# Patient Record
Sex: FEMALE | Race: WHITE | Employment: FULL TIME | URBAN - METROPOLITAN AREA
[De-identification: names, ages, dates, MRNs, and addresses within clinical notes are randomized per-mention and may not be internally consistent; named-entity substitution may affect disease eponyms.]

---

## 2016-03-08 LAB
ALBUMIN SERPL-MCNC: 4.3 G/DL
ALP BLD-CCNC: 64 U/L
ALT SERPL-CCNC: 77 U/L
ANION GAP SERPL CALCULATED.3IONS-SCNC: NORMAL MMOL/L
AST SERPL-CCNC: 45 U/L
AVERAGE GLUCOSE: NORMAL
BILIRUB SERPL-MCNC: 0.4 MG/DL (ref 0.1–1.4)
BUN BLDV-MCNC: 12 MG/DL
CALCIUM SERPL-MCNC: 9.9 MG/DL
CHLORIDE BLD-SCNC: 108 MMOL/L
CHOLESTEROL, TOTAL: 297 MG/DL
CHOLESTEROL/HDL RATIO: 6.5
CO2: 21 MMOL/L
CREAT SERPL-MCNC: 0.76 MG/DL
FOLATE: >24
GFR CALCULATED: 93
GLUCOSE BLD-MCNC: 84 MG/DL
HBA1C MFR BLD: 5.7 %
HDLC SERPL-MCNC: 46 MG/DL (ref 35–70)
LDL CHOLESTEROL CALCULATED: 190 MG/DL (ref 0–160)
POTASSIUM SERPL-SCNC: 4.5 MMOL/L
SODIUM BLD-SCNC: 143 MMOL/L
TOTAL PROTEIN: 6.9
TRIGL SERPL-MCNC: 306 MG/DL
VITAMIN B-12: 940
VLDLC SERPL CALC-MCNC: ABNORMAL MG/DL

## 2018-06-07 ENCOUNTER — HOSPITAL ENCOUNTER (OUTPATIENT)
Dept: WOMENS IMAGING | Age: 48
Discharge: HOME | End: 2018-06-07
Attending: NURSE PRACTITIONER | Admitting: NURSE PRACTITIONER

## 2018-06-07 ENCOUNTER — OFFICE VISIT (OUTPATIENT)
Dept: FAMILY MEDICINE CLINIC | Age: 48
End: 2018-06-07

## 2018-06-07 VITALS
OXYGEN SATURATION: 98 % | HEIGHT: 66 IN | HEART RATE: 62 BPM | TEMPERATURE: 98.5 F | WEIGHT: 188.8 LBS | SYSTOLIC BLOOD PRESSURE: 120 MMHG | BODY MASS INDEX: 30.34 KG/M2 | DIASTOLIC BLOOD PRESSURE: 84 MMHG

## 2018-06-07 DIAGNOSIS — R82.90 ABNORMAL URINALYSIS: ICD-10-CM

## 2018-06-07 DIAGNOSIS — N63.0 LUMP OR MASS IN BREAST: ICD-10-CM

## 2018-06-07 DIAGNOSIS — N63.23 BREAST LUMP ON LEFT SIDE AT 5 O'CLOCK POSITION: ICD-10-CM

## 2018-06-07 DIAGNOSIS — Z13.220 SCREENING FOR HYPERLIPIDEMIA: ICD-10-CM

## 2018-06-07 DIAGNOSIS — N63.21 BREAST LUMP ON LEFT SIDE AT 2 O'CLOCK POSITION: ICD-10-CM

## 2018-06-07 DIAGNOSIS — N63.21 BREAST LUMP ON LEFT SIDE AT 2 O'CLOCK POSITION: Primary | ICD-10-CM

## 2018-06-07 DIAGNOSIS — R53.82 CHRONIC FATIGUE: ICD-10-CM

## 2018-06-07 LAB
BILIRUBIN, POC: NEGATIVE
BLOOD URINE, POC: NEGATIVE
CHOLESTEROL: 245 MG/DL
CLARITY, POC: CLEAR
COLOR, POC: NORMAL
GLUCOSE URINE, POC: NEGATIVE
HDLC SERPL-MCNC: 65 MG/DL
KETONES, POC: NEGATIVE
LDL CHOLESTEROL DIRECT: 172 MG/DL
LEUKOCYTE EST, POC: NORMAL
NITRITE, POC: NEGATIVE
PH, POC: 7
PROTEIN, POC: NEGATIVE
SPECIFIC GRAVITY, POC: 1.01
TRIGL SERPL-MCNC: 120 MG/DL
TSH HIGH SENSITIVITY: 1.4 UIU/ML (ref 0.27–4.2)
UROBILINOGEN, POC: NORMAL
VITAMIN B-12: 705 PG/ML (ref 211–911)

## 2018-06-07 PROCEDURE — 99213 OFFICE O/P EST LOW 20 MIN: CPT | Performed by: NURSE PRACTITIONER

## 2018-06-07 PROCEDURE — 81002 URINALYSIS NONAUTO W/O SCOPE: CPT | Performed by: NURSE PRACTITIONER

## 2018-06-07 RX ORDER — TOPIRAMATE 50 MG/1
1 TABLET, FILM COATED ORAL 2 TIMES DAILY
Refills: 4 | COMMUNITY
Start: 2018-06-04 | End: 2018-06-25 | Stop reason: DRUGHIGH

## 2018-06-07 RX ORDER — LEVOTHYROXINE SODIUM 0.03 MG/1
1 TABLET ORAL DAILY
Refills: 2 | COMMUNITY
Start: 2018-05-19 | End: 2018-06-25 | Stop reason: SDUPTHER

## 2018-06-07 ASSESSMENT — ENCOUNTER SYMPTOMS
CHEST TIGHTNESS: 1
VOMITING: 1
NAUSEA: 1
ABDOMINAL PAIN: 1
SHORTNESS OF BREATH: 1
CONSTIPATION: 1
DIARRHEA: 1
BLOOD IN STOOL: 1
COUGH: 1

## 2018-06-07 ASSESSMENT — PATIENT HEALTH QUESTIONNAIRE - PHQ9
SUM OF ALL RESPONSES TO PHQ QUESTIONS 1-9: 0
SUM OF ALL RESPONSES TO PHQ9 QUESTIONS 1 & 2: 0
1. LITTLE INTEREST OR PLEASURE IN DOING THINGS: 0
2. FEELING DOWN, DEPRESSED OR HOPELESS: 0

## 2018-06-09 LAB — URINE CULTURE, ROUTINE: NORMAL

## 2018-06-25 ENCOUNTER — OFFICE VISIT (OUTPATIENT)
Dept: FAMILY MEDICINE CLINIC | Age: 48
End: 2018-06-25

## 2018-06-25 VITALS
DIASTOLIC BLOOD PRESSURE: 70 MMHG | SYSTOLIC BLOOD PRESSURE: 118 MMHG | WEIGHT: 188.6 LBS | HEART RATE: 58 BPM | BODY MASS INDEX: 30.67 KG/M2

## 2018-06-25 DIAGNOSIS — F51.01 PRIMARY INSOMNIA: ICD-10-CM

## 2018-06-25 DIAGNOSIS — R51.9 HEADACHE DISORDER: ICD-10-CM

## 2018-06-25 DIAGNOSIS — Z90.710 HISTORY OF HYSTERECTOMY: ICD-10-CM

## 2018-06-25 DIAGNOSIS — J44.9 CHRONIC OBSTRUCTIVE PULMONARY DISEASE, UNSPECIFIED COPD TYPE (HCC): ICD-10-CM

## 2018-06-25 DIAGNOSIS — M79.7 FIBROMYALGIA: ICD-10-CM

## 2018-06-25 DIAGNOSIS — H72.92 RUPTURED EAR DRUM, LEFT: ICD-10-CM

## 2018-06-25 DIAGNOSIS — K62.5 RECTAL BLEEDING: Primary | ICD-10-CM

## 2018-06-25 DIAGNOSIS — K58.2 IRRITABLE BOWEL SYNDROME WITH BOTH CONSTIPATION AND DIARRHEA: ICD-10-CM

## 2018-06-25 DIAGNOSIS — F25.0 SCHIZOAFFECTIVE DISORDER, BIPOLAR TYPE (HCC): ICD-10-CM

## 2018-06-25 PROBLEM — F25.9 SCHIZOAFFECTIVE DISORDER (HCC): Status: ACTIVE | Noted: 2018-06-25

## 2018-06-25 PROBLEM — E78.5 HYPERLIPIDEMIA: Status: ACTIVE | Noted: 2018-06-25

## 2018-06-25 PROCEDURE — G8926 SPIRO NO PERF OR DOC: HCPCS | Performed by: FAMILY MEDICINE

## 2018-06-25 PROCEDURE — 1036F TOBACCO NON-USER: CPT | Performed by: FAMILY MEDICINE

## 2018-06-25 PROCEDURE — 3017F COLORECTAL CA SCREEN DOC REV: CPT | Performed by: FAMILY MEDICINE

## 2018-06-25 PROCEDURE — 3023F SPIROM DOC REV: CPT | Performed by: FAMILY MEDICINE

## 2018-06-25 PROCEDURE — G8417 CALC BMI ABV UP PARAM F/U: HCPCS | Performed by: FAMILY MEDICINE

## 2018-06-25 PROCEDURE — 99214 OFFICE O/P EST MOD 30 MIN: CPT | Performed by: FAMILY MEDICINE

## 2018-06-25 PROCEDURE — G8427 DOCREV CUR MEDS BY ELIG CLIN: HCPCS | Performed by: FAMILY MEDICINE

## 2018-06-25 RX ORDER — LEVOTHYROXINE SODIUM 0.03 MG/1
25 TABLET ORAL DAILY
Qty: 30 TABLET | Refills: 2 | Status: SHIPPED | OUTPATIENT
Start: 2018-06-25 | End: 2018-09-07

## 2018-06-25 RX ORDER — VIT C/B6/B5/MAGNESIUM/HERB 173 50-5-6-5MG
1 CAPSULE ORAL DAILY
Status: ON HOLD | COMMUNITY
End: 2020-02-01 | Stop reason: ALTCHOICE

## 2018-06-25 RX ORDER — TOPIRAMATE 100 MG/1
100 TABLET, FILM COATED ORAL 2 TIMES DAILY
Qty: 60 TABLET | Refills: 2 | Status: SHIPPED | OUTPATIENT
Start: 2018-06-25 | End: 2018-09-26 | Stop reason: SDUPTHER

## 2018-06-25 RX ORDER — DICYCLOMINE HYDROCHLORIDE 10 MG/1
10 CAPSULE ORAL 3 TIMES DAILY
Qty: 90 CAPSULE | Refills: 2 | Status: SHIPPED | OUTPATIENT
Start: 2018-06-25 | End: 2018-09-26 | Stop reason: SDUPTHER

## 2018-06-25 RX ORDER — ALBUTEROL SULFATE 90 UG/1
2 AEROSOL, METERED RESPIRATORY (INHALATION) EVERY 6 HOURS PRN
Qty: 1 INHALER | Refills: 1 | Status: SHIPPED | OUTPATIENT
Start: 2018-06-25 | End: 2018-09-26 | Stop reason: SDUPTHER

## 2018-06-26 ASSESSMENT — ENCOUNTER SYMPTOMS
BLOOD IN STOOL: 1
EYES NEGATIVE: 1
CONSTIPATION: 1
HEMATOCHEZIA: 1
SHORTNESS OF BREATH: 1
DIARRHEA: 1

## 2018-06-26 ASSESSMENT — COPD QUESTIONNAIRES: COPD: 1

## 2018-07-10 ENCOUNTER — OFFICE VISIT (OUTPATIENT)
Dept: FAMILY MEDICINE CLINIC | Age: 48
End: 2018-07-10

## 2018-07-10 VITALS
WEIGHT: 179.2 LBS | TEMPERATURE: 98.2 F | BODY MASS INDEX: 28.8 KG/M2 | SYSTOLIC BLOOD PRESSURE: 120 MMHG | HEART RATE: 79 BPM | HEIGHT: 66 IN | DIASTOLIC BLOOD PRESSURE: 88 MMHG | OXYGEN SATURATION: 98 %

## 2018-07-10 DIAGNOSIS — R11.2 NAUSEA AND VOMITING, INTRACTABILITY OF VOMITING NOT SPECIFIED, UNSPECIFIED VOMITING TYPE: Primary | ICD-10-CM

## 2018-07-10 DIAGNOSIS — R42 VERTIGO: ICD-10-CM

## 2018-07-10 PROCEDURE — 99213 OFFICE O/P EST LOW 20 MIN: CPT | Performed by: FAMILY MEDICINE

## 2018-07-10 PROCEDURE — G8417 CALC BMI ABV UP PARAM F/U: HCPCS | Performed by: FAMILY MEDICINE

## 2018-07-10 PROCEDURE — 1036F TOBACCO NON-USER: CPT | Performed by: FAMILY MEDICINE

## 2018-07-10 PROCEDURE — 96372 THER/PROPH/DIAG INJ SC/IM: CPT | Performed by: FAMILY MEDICINE

## 2018-07-10 PROCEDURE — G8427 DOCREV CUR MEDS BY ELIG CLIN: HCPCS | Performed by: FAMILY MEDICINE

## 2018-07-10 PROCEDURE — 3017F COLORECTAL CA SCREEN DOC REV: CPT | Performed by: FAMILY MEDICINE

## 2018-07-10 RX ORDER — PROMETHAZINE HYDROCHLORIDE 25 MG/ML
25 INJECTION, SOLUTION INTRAMUSCULAR; INTRAVENOUS ONCE
Status: COMPLETED | OUTPATIENT
Start: 2018-07-10 | End: 2018-07-10

## 2018-07-10 RX ORDER — MECLIZINE HYDROCHLORIDE 25 MG/1
25 TABLET ORAL 3 TIMES DAILY PRN
Qty: 25 TABLET | Refills: 1 | Status: SHIPPED | OUTPATIENT
Start: 2018-07-10 | End: 2018-09-26 | Stop reason: SDUPTHER

## 2018-07-10 RX ADMIN — PROMETHAZINE HYDROCHLORIDE 25 MG: 25 INJECTION, SOLUTION INTRAMUSCULAR; INTRAVENOUS at 14:04

## 2018-07-10 ASSESSMENT — ENCOUNTER SYMPTOMS: VOMITING: 1

## 2018-07-10 NOTE — PATIENT INSTRUCTIONS
Patient Education        Epley Maneuver at Home for Vertigo: Exercises  Your Care Instructions  Vertigo is a spinning or whirling sensation when you move your head. Your doctor may have moved you in different positions to help your vertigo get better faster. This is called the Epley maneuver. Your doctor also may have asked you to do these exercises at home. Do the exercises as often as your doctor recommends. If your vertigo is getting worse, your doctor may have you change the exercise or stop it. Step 1  Step 1    1. Sit on the edge of a bed or sofa. Step 2    1. Turn your head 45 degrees in the direction your doctor told you to. This should be toward the ear that causes the most vertigo for you. In this picture, the woman is turning toward her left ear. Step 3    1. Tilt yourself backward until you are lying on your back. Your head should still be at a 45-degree turn. Your head should be about midway between looking straight ahead and looking out to your side. Hold for 30 seconds. If you have vertigo, stay in this position until it stops. Step 4    1. Turn your head 90 degrees toward the ear that has the least vertigo. In this picture, the woman is turning to the right because she has vertigo on her left side. The point of your chin should be raised and over your shoulder. Hold for 30 seconds. Step 5    1. Roll onto the side with the least vertigo. You should now be looking at the floor. Hold for 30 seconds. Follow-up care is a key part of your treatment and safety. Be sure to make and go to all appointments, and call your doctor if you are having problems. It's also a good idea to know your test results and keep a list of the medicines you take. Where can you learn more? Go to https://chpejennifereb.[employer]. org and sign in to your [employer] account. Enter U989 in the [employer]z box to learn more about \"Epley Maneuver at Home for Vertigo: Exercises. \"     If you do not have an

## 2018-07-11 ENCOUNTER — TELEPHONE (OUTPATIENT)
Dept: FAMILY MEDICINE CLINIC | Age: 48
End: 2018-07-11

## 2018-07-11 DIAGNOSIS — R42 VERTIGO: Primary | ICD-10-CM

## 2018-07-11 RX ORDER — PROMETHAZINE HYDROCHLORIDE 25 MG/1
25 TABLET ORAL EVERY 6 HOURS PRN
Qty: 20 TABLET | Refills: 0 | Status: SHIPPED | OUTPATIENT
Start: 2018-07-11 | End: 2018-09-07

## 2018-07-11 ASSESSMENT — ENCOUNTER SYMPTOMS: NAUSEA: 1

## 2018-07-11 NOTE — PROGRESS NOTES
LM on VM for patient to return call
07/10/18 1322 07/10/18 1325   BP: (!) 122/90 120/88   Site: Left Arm Left Arm   Position: Sitting Sitting   Cuff Size: Medium Adult Medium Adult   Pulse: 79    Temp: 98.2 °F (36.8 °C)    TempSrc: Oral    SpO2: 98%    Weight: 179 lb 3.2 oz (81.3 kg)    Height: 5' 5.75\" (1.67 m)      Body mass index is 29.14 kg/m². Wt Readings from Last 3 Encounters:   07/10/18 179 lb 3.2 oz (81.3 kg)   06/25/18 188 lb 9.6 oz (85.5 kg)   06/07/18 188 lb 12.8 oz (85.6 kg)     BP Readings from Last 3 Encounters:   07/10/18 120/88   06/25/18 118/70   06/07/18 120/84          No results found for this visit on 07/10/18. Assessment:       Diagnosis Orders   1. Nausea and vomiting, intractability of vomiting not specified, unspecified vomiting type  promethazine (PHENERGAN) injection 25 mg    meclizine (ANTIVERT) 25 MG tablet   2. Vertigo             Plan:      Patient did not want the Epley maneuver done    Epley maneuver demonstrated--can watch u tube video for this. Repeat 10 times per day each side. May make nauseated, so do these on empty stomach. Symptoms may recur, so re-start the exercises. Meclizine may make sleepy, so take with caution.

## 2018-07-11 NOTE — TELEPHONE ENCOUNTER
Patient said she is still feeling nauseous and dizzy, about same as yesterday. Patient is out of Zofran, but that does not help. The phenergan injection helped yesterday.   Patient inquired if you can seen something in for nausea to [address]

## 2018-07-16 PROBLEM — M54.9 CHRONIC BACK PAIN: Status: ACTIVE | Noted: 2018-07-16

## 2018-07-16 PROBLEM — K76.0 FATTY LIVER: Status: ACTIVE | Noted: 2018-07-16

## 2018-07-16 PROBLEM — G89.29 CHRONIC BACK PAIN: Status: ACTIVE | Noted: 2018-07-16

## 2018-09-07 ENCOUNTER — OFFICE VISIT (OUTPATIENT)
Dept: FAMILY MEDICINE CLINIC | Age: 48
End: 2018-09-07

## 2018-09-07 VITALS
TEMPERATURE: 98 F | HEIGHT: 66 IN | RESPIRATION RATE: 15 BRPM | BODY MASS INDEX: 28.54 KG/M2 | DIASTOLIC BLOOD PRESSURE: 70 MMHG | SYSTOLIC BLOOD PRESSURE: 118 MMHG | WEIGHT: 177.6 LBS | OXYGEN SATURATION: 95 % | HEART RATE: 61 BPM

## 2018-09-07 DIAGNOSIS — R05.9 COUGH: ICD-10-CM

## 2018-09-07 DIAGNOSIS — J32.0 RIGHT MAXILLARY SINUSITIS: Primary | ICD-10-CM

## 2018-09-07 PROCEDURE — 1036F TOBACCO NON-USER: CPT | Performed by: FAMILY MEDICINE

## 2018-09-07 PROCEDURE — 99213 OFFICE O/P EST LOW 20 MIN: CPT | Performed by: FAMILY MEDICINE

## 2018-09-07 PROCEDURE — G8417 CALC BMI ABV UP PARAM F/U: HCPCS | Performed by: FAMILY MEDICINE

## 2018-09-07 PROCEDURE — G8427 DOCREV CUR MEDS BY ELIG CLIN: HCPCS | Performed by: FAMILY MEDICINE

## 2018-09-07 PROCEDURE — 3017F COLORECTAL CA SCREEN DOC REV: CPT | Performed by: FAMILY MEDICINE

## 2018-09-07 RX ORDER — LEVOTHYROXINE SODIUM 0.05 MG/1
50 TABLET ORAL DAILY
COMMUNITY
End: 2018-09-26 | Stop reason: SDUPTHER

## 2018-09-07 NOTE — PROGRESS NOTES
OFFICE VISIT      Patient ID: Wade Braun [id number]  Chief Complaint   Patient presents with    Sinusitis     sinsus pressure, congestion. Has tried flonase and allergy medication. ongoing for a few days. cough       HPI . HPI per chief complaint    Review of Systems   Constitutional: Negative for chills, diaphoresis and fever. .  ROS per HPI. ROS is otherwise negative    Patient Active Problem List   Diagnosis    Hx of cardiovascular stress test    Palpitation    H/O 24 hour EKG monitoring    Fibromyalgia    Chronic obstructive pulmonary disease (HCC)    [facility] [address]    History of hysterectomy    Hyperlipidemia    Fatty liver    Chronic back pain       Past Medical History:   Diagnosis Date    COPD (chronic obstructive pulmonary disease) ([facility] [address] 10/2013    GERD (gastroesophageal reflux disease)     H/O 24 hour EKG monitoring 08/05/14-09/03/14    30 DAY EVENT MONITOR-Normal sinus rhythm    H/O fibromyalgia     History of 24 hour EKG monitoring 6/30/14    Sinus rhythm. Isolated  premature atrial contract. and premature ventricular contraction. Sinus tachycardia present.  History of echocardiogram 7/1/14    EF 55% Mild left ventricular hypertrophy noted. Mild mitral and TR noted. The patient is bradycardic during the study.  Hx of cardiovascular stress test 6/30/14    Lexiscan: EF 73% Negative electrocardiogram suggestive for ischemia.     Hypertension     Spastic paraparesis        Past Surgical History:   Procedure Laterality Date    BACK SURGERY      CARDIAC CATHETERIZATION      CORONARY ANGIOPLASTY      HYSTERECTOMY      INGUINAL HERNIA REPAIR Right     INNER EAR SURGERY Left     TUBAL LIGATION         Family History   Problem Relation Age of Onset    Heart Disease Mother     Heart Disease Father     Diabetes Sister     Heart Disease Sister     Kidney Disease Sister     Breast Cancer Maternal Grandmother     Breast Cancer Paternal Aunt         2 aunts    Ovarian Cancer Paternal Aunt          in her 29's       Current Outpatient Prescriptions on File Prior to Visit   Medication Sig Dispense Refill    Multiple Vitamins-Minerals (MULTIPLE VITAMINS/WOMENS PO) Take by mouth      topiramate (TOPAMAX) 100 MG tablet Take 1 tablet by mouth 2 times daily 60 tablet 2    dicyclomine (BENTYL) 10 MG capsule Take 1 capsule by mouth 3 times daily 90 capsule 2    albuterol sulfate  (90 Base) MCG/ACT inhaler Inhale 2 puffs into the lungs every 6 hours as needed for Wheezing 1 Inhaler 1    metoprolol tartrate (LOPRESSOR) 25 MG tablet Take 1 tablet by mouth 2 times daily 60 tablet 2    meclizine (ANTIVERT) 25 MG tablet Take 1 tablet by mouth 3 times daily as needed for Dizziness 25 tablet 1    Turmeric 500 MG CAPS Take by mouth       No current facility-administered medications on file prior to visit. Objective:   Physical Exam   Constitutional: She appears well-developed and well-nourished. No distress. HENT:   Head: Normocephalic and atraumatic. Right Ear: Tympanic membrane and external ear normal.   Left Ear: Tympanic membrane and external ear normal.   Nose: No mucosal edema, rhinorrhea, nose lacerations, sinus tenderness or nasal deformity. Right sinus exhibits maxillary sinus tenderness. Right sinus exhibits no frontal sinus tenderness. Left sinus exhibits maxillary sinus tenderness. Left sinus exhibits no frontal sinus tenderness. Mouth/Throat: Oropharynx is clear and moist and mucous membranes are normal. No oropharyngeal exudate, posterior oropharyngeal edema or posterior oropharyngeal erythema. Neck: Neck supple. No tracheal deviation present. No thyromegaly present. Cardiovascular: Normal rate, regular rhythm, S1 normal, S2 normal and normal heart sounds. Exam reveals no gallop and no friction rub. Pulmonary/Chest: Effort normal and breath sounds normal. No respiratory distress. She has no wheezes. She has no rales.

## 2018-09-07 NOTE — PATIENT INSTRUCTIONS
all appointments, and call your doctor if you are having problems. It's also a good idea to know your test results and keep a list of the medicines you take. How can you care for yourself at home? · You can buy premixed saline solution in a squeeze bottle or other sinus rinse products at a drugstore. Read and follow the instructions on the label. · You also can make your own saline solution by adding 1 teaspoon of salt and 1 teaspoon of baking soda to 2 cups of distilled water. · If you use a homemade solution, pour a small amount into a clean bowl. Using a rubber bulb syringe, squeeze the syringe and place the tip in the salt water. Pull a small amount of the salt water into the syringe by relaxing your hand. · Sit down with your head tilted slightly back. Do not lie down. Put the tip of the bulb syringe or the squeeze bottle a little way into one of your nostrils. Gently drip or squirt a few drops into the nostril. Repeat with the other nostril. Some sneezing and gagging are normal at first.  · Gently blow your nose. · Wipe the syringe or bottle tip clean after each use. · Repeat this 2 or 3 times a day. · Use nasal washes gently if you have nosebleeds often. When should you call for help? Watch closely for changes in your health, and be sure to contact your doctor if:    · You often get nosebleeds.     · You have problems doing the nasal washes. Where can you learn more? Go to [email or web address]://[email or web address]peWriter.ly.[employer]. org and sign in to your [employer] account. Enter [phone] in the [facility] box to learn more about \"Saline Nasal Washes: Care Instructions. \"     If you do not have an account, please click on the \"Sign Up Now\" link. Current as of: May 12, 2017  Content Version: 11.7  © 6937-7705 [employer]. Care instructions adapted under license by [facility] ([facility]).  If you have questions about a medical condition or this instruction, always ask your healthcare professional. Juan C

## 2018-09-10 ENCOUNTER — TELEPHONE (OUTPATIENT)
Dept: FAMILY MEDICINE CLINIC | Age: 48
End: 2018-09-10

## 2018-09-10 DIAGNOSIS — J01.00 ACUTE MAXILLARY SINUSITIS, RECURRENCE NOT SPECIFIED: Primary | ICD-10-CM

## 2018-09-10 RX ORDER — CLINDAMYCIN HYDROCHLORIDE 300 MG/1
300 CAPSULE ORAL 3 TIMES DAILY
Qty: 30 CAPSULE | Refills: 0 | Status: SHIPPED | OUTPATIENT
Start: 2018-09-10 | End: 2018-09-20

## 2018-09-10 RX ORDER — LEVOFLOXACIN 500 MG/1
500 TABLET, FILM COATED ORAL DAILY
Qty: 10 TABLET | Refills: 0 | Status: SHIPPED | OUTPATIENT
Start: 2018-09-10 | End: 2018-09-10

## 2018-09-10 NOTE — TELEPHONE ENCOUNTER
Patient called back and said she does not have prescription coverage and the Levaquin cost $120 at [facility]. Patient would like alternative. Patient stated she can go to [facility] if there are any free antibiotic that would work for her.

## 2018-09-11 ENCOUNTER — TELEPHONE (OUTPATIENT)
Dept: FAMILY MEDICINE CLINIC | Age: 48
End: 2018-09-11

## 2018-09-26 ENCOUNTER — OFFICE VISIT (OUTPATIENT)
Dept: FAMILY MEDICINE CLINIC | Age: 48
End: 2018-09-26
Payer: MEDICARE

## 2018-09-26 VITALS
BODY MASS INDEX: 27.9 KG/M2 | HEART RATE: 88 BPM | WEIGHT: 173.6 LBS | HEIGHT: 66 IN | SYSTOLIC BLOOD PRESSURE: 142 MMHG | DIASTOLIC BLOOD PRESSURE: 90 MMHG

## 2018-09-26 DIAGNOSIS — R42 VERTIGO: ICD-10-CM

## 2018-09-26 DIAGNOSIS — R51.9 HEADACHE DISORDER: ICD-10-CM

## 2018-09-26 DIAGNOSIS — J41.0 SIMPLE CHRONIC BRONCHITIS (HCC): ICD-10-CM

## 2018-09-26 DIAGNOSIS — K58.2 IRRITABLE BOWEL SYNDROME WITH BOTH CONSTIPATION AND DIARRHEA: ICD-10-CM

## 2018-09-26 DIAGNOSIS — I10 ESSENTIAL HYPERTENSION: ICD-10-CM

## 2018-09-26 DIAGNOSIS — J44.9 CHRONIC OBSTRUCTIVE PULMONARY DISEASE, UNSPECIFIED COPD TYPE (HCC): Primary | ICD-10-CM

## 2018-09-26 DIAGNOSIS — E03.9 ACQUIRED HYPOTHYROIDISM: ICD-10-CM

## 2018-09-26 DIAGNOSIS — Z87.891 FORMER TOBACCO USE: ICD-10-CM

## 2018-09-26 DIAGNOSIS — Z23 NEED FOR INFLUENZA VACCINATION: ICD-10-CM

## 2018-09-26 PROBLEM — G89.29 CHRONIC LOW BACK PAIN: Status: ACTIVE | Noted: 2018-09-26

## 2018-09-26 PROBLEM — M54.50 CHRONIC LOW BACK PAIN: Status: ACTIVE | Noted: 2018-09-26

## 2018-09-26 PROCEDURE — G0008 ADMIN INFLUENZA VIRUS VAC: HCPCS | Performed by: FAMILY MEDICINE

## 2018-09-26 PROCEDURE — G8427 DOCREV CUR MEDS BY ELIG CLIN: HCPCS | Performed by: FAMILY MEDICINE

## 2018-09-26 PROCEDURE — 3023F SPIROM DOC REV: CPT | Performed by: FAMILY MEDICINE

## 2018-09-26 PROCEDURE — G8417 CALC BMI ABV UP PARAM F/U: HCPCS | Performed by: FAMILY MEDICINE

## 2018-09-26 PROCEDURE — 99214 OFFICE O/P EST MOD 30 MIN: CPT | Performed by: FAMILY MEDICINE

## 2018-09-26 PROCEDURE — 90686 IIV4 VACC NO PRSV 0.5 ML IM: CPT | Performed by: FAMILY MEDICINE

## 2018-09-26 PROCEDURE — 3017F COLORECTAL CA SCREEN DOC REV: CPT | Performed by: FAMILY MEDICINE

## 2018-09-26 PROCEDURE — G8926 SPIRO NO PERF OR DOC: HCPCS | Performed by: FAMILY MEDICINE

## 2018-09-26 PROCEDURE — 1036F TOBACCO NON-USER: CPT | Performed by: FAMILY MEDICINE

## 2018-09-26 RX ORDER — AMLODIPINE BESYLATE 10 MG/1
10 TABLET ORAL DAILY
Qty: 30 TABLET | Refills: 3 | Status: ON HOLD | OUTPATIENT
Start: 2018-09-26 | End: 2020-02-01

## 2018-09-26 RX ORDER — LEVOTHYROXINE SODIUM 0.05 MG/1
50 TABLET ORAL DAILY
Qty: 30 TABLET | Refills: 3 | Status: SHIPPED | OUTPATIENT
Start: 2018-09-26

## 2018-09-26 RX ORDER — MECLIZINE HYDROCHLORIDE 25 MG/1
25 TABLET ORAL 3 TIMES DAILY PRN
Qty: 25 TABLET | Refills: 1 | Status: SHIPPED | OUTPATIENT
Start: 2018-09-26 | End: 2020-03-10 | Stop reason: CLARIF

## 2018-09-26 RX ORDER — DICYCLOMINE HYDROCHLORIDE 10 MG/1
10 CAPSULE ORAL 3 TIMES DAILY
Qty: 90 CAPSULE | Refills: 3 | Status: SHIPPED | OUTPATIENT
Start: 2018-09-26

## 2018-09-26 RX ORDER — ALBUTEROL SULFATE 90 UG/1
2 AEROSOL, METERED RESPIRATORY (INHALATION) EVERY 6 HOURS PRN
Qty: 1 INHALER | Refills: 1 | Status: SHIPPED | OUTPATIENT
Start: 2018-09-26 | End: 2018-10-11 | Stop reason: CLARIF

## 2018-09-26 RX ORDER — TOPIRAMATE 100 MG/1
100 TABLET, FILM COATED ORAL 2 TIMES DAILY
Qty: 60 TABLET | Refills: 3 | Status: SHIPPED | OUTPATIENT
Start: 2018-09-26 | End: 2021-06-10 | Stop reason: CLARIF

## 2018-09-26 RX ORDER — FLUTICASONE PROPIONATE 220 UG/1
2 AEROSOL, METERED RESPIRATORY (INHALATION) 2 TIMES DAILY
Qty: 1 INHALER | Refills: 3 | Status: SHIPPED | OUTPATIENT
Start: 2018-09-26 | End: 2019-01-15 | Stop reason: ALTCHOICE

## 2018-09-26 ASSESSMENT — COPD QUESTIONNAIRES: COPD: 1

## 2018-09-26 ASSESSMENT — ENCOUNTER SYMPTOMS
DIARRHEA: 0
CHEST TIGHTNESS: 1
CONSTIPATION: 0
SHORTNESS OF BREATH: 1

## 2018-09-26 NOTE — PROGRESS NOTES
Vaccine Information Sheet, \"Influenza - Inactivated\"  given to [facility], or parent/legal guardian of  [facility] and verbalized understanding. Patient responses:    Have you ever had a reaction to a flu vaccine? No  Are you able to eat eggs without adverse effects? Yes  Do you have any current illness? No  Have you ever had [place] Syndrome? No    Flu vaccine given per order. Please see immunization tab.

## 2018-09-26 NOTE — PROGRESS NOTES
Chronic back pain    Chronic low back pain    Acquired hypothyroidism    Former tobacco use    Simple chronic bronchitis (HCC)       Past Medical History:   Diagnosis Date    COPD (chronic obstructive pulmonary disease) ([facility] [address] 10/2013    GERD (gastroesophageal reflux disease)     H/O 24 hour EKG monitoring 14-14    30 DAY EVENT MONITOR-Normal sinus rhythm    H/O fibromyalgia     History of 24 hour EKG monitoring 14    Sinus rhythm. Isolated  premature atrial contract. and premature ventricular contraction. Sinus tachycardia present.  History of echocardiogram 14    EF 55% Mild left ventricular hypertrophy noted. Mild mitral and TR noted. The patient is bradycardic during the study.  Hx of cardiovascular stress test 14    Lexiscan: EF 73% Negative electrocardiogram suggestive for ischemia.  Hypertension     Spastic paraparesis        Past Surgical History:   Procedure Laterality Date    BACK SURGERY      CARDIAC CATHETERIZATION      CORONARY ANGIOPLASTY      HYSTERECTOMY      INGUINAL HERNIA REPAIR Right     INNER EAR SURGERY Left     TUBAL LIGATION         Family History   Problem Relation Age of Onset    Heart Disease Mother     Heart Disease Father     Diabetes Sister     Heart Disease Sister     Kidney Disease Sister     Breast Cancer Maternal Grandmother     Breast Cancer Paternal Aunt         2 aunts    Ovarian Cancer Paternal Aunt          in her 29's       Current Outpatient Prescriptions on File Prior to Visit   Medication Sig Dispense Refill    Multiple Vitamins-Minerals (MULTIPLE VITAMINS/WOMENS PO) Take by mouth      Turmeric 500 MG CAPS Take by mouth       No current facility-administered medications on file prior to visit.                     Objective:   Physical Exam  Vitals:    18 1007 18 1012   BP: (!) 142/90 (!) 142/90   Pulse: 88    Weight: 173 lb 9.6 oz (78.7 kg)    Height: 5' 5.5\" (1.664 m)      Body mass index is 28.45 kg/m². Wt Readings from Last 3 Encounters:   09/26/18 173 lb 9.6 oz (78.7 kg)   09/07/18 177 lb 9.6 oz (80.6 kg)   07/10/18 179 lb 3.2 oz (81.3 kg)     BP Readings from Last 3 Encounters:   09/26/18 (!) 142/90   09/07/18 118/70   07/10/18 120/88          No results found for this visit on 09/26/18.   The 10-year ASCVD risk score (Tyson Alvarez, et al., 2013) is: 1.6%    Values used to calculate the score:      Age: 46 years      Sex: Female      Is Non- : No      Diabetic: No      Tobacco smoker: No      Systolic Blood Pressure: 859 mmHg      Is BP treated: No      HDL Cholesterol: 65 MG/DL      Total Cholesterol: 245 MG/DL  Lab Review   Abstract on 07/17/2018   Component Date Value    Cholesterol, Total 03/08/2016 297     HDL 03/08/2016 46     LDL Calculated 03/08/2016 190*    Triglycerides 03/08/2016 306     Chol/HDL Ratio 03/08/2016 6.5     Sodium 03/08/2016 143     Chloride 03/08/2016 108     Potassium 03/08/2016 4.5     BUN 03/08/2016 12     CREATININE 03/08/2016 0.76     Glucose 03/08/2016 84     AST 03/08/2016 45     ALT 03/08/2016 77     Calcium 03/08/2016 9.9     Total Protein 03/08/2016 6.9     CO2 03/08/2016 21     Alb 03/08/2016 4.3     Alkaline Phosphatase 03/08/2016 64     Total Bilirubin 03/08/2016 0.4     Gfr Calculated 03/08/2016 93     Hemoglobin A1C 03/08/2016 5.7     Vitamin B-12 03/08/2016 940     Folate 03/08/2016 >24.0    Hospital Outpatient Visit on 06/07/2018   Component Date Value    Vitamin B-12 06/07/2018 705.0     TSH, High Sensitivity 06/07/2018 1.400     Triglycerides 06/07/2018 120     Cholesterol 06/07/2018 245*    HDL 06/07/2018 65     LDL Direct 06/07/2018 172*   Office Visit on 06/07/2018   Component Date Value    Urine Culture, Routine 06/07/2018 No growth at 18-36 hours     Color, UA 06/07/2018 Pale Yellow     Clarity, UA 06/07/2018 Clear     Glucose, UA POC 06/07/2018 Negative     Bilirubin, UA 06/07/2018 Negative     Urine 05/23/2018 NEGATIVE     Specific [place], UA 05/23/2018 1.006     Blood, Urine 05/23/2018 NEGATIVE     pH, Urine 05/23/2018 7.0     Protein, UA 05/23/2018 NEGATIVE     Urobilinogen, Urine 05/23/2018 NORMAL     Nitrite Urine, Quantitat* 05/23/2018 NEGATIVE     Leukocyte Esterase, Urine 05/23/2018 MODERATE*    RBC, UA 05/23/2018 NONE SEEN     WBC, UA 05/23/2018 6*    Bacteria, UA 05/23/2018 RARE*    Squam Epithel, UA 05/23/2018 3     Trichomonas, UA 05/23/2018 NONE SEEN     Ventricular Rate 05/23/2018 59     Atrial Rate 05/23/2018 59     P-R Interval 05/23/2018 186     QRS Duration 05/23/2018 96     Q-T Interval 05/23/2018 414     QTc Calculation (Bazett) 05/23/2018 409     P Axis 05/23/2018 32     R Axis 05/23/2018 8     T Axis 05/23/2018 28     Diagnosis 05/23/2018                      Value:Sinus bradycardia  Otherwise normal ECG  No previous ECGs available  Confirmed by Chacha Lima MD, Cici Qureshi ([id number]) on 5/24/2018 10:34:55 AM             Assessment:       Diagnosis Orders   1. Chronic obstructive pulmonary disease, unspecified COPD type (HCC)  fluticasone (FLOVENT HFA) 220 MCG/ACT inhaler    albuterol sulfate  (90 Base) MCG/ACT inhaler   2. Need for influenza vaccination  INFLUENZA, QUADV, 3 YRS AND OLDER, IM, PF, PREFILL SYR OR SDV, 0.5ML (FLUZONE QUADV, PF)   3. Acquired hypothyroidism  levothyroxine (SYNTHROID) 50 MCG tablet   4. Former tobacco use     5. Simple chronic bronchitis ([address]     6. Headache disorder  topiramate (TOPAMAX) 100 MG tablet   7. Irritable bowel syndrome with both constipation and diarrhea  dicyclomine (BENTYL) 10 MG capsule   8. Vertigo  meclizine (ANTIVERT) 25 MG tablet   9. Essential hypertension  amLODIPine (NORVASC) 10 MG tablet         Plan:      See orders    Blood pressure is high today. I'm switching her from beta blocker to amlodipine. The beta blocker may have been contributing to her COPD symptoms    COPD poorly controlled.   Patient stopped

## 2018-10-09 ENCOUNTER — OFFICE VISIT (OUTPATIENT)
Dept: FAMILY MEDICINE CLINIC | Age: 48
End: 2018-10-09
Payer: MEDICARE

## 2018-10-09 VITALS
WEIGHT: 169 LBS | HEART RATE: 83 BPM | BODY MASS INDEX: 27.16 KG/M2 | HEIGHT: 66 IN | SYSTOLIC BLOOD PRESSURE: 120 MMHG | DIASTOLIC BLOOD PRESSURE: 74 MMHG

## 2018-10-09 DIAGNOSIS — M54.2 NECK PAIN ON LEFT SIDE: ICD-10-CM

## 2018-10-09 DIAGNOSIS — I10 ESSENTIAL HYPERTENSION: Primary | ICD-10-CM

## 2018-10-09 PROCEDURE — 99213 OFFICE O/P EST LOW 20 MIN: CPT | Performed by: FAMILY MEDICINE

## 2018-10-09 RX ORDER — HYDROCODONE BITARTRATE AND ACETAMINOPHEN 5; 325 MG/1; MG/1
1 TABLET ORAL EVERY 6 HOURS PRN
Qty: 20 TABLET | Refills: 0 | Status: SHIPPED | OUTPATIENT
Start: 2018-10-09 | End: 2018-10-14

## 2018-10-09 RX ORDER — IBUPROFEN 200 MG
800 TABLET ORAL EVERY 6 HOURS PRN
COMMUNITY
End: 2019-01-15

## 2018-10-09 RX ORDER — BACLOFEN 20 MG/1
20 TABLET ORAL 3 TIMES DAILY
Qty: 40 TABLET | Refills: 0 | Status: SHIPPED | OUTPATIENT
Start: 2018-10-09 | End: 2018-10-23 | Stop reason: SDUPTHER

## 2018-10-09 ASSESSMENT — ENCOUNTER SYMPTOMS: SHORTNESS OF BREATH: 0

## 2018-10-11 DIAGNOSIS — J41.0 SIMPLE CHRONIC BRONCHITIS (HCC): Primary | ICD-10-CM

## 2018-10-16 ENCOUNTER — OFFICE VISIT (OUTPATIENT)
Dept: FAMILY MEDICINE CLINIC | Age: 48
End: 2018-10-16
Payer: MEDICARE

## 2018-10-16 VITALS
BODY MASS INDEX: 27.96 KG/M2 | HEART RATE: 69 BPM | OXYGEN SATURATION: 99 % | WEIGHT: 170.6 LBS | DIASTOLIC BLOOD PRESSURE: 88 MMHG | TEMPERATURE: 98 F | SYSTOLIC BLOOD PRESSURE: 138 MMHG

## 2018-10-16 DIAGNOSIS — M54.50 ACUTE RIGHT-SIDED LOW BACK PAIN WITHOUT SCIATICA: Primary | ICD-10-CM

## 2018-10-16 DIAGNOSIS — Z98.890 HISTORY OF BACK SURGERY: ICD-10-CM

## 2018-10-16 PROCEDURE — G8417 CALC BMI ABV UP PARAM F/U: HCPCS | Performed by: FAMILY MEDICINE

## 2018-10-16 PROCEDURE — G8427 DOCREV CUR MEDS BY ELIG CLIN: HCPCS | Performed by: FAMILY MEDICINE

## 2018-10-16 PROCEDURE — 1036F TOBACCO NON-USER: CPT | Performed by: FAMILY MEDICINE

## 2018-10-16 PROCEDURE — 96372 THER/PROPH/DIAG INJ SC/IM: CPT | Performed by: FAMILY MEDICINE

## 2018-10-16 PROCEDURE — 3017F COLORECTAL CA SCREEN DOC REV: CPT | Performed by: FAMILY MEDICINE

## 2018-10-16 PROCEDURE — 99213 OFFICE O/P EST LOW 20 MIN: CPT | Performed by: FAMILY MEDICINE

## 2018-10-16 PROCEDURE — G8482 FLU IMMUNIZE ORDER/ADMIN: HCPCS | Performed by: FAMILY MEDICINE

## 2018-10-16 RX ORDER — KETOROLAC TROMETHAMINE 30 MG/ML
60 INJECTION, SOLUTION INTRAMUSCULAR; INTRAVENOUS ONCE
Status: COMPLETED | OUTPATIENT
Start: 2018-10-16 | End: 2018-10-16

## 2018-10-16 RX ORDER — GABAPENTIN 300 MG/1
300 CAPSULE ORAL NIGHTLY
Qty: 30 CAPSULE | Refills: 0 | Status: SHIPPED | OUTPATIENT
Start: 2018-10-16 | End: 2018-10-23 | Stop reason: SDUPTHER

## 2018-10-16 RX ADMIN — KETOROLAC TROMETHAMINE 60 MG: 30 INJECTION, SOLUTION INTRAMUSCULAR; INTRAVENOUS at 09:29

## 2018-10-16 ASSESSMENT — ENCOUNTER SYMPTOMS: BACK PAIN: 1

## 2018-10-16 NOTE — PROGRESS NOTES
Normocephalic and atraumatic. Neck: Neck supple. Cardiovascular: Normal rate, regular rhythm and normal heart sounds. Pulmonary/Chest: Effort normal and breath sounds normal. No respiratory distress. She has no wheezes. Musculoskeletal:        Lumbar back: She exhibits decreased range of motion, tenderness and pain. She exhibits no bony tenderness, no swelling, no edema, no deformity and no spasm. Right lower leg: She exhibits no edema. Left lower leg: She exhibits no edema. Unable to assess strength in the hamstriings/quads/legs due to intensity of the pain    Extreme amount of pain elicited in back with asking her to push her right side towards her abdomen as part of the strength evaluation. Neurological: She is alert. Unable to assess due to patient pain. Walking very stiffly. Psychiatric: She has a normal mood and affect. Vitals:    10/16/18 0854 10/16/18 0857   BP: (!) 140/90 138/88   Pulse: 69    Temp: 98 °F (36.7 °C)    TempSrc: Oral    SpO2: 99%    Weight: 170 lb 9.6 oz (77.4 kg)      Body mass index is 27.96 kg/m². Wt Readings from Last 3 Encounters:   10/16/18 170 lb 9.6 oz (77.4 kg)   10/09/18 169 lb (76.7 kg)   09/26/18 173 lb 9.6 oz (78.7 kg)     BP Readings from Last 3 Encounters:   10/16/18 138/88   10/09/18 120/74   09/26/18 (!) 142/90          No results found for this visit on 10/16/18.   The 10-year ASCVD risk score (Pamela Huerta et al., 2013) is: 2.1%    Values used to calculate the score:      Age: 46 years      Sex: Female      Is Non- : No      Diabetic: No      Tobacco smoker: No      Systolic Blood Pressure: 960 mmHg      Is BP treated: Yes      HDL Cholesterol: 65 MG/DL      Total Cholesterol: 245 MG/DL  Lab Review   Abstract on 07/17/2018   Component Date Value    Cholesterol, Total 03/08/2016 297     HDL 03/08/2016 46     LDL Calculated 03/08/2016 190*    Triglycerides 03/08/2016 306     Chol/HDL Ratio 03/08/2016 6.5     ECGs available  Confirmed by [facility] GAY ABRAMS 19 ([id number]) on 5/24/2018 10:34:55 AM             Assessment:       Diagnosis Orders   1. Acute right-sided low back pain without sciatica  XR LUMBAR SPINE (MIN 4 VIEWS)    gabapentin (NEURONTIN) 300 MG capsule    ketorolac (TORADOL) injection 60 mg   2. History of back surgery  XR LUMBAR SPINE (MIN 4 VIEWS)    XR HIP RIGHT MIN 4VW W PELVIS           Plan:      See orders    Recheck in one week.   Still awaiting

## 2018-10-17 ENCOUNTER — HOSPITAL ENCOUNTER (OUTPATIENT)
Dept: GENERAL RADIOLOGY | Age: 48
Discharge: HOME | End: 2018-10-17
Payer: MEDICARE

## 2018-10-17 ENCOUNTER — HOSPITAL ENCOUNTER (OUTPATIENT)
Age: 48
Discharge: HOME | End: 2018-10-17
Payer: MEDICARE

## 2018-10-17 DIAGNOSIS — M54.50 ACUTE RIGHT-SIDED LOW BACK PAIN WITHOUT SCIATICA: ICD-10-CM

## 2018-10-17 DIAGNOSIS — Z98.890 HISTORY OF BACK SURGERY: ICD-10-CM

## 2018-10-17 PROCEDURE — 72110 X-RAY EXAM L-2 SPINE 4/>VWS: CPT

## 2018-10-17 PROCEDURE — 73501 X-RAY EXAM HIP UNI 1 VIEW: CPT

## 2018-10-23 ENCOUNTER — OFFICE VISIT (OUTPATIENT)
Dept: FAMILY MEDICINE CLINIC | Age: 48
End: 2018-10-23
Payer: MEDICARE

## 2018-10-23 VITALS
WEIGHT: 170 LBS | SYSTOLIC BLOOD PRESSURE: 90 MMHG | BODY MASS INDEX: 27.32 KG/M2 | HEIGHT: 66 IN | HEART RATE: 74 BPM | DIASTOLIC BLOOD PRESSURE: 60 MMHG

## 2018-10-23 DIAGNOSIS — G89.29 CHRONIC RIGHT-SIDED LOW BACK PAIN WITH RIGHT-SIDED SCIATICA: Primary | ICD-10-CM

## 2018-10-23 DIAGNOSIS — M54.41 CHRONIC RIGHT-SIDED LOW BACK PAIN WITH RIGHT-SIDED SCIATICA: Primary | ICD-10-CM

## 2018-10-23 PROCEDURE — G8417 CALC BMI ABV UP PARAM F/U: HCPCS | Performed by: FAMILY MEDICINE

## 2018-10-23 PROCEDURE — 99213 OFFICE O/P EST LOW 20 MIN: CPT | Performed by: FAMILY MEDICINE

## 2018-10-23 PROCEDURE — 3017F COLORECTAL CA SCREEN DOC REV: CPT | Performed by: FAMILY MEDICINE

## 2018-10-23 PROCEDURE — 1036F TOBACCO NON-USER: CPT | Performed by: FAMILY MEDICINE

## 2018-10-23 PROCEDURE — G8427 DOCREV CUR MEDS BY ELIG CLIN: HCPCS | Performed by: FAMILY MEDICINE

## 2018-10-23 PROCEDURE — G8482 FLU IMMUNIZE ORDER/ADMIN: HCPCS | Performed by: FAMILY MEDICINE

## 2018-10-23 RX ORDER — GABAPENTIN 300 MG/1
300 CAPSULE ORAL NIGHTLY
Qty: 30 CAPSULE | Refills: 0 | Status: SHIPPED | OUTPATIENT
Start: 2018-12-16 | End: 2019-01-15

## 2018-10-23 RX ORDER — GABAPENTIN 300 MG/1
300 CAPSULE ORAL NIGHTLY
Qty: 30 CAPSULE | Refills: 0 | Status: SHIPPED | OUTPATIENT
Start: 2018-11-16 | End: 2019-01-15

## 2018-10-23 RX ORDER — BACLOFEN 20 MG/1
20 TABLET ORAL 3 TIMES DAILY
Qty: 90 TABLET | Refills: 1 | Status: ON HOLD | OUTPATIENT
Start: 2018-10-23 | End: 2020-02-01

## 2018-10-23 ASSESSMENT — ENCOUNTER SYMPTOMS: BACK PAIN: 1

## 2018-10-23 NOTE — LETTER
[address] [place] [address]  2/[birth year]      For certain conditions, multiple classes of medications may be used to help better manage your symptoms, and to improve your ability to function at home, work and in social settings. However, these medications do have risks, which will be discussed with you, including addiction and dependency. The following prescribed medications need frequent monitoring and will require you to partner and assist in your healthcare. Medication  Dose, instructions and quantity as indicated on current prescription bottle Diagnosis/Reason(s) for Taking Category     Neurontin 300 mg nightly                               Benefits and goals of Controlled Substance Medications: There are two potential goals for your treatment: (1) decreased pain and suffering (2) improved daily life functions. There are many possible treatments for your chronic condition(s), and, in addition to controlled substance medications, we will try alternatives such as physical therapy, yoga, massage, home daily exercise, meditation, relaxation techniques, injections, chiropractic manipulations, surgery, cognitive therapy, hypnosis and many medications that are not habit-forming. Use of controlled substance medications may be helpful, but they are unlikely to resolve all of your symptoms or restore all function. Risks of Controlled Substance Medications:    Opioid pain medications: These medications can lead to problems such as addiction/dependence, sedation, lightheadedness/dizziness, memory issues, falls, constipation, nausea, or vomiting. They may also impair the ability to drive or operate machinery. Additionally, these medications may lower testosterone levels, leading to loss of bone strength, stamina and sex drive.   They may cause problems with breathing, sleep apnea and reduced coughing, which are especially dangerous for patients with lung

## 2018-10-23 NOTE — PROGRESS NOTES
BACK SURGERY      CARDIAC CATHETERIZATION      CORONARY ANGIOPLASTY      HYSTERECTOMY      INGUINAL HERNIA REPAIR Right     INNER EAR SURGERY Left     TUBAL LIGATION         Family History   Problem Relation Age of Onset    Heart Disease Mother     Heart Disease Father     Diabetes Sister     Heart Disease Sister     Kidney Disease Sister     Breast Cancer Maternal Grandmother     Breast Cancer Paternal Aunt         2 aunts    Ovarian Cancer Paternal Aunt          in her 29's       Current Outpatient Prescriptions on File Prior to Visit   Medication Sig Dispense Refill    albuterol sulfate (PROAIR RESPICLICK) 988 (90 Base) MCG/ACT aerosol powder inhalation Inhale 2 puffs into the lungs every 6 hours as needed for Wheezing or Shortness of Breath 1 Inhaler 1    ibuprofen (ADVIL;MOTRIN) 200 MG tablet Take 800 mg by mouth every 6 hours as needed for Pain      baclofen (LIORESAL) 20 MG tablet Take 1 tablet by mouth 3 times daily 40 tablet 0    amLODIPine (NORVASC) 10 MG tablet Take 1 tablet by mouth daily 30 tablet 3    fluticasone (FLOVENT HFA) 220 MCG/ACT inhaler Inhale 2 puffs into the lungs 2 times daily 1 Inhaler 3    levothyroxine (SYNTHROID) 50 MCG tablet Take 1 tablet by mouth Daily 30 tablet 3    topiramate (TOPAMAX) 100 MG tablet Take 1 tablet by mouth 2 times daily 60 tablet 3    dicyclomine (BENTYL) 10 MG capsule Take 1 capsule by mouth 3 times daily 90 capsule 3    meclizine (ANTIVERT) 25 MG tablet Take 1 tablet by mouth 3 times daily as needed for Dizziness 25 tablet 1    Multiple Vitamins-Minerals (MULTIPLE VITAMINS/WOMENS PO) Take by mouth      Turmeric 500 MG CAPS Take by mouth       No current facility-administered medications on file prior to visit. Objective:   Physical Exam   Constitutional: She appears well-developed and well-nourished. HENT:   Head: Normocephalic and atraumatic. Musculoskeletal:        Lumbar back: She exhibits tenderness.  She

## 2018-11-07 DIAGNOSIS — J41.0 SIMPLE CHRONIC BRONCHITIS (HCC): ICD-10-CM

## 2018-12-17 ENCOUNTER — OFFICE VISIT (OUTPATIENT)
Dept: FAMILY MEDICINE CLINIC | Age: 48
End: 2018-12-17
Payer: MEDICARE

## 2018-12-17 VITALS
TEMPERATURE: 98.5 F | SYSTOLIC BLOOD PRESSURE: 110 MMHG | HEIGHT: 66 IN | BODY MASS INDEX: 27.38 KG/M2 | WEIGHT: 170.4 LBS | DIASTOLIC BLOOD PRESSURE: 70 MMHG | HEART RATE: 63 BPM

## 2018-12-17 DIAGNOSIS — J41.0 SIMPLE CHRONIC BRONCHITIS (HCC): Primary | ICD-10-CM

## 2018-12-17 DIAGNOSIS — J20.9 ACUTE BRONCHITIS, UNSPECIFIED ORGANISM: ICD-10-CM

## 2018-12-17 DIAGNOSIS — H72.92 PERFORATED EAR DRUM, LEFT: ICD-10-CM

## 2018-12-17 DIAGNOSIS — J44.1 COPD EXACERBATION (HCC): ICD-10-CM

## 2018-12-17 PROCEDURE — 3023F SPIROM DOC REV: CPT | Performed by: FAMILY MEDICINE

## 2018-12-17 PROCEDURE — G8482 FLU IMMUNIZE ORDER/ADMIN: HCPCS | Performed by: FAMILY MEDICINE

## 2018-12-17 PROCEDURE — G8427 DOCREV CUR MEDS BY ELIG CLIN: HCPCS | Performed by: FAMILY MEDICINE

## 2018-12-17 PROCEDURE — 3017F COLORECTAL CA SCREEN DOC REV: CPT | Performed by: FAMILY MEDICINE

## 2018-12-17 PROCEDURE — 99214 OFFICE O/P EST MOD 30 MIN: CPT | Performed by: FAMILY MEDICINE

## 2018-12-17 PROCEDURE — G8417 CALC BMI ABV UP PARAM F/U: HCPCS | Performed by: FAMILY MEDICINE

## 2018-12-17 PROCEDURE — 1036F TOBACCO NON-USER: CPT | Performed by: FAMILY MEDICINE

## 2018-12-17 PROCEDURE — G8926 SPIRO NO PERF OR DOC: HCPCS | Performed by: FAMILY MEDICINE

## 2018-12-17 RX ORDER — DOXYCYCLINE HYCLATE 100 MG
100 TABLET ORAL 2 TIMES DAILY
Qty: 14 TABLET | Refills: 0 | Status: SHIPPED | OUTPATIENT
Start: 2018-12-17 | End: 2019-01-15 | Stop reason: SDUPTHER

## 2018-12-17 RX ORDER — FLUTICASONE FUROATE AND VILANTEROL 200; 25 UG/1; UG/1
1 POWDER RESPIRATORY (INHALATION) DAILY
Qty: 1 EACH | Refills: 1 | Status: ON HOLD | OUTPATIENT
Start: 2018-12-17 | End: 2020-02-01

## 2018-12-17 ASSESSMENT — ENCOUNTER SYMPTOMS
SHORTNESS OF BREATH: 1
CHEST TIGHTNESS: 1
COUGH: 1

## 2019-01-15 ENCOUNTER — OFFICE VISIT (OUTPATIENT)
Dept: FAMILY MEDICINE CLINIC | Age: 49
End: 2019-01-15
Payer: MEDICARE

## 2019-01-15 VITALS
WEIGHT: 164.2 LBS | BODY MASS INDEX: 26.39 KG/M2 | TEMPERATURE: 98.3 F | HEART RATE: 98 BPM | HEIGHT: 66 IN | SYSTOLIC BLOOD PRESSURE: 100 MMHG | DIASTOLIC BLOOD PRESSURE: 70 MMHG

## 2019-01-15 DIAGNOSIS — H72.92 PERFORATED LEFT TYMPANIC MEMBRANE ON EXAMINATION: ICD-10-CM

## 2019-01-15 DIAGNOSIS — J20.9 ACUTE BRONCHITIS, UNSPECIFIED ORGANISM: Primary | ICD-10-CM

## 2019-01-15 PROCEDURE — 99213 OFFICE O/P EST LOW 20 MIN: CPT | Performed by: FAMILY MEDICINE

## 2019-01-15 PROCEDURE — G8427 DOCREV CUR MEDS BY ELIG CLIN: HCPCS | Performed by: FAMILY MEDICINE

## 2019-01-15 PROCEDURE — G8482 FLU IMMUNIZE ORDER/ADMIN: HCPCS | Performed by: FAMILY MEDICINE

## 2019-01-15 PROCEDURE — G8417 CALC BMI ABV UP PARAM F/U: HCPCS | Performed by: FAMILY MEDICINE

## 2019-01-15 PROCEDURE — 3017F COLORECTAL CA SCREEN DOC REV: CPT | Performed by: FAMILY MEDICINE

## 2019-01-15 PROCEDURE — 94640 AIRWAY INHALATION TREATMENT: CPT | Performed by: FAMILY MEDICINE

## 2019-01-15 PROCEDURE — 1036F TOBACCO NON-USER: CPT | Performed by: FAMILY MEDICINE

## 2019-01-15 RX ORDER — DOXYCYCLINE HYCLATE 100 MG
100 TABLET ORAL 2 TIMES DAILY
Qty: 14 TABLET | Refills: 0 | Status: SHIPPED | OUTPATIENT
Start: 2019-01-15 | End: 2019-01-22

## 2019-01-15 RX ORDER — BENZONATATE 200 MG/1
200 CAPSULE ORAL 3 TIMES DAILY PRN
Qty: 30 CAPSULE | Refills: 0 | Status: ON HOLD | OUTPATIENT
Start: 2019-01-15 | End: 2020-02-05 | Stop reason: HOSPADM

## 2019-01-15 RX ORDER — ALBUTEROL SULFATE 2.5 MG/3ML
2.5 SOLUTION RESPIRATORY (INHALATION) ONCE
Status: COMPLETED | OUTPATIENT
Start: 2019-01-15 | End: 2019-01-15

## 2019-01-15 RX ADMIN — ALBUTEROL SULFATE 2.5 MG: 2.5 SOLUTION RESPIRATORY (INHALATION) at 10:30

## 2019-01-15 RX ADMIN — ALBUTEROL SULFATE 2.5 MG: 2.5 SOLUTION RESPIRATORY (INHALATION) at 10:31

## 2019-01-15 ASSESSMENT — ENCOUNTER SYMPTOMS
CHOKING: 1
SHORTNESS OF BREATH: 1

## 2019-01-31 ENCOUNTER — HOSPITAL ENCOUNTER (EMERGENCY)
Age: 49
Discharge: HOME | End: 2019-01-31
Attending: EMERGENCY MEDICINE
Payer: MEDICARE

## 2019-01-31 ENCOUNTER — APPOINTMENT (OUTPATIENT)
Dept: GENERAL RADIOLOGY | Age: 49
End: 2019-01-31
Payer: MEDICARE

## 2019-01-31 ENCOUNTER — APPOINTMENT (OUTPATIENT)
Dept: CT IMAGING | Age: 49
End: 2019-01-31
Payer: MEDICARE

## 2019-01-31 VITALS
HEIGHT: 66 IN | TEMPERATURE: 97.9 F | WEIGHT: 160 LBS | SYSTOLIC BLOOD PRESSURE: 126 MMHG | DIASTOLIC BLOOD PRESSURE: 85 MMHG | HEART RATE: 59 BPM | OXYGEN SATURATION: 97 % | BODY MASS INDEX: 25.71 KG/M2 | RESPIRATION RATE: 12 BRPM

## 2019-01-31 DIAGNOSIS — R07.9 CHEST PAIN, UNSPECIFIED TYPE: Primary | ICD-10-CM

## 2019-01-31 LAB
ALBUMIN SERPL-MCNC: 4.6 GM/DL (ref 3.4–5)
ALP BLD-CCNC: 58 IU/L (ref 40–129)
ALT SERPL-CCNC: 14 U/L (ref 10–40)
ANION GAP SERPL CALCULATED.3IONS-SCNC: 11 MMOL/L (ref 4–16)
AST SERPL-CCNC: 19 IU/L (ref 15–37)
BASOPHILS ABSOLUTE: 0.1 K/CU MM
BASOPHILS RELATIVE PERCENT: 1.1 % (ref 0–1)
BILIRUB SERPL-MCNC: 0.3 MG/DL (ref 0–1)
BUN BLDV-MCNC: 8 MG/DL (ref 6–23)
CALCIUM SERPL-MCNC: 10.3 MG/DL (ref 8.3–10.6)
CHLORIDE BLD-SCNC: 109 MMOL/L (ref 99–110)
CO2: 23 MMOL/L (ref 21–32)
CREAT SERPL-MCNC: 0.9 MG/DL (ref 0.6–1.1)
D DIMER: 508 NG/ML(DDU)
DIFFERENTIAL TYPE: ABNORMAL
EOSINOPHILS ABSOLUTE: 0.2 K/CU MM
EOSINOPHILS RELATIVE PERCENT: 3.2 % (ref 0–3)
GFR AFRICAN AMERICAN: >60 ML/MIN/1.73M2
GFR NON-AFRICAN AMERICAN: >60 ML/MIN/1.73M2
GLUCOSE BLD-MCNC: 89 MG/DL (ref 70–99)
HCT VFR BLD CALC: 44.9 % (ref 37–47)
HEMOGLOBIN: 13.8 GM/DL (ref 12.5–16)
IMMATURE NEUTROPHIL %: 0.2 % (ref 0–0.43)
LIPASE: 24 IU/L (ref 13–60)
LYMPHOCYTES ABSOLUTE: 2.5 K/CU MM
LYMPHOCYTES RELATIVE PERCENT: 45.5 % (ref 24–44)
MCH RBC QN AUTO: 29.4 PG (ref 27–31)
MCHC RBC AUTO-ENTMCNC: 30.7 % (ref 32–36)
MCV RBC AUTO: 95.5 FL (ref 78–100)
MONOCYTES ABSOLUTE: 0.6 K/CU MM
MONOCYTES RELATIVE PERCENT: 10.1 % (ref 0–4)
NUCLEATED RBC %: 0 %
PDW BLD-RTO: 13.2 % (ref 11.7–14.9)
PLATELET # BLD: 206 K/CU MM (ref 140–440)
PMV BLD AUTO: 12.1 FL (ref 7.5–11.1)
POTASSIUM SERPL-SCNC: 3.6 MMOL/L (ref 3.5–5.1)
RBC # BLD: 4.7 M/CU MM (ref 4.2–5.4)
SEGMENTED NEUTROPHILS ABSOLUTE COUNT: 2.2 K/CU MM
SEGMENTED NEUTROPHILS RELATIVE PERCENT: 39.9 % (ref 36–66)
SODIUM BLD-SCNC: 143 MMOL/L (ref 135–145)
TOTAL IMMATURE NEUTOROPHIL: 0.01 K/CU MM
TOTAL NUCLEATED RBC: 0 K/CU MM
TOTAL PROTEIN: 7 GM/DL (ref 6.4–8.2)
TROPONIN T: <0.01 NG/ML
TROPONIN T: <0.01 NG/ML
WBC # BLD: 5.5 K/CU MM (ref 4–10.5)

## 2019-01-31 PROCEDURE — 85379 FIBRIN DEGRADATION QUANT: CPT

## 2019-01-31 PROCEDURE — 6370000000 HC RX 637 (ALT 250 FOR IP): Performed by: PHYSICIAN ASSISTANT

## 2019-01-31 PROCEDURE — 71275 CT ANGIOGRAPHY CHEST: CPT

## 2019-01-31 PROCEDURE — 2580000003 HC RX 258: Performed by: PHYSICIAN ASSISTANT

## 2019-01-31 PROCEDURE — 99285 EMERGENCY DEPT VISIT HI MDM: CPT

## 2019-01-31 PROCEDURE — 84484 ASSAY OF TROPONIN QUANT: CPT

## 2019-01-31 PROCEDURE — 93005 ELECTROCARDIOGRAM TRACING: CPT | Performed by: EMERGENCY MEDICINE

## 2019-01-31 PROCEDURE — 71046 X-RAY EXAM CHEST 2 VIEWS: CPT

## 2019-01-31 PROCEDURE — 80053 COMPREHEN METABOLIC PANEL: CPT

## 2019-01-31 PROCEDURE — 83690 ASSAY OF LIPASE: CPT

## 2019-01-31 PROCEDURE — 36415 COLL VENOUS BLD VENIPUNCTURE: CPT

## 2019-01-31 PROCEDURE — 6360000004 HC RX CONTRAST MEDICATION: Performed by: PHYSICIAN ASSISTANT

## 2019-01-31 PROCEDURE — 93010 ELECTROCARDIOGRAM REPORT: CPT | Performed by: INTERNAL MEDICINE

## 2019-01-31 PROCEDURE — 85025 COMPLETE CBC W/AUTO DIFF WBC: CPT

## 2019-01-31 RX ORDER — ONDANSETRON 4 MG/1
4 TABLET, ORALLY DISINTEGRATING ORAL ONCE
Status: COMPLETED | OUTPATIENT
Start: 2019-01-31 | End: 2019-01-31

## 2019-01-31 RX ORDER — 0.9 % SODIUM CHLORIDE 0.9 %
10 VIAL (ML) INJECTION
Status: COMPLETED | OUTPATIENT
Start: 2019-01-31 | End: 2019-01-31

## 2019-01-31 RX ADMIN — IOPAMIDOL 100 ML: 755 INJECTION, SOLUTION INTRAVENOUS at 12:23

## 2019-01-31 RX ADMIN — ONDANSETRON 4 MG: 4 TABLET, ORALLY DISINTEGRATING ORAL at 12:01

## 2019-01-31 RX ADMIN — SODIUM CHLORIDE, PRESERVATIVE FREE 10 ML: 5 INJECTION INTRAVENOUS at 12:23

## 2019-01-31 ASSESSMENT — PAIN DESCRIPTION - PAIN TYPE: TYPE: ACUTE PAIN

## 2019-01-31 ASSESSMENT — HEART SCORE: ECG: 0

## 2019-01-31 ASSESSMENT — PAIN DESCRIPTION - LOCATION: LOCATION: CHEST

## 2019-01-31 ASSESSMENT — PAIN SCALES - GENERAL: PAINLEVEL_OUTOF10: 5

## 2019-02-06 LAB
EKG ATRIAL RATE: 62 BPM
EKG DIAGNOSIS: NORMAL
EKG P AXIS: 56 DEGREES
EKG P-R INTERVAL: 188 MS
EKG Q-T INTERVAL: 402 MS
EKG QRS DURATION: 102 MS
EKG QTC CALCULATION (BAZETT): 408 MS
EKG R AXIS: 19 DEGREES
EKG T AXIS: 29 DEGREES
EKG VENTRICULAR RATE: 62 BPM

## 2019-02-24 ENCOUNTER — HOSPITAL ENCOUNTER (EMERGENCY)
Age: 49
Discharge: PSYCHIATRIC OR SUBSTANCE USE FACILITY | End: 2019-02-25
Attending: EMERGENCY MEDICINE
Payer: MEDICARE

## 2019-02-24 ENCOUNTER — APPOINTMENT (OUTPATIENT)
Dept: GENERAL RADIOLOGY | Age: 49
End: 2019-02-24
Payer: MEDICARE

## 2019-02-24 DIAGNOSIS — S52.124A CLOSED NONDISPLACED FRACTURE OF HEAD OF RIGHT RADIUS, INITIAL ENCOUNTER: Primary | ICD-10-CM

## 2019-02-24 DIAGNOSIS — F29 PSYCHOSIS, UNSPECIFIED PSYCHOSIS TYPE (HCC): ICD-10-CM

## 2019-02-24 DIAGNOSIS — Z86.59 HISTORY OF SCHIZOAFFECTIVE DISORDER: ICD-10-CM

## 2019-02-24 LAB
ACETAMINOPHEN LEVEL: <5 UG/ML (ref 15–30)
ALBUMIN SERPL-MCNC: 4.6 GM/DL (ref 3.4–5)
ALCOHOL SCREEN SERUM: <0.01 %WT/VOL
ALP BLD-CCNC: 61 IU/L (ref 40–129)
ALT SERPL-CCNC: 28 U/L (ref 10–40)
AMPHETAMINES: NEGATIVE
ANION GAP SERPL CALCULATED.3IONS-SCNC: 22 MMOL/L (ref 4–16)
AST SERPL-CCNC: 83 IU/L (ref 15–37)
BACTERIA: NEGATIVE /HPF
BARBITURATE SCREEN URINE: NEGATIVE
BASOPHILS ABSOLUTE: 0 K/CU MM
BASOPHILS RELATIVE PERCENT: 0.2 % (ref 0–1)
BENZODIAZEPINE SCREEN, URINE: NEGATIVE
BILIRUB SERPL-MCNC: 0.7 MG/DL (ref 0–1)
BILIRUBIN URINE: NEGATIVE MG/DL
BLOOD, URINE: ABNORMAL
BUN BLDV-MCNC: 27 MG/DL (ref 6–23)
CALCIUM SERPL-MCNC: 9.8 MG/DL (ref 8.3–10.6)
CANNABINOID SCREEN URINE: NEGATIVE
CHLORIDE BLD-SCNC: 98 MMOL/L (ref 99–110)
CLARITY: CLEAR
CO2: 16 MMOL/L (ref 21–32)
COCAINE METABOLITE: NEGATIVE
COLOR: YELLOW
CREAT SERPL-MCNC: 1.1 MG/DL (ref 0.6–1.1)
DIFFERENTIAL TYPE: ABNORMAL
EOSINOPHILS ABSOLUTE: 0 K/CU MM
EOSINOPHILS RELATIVE PERCENT: 0.1 % (ref 0–3)
GFR AFRICAN AMERICAN: >60 ML/MIN/1.73M2
GFR NON-AFRICAN AMERICAN: 53 ML/MIN/1.73M2
GLUCOSE BLD-MCNC: 91 MG/DL (ref 70–99)
GLUCOSE, URINE: NEGATIVE MG/DL
HCT VFR BLD CALC: 44.2 % (ref 37–47)
HEMOGLOBIN: 12.9 GM/DL (ref 12.5–16)
HYALINE CASTS: 2 /LPF
IMMATURE NEUTROPHIL %: 0.6 % (ref 0–0.43)
KETONES, URINE: ABNORMAL MG/DL
LACTATE: 1.3 MMOL/L (ref 0.4–2)
LEUKOCYTE ESTERASE, URINE: ABNORMAL
LYMPHOCYTES ABSOLUTE: 1.2 K/CU MM
LYMPHOCYTES RELATIVE PERCENT: 8.3 % (ref 24–44)
MCH RBC QN AUTO: 29.3 PG (ref 27–31)
MCHC RBC AUTO-ENTMCNC: 29.2 % (ref 32–36)
MCV RBC AUTO: 100.5 FL (ref 78–100)
MONOCYTES ABSOLUTE: 2 K/CU MM
MONOCYTES RELATIVE PERCENT: 13.9 % (ref 0–4)
MUCUS: ABNORMAL HPF
NITRITE URINE, QUANTITATIVE: NEGATIVE
NUCLEATED RBC %: 0 %
OPIATES, URINE: NEGATIVE
OXYCODONE: NEGATIVE
PDW BLD-RTO: 14.3 % (ref 11.7–14.9)
PH, URINE: 5 (ref 5–8)
PHENCYCLIDINE, URINE: NEGATIVE
PLATELET # BLD: 176 K/CU MM (ref 140–440)
PMV BLD AUTO: 11 FL (ref 7.5–11.1)
POTASSIUM SERPL-SCNC: 3.5 MMOL/L (ref 3.5–5.1)
PROTEIN UA: NEGATIVE MG/DL
RBC # BLD: 4.4 M/CU MM (ref 4.2–5.4)
RBC URINE: 5 /HPF (ref 0–6)
SALICYLATE LEVEL: <0.3 MG/DL (ref 15–30)
SEGMENTED NEUTROPHILS ABSOLUTE COUNT: 11 K/CU MM
SEGMENTED NEUTROPHILS RELATIVE PERCENT: 76.9 % (ref 36–66)
SODIUM BLD-SCNC: 136 MMOL/L (ref 135–145)
SPECIFIC GRAVITY UA: 1.01 (ref 1–1.03)
SQUAMOUS EPITHELIAL: <1 /HPF
TOTAL IMMATURE NEUTOROPHIL: 0.09 K/CU MM
TOTAL NUCLEATED RBC: 0 K/CU MM
TOTAL PROTEIN: 7.5 GM/DL (ref 6.4–8.2)
TRICHOMONAS: ABNORMAL /HPF
TSH HIGH SENSITIVITY: 0.85 UIU/ML (ref 0.27–4.2)
UROBILINOGEN, URINE: NORMAL MG/DL (ref 0.2–1)
WBC # BLD: 14.3 K/CU MM (ref 4–10.5)
WBC UA: 3 /HPF (ref 0–5)

## 2019-02-24 PROCEDURE — 84443 ASSAY THYROID STIM HORMONE: CPT

## 2019-02-24 PROCEDURE — G0480 DRUG TEST DEF 1-7 CLASSES: HCPCS

## 2019-02-24 PROCEDURE — 81001 URINALYSIS AUTO W/SCOPE: CPT

## 2019-02-24 PROCEDURE — 6370000000 HC RX 637 (ALT 250 FOR IP): Performed by: EMERGENCY MEDICINE

## 2019-02-24 PROCEDURE — 80053 COMPREHEN METABOLIC PANEL: CPT

## 2019-02-24 PROCEDURE — 36415 COLL VENOUS BLD VENIPUNCTURE: CPT

## 2019-02-24 PROCEDURE — 87040 BLOOD CULTURE FOR BACTERIA: CPT

## 2019-02-24 PROCEDURE — 99285 EMERGENCY DEPT VISIT HI MDM: CPT

## 2019-02-24 PROCEDURE — 85025 COMPLETE CBC W/AUTO DIFF WBC: CPT

## 2019-02-24 PROCEDURE — 73080 X-RAY EXAM OF ELBOW: CPT

## 2019-02-24 PROCEDURE — 4500000028 HC INTERMEDIATE PROCEDURE

## 2019-02-24 PROCEDURE — 73110 X-RAY EXAM OF WRIST: CPT

## 2019-02-24 PROCEDURE — 83605 ASSAY OF LACTIC ACID: CPT

## 2019-02-24 PROCEDURE — 80307 DRUG TEST PRSMV CHEM ANLYZR: CPT

## 2019-02-24 RX ORDER — IBUPROFEN 600 MG/1
600 TABLET ORAL ONCE
Status: COMPLETED | OUTPATIENT
Start: 2019-02-24 | End: 2019-02-24

## 2019-02-24 RX ADMIN — IBUPROFEN 600 MG: 600 TABLET ORAL at 17:08

## 2019-02-24 ASSESSMENT — PAIN SCALES - GENERAL: PAINLEVEL_OUTOF10: 10

## 2019-02-25 VITALS
BODY MASS INDEX: 24.91 KG/M2 | WEIGHT: 155 LBS | HEIGHT: 66 IN | OXYGEN SATURATION: 99 % | DIASTOLIC BLOOD PRESSURE: 89 MMHG | SYSTOLIC BLOOD PRESSURE: 161 MMHG | TEMPERATURE: 98.1 F | RESPIRATION RATE: 16 BRPM | HEART RATE: 89 BPM

## 2019-02-25 PROCEDURE — 6370000000 HC RX 637 (ALT 250 FOR IP): Performed by: EMERGENCY MEDICINE

## 2019-02-25 RX ORDER — IBUPROFEN 600 MG/1
600 TABLET ORAL ONCE
Status: COMPLETED | OUTPATIENT
Start: 2019-02-25 | End: 2019-02-25

## 2019-02-25 RX ORDER — ACETAMINOPHEN 325 MG/1
650 TABLET ORAL ONCE
Status: COMPLETED | OUTPATIENT
Start: 2019-02-25 | End: 2019-02-25

## 2019-02-25 RX ADMIN — ACETAMINOPHEN 650 MG: 325 TABLET ORAL at 09:16

## 2019-02-25 RX ADMIN — IBUPROFEN 600 MG: 600 TABLET ORAL at 09:16

## 2019-02-25 ASSESSMENT — PAIN SCALES - GENERAL: PAINLEVEL_OUTOF10: 7

## 2019-03-01 LAB
CULTURE: NORMAL
CULTURE: NORMAL
Lab: NORMAL
Lab: NORMAL
REPORT STATUS: NORMAL
REPORT STATUS: NORMAL
SPECIMEN: NORMAL
SPECIMEN: NORMAL

## 2019-03-19 ENCOUNTER — OFFICE VISIT (OUTPATIENT)
Dept: ORTHOPEDIC SURGERY | Age: 49
End: 2019-03-19
Payer: MEDICARE

## 2019-03-19 VITALS — OXYGEN SATURATION: 93 % | BODY MASS INDEX: 25.79 KG/M2 | HEART RATE: 98 BPM | HEIGHT: 65 IN | RESPIRATION RATE: 14 BRPM

## 2019-03-19 DIAGNOSIS — S52.121A CLOSED DISPLACED FRACTURE OF HEAD OF RIGHT RADIUS, INITIAL ENCOUNTER: Primary | ICD-10-CM

## 2019-03-19 PROCEDURE — 1036F TOBACCO NON-USER: CPT | Performed by: PHYSICIAN ASSISTANT

## 2019-03-19 PROCEDURE — 3017F COLORECTAL CA SCREEN DOC REV: CPT | Performed by: PHYSICIAN ASSISTANT

## 2019-03-19 PROCEDURE — G8427 DOCREV CUR MEDS BY ELIG CLIN: HCPCS | Performed by: PHYSICIAN ASSISTANT

## 2019-03-19 PROCEDURE — 99202 OFFICE O/P NEW SF 15 MIN: CPT | Performed by: PHYSICIAN ASSISTANT

## 2019-03-19 PROCEDURE — G8482 FLU IMMUNIZE ORDER/ADMIN: HCPCS | Performed by: PHYSICIAN ASSISTANT

## 2019-03-19 PROCEDURE — G8417 CALC BMI ABV UP PARAM F/U: HCPCS | Performed by: PHYSICIAN ASSISTANT

## 2019-03-19 NOTE — PATIENT INSTRUCTIONS
Discontinue sling  PT ordered   Avoid lifting over 2 pounds with right upper extremity for the next 3-4 weeks  Follow up as needed

## 2019-03-19 NOTE — PROGRESS NOTES
[facility] ALLYSON is coming in with a right radial head fracture, date of injury was on 2/24/19. She states that she fell backwards on concrete. She states that she fell right on the elbow. No prior injuries/surgeries to the right arm/elbow. She has pain when she uses the arm. She only takes ibuprofen when needed for the pain. When she does have pain it is in the forearm sometimes it is shooting and other times it just throbs.

## 2019-03-25 ENCOUNTER — HOSPITAL ENCOUNTER (OUTPATIENT)
Dept: OCCUPATIONAL THERAPY | Age: 49
Setting detail: THERAPIES SERIES
Discharge: HOME | End: 2019-03-25
Payer: MEDICARE

## 2019-03-25 PROCEDURE — 97166 OT EVAL MOD COMPLEX 45 MIN: CPT

## 2019-03-25 PROCEDURE — 97140 MANUAL THERAPY 1/> REGIONS: CPT

## 2019-03-25 PROCEDURE — 97110 THERAPEUTIC EXERCISES: CPT

## 2019-03-28 ENCOUNTER — HOSPITAL ENCOUNTER (OUTPATIENT)
Dept: OCCUPATIONAL THERAPY | Age: 49
Setting detail: THERAPIES SERIES
Discharge: HOME | End: 2019-03-28
Payer: MEDICARE

## 2019-03-28 PROCEDURE — 97022 WHIRLPOOL THERAPY: CPT

## 2019-03-28 PROCEDURE — 97110 THERAPEUTIC EXERCISES: CPT

## 2019-04-01 NOTE — PROGRESS NOTES
Review of Systems   Constitutional: Negative for chills and fever. Musculoskeletal: Positive for arthralgias and myalgias. Negative for joint swelling. Skin: Negative for rash and wound. Neurological: Negative for weakness and numbness. All other systems reviewed and are negative. HPI:Coty Stearns is a 46y.o. year old female who presented to the office today after a fall on February 24, 2019 in which she had x-rays that showed a radial head fracture. She states that she does have pain when she uses the arm and it is is in her forearm occasionally shooting pain other times it just throbs. She did have a sling but she is no longer using the sling. She rates her pain at a 4/10. No prior injuries in the right elbow. Past Medical History:   Diagnosis Date    COPD (chronic obstructive pulmonary disease) ([facility] [address] 10/2013    GERD (gastroesophageal reflux disease)     H/O 24 hour EKG monitoring 08/05/14-09/03/14    30 DAY EVENT MONITOR-Normal sinus rhythm    H/O fibromyalgia     History of 24 hour EKG monitoring 6/30/14    Sinus rhythm. Isolated  premature atrial contract. and premature ventricular contraction. Sinus tachycardia present.  History of echocardiogram 7/1/14    EF 55% Mild left ventricular hypertrophy noted. Mild mitral and TR noted. The patient is bradycardic during the study.  Hx of cardiovascular stress test 6/30/14    Lexiscan: EF 73% Negative electrocardiogram suggestive for ischemia.     Hypertension     Spastic paraparesis        Past Surgical History:   Procedure Laterality Date    BACK SURGERY      CARDIAC CATHETERIZATION      CORONARY ANGIOPLASTY      HYSTERECTOMY      INGUINAL HERNIA REPAIR Right     INNER EAR SURGERY Left     TUBAL LIGATION         Family History   Problem Relation Age of Onset    Heart Disease Mother     Elevated Lipids Mother     Hypertension Mother     Heart Disease Father     Diabetes Sister     Heart Disease Sister     Kidney forearm  Range of motion:   Extension 2 degrees   Flexion 120°   Supination 90°   Pronation 90°      Outside record review: ER records  Reviewed x-rays taken on Faber 24th 2019. Imaging studies:  4 views of the right elbow were taken and reviewed today in the office. X-rays showed previously identified right radial head fracture with intra-articular extension minimal depression and minimal step off with fracture callus formation seen indicating subacute nature of this fracture.   The official read and interpretation of these x-rays will be done by the the [facility]           Impression:  right radial head fracture      Plan:    Discontinue sling  PT ordered   Avoid lifting over 2 pounds with right upper extremity for the next 3-4 weeks  Follow up as needed

## 2019-04-02 ENCOUNTER — HOSPITAL ENCOUNTER (OUTPATIENT)
Dept: OCCUPATIONAL THERAPY | Age: 49
Setting detail: THERAPIES SERIES
Discharge: HOME | End: 2019-04-02
Payer: MEDICARE

## 2019-04-02 PROCEDURE — 97140 MANUAL THERAPY 1/> REGIONS: CPT

## 2019-04-02 PROCEDURE — 97110 THERAPEUTIC EXERCISES: CPT

## 2019-04-02 PROCEDURE — 97530 THERAPEUTIC ACTIVITIES: CPT

## 2019-04-02 NOTE — FLOWSHEET NOTE
Occupational Therapy Out Patient Daily Treatment Note     [facility]      JULIAN [facility]     [address] [facility] [address]  [facility] 23       [facility] [address], [address]       Rayna Chavez [address]     [phone] [phone] [phone]  ______________________________________________________________________  Date:  2019  Patient Name:  Israel Mansfield    :  1970  Restrictions/Precautions:   General, 2# weight limit  Diagnosis:   R radial head fx  Treatment Diagnosis:  elbow stiffness and pain  Insurance/Certification information:  [facility] medicare  Referring Physician:   Deandra Brown PA-C  Plan of care signed (Y/N):    Visit# / total visits:3  /12  Pain level: 4-5/10 With movement    Subjective:   Prior Level of Function:  Full function R elbow since injury  Patient Goals:  Return to PLOF    Treatment Flowsheet   Right Left     Fluidotherapy x    Massage elbow x    Gentle AROM/PROM elbow and FA x    Instructed in HEP x     distal strengthening with digiflex 3# pinch pin lateral and palmar 2# and 1# wrist flex and extension x                                                                                                        Interventions/Modalities used:  [x] Therapeutic Exercise   [x] Modalities: prn  [x] Therapeutic Activity    [] Ultrasound [] Elec Stimulation   [] Total Motion Release    [] Fluido [] Kinesiotaping  [] Neuromuscular Re-education   [] Ionto [x] Coldpack/hotpack   [x] Instruction in HEP    Other:    Objective Findings: L elbow flex 132 and extension 12    Communication with other providers: POC to physician    Education provided to patient: Instructed in HEP    Adverse Reactions to treatment: none    Time in: 1045  Time out:  1140  Timed treatment minutes: 45  Total treatment time:  55    Treatment/Activity Tolerance:     [x]  Patient tolerated treatment well []  Patient limited by cyn    [] Patient limited by pain []  Patient limited by other medical complications   []  Other:   Goals   Pt will decrease pain 0-1/10 with movement R elbow to increase functional activity tolerance.   Pt will increase AROM R elbow flex/ext and forearm sup/pron to WNL to increase functional mobility   Pt will follow thru and be independent with HEP including ROM ex shoulder to prevent frozen shoulder symptoms.   LTG   Pt will decrease Quick dash of 63.6          Patient Requires Follow-up:  [x]  Yes  []  No    Plan: [x]  Continue per plan of care []  Alter current plan (see comments)   [x]  Plan of care initiated []  Hold pending MD visit []  Discharge    Plan for Next Session:      Electronically signed by:  LEROY Humphrey/L, 4/2/2019, 1:42 PM

## 2019-04-05 ENCOUNTER — HOSPITAL ENCOUNTER (OUTPATIENT)
Dept: OCCUPATIONAL THERAPY | Age: 49
Setting detail: THERAPIES SERIES
Discharge: HOME | End: 2019-04-05
Payer: MEDICARE

## 2019-04-05 PROCEDURE — 97530 THERAPEUTIC ACTIVITIES: CPT

## 2019-04-05 PROCEDURE — 97140 MANUAL THERAPY 1/> REGIONS: CPT

## 2019-04-05 PROCEDURE — 97110 THERAPEUTIC EXERCISES: CPT

## 2019-04-10 ENCOUNTER — HOSPITAL ENCOUNTER (OUTPATIENT)
Dept: OCCUPATIONAL THERAPY | Age: 49
Discharge: HOME | End: 2019-04-10

## 2019-04-10 NOTE — FLOWSHEET NOTE
Physical Therapy  Cancellation/No-show Note  Patient Name:  Aurora Dillon  :  1970   Date:  4/10/2019  Cancelled visits to date: 1  No-shows to date: 0    For today's appointment patient:  [x]  Cancelled  []  Rescheduled appointment  []  No-show     Reason given by patient:  [x]  Patient ill  []  Conflicting appointment  []  No transportation    []  Conflict with work  []  No reason given  []  Other:     Comments:      Electronically signed by:  Steve Vergara, 4/10/2019, 9:31 AM

## 2019-04-22 ENCOUNTER — HOSPITAL ENCOUNTER (OUTPATIENT)
Dept: OCCUPATIONAL THERAPY | Age: 49
Setting detail: THERAPIES SERIES
Discharge: HOME | End: 2019-04-22
Payer: MEDICARE

## 2019-04-22 PROCEDURE — 97110 THERAPEUTIC EXERCISES: CPT

## 2019-04-22 PROCEDURE — 97530 THERAPEUTIC ACTIVITIES: CPT

## 2019-04-22 PROCEDURE — 97140 MANUAL THERAPY 1/> REGIONS: CPT

## 2019-04-22 NOTE — FLOWSHEET NOTE
Occupational Therapy Out Patient Daily Treatment Note     [x][place] [address]      JULIAN SALINAS [facility]     [address] [facility] [address]  [facility] 23       [facility] 218, [address], [address]       Rayna [address]avez [address]     [phone] [phone] [phone]  ______________________________________________________________________  Date:  2019  Patient Name:  Reji Champion    :  1970  Restrictions/Precautions:   General, 2# weight limit  Diagnosis:   R radial head fx  Treatment Diagnosis:  elbow stiffness and pain  Insurance/Certification information:  [facility] medicare  Referring Physician:   Kathy Huddleston PA-C  Plan of care signed (Y/N):    Visit# / total visits:   Pain level: 7/10 This morning and with exercise    Subjective: States has been really stiff and sore  Prior Level of Function:  Full function R elbow since injury  Patient Goals:  Return to PLOF    Treatment Flowsheet   Right Left    hot pack to elbow x    Massage elbow x    Gentle AROM/PROM elbow and FA x    Instructed in HEP x     distal strengthening with digiflex 3# pinch pin lateral and palmar 2# and 2# wrist flex and extension x                                                                                                        Interventions/Modalities used:  [x] Therapeutic Exercise   [x] Modalities: prn  [x] Therapeutic Activity    [] Ultrasound [] Elec Stimulation   [] Total Motion Release    [] Fluido [] Kinesiotaping  [] Neuromuscular Re-education   [] Ionto [x] Coldpack/hotpack   [x] Instruction in HEP    Other:    Objective Findings: L elbow flex 140 and extension 10    Communication with other providers: POC to physician    Education provided to patient: Instructed in HEP    Adverse Reactions to treatment: none    Time in: 1300  Time out:  1400  Timed treatment minutes: 45  Total treatment time:  45    Treatment/Activity Tolerance:     [x]  Patient

## 2019-04-24 ENCOUNTER — HOSPITAL ENCOUNTER (OUTPATIENT)
Dept: OCCUPATIONAL THERAPY | Age: 49
Setting detail: THERAPIES SERIES
Discharge: HOME | End: 2019-04-24
Payer: MEDICARE

## 2019-04-24 PROCEDURE — 97530 THERAPEUTIC ACTIVITIES: CPT

## 2019-04-24 PROCEDURE — 97110 THERAPEUTIC EXERCISES: CPT

## 2019-04-24 PROCEDURE — 97140 MANUAL THERAPY 1/> REGIONS: CPT

## 2019-04-24 NOTE — FLOWSHEET NOTE
Occupational Therapy Out Patient Daily Treatment Note     [x][place] [address]      JULIAN [facility]     [address] [facility] [address]  [place] [address]       [facility] 218, [address], [address]       Rayna Chavez [address]     [phone] [phone] [phone]  ______________________________________________________________________  Date:  2019  Patient Name:  Len Gates    :  1970  Restrictions/Precautions:   General, 2# weight limit  Diagnosis:   R radial head fx  Treatment Diagnosis:  elbow stiffness and pain  Insurance/Certification information:  [facility] medicare  Referring Physician:   Chio Billings PA-C  Plan of care signed (Y/N):    Visit# / total visits:   Pain level: 10 This morning and with exercise    Subjective: States has been really stiff and sore  Prior Level of Function:  Full function R elbow since injury  Patient Goals:  Return to PLOF    Treatment Flowsheet   Right Left    hot pack to elbow x    Massage elbow x    Gentle AROM/PROM elbow and FA x    Instructed in HEP x     distal strengthening with digiflex 3# pinch pin lateral and palmar 2# and 2# wrist flex and extension x      0wt hammer ex sup/pron x    Elbow flex and ext  x                                                                                          Interventions/Modalities used:  [x] Therapeutic Exercise   [x] Modalities: prn  [x] Therapeutic Activity    [] Ultrasound [] Elec Stimulation   [] Total Motion Release    [] Fluido [] Kinesiotaping  [] Neuromuscular Re-education   [] Ionto [x] Coldpack/hotpack   [x] Instruction in HEP    Other:    Objective Findings: L elbow flex 138 and extension 10-softer end feel    Communication with other providers: POC to physician    Education provided to patient: Instructed in HEP    Adverse Reactions to treatment: none    Time in: 1345  Time out:  1430  Timed treatment minutes: 45  Total treatment time: 45    Treatment/Activity Tolerance:     [x]  Patient tolerated treatment well []  Patient limited by fatique    []  Patient limited by pain []  Patient limited by other medical complications   []  Other:   Goals   Pt will decrease pain 0-1/10 with movement R elbow to increase functional activity tolerance.   Pt will increase AROM R elbow flex/ext and forearm sup/pron to WNL to increase functional mobility   Pt will follow thru and be independent with HEP including ROM ex shoulder to prevent frozen shoulder symptoms.   LTG   Pt will decrease Quick dash of 63.6          Patient Requires Follow-up:  [x]  Yes  []  No     Plan: [x]  Continue per plan of care []  Alter current plan (see comments)   []  Plan of care initiated []  Hold pending MD visit []  Discharge    Plan for Next Session:      Electronically signed by:  LEROY Rubi/L, 4/24/2019, 2:29 PM

## 2019-04-30 ENCOUNTER — HOSPITAL ENCOUNTER (OUTPATIENT)
Dept: OCCUPATIONAL THERAPY | Age: 49
Setting detail: THERAPIES SERIES
Discharge: HOME | End: 2019-04-30
Payer: MEDICARE

## 2019-04-30 PROCEDURE — 97140 MANUAL THERAPY 1/> REGIONS: CPT

## 2019-04-30 PROCEDURE — 97530 THERAPEUTIC ACTIVITIES: CPT

## 2019-04-30 PROCEDURE — 97110 THERAPEUTIC EXERCISES: CPT

## 2019-04-30 NOTE — FLOWSHEET NOTE
Occupational Therapy Out Patient Daily Treatment Note     [x][place] [address]      JULIAN [facility]     [address] [facility] [address]  [facility] 23       [facility] 218, [address], [address]       Rayna Chavez [address]     [phone] [phone] [phone]  ______________________________________________________________________  Date:  2019  Patient Name:  Jesusita Gorman    :  1970  Restrictions/Precautions:   General, 2# weight limit  Diagnosis:   R radial head fx  Treatment Diagnosis:  elbow stiffness and pain  Insurance/Certification information:  [facility] medicare  Referring Physician:   Aleta Sanchez PA-C  Plan of care signed (Y/N):    Visit# / total visits:   Pain level: 5-10     Subjective: States has been really stiff and sore with popping  Prior Level of Function:  Full function R elbow since injury  Patient Goals:  Return to PLOF    Treatment Flowsheet   Right Left    hot pack to elbow x    Massage elbow x    Gentle AROM/PROM elbow and FA x    Instructed in HEP x     distal strengthening with digiflex 3# pinch pin lateral and palmar 2# and 2# wrist flex and extension x      8oz wt hammer ex sup/pron x    Elbow flex and ext  x                                                                                          Interventions/Modalities used:  [x] Therapeutic Exercise   [x] Modalities: prn  [x] Therapeutic Activity    [] Ultrasound [] Elec Stimulation   [] Total Motion Release    [] Fluido [] Kinesiotaping  [] Neuromuscular Re-education   [] Ionto [x] Coldpack/hotpack   [x] Instruction in HEP    Other:    Objective Findings: L elbow flex 140 and extension 10 improved end feel    Communication with other providers: POC to physician    Education provided to patient: Instructed in HEP    Adverse Reactions to treatment: none    Time in: 1345  Time out:  1430  Timed treatment minutes: 45  Total treatment time: 45    Treatment/Activity Tolerance:     [x]  Patient tolerated treatment well []  Patient limited by fatique    []  Patient limited by pain []  Patient limited by other medical complications   []  Other:   Goals   Pt will decrease pain 0-1/10 with movement R elbow to increase functional activity tolerance.   Pt will increase AROM R elbow flex/ext and forearm sup/pron to WNL to increase functional mobility   Pt will follow thru and be independent with HEP including ROM ex shoulder to prevent frozen shoulder symptoms.   LTG   Pt will decrease Quick dash of 63.6          Patient Requires Follow-up:  [x]  Yes  []  No     Plan: [x]  Continue per plan of care []  Alter current plan (see comments)   []  Plan of care initiated []  Hold pending MD visit []  Discharge    Plan for Next Session:      Electronically signed by:  LEROY Humphrey/L, 4/30/2019, 4:01 PM

## 2019-05-03 ENCOUNTER — HOSPITAL ENCOUNTER (OUTPATIENT)
Dept: OCCUPATIONAL THERAPY | Age: 49
Discharge: HOME | End: 2019-05-03

## 2019-05-06 ENCOUNTER — HOSPITAL ENCOUNTER (OUTPATIENT)
Dept: OCCUPATIONAL THERAPY | Age: 49
Setting detail: THERAPIES SERIES
Discharge: HOME | End: 2019-05-06
Payer: MEDICARE

## 2019-05-06 PROCEDURE — 97140 MANUAL THERAPY 1/> REGIONS: CPT

## 2019-05-06 PROCEDURE — 97110 THERAPEUTIC EXERCISES: CPT

## 2019-05-06 NOTE — FLOWSHEET NOTE
Occupational Therapy Out Patient Daily Treatment Note     [x][place] [address]      JULIAN [facility]     [address] [facility] [address] [facility] 23       [facility] 218, [address], [address]       Rayna Chavez [address]     [phone] [phone] [phone]  ______________________________________________________________________  Date:  2019  Patient Name:  Laekshia Rashid    :  1970  Restrictions/Precautions:   General, 2# weight limit  Diagnosis:   R radial head fx  Treatment Diagnosis:  elbow stiffness and pain  Insurance/Certification information:  [facility] medicare  Referring Physician:   Huy Long PA-C  Plan of care signed (Y/N):    Visit# / total visits:   Pain level: 0/10 4-5/10  When moving    Subjective: States \"its getting better\".   Prior Level of Function:  Full function R elbow since injury  Patient Goals:  Return to PLOF    Treatment Flowsheet   Right Left    hot pack to elbow x    Massage elbow x    Gentle AROM/PROM elbow and FA x    Instructed in HEP x     distal strengthening with digiflex 3# pinch pin lateral and palmar 2# and 2# wrist flex and extension x      8oz wt hammer ex sup/pron x    Elbow flex and ext  x                                                                                          Interventions/Modalities used:  [x] Therapeutic Exercise   [x] Modalities: prn  [x] Therapeutic Activity    [] Ultrasound [] Elec Stimulation   [] Total Motion Release    [] Fluido [] Kinesiotaping  [] Neuromuscular Re-education   [] Ionto [x] Coldpack/hotpack   [x] Instruction in HEP    Other:    Objective Findings: L elbow flex 142 and extension 8 improved end feel    Communication with other providers: POC to physician    Education provided to patient: Instructed in HEP    Adverse Reactions to treatment: none    Time in: 1530  Time out:  1615  Timed treatment minutes: 45  Total treatment time: 45    Treatment/Activity Tolerance:     [x]  Patient tolerated treatment well []  Patient limited by fatique    []  Patient limited by pain []  Patient limited by other medical complications   []  Other:   Goals   Pt will decrease pain 0-1/10 with movement R elbow to increase functional activity tolerance.   Pt will increase AROM R elbow flex/ext and forearm sup/pron to WNL to increase functional mobility   Pt will follow thru and be independent with HEP including ROM ex shoulder to prevent frozen shoulder symptoms. LTG   Pt will decrease Quick dash of 63.6          Patient Requires Follow-up:  [x]  Yes  []  No     Plan: [x]  Continue per plan of care []  Alter current plan (see comments)   []  Plan of care initiated []  Hold pending MD visit []  Discharge    Plan for Next Session:    Continue with POC.     Electronically signed by:  Sam YIP 1994  , 5/6/2019, 3:39 PM

## 2019-05-08 ENCOUNTER — HOSPITAL ENCOUNTER (OUTPATIENT)
Dept: OCCUPATIONAL THERAPY | Age: 49
Setting detail: THERAPIES SERIES
Discharge: HOME | End: 2019-05-08
Payer: MEDICARE

## 2019-05-08 PROCEDURE — 97530 THERAPEUTIC ACTIVITIES: CPT

## 2019-05-08 PROCEDURE — 97140 MANUAL THERAPY 1/> REGIONS: CPT

## 2019-05-08 PROCEDURE — 97110 THERAPEUTIC EXERCISES: CPT

## 2019-05-13 ENCOUNTER — HOSPITAL ENCOUNTER (OUTPATIENT)
Dept: OCCUPATIONAL THERAPY | Age: 49
Setting detail: THERAPIES SERIES
Discharge: HOME | End: 2019-05-13
Payer: MEDICARE

## 2019-05-13 PROCEDURE — 97530 THERAPEUTIC ACTIVITIES: CPT

## 2019-05-13 PROCEDURE — 97140 MANUAL THERAPY 1/> REGIONS: CPT

## 2019-05-13 PROCEDURE — 97110 THERAPEUTIC EXERCISES: CPT

## 2019-05-20 ENCOUNTER — HOSPITAL ENCOUNTER (OUTPATIENT)
Dept: OCCUPATIONAL THERAPY | Age: 49
Setting detail: THERAPIES SERIES
Discharge: HOME | End: 2019-05-20
Payer: MEDICARE

## 2019-05-20 PROCEDURE — 97140 MANUAL THERAPY 1/> REGIONS: CPT

## 2019-05-20 PROCEDURE — 97530 THERAPEUTIC ACTIVITIES: CPT

## 2019-05-20 PROCEDURE — 97110 THERAPEUTIC EXERCISES: CPT

## 2019-05-20 NOTE — FLOWSHEET NOTE
40  Total treatment time:  40    Treatment/Activity Tolerance:     [x]  Patient tolerated treatment well []  Patient limited by fatique    []  Patient limited by pain []  Patient limited by other medical complications   []  Other:   Goals   Pt will decrease pain 0-1/10 with movement R elbow to increase functional activity tolerance.   Pt will increase AROM R elbow flex/ext and forearm sup/pron to WNL to increase functional mobility   Pt will follow thru and be independent with HEP including ROM ex shoulder to prevent frozen shoulder symptoms. LTG   Pt will decrease Quick dash of 63.6          Patient Requires Follow-up:  [x]  Yes  []  No     Plan: [x]  Continue per plan of care []  Alter current plan (see comments)   []  Plan of care initiated []  Hold pending MD visit []  Discharge    Plan for Next Session:    Continue with POC.     Electronically signed by:  YOSHI Mena/L THEODORA 1994    , 5/20/2019, 4:11 PM

## 2019-05-23 ENCOUNTER — HOSPITAL ENCOUNTER (OUTPATIENT)
Age: 49
Discharge: HOME | End: 2019-05-23

## 2019-05-23 LAB
ALBUMIN SERPL-MCNC: 4.3 GM/DL (ref 3.4–5)
ALP BLD-CCNC: 61 IU/L (ref 40–128)
ALT SERPL-CCNC: 9 U/L (ref 10–40)
AMPHETAMINES: NEGATIVE
ANION GAP SERPL CALCULATED.3IONS-SCNC: 16 MMOL/L (ref 4–16)
AST SERPL-CCNC: 14 IU/L (ref 15–37)
BARBITURATE SCREEN URINE: NEGATIVE
BENZODIAZEPINE SCREEN, URINE: NEGATIVE
BILIRUB SERPL-MCNC: 0.3 MG/DL (ref 0–1)
BUN BLDV-MCNC: 13 MG/DL (ref 6–23)
CALCIUM SERPL-MCNC: 10.3 MG/DL (ref 8.3–10.6)
CANNABINOID SCREEN URINE: NEGATIVE
CHLORIDE BLD-SCNC: 103 MMOL/L (ref 99–110)
CHOLESTEROL: 234 MG/DL
CO2: 25 MMOL/L (ref 21–32)
COCAINE METABOLITE: NEGATIVE
CREAT SERPL-MCNC: 0.8 MG/DL (ref 0.6–1.1)
GFR AFRICAN AMERICAN: >60 ML/MIN/1.73M2
GFR NON-AFRICAN AMERICAN: >60 ML/MIN/1.73M2
GLUCOSE BLD-MCNC: 54 MG/DL (ref 70–99)
HCT VFR BLD CALC: 42 % (ref 37–47)
HDLC SERPL-MCNC: 73 MG/DL
HEMOGLOBIN: 13.3 GM/DL (ref 12.5–16)
LDL CHOLESTEROL DIRECT: 155 MG/DL
MCH RBC QN AUTO: 29.4 PG (ref 27–31)
MCHC RBC AUTO-ENTMCNC: 31.7 % (ref 32–36)
MCV RBC AUTO: 92.9 FL (ref 78–100)
OPIATES, URINE: NEGATIVE
OXYCODONE: NORMAL
PDW BLD-RTO: 12.5 % (ref 11.7–14.9)
PHENCYCLIDINE, URINE: NEGATIVE
PLATELET # BLD: 234 K/CU MM (ref 140–440)
PMV BLD AUTO: 10.6 FL (ref 7.5–11.1)
POTASSIUM SERPL-SCNC: 4.3 MMOL/L (ref 3.5–5.1)
RBC # BLD: 4.52 M/CU MM (ref 4.2–5.4)
SODIUM BLD-SCNC: 144 MMOL/L (ref 135–145)
TOTAL PROTEIN: 6.3 GM/DL (ref 6.4–8.2)
TRIGL SERPL-MCNC: 79 MG/DL
TSH HIGH SENSITIVITY: 2.07 UIU/ML (ref 0.27–4.2)
WBC # BLD: 6.4 K/CU MM (ref 4–10.5)

## 2019-05-23 PROCEDURE — 83721 ASSAY OF BLOOD LIPOPROTEIN: CPT

## 2019-05-23 PROCEDURE — 80053 COMPREHEN METABOLIC PANEL: CPT

## 2019-05-23 PROCEDURE — 80307 DRUG TEST PRSMV CHEM ANLYZR: CPT

## 2019-05-23 PROCEDURE — 85027 COMPLETE CBC AUTOMATED: CPT

## 2019-05-23 PROCEDURE — 84443 ASSAY THYROID STIM HORMONE: CPT

## 2019-05-23 PROCEDURE — 36415 COLL VENOUS BLD VENIPUNCTURE: CPT

## 2019-05-23 PROCEDURE — 80061 LIPID PANEL: CPT

## 2019-05-31 ENCOUNTER — HOSPITAL ENCOUNTER (OUTPATIENT)
Dept: OCCUPATIONAL THERAPY | Age: 49
Setting detail: THERAPIES SERIES
Discharge: HOME | End: 2019-05-31
Payer: MEDICARE

## 2019-05-31 PROCEDURE — 97530 THERAPEUTIC ACTIVITIES: CPT

## 2019-05-31 PROCEDURE — 97110 THERAPEUTIC EXERCISES: CPT

## 2019-05-31 PROCEDURE — 97140 MANUAL THERAPY 1/> REGIONS: CPT

## 2019-05-31 NOTE — FLOWSHEET NOTE
Occupational Therapy Out Patient Daily Treatment Note     [x][facility]      JULIAN SALINAS [facility]     [address] [facility] [address]  [place] [address]       [facility] [address], [address], [address]       Rayna Chavez [address]     [phone] [phone] [phone]  ______________________________________________________________________  Date:  2019  Patient Name:  Jesusita Gorman    :  1970  Restrictions/Precautions:   General, 2# weight limit  Diagnosis:   R radial head fx  Treatment Diagnosis:  elbow stiffness and pain  Insurance/Certification information:  [facility] medicare  Referring Physician:   Aleta Sanchez PA-C  Plan of care signed (Y/N):    Visit# / total visits:   Pain level: 0/10 3-4/10  When moving    Subjective: States continue to do better.-same  Prior Level of Function:  Full function R elbow since injury  Patient Goals:  Return to PLOF    Treatment Flowsheet   Right Left    hot pack to elbow x    Massage elbow x    Gentle AROM/PROM elbow and FA x    Instructed in HEP x     distal strengthening with digiflex 5# pinch pin lateral and palmar 4# and 2# wrist flex and extension x      8oz wt hammer ex sup/pron x    Elbow flex and ext 2# x     wrist flex/ext  x                                                                                   Interventions/Modalities used:  [x] Therapeutic Exercise   [x] Modalities: prn  [x] Therapeutic Activity    [] Ultrasound [] Elec Stimulation   [] Total Motion Release    [] Fluido [] Kinesiotaping  [] Neuromuscular Re-education   [] Ionto [x] Coldpack/hotpack   [x] Instruction in HEP    Other:    Objective Findings: L elbow flex 146 and extension 2 improved end feel -pain at end of extension    Communication with other providers: POC to physician    Education provided to patient: Instructed in HEP    Adverse Reactions to treatment: none    Time in: 1515  Time out:  1600  Timed treatment minutes: 40  Total treatment time:  40    Treatment/Activity Tolerance:     [x]  Patient tolerated treatment well []  Patient limited by fatique    []  Patient limited by pain []  Patient limited by other medical complications   []  Other:   Goals   Pt will decrease pain 0-1/10 with movement R elbow to increase functional activity tolerance.   Pt will increase AROM R elbow flex/ext and forearm sup/pron to WNL to increase functional mobility   Pt will follow thru and be independent with HEP including ROM ex shoulder to prevent frozen shoulder symptoms. LTG   Pt will decrease Quick dash of 63.6          Patient Requires Follow-up:  [x]  Yes  []  No     Plan: [x]  Continue per plan of care []  Alter current plan (see comments)   []  Plan of care initiated []  Hold pending MD visit []  Discharge    Plan for Next Session:    Continue with POC.     Electronically signed by:  Luisito Fontaine    , 5/31/2019, 5:20 PM

## 2019-06-03 ENCOUNTER — HOSPITAL ENCOUNTER (OUTPATIENT)
Dept: OCCUPATIONAL THERAPY | Age: 49
Setting detail: THERAPIES SERIES
Discharge: HOME | End: 2019-06-03
Payer: MEDICARE

## 2019-06-03 PROCEDURE — 97110 THERAPEUTIC EXERCISES: CPT

## 2019-06-03 PROCEDURE — 97530 THERAPEUTIC ACTIVITIES: CPT

## 2019-06-03 PROCEDURE — 97140 MANUAL THERAPY 1/> REGIONS: CPT

## 2019-06-05 ENCOUNTER — HOSPITAL ENCOUNTER (OUTPATIENT)
Dept: OCCUPATIONAL THERAPY | Age: 49
Setting detail: THERAPIES SERIES
Discharge: HOME | End: 2019-06-05
Payer: MEDICARE

## 2019-06-05 PROCEDURE — 97110 THERAPEUTIC EXERCISES: CPT

## 2019-06-05 PROCEDURE — 97530 THERAPEUTIC ACTIVITIES: CPT

## 2019-06-05 PROCEDURE — 97140 MANUAL THERAPY 1/> REGIONS: CPT

## 2019-06-05 NOTE — FLOWSHEET NOTE
Occupational Therapy Out Patient Daily Treatment Note     [x][facility]      JULIAN SALINAS [address]z       [facility] [address], [address], [address]       Rayna [address]avez [address]     [phone] [phone] [phone]  ______________________________________________________________________  Date:  2019  Patient Name:  Radha Rojas    :  1970  Restrictions/Precautions:   General, 2# weight limit  Diagnosis:   R radial head fx  Treatment Diagnosis:  elbow stiffness and pain  Insurance/Certification information:  [facility] medicare  Referring Physician:   Blayne Ho PA-C  Plan of care signed (Y/N):    Visit# / total visits: 15/16  Pain level: 0/10 3-4/10  When moving    Subjective: States accidentally picked up water with R UE and caused some discomfort.   Prior Level of Function:  Full function R elbow since injury  Patient Goals:  Return to PLOF    Treatment Flowsheet   Right Left    hot pack to elbow x    Massage elbow x    Gentle AROM/PROM elbow and FA x    Instructed in HEP x     distal strengthening with digiflex 5# pinch pin lateral and palmar 4# and 2# wrist flex and extension x      8oz wt hammer ex sup/pron x    Elbow flex and ext 2# x     wrist flex/ext  x                                                                                   Interventions/Modalities used:  [x] Therapeutic Exercise   [x] Modalities: prn  [x] Therapeutic Activity    [] Ultrasound [] Elec Stimulation   [] Total Motion Release    [] Fluido [] Kinesiotaping  [] Neuromuscular Re-education   [] Ionto [x] Coldpack/hotpack   [x] Instruction in HEP    Other:    Objective Findings: L elbow flex 145 and extension 2 improved end feel -pain at end of extension R  29.8#    Communication with other providers: POC to physician    Education provided to patient: Instructed in HEP    Adverse Reactions to treatment: none    Time in: 1520  Time out:  1600  Timed treatment minutes: 40  Total treatment time:  40    Treatment/Activity Tolerance:     [x]  Patient tolerated treatment well []  Patient limited by fatique    []  Patient limited by pain []  Patient limited by other medical complications   []  Other:   Goals   Pt will decrease pain 0-1/10 with movement R elbow to increase functional activity tolerance.   Pt will increase AROM R elbow flex/ext and forearm sup/pron to WNL to increase functional mobility   Pt will follow thru and be independent with HEP including ROM ex shoulder to prevent frozen shoulder symptoms. LTG   Pt will decrease Quick dash of 63.6          Patient Requires Follow-up:  [x]  Yes  []  No     Plan: [x]  Continue per plan of care []  Alter current plan (see comments)   []  Plan of care initiated []  Hold pending MD visit []  Discharge    Plan for Next Session:    Continue with POC.     Electronically signed by:  YOSHI Bailey/L THEODORA 1994    , 6/5/2019, 3:31 PM

## 2019-06-10 ENCOUNTER — HOSPITAL ENCOUNTER (OUTPATIENT)
Dept: OCCUPATIONAL THERAPY | Age: 49
Setting detail: THERAPIES SERIES
Discharge: HOME | End: 2019-06-10
Payer: MEDICARE

## 2019-06-10 PROCEDURE — 97110 THERAPEUTIC EXERCISES: CPT

## 2019-06-10 PROCEDURE — 97140 MANUAL THERAPY 1/> REGIONS: CPT

## 2019-06-10 PROCEDURE — 97530 THERAPEUTIC ACTIVITIES: CPT

## 2019-06-10 NOTE — FLOWSHEET NOTE
Occupational Therapy Out Patient Daily Treatment Note     [x]Nicolaus Son Jones 3457      JULIAN [facility]     [address] [facility] [address] [facility] 23       [facility], [address], [address]       Rayna Putnam [address]     [phone] [phone] [phone]  ______________________________________________________________________  Date:  6/10/2019  Patient Name:  Cliff Thompson    :  1970  Restrictions/Precautions:   General, 2# weight limit  Diagnosis:   R radial head fx  Treatment Diagnosis:  elbow stiffness and pain  Insurance/Certification information:  [facility] medicare  Referring Physician:   Miriam Ratliff PA-C  Plan of care signed (Y/N):    Visit# / total visits:   Pain level: 0/10 3-4/10  When moving    Subjective: States accidentally picked up water with R UE and caused some discomfort.   Prior Level of Function:  Full function R elbow since injury  Patient Goals:  Return to PLOF    Treatment Flowsheet   Right Left    hot pack to elbow x    Massage elbow x    Gentle AROM/PROM elbow and FA x    Instructed in HEP x     distal strengthening with digiflex 5# pinch pin lateral and palmar 4# and 2# wrist flex and extension x      8oz wt hammer ex sup/pron x    Elbow flex and ext 2# x     wrist flex/ext  x                                                                                   Interventions/Modalities used:  [x] Therapeutic Exercise   [x] Modalities: prn  [x] Therapeutic Activity    [] Ultrasound [] Elec Stimulation   [] Total Motion Release    [] Fluido [] Kinesiotaping  [] Neuromuscular Re-education   [] Ionto [x] Coldpack/hotpack   [x] Instruction in HEP    Other:    Objective Findings: L elbow flex 150 and extension 2 improved end feel -pain at end of extension R  27.5#    Communication with other providers: POC to physician    Education provided to patient: Instructed in HEP    Adverse Reactions to treatment:

## 2019-06-12 ENCOUNTER — HOSPITAL ENCOUNTER (OUTPATIENT)
Dept: OCCUPATIONAL THERAPY | Age: 49
Setting detail: THERAPIES SERIES
Discharge: HOME | End: 2019-06-12
Payer: MEDICARE

## 2019-06-12 PROCEDURE — 97110 THERAPEUTIC EXERCISES: CPT

## 2019-06-12 PROCEDURE — 97140 MANUAL THERAPY 1/> REGIONS: CPT

## 2019-06-12 PROCEDURE — 97530 THERAPEUTIC ACTIVITIES: CPT

## 2019-06-12 NOTE — FLOWSHEET NOTE
Occupational Therapy Out Patient Daily Treatment Note     [x]Alma Son Jones 2064      JULIAN [facility]     [address] [facility] [address] [place] 23       [facility], [address], [address]       Rayna Chavez [address]     [phone] [phone] [phone]  ______________________________________________________________________  Date:  2019  Patient Name:  Aurora Dillon    :  1970  Restrictions/Precautions:   General, 2# weight limit  Diagnosis:   R radial head fx  Treatment Diagnosis:  elbow stiffness and pain  Insurance/Certification information:  [facility] medicare  Referring Physician:   Marino Zeng PA-C  Plan of care signed (Y/N):    Visit# / total visits:   Pain level: 0/10 3-4/10  When moving    Subjective: States accidentally picked up water with R UE and caused some discomfort.   Prior Level of Function:  Full function R elbow since injury  Patient Goals:  Return to PLOF    Treatment Flowsheet   Right Left    hot pack to elbow x    Massage elbow x    Gentle AROM/PROM elbow and FA x    Instructed in HEP x     distal strengthening with digiflex 5# pinch pin lateral and palmar 4# and 2# wrist flex and extension x      8oz wt hammer ex sup/pron x    Elbow flex and ext 2# x     wrist flex/ext  x                                                                                   Interventions/Modalities used:  [x] Therapeutic Exercise   [x] Modalities: prn  [x] Therapeutic Activity    [] Ultrasound [] Elec Stimulation   [] Total Motion Release    [] Fluido [] Kinesiotaping  [] Neuromuscular Re-education   [] Ionto [x] Coldpack/hotpack   [x] Instruction in HEP    Other:    Objective Findings: L elbow flex 150 and extension 2 improved end feel -pain at end of extension R  52.1#    Communication with other providers: POC to physician    Education provided to patient: Instructed in HEP    Adverse Reactions to treatment: none    Time in: 1520  Time out:  1600  Timed treatment minutes: 40  Total treatment time:  40    Treatment/Activity Tolerance:     [x]  Patient tolerated treatment well []  Patient limited by fatique    []  Patient limited by pain []  Patient limited by other medical complications   []  Other:   Goals   Pt will decrease pain 0-1/10 with movement R elbow to increase functional activity tolerance.   Pt will increase AROM R elbow flex/ext and forearm sup/pron to WNL to increase functional mobility   Pt will follow thru and be independent with HEP including ROM ex shoulder to prevent frozen shoulder symptoms. LTG   Pt will decrease Quick dash of 63.6          Patient Requires Follow-up:  [x]  Yes  []  No     Plan: [x]  Continue per plan of care []  Alter current plan (see comments)   []  Plan of care initiated []  Hold pending MD visit []  Discharge    Plan for Next Session:    Continue with POC.     Electronically signed by:  YOSHI Nash/L THEODORA 1992    , 6/12/2019, 3:40 PM

## 2019-06-17 ENCOUNTER — HOSPITAL ENCOUNTER (OUTPATIENT)
Dept: OCCUPATIONAL THERAPY | Age: 49
Setting detail: THERAPIES SERIES
Discharge: HOME | End: 2019-06-17
Payer: MEDICARE

## 2019-06-17 PROCEDURE — 97140 MANUAL THERAPY 1/> REGIONS: CPT

## 2019-06-17 PROCEDURE — 97110 THERAPEUTIC EXERCISES: CPT

## 2019-06-17 PROCEDURE — 97530 THERAPEUTIC ACTIVITIES: CPT

## 2019-06-17 NOTE — FLOWSHEET NOTE
Occupational Therapy Out Patient Daily Treatment Note     [x]Ivanhoe Son Jones 0650      JULIAN [facility]     [address] [facility] [address] [facility] 23       [facility] [address], [address], [address]       Rayna Chavez [address]     [phone] [phone] [phone]  ______________________________________________________________________  Date:  2019  Patient Name:  Cliff Thompson    :  1970  Restrictions/Precautions:   General, 2# weight limit  Diagnosis:   R radial head fx  Treatment Diagnosis:  elbow stiffness and pain  Insurance/Certification information:  [facility] medicare  Referring Physician:   Miriam Ratliff PA-C  Plan of care signed (Y/N):    Visit# / total visits:   Pain level: 0/10 3-4/10  When moving    Subjective: States accidentally picked up water with R UE and caused some discomfort.   Prior Level of Function:  Full function R elbow since injury  Patient Goals:  Return to PLOF    Treatment Flowsheet   Right Left    hot pack to elbow x    Massage elbow x    Gentle AROM/PROM elbow and FA x    Instructed in HEP x     distal strengthening with digiflex 5# pinch pin lateral and palmar 4# and 2# wrist flex and extension x      8oz wt hammer ex sup/pron x    Elbow flex and ext 2# x     wrist flex/ext  x                                                                                   Interventions/Modalities used:  [x] Therapeutic Exercise   [x] Modalities: prn  [x] Therapeutic Activity    [] Ultrasound [] Elec Stimulation   [] Total Motion Release    [] Fluido [] Kinesiotaping  [] Neuromuscular Re-education   [] Ionto [x] Coldpack/hotpack   [x] Instruction in HEP    Other:    Objective Findings: L elbow flex 150 and extension 0 improved end feel -pain at end of extension R  57.3#    Communication with other providers: POC to physician    Education provided to patient: Instructed in HEP    Adverse Reactions to treatment: none    Time in: 1515  Time out:  1555  Timed treatment minutes: 40  Total treatment time:  40    Treatment/Activity Tolerance:     [x]  Patient tolerated treatment well []  Patient limited by fatique    []  Patient limited by pain []  Patient limited by other medical complications   []  Other:   Goals   Pt will decrease pain 0-1/10 with movement R elbow to increase functional activity tolerance.   Pt will increase AROM R elbow flex/ext and forearm sup/pron to WNL to increase functional mobility   Pt will follow thru and be independent with HEP including ROM ex shoulder to prevent frozen shoulder symptoms. LTG   Pt will decrease Quick dash of 63.6          Patient Requires Follow-up:  [x]  Yes  []  No     Plan: [x]  Continue per plan of care []  Alter current plan (see comments)   []  Plan of care initiated []  Hold pending MD visit []  Discharge    Plan for Next Session:    Continue with POC.     Electronically signed by:  YOSHI Lu/L THEODORA 1992    , 6/17/2019, 3:27 PM

## 2019-06-19 ENCOUNTER — HOSPITAL ENCOUNTER (OUTPATIENT)
Dept: OCCUPATIONAL THERAPY | Age: 49
Discharge: HOME | End: 2019-06-19

## 2019-06-19 NOTE — FLOWSHEET NOTE
Physical Therapy  Cancellation/No-show Note  Patient Name:  Calvin Sandy  :  1970   Date:  2019  Cancelled visits to date: 3  No-shows to date: 1    For today's appointment patient:  [x]  Cancelled  []  Rescheduled appointment  []  No-show     Reason given by patient:  []  Patient ill  []  Conflicting appointment  []  No transportation    []  Conflict with work  [x]  No reason given  []  Other:  Left message on voicemail, did not give reason why   Comments:      Electronically signed by:  Enrique Doe,

## 2019-06-24 ENCOUNTER — HOSPITAL ENCOUNTER (OUTPATIENT)
Dept: OCCUPATIONAL THERAPY | Age: 49
Setting detail: THERAPIES SERIES
Discharge: HOME | End: 2019-06-24
Payer: MEDICARE

## 2019-06-24 PROCEDURE — 97530 THERAPEUTIC ACTIVITIES: CPT

## 2019-06-24 PROCEDURE — 97140 MANUAL THERAPY 1/> REGIONS: CPT

## 2019-06-24 PROCEDURE — 97110 THERAPEUTIC EXERCISES: CPT

## 2019-06-24 NOTE — FLOWSHEET NOTE
Occupational Therapy Out Patient Daily Treatment and Discharge Note     [x]Ar[place] Soncarlos Jones 1460      JULIAN [facility] [address] [place] [address]       [facility] [address], [address], [address]       Rayna Chavez [address]     [phone] [phone] [phone]  ______________________________________________________________________  Date:  2019  Patient Name:  Xi Lau    :  1970  Restrictions/Precautions:   General, 2# weight limit  Diagnosis:   R radial head fx  Treatment Diagnosis:  elbow stiffness and pain  Insurance/Certification information:  [facility] medicare  Referring Physician:   Jamshid Valente PA-C  Plan of care signed (Y/N):    Visit# / total visits:   Pain level: 0/10     Subjective: States \"I don't feel well today\".   Prior Level of Function:  Full function R elbow since injury  Patient Goals:  Return to PLOF    Treatment Flowsheet   Right Left    hot pack to elbow x    Massage elbow x    Gentle AROM/PROM elbow and FA x    Instructed in HEP x     distal strengthening with digiflex 5# pinch pin lateral and palmar 4# and 2# wrist flex and extension x      8oz wt hammer ex sup/pron x    Elbow flex and ext 2# x     wrist flex/ext  x                                                                                   Interventions/Modalities used:  [x] Therapeutic Exercise   [x] Modalities: prn  [x] Therapeutic Activity    [] Ultrasound [] Elec Stimulation   [] Total Motion Release    [] Fluido [] Kinesiotaping  [] Neuromuscular Re-education   [] Ionto [x] Coldpack/hotpack   [x] Instruction in HEP    Other:    Objective Findings: L elbow flex 150 and extension 0 improved end feel -pain at end of extension R  50.7#  DC Quick Dash 38.64  Communication with other providers: POC to physician    Education provided to patient: Instructed in HEP    Adverse Reactions to treatment: none    Time in: 7437  Time out: 5530  Timed treatment minutes: 39  Total treatment time:  39    Treatment/Activity Tolerance:     [x]  Patient tolerated treatment well []  Patient limited by fatique    []  Patient limited by pain []  Patient limited by other medical complications   []  Other:   Goals   Pt will decrease pain 0-1/10 with movement R elbow to increase functional activity tolerance. MET   Pt will increase AROM R elbow flex/ext and forearm sup/pron to WNL to increase functional mobility MET   Pt will follow thru and be independent with HEP including ROM ex shoulder to prevent frozen shoulder symptoms. MET  LTG   Pt will decrease Quick dash of 63.6 MET          Patient Requires Follow-up:  [x]  Yes  []  No     Plan: [x]  Continue per plan of care []  Alter current plan (see comments)   []  Plan of care initiated []  Hold pending MD visit []  Discharge    Plan for Next Session:    Continue with POC.     Electronically signed by:  YOSHI Driver/L THEODORA 1992    , 6/24/2019, 4:03 PM

## 2019-11-01 ENCOUNTER — APPOINTMENT (OUTPATIENT)
Dept: CT IMAGING | Age: 49
End: 2019-11-01
Payer: MEDICARE

## 2019-11-01 ENCOUNTER — APPOINTMENT (OUTPATIENT)
Dept: GENERAL RADIOLOGY | Age: 49
End: 2019-11-01
Payer: MEDICARE

## 2019-11-01 ENCOUNTER — HOSPITAL ENCOUNTER (EMERGENCY)
Age: 49
Discharge: HOME | End: 2019-11-02
Attending: EMERGENCY MEDICINE
Payer: MEDICARE

## 2019-11-01 DIAGNOSIS — R07.9 CHEST PAIN, UNSPECIFIED TYPE: Primary | ICD-10-CM

## 2019-11-01 LAB
ALBUMIN SERPL-MCNC: 4.5 GM/DL (ref 3.4–5)
ALP BLD-CCNC: 68 IU/L (ref 40–129)
ALT SERPL-CCNC: 13 U/L (ref 10–40)
ANION GAP SERPL CALCULATED.3IONS-SCNC: 10 MMOL/L (ref 4–16)
AST SERPL-CCNC: 22 IU/L (ref 15–37)
BASOPHILS ABSOLUTE: 0.1 K/CU MM
BASOPHILS RELATIVE PERCENT: 0.9 % (ref 0–1)
BILIRUB SERPL-MCNC: 0.1 MG/DL (ref 0–1)
BUN BLDV-MCNC: 16 MG/DL (ref 6–23)
CALCIUM SERPL-MCNC: 10.4 MG/DL (ref 8.3–10.6)
CHLORIDE BLD-SCNC: 108 MMOL/L (ref 99–110)
CO2: 22 MMOL/L (ref 21–32)
CREAT SERPL-MCNC: 1 MG/DL (ref 0.6–1.1)
D DIMER: 999 NG/ML(DDU)
DIFFERENTIAL TYPE: ABNORMAL
EOSINOPHILS ABSOLUTE: 0.4 K/CU MM
EOSINOPHILS RELATIVE PERCENT: 5.4 % (ref 0–3)
GFR AFRICAN AMERICAN: >60 ML/MIN/1.73M2
GFR NON-AFRICAN AMERICAN: 58 ML/MIN/1.73M2
GLUCOSE BLD-MCNC: 98 MG/DL (ref 70–99)
HCT VFR BLD CALC: 43.3 % (ref 37–47)
HEMOGLOBIN: 12.9 GM/DL (ref 12.5–16)
IMMATURE NEUTROPHIL %: 0.2 % (ref 0–0.43)
LIPASE: 53 IU/L (ref 13–60)
LYMPHOCYTES ABSOLUTE: 3 K/CU MM
LYMPHOCYTES RELATIVE PERCENT: 37.3 % (ref 24–44)
MCH RBC QN AUTO: 29.6 PG (ref 27–31)
MCHC RBC AUTO-ENTMCNC: 29.8 % (ref 32–36)
MCV RBC AUTO: 99.3 FL (ref 78–100)
MONOCYTES ABSOLUTE: 0.8 K/CU MM
MONOCYTES RELATIVE PERCENT: 10 % (ref 0–4)
NUCLEATED RBC %: 0 %
PDW BLD-RTO: 14.6 % (ref 11.7–14.9)
PLATELET # BLD: 214 K/CU MM (ref 140–440)
PMV BLD AUTO: 10.6 FL (ref 7.5–11.1)
POTASSIUM SERPL-SCNC: 3.7 MMOL/L (ref 3.5–5.1)
RBC # BLD: 4.36 M/CU MM (ref 4.2–5.4)
REASON FOR REJECTION: NORMAL
REJECTED TEST: NORMAL
REJECTED TEST: NORMAL
SEGMENTED NEUTROPHILS ABSOLUTE COUNT: 3.7 K/CU MM
SEGMENTED NEUTROPHILS RELATIVE PERCENT: 46.2 % (ref 36–66)
SODIUM BLD-SCNC: 140 MMOL/L (ref 135–145)
TOTAL IMMATURE NEUTOROPHIL: 0.02 K/CU MM
TOTAL NUCLEATED RBC: 0 K/CU MM
TOTAL PROTEIN: 6.8 GM/DL (ref 6.4–8.2)
TROPONIN T: <0.01 NG/ML
WBC # BLD: 8.1 K/CU MM (ref 4–10.5)

## 2019-11-01 PROCEDURE — 84484 ASSAY OF TROPONIN QUANT: CPT

## 2019-11-01 PROCEDURE — 71046 X-RAY EXAM CHEST 2 VIEWS: CPT

## 2019-11-01 PROCEDURE — 80053 COMPREHEN METABOLIC PANEL: CPT

## 2019-11-01 PROCEDURE — 99285 EMERGENCY DEPT VISIT HI MDM: CPT

## 2019-11-01 PROCEDURE — 6360000004 HC RX CONTRAST MEDICATION: Performed by: EMERGENCY MEDICINE

## 2019-11-01 PROCEDURE — 6360000002 HC RX W HCPCS: Performed by: EMERGENCY MEDICINE

## 2019-11-01 PROCEDURE — 96374 THER/PROPH/DIAG INJ IV PUSH: CPT

## 2019-11-01 PROCEDURE — 85025 COMPLETE CBC W/AUTO DIFF WBC: CPT

## 2019-11-01 PROCEDURE — 83690 ASSAY OF LIPASE: CPT

## 2019-11-01 PROCEDURE — 85379 FIBRIN DEGRADATION QUANT: CPT

## 2019-11-01 PROCEDURE — 36415 COLL VENOUS BLD VENIPUNCTURE: CPT

## 2019-11-01 PROCEDURE — 6370000000 HC RX 637 (ALT 250 FOR IP): Performed by: EMERGENCY MEDICINE

## 2019-11-01 PROCEDURE — 93005 ELECTROCARDIOGRAM TRACING: CPT | Performed by: PHYSICIAN ASSISTANT

## 2019-11-01 PROCEDURE — 71275 CT ANGIOGRAPHY CHEST: CPT

## 2019-11-01 RX ORDER — KETOROLAC TROMETHAMINE 30 MG/ML
30 INJECTION, SOLUTION INTRAMUSCULAR; INTRAVENOUS ONCE
Status: COMPLETED | OUTPATIENT
Start: 2019-11-01 | End: 2019-11-01

## 2019-11-01 RX ORDER — LIDOCAINE HYDROCHLORIDE 20 MG/ML
15 SOLUTION OROPHARYNGEAL ONCE
Status: COMPLETED | OUTPATIENT
Start: 2019-11-01 | End: 2019-11-01

## 2019-11-01 RX ORDER — MAGNESIUM HYDROXIDE/ALUMINUM HYDROXICE/SIMETHICONE 120; 1200; 1200 MG/30ML; MG/30ML; MG/30ML
30 SUSPENSION ORAL ONCE
Status: COMPLETED | OUTPATIENT
Start: 2019-11-01 | End: 2019-11-01

## 2019-11-01 RX ADMIN — KETOROLAC TROMETHAMINE 30 MG: 30 INJECTION, SOLUTION INTRAMUSCULAR; INTRAVENOUS at 21:58

## 2019-11-01 RX ADMIN — LIDOCAINE HYDROCHLORIDE 15 ML: 20 SOLUTION ORAL; TOPICAL at 22:00

## 2019-11-01 RX ADMIN — ALUMINUM HYDROXIDE, MAGNESIUM HYDROXIDE, AND SIMETHICONE 30 ML: 200; 200; 20 SUSPENSION ORAL at 22:00

## 2019-11-01 RX ADMIN — IOPAMIDOL 90 ML: 755 INJECTION, SOLUTION INTRAVENOUS at 23:14

## 2019-11-01 ASSESSMENT — PAIN DESCRIPTION - FREQUENCY: FREQUENCY: CONTINUOUS

## 2019-11-01 ASSESSMENT — PAIN SCALES - GENERAL
PAINLEVEL_OUTOF10: 7
PAINLEVEL_OUTOF10: 6

## 2019-11-01 ASSESSMENT — PAIN DESCRIPTION - PAIN TYPE: TYPE: ACUTE PAIN

## 2019-11-01 ASSESSMENT — PAIN DESCRIPTION - DESCRIPTORS: DESCRIPTORS: ACHING

## 2019-11-01 ASSESSMENT — PAIN DESCRIPTION - LOCATION: LOCATION: BACK;CHEST

## 2019-11-01 ASSESSMENT — HEART SCORE: ECG: 0

## 2019-11-02 VITALS
WEIGHT: 155 LBS | DIASTOLIC BLOOD PRESSURE: 96 MMHG | HEART RATE: 66 BPM | TEMPERATURE: 98 F | BODY MASS INDEX: 25.83 KG/M2 | RESPIRATION RATE: 18 BRPM | HEIGHT: 65 IN | OXYGEN SATURATION: 98 % | SYSTOLIC BLOOD PRESSURE: 150 MMHG

## 2019-11-02 PROCEDURE — 93010 ELECTROCARDIOGRAM REPORT: CPT | Performed by: INTERNAL MEDICINE

## 2019-11-07 LAB
EKG ATRIAL RATE: 84 BPM
EKG DIAGNOSIS: NORMAL
EKG P AXIS: 43 DEGREES
EKG P-R INTERVAL: 194 MS
EKG Q-T INTERVAL: 376 MS
EKG QRS DURATION: 96 MS
EKG QTC CALCULATION (BAZETT): 444 MS
EKG R AXIS: 4 DEGREES
EKG T AXIS: 28 DEGREES
EKG VENTRICULAR RATE: 84 BPM

## 2019-11-23 ENCOUNTER — APPOINTMENT (OUTPATIENT)
Dept: ULTRASOUND IMAGING | Age: 49
End: 2019-11-23
Payer: MEDICARE

## 2019-11-23 ENCOUNTER — HOSPITAL ENCOUNTER (EMERGENCY)
Age: 49
Discharge: HOME | End: 2019-11-24
Attending: EMERGENCY MEDICINE
Payer: MEDICARE

## 2019-11-23 ENCOUNTER — APPOINTMENT (OUTPATIENT)
Dept: GENERAL RADIOLOGY | Age: 49
End: 2019-11-23
Payer: MEDICARE

## 2019-11-23 VITALS
RESPIRATION RATE: 16 BRPM | SYSTOLIC BLOOD PRESSURE: 104 MMHG | HEIGHT: 62 IN | WEIGHT: 205 LBS | OXYGEN SATURATION: 96 % | BODY MASS INDEX: 37.73 KG/M2 | DIASTOLIC BLOOD PRESSURE: 59 MMHG | HEART RATE: 64 BPM | TEMPERATURE: 97.9 F

## 2019-11-23 DIAGNOSIS — R07.9 CHEST PAIN, UNSPECIFIED TYPE: Primary | ICD-10-CM

## 2019-11-23 LAB
ALBUMIN SERPL-MCNC: 4.7 GM/DL (ref 3.4–5)
ALP BLD-CCNC: 72 IU/L (ref 40–129)
ALT SERPL-CCNC: 18 U/L (ref 10–40)
ANION GAP SERPL CALCULATED.3IONS-SCNC: 13 MMOL/L (ref 4–16)
AST SERPL-CCNC: 19 IU/L (ref 15–37)
BACTERIA: NEGATIVE /HPF
BASOPHILS ABSOLUTE: 0.1 K/CU MM
BASOPHILS RELATIVE PERCENT: 0.8 % (ref 0–1)
BILIRUB SERPL-MCNC: 0.3 MG/DL (ref 0–1)
BILIRUBIN URINE: NEGATIVE MG/DL
BLOOD, URINE: NEGATIVE
BUN BLDV-MCNC: 20 MG/DL (ref 6–23)
CALCIUM SERPL-MCNC: 10.6 MG/DL (ref 8.3–10.6)
CHLORIDE BLD-SCNC: 103 MMOL/L (ref 99–110)
CLARITY: CLEAR
CO2: 23 MMOL/L (ref 21–32)
COLOR: YELLOW
CREAT SERPL-MCNC: 1.2 MG/DL (ref 0.6–1.1)
DIFFERENTIAL TYPE: ABNORMAL
EOSINOPHILS ABSOLUTE: 0.4 K/CU MM
EOSINOPHILS RELATIVE PERCENT: 4.1 % (ref 0–3)
GFR AFRICAN AMERICAN: 57 ML/MIN/1.73M2
GFR NON-AFRICAN AMERICAN: 47 ML/MIN/1.73M2
GLUCOSE BLD-MCNC: 94 MG/DL (ref 70–99)
GLUCOSE, URINE: NEGATIVE MG/DL
HCT VFR BLD CALC: 48.2 % (ref 37–47)
HEMOGLOBIN: 15 GM/DL (ref 12.5–16)
IMMATURE NEUTROPHIL %: 0.6 % (ref 0–0.43)
KETONES, URINE: NEGATIVE MG/DL
LEUKOCYTE ESTERASE, URINE: ABNORMAL
LIPASE: 34 IU/L (ref 13–60)
LYMPHOCYTES ABSOLUTE: 2.7 K/CU MM
LYMPHOCYTES RELATIVE PERCENT: 30.1 % (ref 24–44)
MCH RBC QN AUTO: 29.8 PG (ref 27–31)
MCHC RBC AUTO-ENTMCNC: 31.1 % (ref 32–36)
MCV RBC AUTO: 95.8 FL (ref 78–100)
MONOCYTES ABSOLUTE: 1.1 K/CU MM
MONOCYTES RELATIVE PERCENT: 12.6 % (ref 0–4)
MUCUS: ABNORMAL HPF
NITRITE URINE, QUANTITATIVE: NEGATIVE
NON SQUAM EPI CELLS: <1 /HPF
NUCLEATED RBC %: 0 %
PDW BLD-RTO: 13.9 % (ref 11.7–14.9)
PH, URINE: 5 (ref 5–8)
PLATELET # BLD: 231 K/CU MM (ref 140–440)
PMV BLD AUTO: 10.5 FL (ref 7.5–11.1)
POTASSIUM SERPL-SCNC: 4 MMOL/L (ref 3.5–5.1)
PROTEIN UA: NEGATIVE MG/DL
RBC # BLD: 5.03 M/CU MM (ref 4.2–5.4)
RBC URINE: ABNORMAL /HPF (ref 0–6)
SEGMENTED NEUTROPHILS ABSOLUTE COUNT: 4.6 K/CU MM
SEGMENTED NEUTROPHILS RELATIVE PERCENT: 51.8 % (ref 36–66)
SODIUM BLD-SCNC: 139 MMOL/L (ref 135–145)
SPECIFIC GRAVITY UA: 1.01 (ref 1–1.03)
SQUAMOUS EPITHELIAL: <1 /HPF
TOTAL IMMATURE NEUTOROPHIL: 0.05 K/CU MM
TOTAL NUCLEATED RBC: 0 K/CU MM
TOTAL PROTEIN: 7.3 GM/DL (ref 6.4–8.2)
TRICHOMONAS: ABNORMAL /HPF
TROPONIN T: <0.01 NG/ML
TROPONIN T: <0.01 NG/ML
UROBILINOGEN, URINE: NORMAL MG/DL (ref 0.2–1)
WBC # BLD: 8.8 K/CU MM (ref 4–10.5)
WBC UA: 4 /HPF (ref 0–5)

## 2019-11-23 PROCEDURE — C9113 INJ PANTOPRAZOLE SODIUM, VIA: HCPCS | Performed by: PHYSICIAN ASSISTANT

## 2019-11-23 PROCEDURE — 93005 ELECTROCARDIOGRAM TRACING: CPT | Performed by: PHYSICIAN ASSISTANT

## 2019-11-23 PROCEDURE — 84484 ASSAY OF TROPONIN QUANT: CPT

## 2019-11-23 PROCEDURE — 76705 ECHO EXAM OF ABDOMEN: CPT

## 2019-11-23 PROCEDURE — 81001 URINALYSIS AUTO W/SCOPE: CPT

## 2019-11-23 PROCEDURE — 6360000002 HC RX W HCPCS: Performed by: PHYSICIAN ASSISTANT

## 2019-11-23 PROCEDURE — 85025 COMPLETE CBC W/AUTO DIFF WBC: CPT

## 2019-11-23 PROCEDURE — 80053 COMPREHEN METABOLIC PANEL: CPT

## 2019-11-23 PROCEDURE — 99285 EMERGENCY DEPT VISIT HI MDM: CPT

## 2019-11-23 PROCEDURE — 71045 X-RAY EXAM CHEST 1 VIEW: CPT

## 2019-11-23 PROCEDURE — 96374 THER/PROPH/DIAG INJ IV PUSH: CPT

## 2019-11-23 PROCEDURE — 83690 ASSAY OF LIPASE: CPT

## 2019-11-23 PROCEDURE — 2580000003 HC RX 258: Performed by: PHYSICIAN ASSISTANT

## 2019-11-23 PROCEDURE — 96375 TX/PRO/DX INJ NEW DRUG ADDON: CPT

## 2019-11-23 PROCEDURE — 6370000000 HC RX 637 (ALT 250 FOR IP): Performed by: PHYSICIAN ASSISTANT

## 2019-11-23 RX ORDER — MAGNESIUM HYDROXIDE/ALUMINUM HYDROXICE/SIMETHICONE 120; 1200; 1200 MG/30ML; MG/30ML; MG/30ML
30 SUSPENSION ORAL ONCE
Status: COMPLETED | OUTPATIENT
Start: 2019-11-23 | End: 2019-11-23

## 2019-11-23 RX ORDER — KETOROLAC TROMETHAMINE 30 MG/ML
30 INJECTION, SOLUTION INTRAMUSCULAR; INTRAVENOUS ONCE
Status: COMPLETED | OUTPATIENT
Start: 2019-11-23 | End: 2019-11-23

## 2019-11-23 RX ORDER — PANTOPRAZOLE SODIUM 40 MG/10ML
40 INJECTION, POWDER, LYOPHILIZED, FOR SOLUTION INTRAVENOUS ONCE
Status: COMPLETED | OUTPATIENT
Start: 2019-11-23 | End: 2019-11-23

## 2019-11-23 RX ORDER — 0.9 % SODIUM CHLORIDE 0.9 %
1000 INTRAVENOUS SOLUTION INTRAVENOUS ONCE
Status: COMPLETED | OUTPATIENT
Start: 2019-11-23 | End: 2019-11-23

## 2019-11-23 RX ORDER — HYDROCODONE BITARTRATE AND ACETAMINOPHEN 5; 325 MG/1; MG/1
1 TABLET ORAL ONCE
Status: COMPLETED | OUTPATIENT
Start: 2019-11-24 | End: 2019-11-24

## 2019-11-23 RX ORDER — LIDOCAINE HYDROCHLORIDE 20 MG/ML
15 SOLUTION OROPHARYNGEAL ONCE
Status: COMPLETED | OUTPATIENT
Start: 2019-11-23 | End: 2019-11-23

## 2019-11-23 RX ADMIN — KETOROLAC TROMETHAMINE 30 MG: 30 INJECTION, SOLUTION INTRAMUSCULAR; INTRAVENOUS at 21:40

## 2019-11-23 RX ADMIN — PANTOPRAZOLE SODIUM 40 MG: 40 INJECTION, POWDER, FOR SOLUTION INTRAVENOUS at 21:40

## 2019-11-23 RX ADMIN — SODIUM CHLORIDE 1000 ML: 9 INJECTION, SOLUTION INTRAVENOUS at 21:40

## 2019-11-23 RX ADMIN — ALUMINUM HYDROXIDE, MAGNESIUM HYDROXIDE, AND SIMETHICONE 30 ML: 200; 200; 20 SUSPENSION ORAL at 21:40

## 2019-11-23 RX ADMIN — LIDOCAINE HYDROCHLORIDE 15 ML: 20 SOLUTION ORAL; TOPICAL at 21:40

## 2019-11-23 ASSESSMENT — PAIN DESCRIPTION - DESCRIPTORS: DESCRIPTORS: TIGHTNESS

## 2019-11-23 ASSESSMENT — PAIN DESCRIPTION - FREQUENCY: FREQUENCY: INTERMITTENT

## 2019-11-23 ASSESSMENT — PAIN DESCRIPTION - LOCATION: LOCATION: CHEST

## 2019-11-23 ASSESSMENT — PAIN SCALES - GENERAL: PAINLEVEL_OUTOF10: 8

## 2019-11-23 ASSESSMENT — PAIN DESCRIPTION - PAIN TYPE: TYPE: ACUTE PAIN

## 2019-11-23 ASSESSMENT — PAIN DESCRIPTION - ORIENTATION: ORIENTATION: RIGHT

## 2019-11-24 PROCEDURE — 6370000000 HC RX 637 (ALT 250 FOR IP): Performed by: PHYSICIAN ASSISTANT

## 2019-11-24 PROCEDURE — 93010 ELECTROCARDIOGRAM REPORT: CPT | Performed by: INTERNAL MEDICINE

## 2019-11-24 RX ADMIN — HYDROCODONE BITARTRATE AND ACETAMINOPHEN 1 TABLET: 5; 325 TABLET ORAL at 00:29

## 2019-11-24 ASSESSMENT — PAIN SCALES - GENERAL: PAINLEVEL_OUTOF10: 8

## 2019-11-27 LAB
EKG ATRIAL RATE: 62 BPM
EKG DIAGNOSIS: NORMAL
EKG P AXIS: 57 DEGREES
EKG P-R INTERVAL: 188 MS
EKG Q-T INTERVAL: 402 MS
EKG QRS DURATION: 102 MS
EKG QTC CALCULATION (BAZETT): 408 MS
EKG R AXIS: 49 DEGREES
EKG T AXIS: 50 DEGREES
EKG VENTRICULAR RATE: 62 BPM

## 2020-01-31 ENCOUNTER — HOSPITAL ENCOUNTER (INPATIENT)
Age: 50
LOS: 4 days | Discharge: HOME | DRG: 190 | End: 2020-02-05
Attending: EMERGENCY MEDICINE | Admitting: INTERNAL MEDICINE
Payer: COMMERCIAL

## 2020-01-31 ENCOUNTER — APPOINTMENT (OUTPATIENT)
Dept: GENERAL RADIOLOGY | Age: 50
DRG: 190 | End: 2020-01-31

## 2020-01-31 ENCOUNTER — APPOINTMENT (OUTPATIENT)
Dept: CT IMAGING | Age: 50
DRG: 190 | End: 2020-01-31

## 2020-01-31 LAB
ALBUMIN SERPL-MCNC: 3.5 GM/DL (ref 3.4–5)
ALP BLD-CCNC: 51 IU/L (ref 40–129)
ALT SERPL-CCNC: 8 U/L (ref 10–40)
ANION GAP SERPL CALCULATED.3IONS-SCNC: 14 MMOL/L (ref 4–16)
AST SERPL-CCNC: 13 IU/L (ref 15–37)
BASOPHILS ABSOLUTE: 0 K/CU MM
BASOPHILS RELATIVE PERCENT: 0.6 % (ref 0–1)
BILIRUB SERPL-MCNC: 0.1 MG/DL (ref 0–1)
BUN BLDV-MCNC: 20 MG/DL (ref 6–23)
CALCIUM SERPL-MCNC: 8.6 MG/DL (ref 8.3–10.6)
CHLORIDE BLD-SCNC: 105 MMOL/L (ref 99–110)
CO2: 19 MMOL/L (ref 21–32)
CREAT SERPL-MCNC: 1.2 MG/DL (ref 0.6–1.1)
D DIMER: 659 NG/ML(DDU)
DIFFERENTIAL TYPE: ABNORMAL
EOSINOPHILS ABSOLUTE: 0.1 K/CU MM
EOSINOPHILS RELATIVE PERCENT: 0.7 % (ref 0–3)
GFR AFRICAN AMERICAN: 57 ML/MIN/1.73M2
GFR NON-AFRICAN AMERICAN: 47 ML/MIN/1.73M2
GLUCOSE BLD-MCNC: 144 MG/DL (ref 70–99)
GONADOTROPIN, CHORIONIC (HCG) QUANT: NORMAL UIU/ML
HCT VFR BLD CALC: 37 % (ref 37–47)
HEMOGLOBIN: 11.8 GM/DL (ref 12.5–16)
IMMATURE NEUTROPHIL %: 0.3 % (ref 0–0.43)
LYMPHOCYTES ABSOLUTE: 1.6 K/CU MM
LYMPHOCYTES RELATIVE PERCENT: 23.2 % (ref 24–44)
MAGNESIUM: 1.6 MG/DL (ref 1.8–2.4)
MCH RBC QN AUTO: 29.7 PG (ref 27–31)
MCHC RBC AUTO-ENTMCNC: 31.9 % (ref 32–36)
MCV RBC AUTO: 93.2 FL (ref 78–100)
MONOCYTES ABSOLUTE: 1.4 K/CU MM
MONOCYTES RELATIVE PERCENT: 20.4 % (ref 0–4)
NUCLEATED RBC %: 0 %
PDW BLD-RTO: 13.8 % (ref 11.7–14.9)
PLATELET # BLD: 172 K/CU MM (ref 140–440)
PMV BLD AUTO: 10.4 FL (ref 7.5–11.1)
POTASSIUM SERPL-SCNC: ABNORMAL MMOL/L (ref 3.5–5.1)
RAPID INFLUENZA  B AGN: NEGATIVE
RAPID INFLUENZA A AGN: NEGATIVE
RBC # BLD: 3.97 M/CU MM (ref 4.2–5.4)
SEGMENTED NEUTROPHILS ABSOLUTE COUNT: 3.7 K/CU MM
SEGMENTED NEUTROPHILS RELATIVE PERCENT: 54.8 % (ref 36–66)
SODIUM BLD-SCNC: 138 MMOL/L (ref 135–145)
TOTAL IMMATURE NEUTOROPHIL: 0.02 K/CU MM
TOTAL NUCLEATED RBC: 0 K/CU MM
TOTAL PROTEIN: 6 GM/DL (ref 6.4–8.2)
WBC # BLD: 6.8 K/CU MM (ref 4–10.5)

## 2020-01-31 PROCEDURE — 93005 ELECTROCARDIOGRAM TRACING: CPT | Performed by: PHYSICIAN ASSISTANT

## 2020-01-31 PROCEDURE — 2580000003 HC RX 258: Performed by: PHYSICIAN ASSISTANT

## 2020-01-31 PROCEDURE — 87804 INFLUENZA ASSAY W/OPTIC: CPT

## 2020-01-31 PROCEDURE — 71045 X-RAY EXAM CHEST 1 VIEW: CPT

## 2020-01-31 PROCEDURE — 6360000004 HC RX CONTRAST MEDICATION: Performed by: EMERGENCY MEDICINE

## 2020-01-31 PROCEDURE — 94761 N-INVAS EAR/PLS OXIMETRY MLT: CPT

## 2020-01-31 PROCEDURE — 83735 ASSAY OF MAGNESIUM: CPT

## 2020-01-31 PROCEDURE — 6370000000 HC RX 637 (ALT 250 FOR IP): Performed by: PHYSICIAN ASSISTANT

## 2020-01-31 PROCEDURE — 96365 THER/PROPH/DIAG IV INF INIT: CPT

## 2020-01-31 PROCEDURE — 80053 COMPREHEN METABOLIC PANEL: CPT

## 2020-01-31 PROCEDURE — 94640 AIRWAY INHALATION TREATMENT: CPT

## 2020-01-31 PROCEDURE — 84702 CHORIONIC GONADOTROPIN TEST: CPT

## 2020-01-31 PROCEDURE — 6360000002 HC RX W HCPCS: Performed by: PHYSICIAN ASSISTANT

## 2020-01-31 PROCEDURE — 96375 TX/PRO/DX INJ NEW DRUG ADDON: CPT

## 2020-01-31 PROCEDURE — 85025 COMPLETE CBC W/AUTO DIFF WBC: CPT

## 2020-01-31 PROCEDURE — 85379 FIBRIN DEGRADATION QUANT: CPT

## 2020-01-31 PROCEDURE — 96367 TX/PROPH/DG ADDL SEQ IV INF: CPT

## 2020-01-31 PROCEDURE — 2580000003 HC RX 258: Performed by: EMERGENCY MEDICINE

## 2020-01-31 PROCEDURE — 2580000003 HC RX 258

## 2020-01-31 PROCEDURE — 96361 HYDRATE IV INFUSION ADD-ON: CPT

## 2020-01-31 PROCEDURE — 71275 CT ANGIOGRAPHY CHEST: CPT

## 2020-01-31 PROCEDURE — 87040 BLOOD CULTURE FOR BACTERIA: CPT

## 2020-01-31 PROCEDURE — 99291 CRITICAL CARE FIRST HOUR: CPT

## 2020-01-31 PROCEDURE — 96374 THER/PROPH/DIAG INJ IV PUSH: CPT

## 2020-01-31 PROCEDURE — 96368 THER/DIAG CONCURRENT INF: CPT

## 2020-01-31 RX ORDER — ACETAMINOPHEN 500 MG
1000 TABLET ORAL ONCE
Status: COMPLETED | OUTPATIENT
Start: 2020-01-31 | End: 2020-01-31

## 2020-01-31 RX ORDER — 0.9 % SODIUM CHLORIDE 0.9 %
1000 INTRAVENOUS SOLUTION INTRAVENOUS ONCE
Status: COMPLETED | OUTPATIENT
Start: 2020-01-31 | End: 2020-01-31

## 2020-01-31 RX ORDER — ONDANSETRON 4 MG/1
4 TABLET, ORALLY DISINTEGRATING ORAL ONCE
Status: COMPLETED | OUTPATIENT
Start: 2020-01-31 | End: 2020-01-31

## 2020-01-31 RX ORDER — KETOROLAC TROMETHAMINE 30 MG/ML
15 INJECTION, SOLUTION INTRAMUSCULAR; INTRAVENOUS ONCE
Status: COMPLETED | OUTPATIENT
Start: 2020-01-31 | End: 2020-01-31

## 2020-01-31 RX ORDER — POTASSIUM CHLORIDE 7.45 MG/ML
10 INJECTION INTRAVENOUS ONCE
Status: COMPLETED | OUTPATIENT
Start: 2020-01-31 | End: 2020-02-01

## 2020-01-31 RX ORDER — SODIUM CHLORIDE 9 MG/ML
INJECTION, SOLUTION INTRAVENOUS
Status: COMPLETED
Start: 2020-01-31 | End: 2020-01-31

## 2020-01-31 RX ORDER — POTASSIUM CHLORIDE 20 MEQ/1
40 TABLET, EXTENDED RELEASE ORAL ONCE
Status: COMPLETED | OUTPATIENT
Start: 2020-01-31 | End: 2020-01-31

## 2020-01-31 RX ORDER — IPRATROPIUM BROMIDE AND ALBUTEROL SULFATE 2.5; .5 MG/3ML; MG/3ML
2 SOLUTION RESPIRATORY (INHALATION) ONCE
Status: COMPLETED | OUTPATIENT
Start: 2020-01-31 | End: 2020-01-31

## 2020-01-31 RX ORDER — POTASSIUM CHLORIDE 7.45 MG/ML
20 INJECTION INTRAVENOUS ONCE
Status: DISCONTINUED | OUTPATIENT
Start: 2020-01-31 | End: 2020-01-31 | Stop reason: CLARIF

## 2020-01-31 RX ORDER — SODIUM CHLORIDE 0.9 % (FLUSH) 0.9 %
10 SYRINGE (ML) INJECTION 2 TIMES DAILY
Status: DISCONTINUED | OUTPATIENT
Start: 2020-01-31 | End: 2020-02-01

## 2020-01-31 RX ORDER — MAGNESIUM SULFATE 1 G/100ML
1 INJECTION INTRAVENOUS ONCE
Status: COMPLETED | OUTPATIENT
Start: 2020-01-31 | End: 2020-02-01

## 2020-01-31 RX ORDER — SODIUM CHLORIDE 9 MG/ML
INJECTION, SOLUTION INTRAVENOUS ONCE
Status: COMPLETED | OUTPATIENT
Start: 2020-01-31 | End: 2020-01-31

## 2020-01-31 RX ORDER — BENZONATATE 100 MG/1
100 CAPSULE ORAL ONCE
Status: COMPLETED | OUTPATIENT
Start: 2020-01-31 | End: 2020-01-31

## 2020-01-31 RX ADMIN — MAGNESIUM SULFATE HEPTAHYDRATE 1 G: 1 INJECTION, SOLUTION INTRAVENOUS at 22:59

## 2020-01-31 RX ADMIN — POTASSIUM CHLORIDE 40 MEQ: 1500 TABLET, EXTENDED RELEASE ORAL at 22:59

## 2020-01-31 RX ADMIN — KETOROLAC TROMETHAMINE 15 MG: 30 INJECTION, SOLUTION INTRAMUSCULAR at 20:02

## 2020-01-31 RX ADMIN — SODIUM CHLORIDE: 9 INJECTION, SOLUTION INTRAVENOUS at 23:45

## 2020-01-31 RX ADMIN — IPRATROPIUM BROMIDE AND ALBUTEROL SULFATE 2 AMPULE: .5; 3 SOLUTION RESPIRATORY (INHALATION) at 18:48

## 2020-01-31 RX ADMIN — CEFTRIAXONE SODIUM 1 G: 1 INJECTION, POWDER, FOR SOLUTION INTRAMUSCULAR; INTRAVENOUS at 21:20

## 2020-01-31 RX ADMIN — SODIUM CHLORIDE 1000 ML: 9 INJECTION, SOLUTION INTRAVENOUS at 20:02

## 2020-01-31 RX ADMIN — IPRATROPIUM BROMIDE AND ALBUTEROL SULFATE 2 AMPULE: .5; 3 SOLUTION RESPIRATORY (INHALATION) at 19:24

## 2020-01-31 RX ADMIN — ACETAMINOPHEN 1000 MG: 500 TABLET ORAL at 18:28

## 2020-01-31 RX ADMIN — POTASSIUM CHLORIDE 10 MEQ: 7.46 INJECTION, SOLUTION INTRAVENOUS at 23:45

## 2020-01-31 RX ADMIN — SODIUM CHLORIDE 1000 ML: 9 INJECTION, SOLUTION INTRAVENOUS at 21:20

## 2020-01-31 RX ADMIN — AZITHROMYCIN MONOHYDRATE 500 MG: 500 INJECTION, POWDER, LYOPHILIZED, FOR SOLUTION INTRAVENOUS at 21:58

## 2020-01-31 RX ADMIN — Medication 10 ML: at 22:43

## 2020-01-31 RX ADMIN — BENZONATATE 100 MG: 100 CAPSULE ORAL at 18:28

## 2020-01-31 RX ADMIN — IOPAMIDOL 90 ML: 755 INJECTION, SOLUTION INTRAVENOUS at 22:43

## 2020-01-31 RX ADMIN — ONDANSETRON 4 MG: 4 TABLET, ORALLY DISINTEGRATING ORAL at 18:28

## 2020-01-31 ASSESSMENT — PAIN DESCRIPTION - LOCATION: LOCATION: HEAD

## 2020-01-31 ASSESSMENT — PAIN DESCRIPTION - DESCRIPTORS: DESCRIPTORS: POUNDING

## 2020-01-31 ASSESSMENT — PAIN SCALES - GENERAL
PAINLEVEL_OUTOF10: 8
PAINLEVEL_OUTOF10: 7
PAINLEVEL_OUTOF10: 7
PAINLEVEL_OUTOF10: 8

## 2020-01-31 ASSESSMENT — PAIN DESCRIPTION - PAIN TYPE: TYPE: ACUTE PAIN

## 2020-01-31 NOTE — ED PROVIDER NOTES
Castillo Valentino MD   10 mL at 01/31/20 0692     Current Outpatient Medications   Medication Sig Dispense Refill    benzonatate (TESSALON) 200 MG capsule Take 1 capsule by mouth 3 times daily as needed for Cough 30 capsule 0    Fluticasone Furoate-Vilanterol (BREO ELLIPTA) 200-25 MCG/INH AEPB Inhale 1 puff into the lungs daily 1 each 1    albuterol sulfate (PROAIR RESPICLICK) 046 (90 Base) MCG/ACT aerosol powder inhalation Inhale 2 puffs into the lungs every 6 hours as needed for Wheezing or Shortness of Breath \"Proair Respiclick\" 1 Inhaler 1    baclofen (LIORESAL) 20 MG tablet Take 1 tablet by mouth 3 times daily 90 tablet 1    amLODIPine (NORVASC) 10 MG tablet Take 1 tablet by mouth daily 30 tablet 3    levothyroxine (SYNTHROID) 50 MCG tablet Take 1 tablet by mouth Daily 30 tablet 3    topiramate (TOPAMAX) 100 MG tablet Take 1 tablet by mouth 2 times daily 60 tablet 3    dicyclomine (BENTYL) 10 MG capsule Take 1 capsule by mouth 3 times daily 90 capsule 3    meclizine (ANTIVERT) 25 MG tablet Take 1 tablet by mouth 3 times daily as needed for Dizziness 25 tablet 1    Turmeric 500 MG CAPS Take 1 capsule by mouth daily       Multiple Vitamins-Minerals (MULTIPLE VITAMINS/WOMENS PO) Take 1 tablet by mouth daily        Allergies   Allergen Reactions    Latex     Codeine Itching    Pcn [Penicillins] Hives       Nursing Notes Reviewed    Physical Exam:  ED Triage Vitals   Enc Vitals Group      BP       Pulse       Resp       Temp       Temp src       SpO2       Weight       Height       Head Circumference       Peak Flow       Pain Score       Pain Loc       Pain Edu? Excl. in [address]? General :Patient is awake alert oriented person place and time no acute distress nontoxic appearing  HEENT: Pupils are equally round and reactive to light extraocular motors are intact conjunctivae clear sclerae white there is no injection no icterus. Nose without any rhinorrhea or epistaxis.    Oral mucosa is moist no exudate buccal mucosa shows no ulcerations. Uvula is midline    Neck: Neck is supple full range of motion trachea midline thyroid nonpalpable  Cardiac: Heart regular rate rhythm no murmurs rubs clicks or gallops  Lungs: Diffuse end expiratory wheezes noted. there is no use of accessory muscles no nasal flaring identified. Chest wall: There is no tenderness to palpation over the chest wall or over ribs  Abdomen: Abdomen is soft nontender nondistended. There is no firm or pulsatile masses no rebound rigidity or guarding negative Atwood's negative McBurney, no peritoneal signs  Suprapubic:  there is no tenderness to palpation over the external bladder   Musculoskeletal: 5 out of 5 strength in all 4 extremities full flexion extension abduction and adduction supination pronation of all extremities and all digits. No obvious muscle atrophy is noted. No focal muscle deficits are appreciated  Dermatology: Skin is warm and dry there is no obvious abscesses lacerations or lesions noted  Psych: Mentation is grossly normal cognition is grossly normal. Affect is appropriate  Neuro: Motor intact sensory intact cranial nerves II through XII are intact level of consciousness is normal cerebellar function is normal reflexes are grossly normal. No evidence of incontinence or loss of bowel or bladder no saddle anesthesia noted Lymphatic: There is no submandibular or cervical adenopathy appreciated.           I have reviewed and interpreted all of the currently available lab results from this visit (if applicable):  Results for orders placed or performed during the hospital encounter of 01/31/20   Rapid Flu Swab   Result Value Ref Range    Rapid Influenza A Ag NEGATIVE NEGATIVE    Rapid Influenza B Ag NEGATIVE NEGATIVE   CBC auto diff   Result Value Ref Range    WBC 6.8 4.0 - 10.5 K/CU MM    RBC 3.97 (L) 4.2 - 5.4 M/CU MM    Hemoglobin 11.8 (L) 12.5 - 16.0 GM/DL    Hematocrit 37.0 37 - 47 %    MCV 93.2 78 - 100 FL    MCH 29.7 27 - 31 PG MCHC 31.9 (L) 32.0 - 36.0 %    RDW 13.8 11.7 - 14.9 %    Platelets 401 945 - 950 K/CU MM    MPV 10.4 7.5 - 11.1 FL    Differential Type AUTOMATED DIFFERENTIAL     Segs Relative 54.8 36 - 66 %    Lymphocytes % 23.2 (L) 24 - 44 %    Monocytes % 20.4 (H) 0 - 4 %    Eosinophils % 0.7 0 - 3 %    Basophils % 0.6 0 - 1 %    Segs Absolute 3.7 K/CU MM    Lymphocytes Absolute 1.6 K/CU MM    Monocytes Absolute 1.4 K/CU MM    Eosinophils Absolute 0.1 K/CU MM    Basophils Absolute 0.0 K/CU MM    Nucleated RBC % 0.0 %    Total Nucleated RBC 0.0 K/CU MM    Total Immature Neutrophil 0.02 K/CU MM    Immature Neutrophil % 0.3 0 - 0.43 %   CMP   Result Value Ref Range    Sodium 138 135 - 145 MMOL/L    Potassium (LL) 3.5 - 5.1 MMOL/L     2.9  K CALLED TO DR Ulysses Manos AT 2115, [facility]  RESULTS READ BACK      Chloride 105 99 - 110 mMol/L    CO2 19 (L) 21 - 32 MMOL/L    BUN 20 6 - 23 MG/DL    CREATININE 1.2 (H) 0.6 - 1.1 MG/DL    Glucose 144 (H) 70 - 99 MG/DL    Calcium 8.6 8.3 - 10.6 MG/DL    Alb 3.5 3.4 - 5.0 GM/DL    Total Protein 6.0 (L) 6.4 - 8.2 GM/DL    Total Bilirubin 0.1 0.0 - 1.0 MG/DL    ALT 8 (L) 10 - 40 U/L    AST 13 (L) 15 - 37 IU/L    Alkaline Phosphatase 51 40 - 129 IU/L    GFR Non- 47 (L) >60 mL/min/1.73m2    GFR  57 (L) >60 mL/min/1.73m2    Anion Gap 14 4 - 16   HCG Serum, Quantitative   Result Value Ref Range    hCG Quant 1.7                                          UIU/ML    hCG Quant EXPECTED VALUES IN PREGNANCY UIU/ML    hCG Quant    7-50               0.2-1 WEEK UIU/ML    hCG Quant                 1-2 WEEKS UIU/ML    hCG Quant    100-5000           2-3 WEEKS UIU/ML    hCG Quant    500-10,000         3-4 WEEKS UIU/ML    hCG Quant    1000-50,000        4-5 WEEKS UIU/ML    hCG Quant    10,000-100,000     5-6 WEEKS UIU/ML    hCG Quant    15,000-200,000     6-8 WEEKS UIU/ML    hCG Quant    10,000-100,000     2-3 MONTHS UIU/ML   D-Dimer, Quantitative   Result Value Ref Range D-Dimer, Quant 659 (H) <230 NG/mL(DDU)   Magnesium   Result Value Ref Range    Magnesium 1.6 (L) 1.8 - 2.4 mg/dl   EKG 12 Lead   Result Value Ref Range    Ventricular Rate 102 BPM    Atrial Rate 102 BPM    P-R Interval 202 ms    QRS Duration 92 ms    Q-T Interval 370 ms    QTc Calculation (Bazett) 482 ms    P Axis 50 degrees    R Axis 28 degrees    T Axis 50 degrees    Diagnosis       Sinus tachycardia  Otherwise normal ECG  When compared with ECG of 23-NOV-2019 18:48,  Vent. rate has increased BY  40 BPM  QT has lengthened        Radiographs (if obtained):  [] The following radiograph was interpreted by myself in the absence of a radiologist:   [] Radiologist's Report Reviewed:  CTA PULMONARY W CONTRAST   Final Result   No evidence of pulmonary embolism or acute pulmonary abnormality. Study was   mildly motion degraded. XR CHEST PORTABLE   Final Result   No acute cardiopulmonary disease. EKG (if obtained):   Please See Note of attending physician for EKG interpretation. Chart review shows recent radiograph(s):  No results found. MDM:   Pt presents with tachycardia, hypoxia, persistent. Consistent with copd exacerbation. CBC unremarkable, cmp significant for hypokalemia being replaced in ED, CXR CTApulm both negative here. Total critical care time today provided was at least  30 minutes. This excludes seperately billable procedure. Critical care time provided for Acute respiratory process requiring  3+ breathing treatments that required close evaluation and/or intervention with concern for patient decompensation. On-call physicianWas consulted regarding patient.   After thorough discussion regarding patient's history, physical exam.  laboratory values, radiographic evidence (if applicable  theymyself as well as my attending physician agreed Given the patient's presenting concerns, medical history and clinical findings, the patient will be admitted at this time to undergo further

## 2020-01-31 NOTE — ED TRIAGE NOTES
Pt c/o generalized body aches, fever (max of 101.3), body aches x1 day. Pt states that she was exposed to influenza recently.  Pt is A&Ox4 and showing no signs of respiratory distress upon arrival.

## 2020-02-01 PROBLEM — J44.1 COPD EXACERBATION (HCC): Status: ACTIVE | Noted: 2020-02-01

## 2020-02-01 LAB
ADENOVIRUS DETECTION BY PCR: NOT DETECTED
ANION GAP SERPL CALCULATED.3IONS-SCNC: 9 MMOL/L (ref 4–16)
BASOPHILS ABSOLUTE: 0 K/CU MM
BASOPHILS RELATIVE PERCENT: 0.6 % (ref 0–1)
BORDETELLA PERTUSSIS PCR: NOT DETECTED
BUN BLDV-MCNC: 19 MG/DL (ref 6–23)
CALCIUM SERPL-MCNC: 8.1 MG/DL (ref 8.3–10.6)
CHLAMYDOPHILA PNEUMONIA PCR: NOT DETECTED
CHLORIDE BLD-SCNC: 111 MMOL/L (ref 99–110)
CO2: 22 MMOL/L (ref 21–32)
CORONAVIRUS 229E PCR: NOT DETECTED
CORONAVIRUS HKU1 PCR: NOT DETECTED
CORONAVIRUS NL63 PCR: NOT DETECTED
CORONAVIRUS OC43 PCR: NOT DETECTED
CREAT SERPL-MCNC: 1.1 MG/DL (ref 0.6–1.1)
DIFFERENTIAL TYPE: ABNORMAL
EOSINOPHILS ABSOLUTE: 0 K/CU MM
EOSINOPHILS RELATIVE PERCENT: 0 % (ref 0–3)
GFR AFRICAN AMERICAN: >60 ML/MIN/1.73M2
GFR NON-AFRICAN AMERICAN: 52 ML/MIN/1.73M2
GLUCOSE BLD-MCNC: 105 MG/DL (ref 70–99)
HCT VFR BLD CALC: 35.2 % (ref 37–47)
HEMOGLOBIN: 10.7 GM/DL (ref 12.5–16)
HUMAN METAPNEUMOVIRUS PCR: NOT DETECTED
IMMATURE NEUTROPHIL %: 0.2 % (ref 0–0.43)
INFLUENZA A BY PCR: ABNORMAL
INFLUENZA A H1 (2009) PCR: NOT DETECTED
INFLUENZA A H1 PANDEMIC PCR: NOT DETECTED
INFLUENZA A H3 PCR: NOT DETECTED
INFLUENZA B BY PCR: NOT DETECTED
LACTIC ACID, SEPSIS: 1.2 MMOL/L (ref 0.5–1.9)
LYMPHOCYTES ABSOLUTE: 1.4 K/CU MM
LYMPHOCYTES RELATIVE PERCENT: 26.3 % (ref 24–44)
MCH RBC QN AUTO: 29 PG (ref 27–31)
MCHC RBC AUTO-ENTMCNC: 30.4 % (ref 32–36)
MCV RBC AUTO: 95.4 FL (ref 78–100)
MONOCYTES ABSOLUTE: 1.1 K/CU MM
MONOCYTES RELATIVE PERCENT: 20.6 % (ref 0–4)
MYCOPLASMA PNEUMONIAE PCR: NOT DETECTED
NUCLEATED RBC %: 0 %
PARAINFLUENZA 1 PCR: NOT DETECTED
PARAINFLUENZA 2 PCR: NOT DETECTED
PARAINFLUENZA 3 PCR: NOT DETECTED
PARAINFLUENZA 4 PCR: NOT DETECTED
PDW BLD-RTO: 14.1 % (ref 11.7–14.9)
PLATELET # BLD: 166 K/CU MM (ref 140–440)
PMV BLD AUTO: 10.4 FL (ref 7.5–11.1)
POTASSIUM SERPL-SCNC: 4.7 MMOL/L (ref 3.5–5.1)
PROCALCITONIN: 0.14
RBC # BLD: 3.69 M/CU MM (ref 4.2–5.4)
RHINOVIRUS ENTEROVIRUS PCR: NOT DETECTED
RSV PCR: NOT DETECTED
SEGMENTED NEUTROPHILS ABSOLUTE COUNT: 2.8 K/CU MM
SEGMENTED NEUTROPHILS RELATIVE PERCENT: 52.3 % (ref 36–66)
SODIUM BLD-SCNC: 142 MMOL/L (ref 135–145)
TOTAL IMMATURE NEUTOROPHIL: 0.01 K/CU MM
TOTAL NUCLEATED RBC: 0 K/CU MM
WBC # BLD: 5.3 K/CU MM (ref 4–10.5)

## 2020-02-01 PROCEDURE — 93010 ELECTROCARDIOGRAM REPORT: CPT | Performed by: INTERNAL MEDICINE

## 2020-02-01 PROCEDURE — 6370000000 HC RX 637 (ALT 250 FOR IP): Performed by: NURSE PRACTITIONER

## 2020-02-01 PROCEDURE — 87633 RESP VIRUS 12-25 TARGETS: CPT

## 2020-02-01 PROCEDURE — 96376 TX/PRO/DX INJ SAME DRUG ADON: CPT

## 2020-02-01 PROCEDURE — 94761 N-INVAS EAR/PLS OXIMETRY MLT: CPT

## 2020-02-01 PROCEDURE — 2580000003 HC RX 258: Performed by: NURSE PRACTITIONER

## 2020-02-01 PROCEDURE — 87798 DETECT AGENT NOS DNA AMP: CPT

## 2020-02-01 PROCEDURE — 36415 COLL VENOUS BLD VENIPUNCTURE: CPT

## 2020-02-01 PROCEDURE — 6370000000 HC RX 637 (ALT 250 FOR IP): Performed by: INTERNAL MEDICINE

## 2020-02-01 PROCEDURE — 83605 ASSAY OF LACTIC ACID: CPT

## 2020-02-01 PROCEDURE — 85025 COMPLETE CBC W/AUTO DIFF WBC: CPT

## 2020-02-01 PROCEDURE — 84145 PROCALCITONIN (PCT): CPT

## 2020-02-01 PROCEDURE — 80048 BASIC METABOLIC PNL TOTAL CA: CPT

## 2020-02-01 PROCEDURE — 2060000000 HC ICU INTERMEDIATE R&B

## 2020-02-01 PROCEDURE — 6360000002 HC RX W HCPCS: Performed by: PHYSICIAN ASSISTANT

## 2020-02-01 PROCEDURE — 94640 AIRWAY INHALATION TREATMENT: CPT

## 2020-02-01 PROCEDURE — 96367 TX/PROPH/DG ADDL SEQ IV INF: CPT

## 2020-02-01 PROCEDURE — 6360000002 HC RX W HCPCS: Performed by: NURSE PRACTITIONER

## 2020-02-01 PROCEDURE — 87581 M.PNEUMON DNA AMP PROBE: CPT

## 2020-02-01 PROCEDURE — 87486 CHLMYD PNEUM DNA AMP PROBE: CPT

## 2020-02-01 PROCEDURE — 96372 THER/PROPH/DIAG INJ SC/IM: CPT

## 2020-02-01 RX ORDER — SODIUM CHLORIDE 0.9 % (FLUSH) 0.9 %
10 SYRINGE (ML) INJECTION PRN
Status: DISCONTINUED | OUTPATIENT
Start: 2020-02-01 | End: 2020-02-05 | Stop reason: HOSPADM

## 2020-02-01 RX ORDER — SODIUM CHLORIDE 9 MG/ML
INJECTION, SOLUTION INTRAVENOUS CONTINUOUS
Status: DISCONTINUED | OUTPATIENT
Start: 2020-02-01 | End: 2020-02-02

## 2020-02-01 RX ORDER — TOPIRAMATE 100 MG/1
100 TABLET, FILM COATED ORAL 2 TIMES DAILY
Status: DISCONTINUED | OUTPATIENT
Start: 2020-02-01 | End: 2020-02-05 | Stop reason: HOSPADM

## 2020-02-01 RX ORDER — LEVOTHYROXINE SODIUM 0.05 MG/1
50 TABLET ORAL DAILY
Status: DISCONTINUED | OUTPATIENT
Start: 2020-02-01 | End: 2020-02-05 | Stop reason: HOSPADM

## 2020-02-01 RX ORDER — OSELTAMIVIR PHOSPHATE 75 MG/1
75 CAPSULE ORAL 2 TIMES DAILY
Status: DISCONTINUED | OUTPATIENT
Start: 2020-02-01 | End: 2020-02-05 | Stop reason: HOSPADM

## 2020-02-01 RX ORDER — BENZONATATE 100 MG/1
200 CAPSULE ORAL 3 TIMES DAILY PRN
Status: DISCONTINUED | OUTPATIENT
Start: 2020-02-01 | End: 2020-02-05 | Stop reason: HOSPADM

## 2020-02-01 RX ORDER — ONDANSETRON 2 MG/ML
4 INJECTION INTRAMUSCULAR; INTRAVENOUS EVERY 6 HOURS PRN
Status: DISCONTINUED | OUTPATIENT
Start: 2020-02-01 | End: 2020-02-05 | Stop reason: HOSPADM

## 2020-02-01 RX ORDER — FLUTICASONE FUROATE AND VILANTEROL 200; 25 UG/1; UG/1
1 POWDER RESPIRATORY (INHALATION) DAILY
Status: DISCONTINUED | OUTPATIENT
Start: 2020-02-01 | End: 2020-02-01 | Stop reason: CLARIF

## 2020-02-01 RX ORDER — ALBUTEROL SULFATE 90 UG/1
2 AEROSOL, METERED RESPIRATORY (INHALATION) EVERY 6 HOURS PRN
Status: DISCONTINUED | OUTPATIENT
Start: 2020-02-01 | End: 2020-02-05 | Stop reason: HOSPADM

## 2020-02-01 RX ORDER — SODIUM CHLORIDE 0.9 % (FLUSH) 0.9 %
10 SYRINGE (ML) INJECTION EVERY 12 HOURS SCHEDULED
Status: DISCONTINUED | OUTPATIENT
Start: 2020-02-01 | End: 2020-02-05 | Stop reason: HOSPADM

## 2020-02-01 RX ORDER — GUAIFENESIN 600 MG/1
600 TABLET, EXTENDED RELEASE ORAL 2 TIMES DAILY
Status: DISCONTINUED | OUTPATIENT
Start: 2020-02-01 | End: 2020-02-05 | Stop reason: HOSPADM

## 2020-02-01 RX ORDER — PREDNISONE 20 MG/1
40 TABLET ORAL DAILY
Status: DISCONTINUED | OUTPATIENT
Start: 2020-02-01 | End: 2020-02-05 | Stop reason: HOSPADM

## 2020-02-01 RX ADMIN — GUAIFENESIN 600 MG: 600 TABLET, EXTENDED RELEASE ORAL at 20:10

## 2020-02-01 RX ADMIN — GUAIFENESIN 600 MG: 600 TABLET, EXTENDED RELEASE ORAL at 09:15

## 2020-02-01 RX ADMIN — Medication 2 PUFF: at 11:46

## 2020-02-01 RX ADMIN — Medication 2 PUFF: at 21:15

## 2020-02-01 RX ADMIN — TOPIRAMATE 100 MG: 100 TABLET, FILM COATED ORAL at 09:15

## 2020-02-01 RX ADMIN — OSELTAMIVIR PHOSPHATE 75 MG: 75 CAPSULE ORAL at 20:10

## 2020-02-01 RX ADMIN — LEVOTHYROXINE SODIUM 50 MCG: 50 TABLET ORAL at 05:13

## 2020-02-01 RX ADMIN — POTASSIUM CHLORIDE 10 MEQ: 7.46 INJECTION, SOLUTION INTRAVENOUS at 02:12

## 2020-02-01 RX ADMIN — OSELTAMIVIR PHOSPHATE 75 MG: 75 CAPSULE ORAL at 11:46

## 2020-02-01 RX ADMIN — ENOXAPARIN SODIUM 40 MG: 40 INJECTION SUBCUTANEOUS at 09:18

## 2020-02-01 RX ADMIN — SODIUM CHLORIDE, PRESERVATIVE FREE 10 ML: 5 INJECTION INTRAVENOUS at 09:22

## 2020-02-01 RX ADMIN — SODIUM CHLORIDE: 9 INJECTION, SOLUTION INTRAVENOUS at 02:51

## 2020-02-01 RX ADMIN — SODIUM CHLORIDE: 9 INJECTION, SOLUTION INTRAVENOUS at 23:50

## 2020-02-01 RX ADMIN — SODIUM CHLORIDE: 9 INJECTION, SOLUTION INTRAVENOUS at 10:16

## 2020-02-01 RX ADMIN — TOPIRAMATE 100 MG: 100 TABLET, FILM COATED ORAL at 20:11

## 2020-02-01 RX ADMIN — PREDNISONE 40 MG: 20 TABLET ORAL at 09:16

## 2020-02-01 ASSESSMENT — PAIN DESCRIPTION - PAIN TYPE
TYPE: ACUTE PAIN
TYPE: ACUTE PAIN

## 2020-02-01 ASSESSMENT — PAIN DESCRIPTION - LOCATION
LOCATION: CHEST
LOCATION: HEAD

## 2020-02-01 ASSESSMENT — PAIN SCALES - GENERAL
PAINLEVEL_OUTOF10: 0
PAINLEVEL_OUTOF10: 0
PAINLEVEL_OUTOF10: 5
PAINLEVEL_OUTOF10: 6
PAINLEVEL_OUTOF10: 0
PAINLEVEL_OUTOF10: 0

## 2020-02-01 ASSESSMENT — PAIN DESCRIPTION - ORIENTATION: ORIENTATION: INNER

## 2020-02-01 ASSESSMENT — PAIN DESCRIPTION - DESCRIPTORS: DESCRIPTORS: ACHING

## 2020-02-01 NOTE — H&P
HISTORY AND PHYSICAL  (Hospitalist, Internal Medicine)  IDENTIFYING INFORMATION   PATIENT:  Jace Lynn  MRN:  [id number]  ADMIT DATE: 1/31/2020  TIME OF EVALUATION: 2/1/2020 1:16 AM    CHIEF COMPLAINT     Fever of 101.3   Nonproductive cough  Generalized body aches    HISTORY OF PRESENT ILLNESS   Jace Lynn is a 44 y.o. female with a past medical history of COPD, hypertension, GERD, hypothyroidism, fibromyalgia, and spastic paralysis. She presents to the ER with complaints of fever, generalized body aches, and dry cough of 2 days duration. She reports exposure to flu. Patient also reports associated nausea without vomiting. At the ED, the patient was noted to be borderline hypotensive with systolic in low 33Z. She also required supplemental oxygen via nasal cannula. Baseline patient does not use oxygen. Rapid flu swab was negative. Chest x-ray without acute findings. D-dimer was elevated in 600s thus CTA chest obtained which did not show acute PE. The patient was given Rocephin and azithromycin at the ED. Blood cultures pending. At time of this assessment, patient lying in bed, having intermittent coughs spells. She reports fever states his breathing has improved, SBP in high 80s. She denies Running nose,  chest pain, hemoptysis, vomiting, diarrhea, and constipation. PMH listed below:    PAST MEDICAL, SURGICAL, FAMILY, and SOCIAL HISTORY     Past Medical History:   Diagnosis Date    COPD (chronic obstructive pulmonary disease) ([facility] [address] 10/2013    GERD (gastroesophageal reflux disease)     H/O 24 hour EKG monitoring 08/05/14-09/03/14    30 DAY EVENT MONITOR-Normal sinus rhythm    H/O fibromyalgia     History of 24 hour EKG monitoring 6/30/14    Sinus rhythm. Isolated  premature atrial contract. and premature ventricular contraction. Sinus tachycardia present.  History of echocardiogram 7/1/14    EF 55% Mild left ventricular hypertrophy noted. Mild mitral and TR noted.  The patient is bradycardic during the study.  Hx of cardiovascular stress test 14    Lexiscan: EF 73% Negative electrocardiogram suggestive for ischemia.     Hypertension     Spastic paraparesis      Past Surgical History:   Procedure Laterality Date    BACK SURGERY      CARDIAC CATHETERIZATION      HYSTERECTOMY      INGUINAL HERNIA REPAIR Right     INNER EAR SURGERY Left     TUBAL LIGATION       Family History   Problem Relation Age of Onset    Heart Disease Mother     Elevated Lipids Mother     Hypertension Mother     Heart Disease Father     Diabetes Sister     Heart Disease Sister     Kidney Disease Sister     Breast Cancer Maternal Grandmother     Breast Cancer Paternal Aunt         2 aunts    Ovarian Cancer Paternal Aunt          in her 29's     Family Hx of HTN  Family Hx as reviewed above, otherwise non-contributory  Social History     Socioeconomic History    Marital status: Legally      Spouse name: None    Number of children: None    Years of education: None    Highest education level: None   Occupational History    None   Social Needs    Financial resource strain: None    Food insecurity:     Worry: None     Inability: None    Transportation needs:     Medical: None     Non-medical: None   Tobacco Use    Smoking status: Former Smoker     Packs/day: 1.00     Years: 25.00     Pack years: 25.00     Last attempt to quit: 2011     Years since quittin.4    Smokeless tobacco: Never Used   Substance and Sexual Activity    Alcohol use: No    Drug use: No    Sexual activity: Yes     Partners: Male     Comment: seperated   Lifestyle    Physical activity:     Days per week: None     Minutes per session: None    Stress: None   Relationships    Social connections:     Talks on phone: None     Gets together: None     Attends Jain service: None     Active member of club or organization: None     Attends meetings of clubs or organizations: None     Relationship status: None    Intimate partner violence:     Fear of current or ex partner: None     Emotionally abused: None     Physically abused: None     Forced sexual activity: None   Other Topics Concern    None   Social History Narrative    None       MEDICATIONS   Medications Prior to Admission  Not in a hospital admission.     Current Medications  Current Facility-Administered Medications   Medication Dose Route Frequency Provider Last Rate Last Dose    potassium chloride 10 mEq/100 mL IVPB (Peripheral Line)  10 mEq Intravenous Once John Strickland PA-C        sodium chloride flush 0.9 % injection 10 mL  10 mL Intravenous BID Sravani Candelario MD   10 mL at 01/31/20 7346     Current Outpatient Medications   Medication Sig Dispense Refill    benzonatate (TESSALON) 200 MG capsule Take 1 capsule by mouth 3 times daily as needed for Cough 30 capsule 0    Fluticasone Furoate-Vilanterol (BREO ELLIPTA) 200-25 MCG/INH AEPB Inhale 1 puff into the lungs daily 1 each 1    albuterol sulfate (PROAIR RESPICLICK) 617 (90 Base) MCG/ACT aerosol powder inhalation Inhale 2 puffs into the lungs every 6 hours as needed for Wheezing or Shortness of Breath \"Proair Respiclick\" 1 Inhaler 1    baclofen (LIORESAL) 20 MG tablet Take 1 tablet by mouth 3 times daily 90 tablet 1    amLODIPine (NORVASC) 10 MG tablet Take 1 tablet by mouth daily 30 tablet 3    levothyroxine (SYNTHROID) 50 MCG tablet Take 1 tablet by mouth Daily 30 tablet 3    topiramate (TOPAMAX) 100 MG tablet Take 1 tablet by mouth 2 times daily 60 tablet 3    dicyclomine (BENTYL) 10 MG capsule Take 1 capsule by mouth 3 times daily 90 capsule 3    meclizine (ANTIVERT) 25 MG tablet Take 1 tablet by mouth 3 times daily as needed for Dizziness 25 tablet 1    Turmeric 500 MG CAPS Take 1 capsule by mouth daily       Multiple Vitamins-Minerals (MULTIPLE VITAMINS/WOMENS PO) Take 1 tablet by mouth daily            Allergies  Allergies   Allergen Reactions    Latex     Codeine Itching    Pcn [Penicillins] Hives       REVIEW OF SYSTEMS   Within above limitations. 14 point review of systems reviewed. Pertinent positive or negative as per HPI or otherwise negative per 14 point systems review. PHYSICAL EXAM     Wt Readings from Last 3 Encounters:   11/23/19 205 lb (93 kg)   11/01/19 155 lb (70.3 kg)   02/24/19 155 lb (70.3 kg)       Blood pressure (!) 93/58, pulse 91, temperature 98.5 °F (36.9 °C), temperature source Oral, resp. rate 20, SpO2 95 %, not currently breastfeeding. General - AAO x 3  Psych - Appropriate affect/speech. No agitation  Eyes - Eye lids intact. No scleral icterus  ENT - Lips wnl. External ear clear/dry/intact. No thyromegaly on inspection  Neuro - No gross peripheral or central neuro deficits on inspection  Heart - Sinus. RRR. S1 and S2 present. No added HS/murmurs appreciated. No elevated JVD appreciated  Lung - Adequate air entry b/l, No crackles/wheezes appreciated  GI - Soft. No guarding/rigidity. No hepatosplenomegaly/ascites. BS+   - No CVA/suprapubic tenderness or palpable bladder distension  Skin -  No rash/petechiae/ecchymosis. Warm extremities  MSK - Joints with normal ROM.  No joint swellings      LABS AND IMAGING   CBC  [unfilled]    Last 3 Hemoglobin  Lab Results   Component Value Date    HGB 11.8 01/31/2020    HGB 15.0 11/23/2019    HGB 12.9 11/01/2019     Last 3 WBC/ANC  Lab Results   Component Value Date    WBC 6.8 01/31/2020    WBC 8.8 11/23/2019    WBC 8.1 11/01/2019     No components found for: GRNLOCTYABS  Last 3 Platelets  No results found for: PLATELET  Chemistry  [unfilled]  [unfilled]  No results found for: LDH  Coagulation Studies  Lab Results   Component Value Date    INR 0.99 05/23/2018     Liver Function Studies  Lab Results   Component Value Date    ALT 8 01/31/2020    AST 13 01/31/2020    ALKPHOS 51 01/31/2020       Recent Imaging    EXAMINATION:   ONE XRAY VIEW OF THE CHEST       1/31/2020 5:57 pm       COMPARISON:

## 2020-02-01 NOTE — ED NOTES
Bed: ED-29  Expected date:   Expected time:   Means of arrival:   Comments:  Moving room [address], RN  01/31/20 2649

## 2020-02-01 NOTE — FLOWSHEET NOTE
Dr. Euceda Reveal notified of result positive for influenza A @ this time. Orders placed for droplet isolation.      Elizabeth Berg RN 11:31 AM

## 2020-02-02 LAB
ANION GAP SERPL CALCULATED.3IONS-SCNC: 10 MMOL/L (ref 4–16)
BUN BLDV-MCNC: 16 MG/DL (ref 6–23)
CALCIUM SERPL-MCNC: 8.5 MG/DL (ref 8.3–10.6)
CHLORIDE BLD-SCNC: 113 MMOL/L (ref 99–110)
CO2: 19 MMOL/L (ref 21–32)
CREAT SERPL-MCNC: 0.8 MG/DL (ref 0.6–1.1)
GFR AFRICAN AMERICAN: >60 ML/MIN/1.73M2
GFR NON-AFRICAN AMERICAN: >60 ML/MIN/1.73M2
GLUCOSE BLD-MCNC: 100 MG/DL (ref 70–99)
MAGNESIUM: 2.1 MG/DL (ref 1.8–2.4)
POTASSIUM SERPL-SCNC: 4.1 MMOL/L (ref 3.5–5.1)
SODIUM BLD-SCNC: 142 MMOL/L (ref 135–145)

## 2020-02-02 PROCEDURE — 94640 AIRWAY INHALATION TREATMENT: CPT

## 2020-02-02 PROCEDURE — 2580000003 HC RX 258: Performed by: NURSE PRACTITIONER

## 2020-02-02 PROCEDURE — 6370000000 HC RX 637 (ALT 250 FOR IP): Performed by: INTERNAL MEDICINE

## 2020-02-02 PROCEDURE — 6360000002 HC RX W HCPCS: Performed by: NURSE PRACTITIONER

## 2020-02-02 PROCEDURE — 2700000000 HC OXYGEN THERAPY PER DAY

## 2020-02-02 PROCEDURE — 36415 COLL VENOUS BLD VENIPUNCTURE: CPT

## 2020-02-02 PROCEDURE — 83735 ASSAY OF MAGNESIUM: CPT

## 2020-02-02 PROCEDURE — 1200000000 HC SEMI PRIVATE

## 2020-02-02 PROCEDURE — 6370000000 HC RX 637 (ALT 250 FOR IP): Performed by: NURSE PRACTITIONER

## 2020-02-02 PROCEDURE — 96372 THER/PROPH/DIAG INJ SC/IM: CPT

## 2020-02-02 PROCEDURE — 80048 BASIC METABOLIC PNL TOTAL CA: CPT

## 2020-02-02 PROCEDURE — 96366 THER/PROPH/DIAG IV INF ADDON: CPT

## 2020-02-02 PROCEDURE — 94761 N-INVAS EAR/PLS OXIMETRY MLT: CPT

## 2020-02-02 RX ORDER — QUETIAPINE FUMARATE 200 MG/1
200 TABLET, FILM COATED ORAL NIGHTLY
Status: DISCONTINUED | OUTPATIENT
Start: 2020-02-02 | End: 2020-02-05 | Stop reason: HOSPADM

## 2020-02-02 RX ORDER — TRAZODONE HYDROCHLORIDE 50 MG/1
50 TABLET ORAL NIGHTLY
Status: DISCONTINUED | OUTPATIENT
Start: 2020-02-02 | End: 2020-02-05 | Stop reason: HOSPADM

## 2020-02-02 RX ADMIN — Medication 2 PUFF: at 07:14

## 2020-02-02 RX ADMIN — GUAIFENESIN 600 MG: 600 TABLET, EXTENDED RELEASE ORAL at 08:10

## 2020-02-02 RX ADMIN — ENOXAPARIN SODIUM 40 MG: 40 INJECTION SUBCUTANEOUS at 08:09

## 2020-02-02 RX ADMIN — GUAIFENESIN 600 MG: 600 TABLET, EXTENDED RELEASE ORAL at 22:00

## 2020-02-02 RX ADMIN — TOPIRAMATE 100 MG: 100 TABLET, FILM COATED ORAL at 22:00

## 2020-02-02 RX ADMIN — LEVOTHYROXINE SODIUM 50 MCG: 50 TABLET ORAL at 05:18

## 2020-02-02 RX ADMIN — SODIUM CHLORIDE, PRESERVATIVE FREE 10 ML: 5 INJECTION INTRAVENOUS at 08:10

## 2020-02-02 RX ADMIN — OSELTAMIVIR PHOSPHATE 75 MG: 75 CAPSULE ORAL at 08:10

## 2020-02-02 RX ADMIN — SODIUM CHLORIDE, PRESERVATIVE FREE 10 ML: 5 INJECTION INTRAVENOUS at 22:01

## 2020-02-02 RX ADMIN — OSELTAMIVIR PHOSPHATE 75 MG: 75 CAPSULE ORAL at 22:00

## 2020-02-02 RX ADMIN — AZITHROMYCIN MONOHYDRATE 500 MG: 500 INJECTION, POWDER, LYOPHILIZED, FOR SOLUTION INTRAVENOUS at 22:08

## 2020-02-02 RX ADMIN — ALBUTEROL SULFATE 2 PUFF: 90 AEROSOL, METERED RESPIRATORY (INHALATION) at 15:42

## 2020-02-02 RX ADMIN — TRAZODONE HYDROCHLORIDE 50 MG: 50 TABLET ORAL at 22:00

## 2020-02-02 RX ADMIN — TOPIRAMATE 100 MG: 100 TABLET, FILM COATED ORAL at 08:10

## 2020-02-02 RX ADMIN — PREDNISONE 40 MG: 20 TABLET ORAL at 08:10

## 2020-02-02 RX ADMIN — QUETIAPINE FUMARATE 200 MG: 200 TABLET ORAL at 22:00

## 2020-02-02 ASSESSMENT — PAIN SCALES - GENERAL: PAINLEVEL_OUTOF10: 0

## 2020-02-02 NOTE — PROGRESS NOTES
Pt taken by this nurse in wheelchair to 1116. Pt ambulated to bathroom upon arrival to new room. Pt has all belongings with her in new room including her chargers, 2 personal bags, clothing, shoes, and winter coat.  Droplet isolation posted outside room

## 2020-02-02 NOTE — PROGRESS NOTES
Hospitalist Progress Note      Name:  Nick Jones /Age/Sex: 1970  (44 y.o. female)   MRN & CSN:  [id number] & [id number] Admission Date/Time: 2020  5:37 PM   Location:  -A PCP: Mis García PA-C         Hospital Day: 3    Assessment and Plan:   Nick Jones is a 44 y.o.  female  who presents with SOB    1) Influenza PNA  -RDP positive for Flu A  -CXR: No infiltrate  -Started on tamiflu for 5 days  -Droplet precaution in place    2) COPD Exacerbation  -On breathing treatment, steroid  -Covered with Azithromycin for bacterial superinfection     3) Mild Acute kidney injury; likely prerenal  -Crea of 1.2--0.8   -Rehydrated with IVF NS; will dc IVF      4) GERD  -Protonix     5) Hypothyroid  -Synthroid resumed        -DVT Prophylaxis: lovenox    Diet DIET GENERAL;   DVT Prophylaxis [] Lovenox, []  Heparin, [] SCDs, [] Ambulation   GI Prophylaxis [] PPI,  [] H2 Blocker,  [] Carafate,  [] Diet/Tube Feeds   Code Status Full Code   Disposition Home   MDM      History of Present Illness:     Patient was seen and examined  Denied worsening SOB  No dizziness, palpitations  Still having bouts of cough  No further spikes in temp    Ten point ROS reviewed negative, unless as noted above    Objective: Intake/Output Summary (Last 24 hours) at 2020 1205  Last data filed at 2020 0515  Gross per 24 hour   Intake 2706 ml   Output --   Net 2706 ml      Vitals:   Vitals:    20 0803   BP: 119/80   Pulse: 72   Resp: 17   Temp: 98.3 °F (36.8 °C)   SpO2: 98%     Physical Exam:   GEN Awake female, sitting upright in bed in no apparent distress. Appears given age. EYES Pupils are equally round. No scleral erythema, discharge, or conjunctivitis. HENT Mucous membranes are moist. Oral pharynx without exudates, no evidence of thrush. NECK Supple, no apparent thyromegaly or masses. RESP Clear to auscultation, no wheezes, rales or rhonchi.   Symmetric chest movement while on 1L on O2.  CARDIO/VASC S1/S2 auscultated. Regular rate without appreciable murmurs, rubs, or gallops. No JVD or carotid bruits. Peripheral pulses equal bilaterally and palpable. No peripheral edema. GI Abdomen is soft without significant tenderness, masses, or guarding. Bowel sounds are normoactive. Rectal exam deferred.  No costovertebral angle tenderness. Normal appearing external genitalia. Lin catheter is not present. HEME/LYMPH No palpable cervical lymphadenopathy and no hepatosplenomegaly. No petechiae or ecchymoses. MSK No gross joint deformities. SKIN Normal coloration, warm, dry. NEURO Cranial nerves appear grossly intact, normal speech, no lateralizing weakness. PSYCH Awake, alert, oriented x 4. Affect appropriate.     Medications:   Medications:    levothyroxine  50 mcg Oral Daily    topiramate  100 mg Oral BID    sodium chloride flush  10 mL Intravenous 2 times per day    enoxaparin  40 mg Subcutaneous Daily    predniSONE  40 mg Oral Daily    guaiFENesin  600 mg Oral BID    azithromycin  500 mg Intravenous Q24H    mometasone-formoterol  2 puff Inhalation BID    oseltamivir  75 mg Oral BID      Infusions:   PRN Meds: albuterol sulfate HFA, 2 puff, Q6H PRN  benzonatate, 200 mg, TID PRN  sodium chloride flush, 10 mL, PRN  magnesium hydroxide, 30 mL, Daily PRN  ondansetron, 4 mg, Q6H PRN          Electronically signed by Yamini Eller MD on 2/2/2020 at 12:05 PM

## 2020-02-02 NOTE — PLAN OF CARE
Problem: Falls - Risk of:  Goal: Will remain free from falls  Description  Will remain free from falls  2/1/2020 1944 by Kevin Merida RN  Outcome: Ongoing  2/1/2020 1943 by Kevin Merida RN  Outcome: Ongoing  2/1/2020 0906 by David Cartwright RN  Outcome: Ongoing  Goal: Absence of physical injury  Description  Absence of physical injury  2/1/2020 1944 by Kevin Merida RN  Outcome: Ongoing  2/1/2020 1943 by Kevin Merida RN  Outcome: Ongoing  2/1/2020 0906 by David Cartwright RN  Outcome: Ongoing     Problem: Discharge Planning:  Goal: Discharged to appropriate level of care  Description  Discharged to appropriate level of care  2/1/2020 1944 by Kevin Merida RN  Outcome: Ongoing  2/1/2020 1943 by Kevin Merida RN  Outcome: Ongoing  2/1/2020 0906 by David Cartwright RN  Outcome: Ongoing     Problem:  Activity Intolerance:  Goal: Ability to tolerate increased activity will improve  Description  Ability to tolerate increased activity will improve  2/1/2020 1944 by Kevin Merida RN  Outcome: Ongoing  2/1/2020 1943 by Kevin Merida RN  Outcome: Ongoing  2/1/2020 0906 by David Cartwright RN  Outcome: Ongoing     Problem: Airway Clearance - Ineffective:  Goal: Ability to maintain a clear airway will improve  Description  Ability to maintain a clear airway will improve  2/1/2020 1944 by Kevin Merida RN  Outcome: Ongoing  2/1/2020 1943 by Kevin Merida RN  Outcome: Ongoing  2/1/2020 0906 by David Cartwright RN  Outcome: Ongoing     Problem: Breathing Pattern - Ineffective:  Goal: Ability to achieve and maintain a regular respiratory rate will improve  Description  Ability to achieve and maintain a regular respiratory rate will improve  2/1/2020 1944 by Kevin Merida RN  Outcome: Ongoing  2/1/2020 1943 by Kevin Merida RN  Outcome: Ongoing  2/1/2020 0906 by David Cartwright RN  Outcome: Ongoing     Problem: Gas Exchange - Impaired:  Goal: Levels of oxygenation will improve  Description  Levels of oxygenation will

## 2020-02-03 LAB
ANION GAP SERPL CALCULATED.3IONS-SCNC: 10 MMOL/L (ref 4–16)
BUN BLDV-MCNC: 14 MG/DL (ref 6–23)
CALCIUM SERPL-MCNC: 8.8 MG/DL (ref 8.3–10.6)
CHLORIDE BLD-SCNC: 117 MMOL/L (ref 99–110)
CO2: 18 MMOL/L (ref 21–32)
CREAT SERPL-MCNC: 0.7 MG/DL (ref 0.6–1.1)
GFR AFRICAN AMERICAN: >60 ML/MIN/1.73M2
GFR NON-AFRICAN AMERICAN: >60 ML/MIN/1.73M2
GLUCOSE BLD-MCNC: 100 MG/DL (ref 70–99)
MAGNESIUM: 2.2 MG/DL (ref 1.8–2.4)
POTASSIUM SERPL-SCNC: 3.7 MMOL/L (ref 3.5–5.1)
SODIUM BLD-SCNC: 145 MMOL/L (ref 135–145)

## 2020-02-03 PROCEDURE — 6370000000 HC RX 637 (ALT 250 FOR IP): Performed by: INTERNAL MEDICINE

## 2020-02-03 PROCEDURE — 94640 AIRWAY INHALATION TREATMENT: CPT

## 2020-02-03 PROCEDURE — 96372 THER/PROPH/DIAG INJ SC/IM: CPT

## 2020-02-03 PROCEDURE — 6360000002 HC RX W HCPCS: Performed by: NURSE PRACTITIONER

## 2020-02-03 PROCEDURE — 1200000000 HC SEMI PRIVATE

## 2020-02-03 PROCEDURE — 2580000003 HC RX 258: Performed by: NURSE PRACTITIONER

## 2020-02-03 PROCEDURE — 96366 THER/PROPH/DIAG IV INF ADDON: CPT

## 2020-02-03 PROCEDURE — 80048 BASIC METABOLIC PNL TOTAL CA: CPT

## 2020-02-03 PROCEDURE — 6370000000 HC RX 637 (ALT 250 FOR IP): Performed by: NURSE PRACTITIONER

## 2020-02-03 PROCEDURE — 83735 ASSAY OF MAGNESIUM: CPT

## 2020-02-03 PROCEDURE — 94761 N-INVAS EAR/PLS OXIMETRY MLT: CPT

## 2020-02-03 RX ADMIN — OSELTAMIVIR PHOSPHATE 75 MG: 75 CAPSULE ORAL at 21:18

## 2020-02-03 RX ADMIN — GUAIFENESIN 600 MG: 600 TABLET, EXTENDED RELEASE ORAL at 21:17

## 2020-02-03 RX ADMIN — Medication 2 PUFF: at 07:49

## 2020-02-03 RX ADMIN — ENOXAPARIN SODIUM 40 MG: 40 INJECTION SUBCUTANEOUS at 08:40

## 2020-02-03 RX ADMIN — QUETIAPINE FUMARATE 200 MG: 200 TABLET ORAL at 21:18

## 2020-02-03 RX ADMIN — OSELTAMIVIR PHOSPHATE 75 MG: 75 CAPSULE ORAL at 08:40

## 2020-02-03 RX ADMIN — LEVOTHYROXINE SODIUM 50 MCG: 50 TABLET ORAL at 08:40

## 2020-02-03 RX ADMIN — PREDNISONE 40 MG: 20 TABLET ORAL at 08:40

## 2020-02-03 RX ADMIN — GUAIFENESIN 600 MG: 600 TABLET, EXTENDED RELEASE ORAL at 08:40

## 2020-02-03 RX ADMIN — TOPIRAMATE 100 MG: 100 TABLET, FILM COATED ORAL at 21:18

## 2020-02-03 RX ADMIN — TRAZODONE HYDROCHLORIDE 50 MG: 50 TABLET ORAL at 21:17

## 2020-02-03 RX ADMIN — TOPIRAMATE 100 MG: 100 TABLET, FILM COATED ORAL at 08:40

## 2020-02-03 RX ADMIN — SODIUM CHLORIDE, PRESERVATIVE FREE 10 ML: 5 INJECTION INTRAVENOUS at 14:35

## 2020-02-03 RX ADMIN — SODIUM CHLORIDE, PRESERVATIVE FREE 10 ML: 5 INJECTION INTRAVENOUS at 21:11

## 2020-02-03 RX ADMIN — AZITHROMYCIN MONOHYDRATE 500 MG: 500 INJECTION, POWDER, LYOPHILIZED, FOR SOLUTION INTRAVENOUS at 21:10

## 2020-02-03 ASSESSMENT — PAIN SCALES - GENERAL: PAINLEVEL_OUTOF10: 0

## 2020-02-03 NOTE — CARE COORDINATION
Intern reviewed patient's chart and met with patient bedside to initiate discharge planning. Pt had a visitor and pt gave permission to continue with her present. Intern introduced self and explained role. Pt and visitor were having a Bible study and both said, \"It is amazing that we're both sitting here living the lives that we are. You should've seen us before\". Pt has good supports from friends, like her visitor, and lives with a friend in a duplex. Pt is independent with ADLs, has access to transportation and can afford Rx/Med. Pt has no DMEs in the home. Pt has a new insurance card through [employer]. Intern made copies of pt's new card and informed Registration of the change. Pt expressed interest in receiving additional resources. Intern gave a pt a Where to Turn sheet. Pt's plan is to return home with her friend, no needs at this time. CM will be available if needs arise.      Sherra Meckel, KIANAW Intern

## 2020-02-04 LAB
ANION GAP SERPL CALCULATED.3IONS-SCNC: 11 MMOL/L (ref 4–16)
BUN BLDV-MCNC: 14 MG/DL (ref 6–23)
CALCIUM SERPL-MCNC: 8.8 MG/DL (ref 8.3–10.6)
CHLORIDE BLD-SCNC: 116 MMOL/L (ref 99–110)
CO2: 17 MMOL/L (ref 21–32)
CREAT SERPL-MCNC: 0.7 MG/DL (ref 0.6–1.1)
GFR AFRICAN AMERICAN: >60 ML/MIN/1.73M2
GFR NON-AFRICAN AMERICAN: >60 ML/MIN/1.73M2
GLUCOSE BLD-MCNC: 97 MG/DL (ref 70–99)
MAGNESIUM: 2.1 MG/DL (ref 1.8–2.4)
POTASSIUM SERPL-SCNC: 3.6 MMOL/L (ref 3.5–5.1)
SODIUM BLD-SCNC: 144 MMOL/L (ref 135–145)

## 2020-02-04 PROCEDURE — 2580000003 HC RX 258: Performed by: NURSE PRACTITIONER

## 2020-02-04 PROCEDURE — 83735 ASSAY OF MAGNESIUM: CPT

## 2020-02-04 PROCEDURE — 94640 AIRWAY INHALATION TREATMENT: CPT

## 2020-02-04 PROCEDURE — 96366 THER/PROPH/DIAG IV INF ADDON: CPT

## 2020-02-04 PROCEDURE — 6370000000 HC RX 637 (ALT 250 FOR IP): Performed by: INTERNAL MEDICINE

## 2020-02-04 PROCEDURE — 96372 THER/PROPH/DIAG INJ SC/IM: CPT

## 2020-02-04 PROCEDURE — 36415 COLL VENOUS BLD VENIPUNCTURE: CPT

## 2020-02-04 PROCEDURE — 6370000000 HC RX 637 (ALT 250 FOR IP): Performed by: NURSE PRACTITIONER

## 2020-02-04 PROCEDURE — 80048 BASIC METABOLIC PNL TOTAL CA: CPT

## 2020-02-04 PROCEDURE — 94761 N-INVAS EAR/PLS OXIMETRY MLT: CPT

## 2020-02-04 PROCEDURE — 6360000002 HC RX W HCPCS: Performed by: NURSE PRACTITIONER

## 2020-02-04 PROCEDURE — 1200000000 HC SEMI PRIVATE

## 2020-02-04 RX ORDER — ACETAMINOPHEN 325 MG/1
650 TABLET ORAL EVERY 4 HOURS PRN
Status: DISCONTINUED | OUTPATIENT
Start: 2020-02-04 | End: 2020-02-05 | Stop reason: HOSPADM

## 2020-02-04 RX ADMIN — TOPIRAMATE 100 MG: 100 TABLET, FILM COATED ORAL at 08:54

## 2020-02-04 RX ADMIN — Medication 2 PUFF: at 07:52

## 2020-02-04 RX ADMIN — ACETAMINOPHEN 650 MG: 325 TABLET ORAL at 05:58

## 2020-02-04 RX ADMIN — TRAZODONE HYDROCHLORIDE 50 MG: 50 TABLET ORAL at 21:20

## 2020-02-04 RX ADMIN — QUETIAPINE FUMARATE 200 MG: 200 TABLET ORAL at 21:20

## 2020-02-04 RX ADMIN — TOPIRAMATE 100 MG: 100 TABLET, FILM COATED ORAL at 21:20

## 2020-02-04 RX ADMIN — GUAIFENESIN 600 MG: 600 TABLET, EXTENDED RELEASE ORAL at 21:20

## 2020-02-04 RX ADMIN — GUAIFENESIN 600 MG: 600 TABLET, EXTENDED RELEASE ORAL at 08:54

## 2020-02-04 RX ADMIN — SODIUM CHLORIDE, PRESERVATIVE FREE 10 ML: 5 INJECTION INTRAVENOUS at 08:55

## 2020-02-04 RX ADMIN — LEVOTHYROXINE SODIUM 50 MCG: 50 TABLET ORAL at 05:15

## 2020-02-04 RX ADMIN — AZITHROMYCIN MONOHYDRATE 500 MG: 500 INJECTION, POWDER, LYOPHILIZED, FOR SOLUTION INTRAVENOUS at 21:20

## 2020-02-04 RX ADMIN — PREDNISONE 40 MG: 20 TABLET ORAL at 08:54

## 2020-02-04 RX ADMIN — OSELTAMIVIR PHOSPHATE 75 MG: 75 CAPSULE ORAL at 08:54

## 2020-02-04 RX ADMIN — Medication 2 PUFF: at 20:27

## 2020-02-04 RX ADMIN — ENOXAPARIN SODIUM 40 MG: 40 INJECTION SUBCUTANEOUS at 08:54

## 2020-02-04 RX ADMIN — OSELTAMIVIR PHOSPHATE 75 MG: 75 CAPSULE ORAL at 21:20

## 2020-02-04 ASSESSMENT — PAIN DESCRIPTION - PROGRESSION: CLINICAL_PROGRESSION: GRADUALLY WORSENING

## 2020-02-04 ASSESSMENT — PAIN DESCRIPTION - DESCRIPTORS: DESCRIPTORS: HEADACHE

## 2020-02-04 ASSESSMENT — PAIN DESCRIPTION - ORIENTATION: ORIENTATION: ANTERIOR

## 2020-02-04 ASSESSMENT — PAIN DESCRIPTION - PAIN TYPE: TYPE: ACUTE PAIN

## 2020-02-04 ASSESSMENT — PAIN DESCRIPTION - ONSET: ONSET: PROGRESSIVE

## 2020-02-04 ASSESSMENT — PAIN DESCRIPTION - LOCATION: LOCATION: HEAD

## 2020-02-04 ASSESSMENT — PAIN SCALES - GENERAL
PAINLEVEL_OUTOF10: 6
PAINLEVEL_OUTOF10: 7

## 2020-02-04 ASSESSMENT — PAIN - FUNCTIONAL ASSESSMENT: PAIN_FUNCTIONAL_ASSESSMENT: ACTIVITIES ARE NOT PREVENTED

## 2020-02-04 ASSESSMENT — PAIN DESCRIPTION - FREQUENCY: FREQUENCY: INTERMITTENT

## 2020-02-04 NOTE — CARE COORDINATION
Spoke with Alyx Ball, she has a friend that is also in the room. Alyx Ball gave me permission to talk with her friend in the room. Alyx Ball is discharged, we went over her respiratory medications and questions answered. Her Ticket [address] changed midnight 2-1-20. She was seeing Homar Hardin as her PCP but he does not accept her new insurance. She will start seeing Ilsa Olga now. She still has to make her new patient appointment. She respectfully declined letting me make a hospital follow up appointment for her, voicing she will make it to work around her friends schedule. Alyx Ball was educated on the importance of going to a hospital follow up, she voiced understanding. Alyx Ball was given a paper handout on the Walk in [facility] in case she was not able to get in to see Ilsa Espinoza in the next 5-7 days. Went over when to use, how to use and what order to use a IMDI. Shake first, how to synchronize the inhalation with each puff, holding breath, waiting time between puffs, rinse mouth and how to make sure the canister is not empty. Reviewed the benefits of deep breathing and coughing throughout the day. Explained using Pursed lip breathing and the Tri Pod Position when short of breath. Gave a COPD stop light. Reviewed what to look for in the beginning of a flare-up and when to call the doctor. Reviewed COPD, gave her a blue booklet and my business card in case she came up with any questions. Camelia Monique 02/05/20     Her friend/roomate was sick on thursday with the flu. Alyx Ball feels she got this form her roommate. Went to talk to Alyx Ball, she was sleeping.   Will follow  Camelia Monique 02/04/20     COPD CHECKLIST    Was the COPD Order set used      Yes  Pneumonia/COPD Stoplight Education   Yes  Blood Cultures drawn before 1st antibiotic given   Yes  Antibiotic given within 24 hours    Yes  O2 Saturation on admission     93% RA    Consults:             Smoking Cessation      N/A Quit 8-5-11  Pulmonary       No  Med Assist Consult      No  Home Health Care Consult     No  Palliative Care Consult     No  Respiratory Consult      Yes  (including bedside instructions on nebulizers, inhalers, and assessment of oxygen and equipment needs at home)    Discharge:             Pneumococcal Vaccine given before discharge  N/A  Flu Vaccine given before discharge    No refused  Inhalers       Yes  Spacer        No  Steroids       Yes  Follow-up appointment scheduled within 5-7 days  No - refused  Cardio/Pulmonary Wellness program consult  No  Home Oxygen       No  Nebulizer, bi-pap, c-pap at home    No  Home air quality assessment ([facility] [place] [place]) No    Where do you live     Primary Physician:               [x] Home     [x] Dr. Kimberly CALIX               [] Independent Living              [][facility]               [] Assisted Living    [] [address]               [] [address]       [] Homeless/Homeless Shelter              Pulmonary Physician:    Consulted:  [] Crystal Olson     [] Yes   [] Man     [x] No  [] [facility]  [] Tomas Zhou  [] Other:     Pharmacy:      Meds to Beds:  [x] [facility]                               [] Yes   [] [address]       [x] No   [] [facility]   [] [facility]      [] [facility]    [] [facility]   [] [facility]   [] [place]   [] Other:    Home Care:       SNF:  [] Yes, Name:                  [] Yes, Name:  [x] No                     [x] No     Do you have            DME Company:  [] Home O2      [] CM DME    [] BIPAP/CPAP          [] [facility]  [] Nebulizer      [] [facility]  [x] None of the above     [] Other    Home COPD Medications:  Inhalers: Albuterol, Breo     Nebulizers:    N/A      Other:  N/A    Do you have all your home medications? Can you pay for medication?   [x] Yes         [x] Yes   []  No [] No     What time do you prefer your appointments:  Do you have transportation:    [x] AM   Late morning

## 2020-02-05 VITALS
BODY MASS INDEX: 25.34 KG/M2 | HEART RATE: 84 BPM | DIASTOLIC BLOOD PRESSURE: 79 MMHG | TEMPERATURE: 96.6 F | HEIGHT: 65 IN | RESPIRATION RATE: 16 BRPM | OXYGEN SATURATION: 98 % | WEIGHT: 152.12 LBS | SYSTOLIC BLOOD PRESSURE: 120 MMHG

## 2020-02-05 LAB
CULTURE: NORMAL
CULTURE: NORMAL
Lab: NORMAL
Lab: NORMAL
SPECIMEN: NORMAL
SPECIMEN: NORMAL

## 2020-02-05 PROCEDURE — 96372 THER/PROPH/DIAG INJ SC/IM: CPT

## 2020-02-05 PROCEDURE — 6370000000 HC RX 637 (ALT 250 FOR IP): Performed by: INTERNAL MEDICINE

## 2020-02-05 PROCEDURE — 6370000000 HC RX 637 (ALT 250 FOR IP): Performed by: NURSE PRACTITIONER

## 2020-02-05 PROCEDURE — 6360000002 HC RX W HCPCS: Performed by: NURSE PRACTITIONER

## 2020-02-05 PROCEDURE — 2580000003 HC RX 258: Performed by: NURSE PRACTITIONER

## 2020-02-05 RX ORDER — GUAIFENESIN 100 MG/5ML
10 SYRUP ORAL EVERY 4 HOURS PRN
Qty: 1 BOTTLE | Refills: 0 | Status: SHIPPED | OUTPATIENT
Start: 2020-02-05 | End: 2021-06-10 | Stop reason: CLARIF

## 2020-02-05 RX ORDER — BENZONATATE 200 MG/1
200 CAPSULE ORAL 3 TIMES DAILY PRN
Qty: 30 CAPSULE | Refills: 0 | Status: SHIPPED | OUTPATIENT
Start: 2020-02-05 | End: 2020-02-15

## 2020-02-05 RX ORDER — BUDESONIDE AND FORMOTEROL FUMARATE DIHYDRATE 160; 4.5 UG/1; UG/1
2 AEROSOL RESPIRATORY (INHALATION) 2 TIMES DAILY
Qty: 3 INHALER | Refills: 1 | Status: SHIPPED | OUTPATIENT
Start: 2020-02-05 | End: 2020-09-10 | Stop reason: SDUPTHER

## 2020-02-05 RX ORDER — METHYLPREDNISOLONE 4 MG/1
TABLET ORAL
Qty: 1 KIT | Refills: 0 | Status: SHIPPED | OUTPATIENT
Start: 2020-02-05 | End: 2020-02-11

## 2020-02-05 RX ORDER — AZITHROMYCIN 250 MG/1
250 TABLET, FILM COATED ORAL DAILY
Qty: 2 TABLET | Refills: 0 | Status: SHIPPED | OUTPATIENT
Start: 2020-02-05 | End: 2020-02-07

## 2020-02-05 RX ORDER — ALBUTEROL SULFATE 90 UG/1
2 AEROSOL, METERED RESPIRATORY (INHALATION) EVERY 6 HOURS PRN
Qty: 1 INHALER | Refills: 3 | Status: SHIPPED | OUTPATIENT
Start: 2020-02-05 | End: 2020-09-10 | Stop reason: SDUPTHER

## 2020-02-05 RX ORDER — OSELTAMIVIR PHOSPHATE 75 MG/1
75 CAPSULE ORAL 2 TIMES DAILY
Qty: 1 CAPSULE | Refills: 0 | Status: SHIPPED | OUTPATIENT
Start: 2020-02-05 | End: 2020-02-06

## 2020-02-05 RX ADMIN — OSELTAMIVIR PHOSPHATE 75 MG: 75 CAPSULE ORAL at 08:15

## 2020-02-05 RX ADMIN — SODIUM CHLORIDE, PRESERVATIVE FREE 10 ML: 5 INJECTION INTRAVENOUS at 08:16

## 2020-02-05 RX ADMIN — ENOXAPARIN SODIUM 40 MG: 40 INJECTION SUBCUTANEOUS at 08:15

## 2020-02-05 RX ADMIN — PREDNISONE 40 MG: 20 TABLET ORAL at 08:16

## 2020-02-05 RX ADMIN — TOPIRAMATE 100 MG: 100 TABLET, FILM COATED ORAL at 08:16

## 2020-02-05 RX ADMIN — GUAIFENESIN 600 MG: 600 TABLET, EXTENDED RELEASE ORAL at 08:15

## 2020-02-05 RX ADMIN — LEVOTHYROXINE SODIUM 50 MCG: 50 TABLET ORAL at 05:57

## 2020-02-05 NOTE — DISCHARGE SUMMARY
Discharge Summary    Name:  Rohith Lynn /Age/Sex: 1970  (44 y.o. female)   MRN & CSN:  [id number] & [id number] Admission Date/Time: 2020  5:37 PM   Attending:  Elise Azul MD Discharging Physician: Rogers Marcelo MD     Hospital Course:   Kimberlyn Mejia a 44 y.o.  female  who presents with SOB    Patient was seen and examined  Reported significant improvement in her symptoms  Denied any fever, chills, N/V/D  No worsening cough or SOB     Assessment and Plan  1) Influenza PNA  -RDP positive for Flu A  -CXR: No infiltrate  -Started on tamiflu for 5 days; complete today  -Follow up with PCP as OP     2) COPD Exacerbation  -On breathing treatment, steroid  -Covered with Azithromycin for bacterial superinfection  -Dc on Albuterol and ICS+LABA  -Referred to Pulmo to establish care     3) Mild Acute kidney injury; likely prerenal  -Crea of 1.2--0.7  -Rehydrated with IVF NS  -Encouraged oral intake       4) GERD  -Protonix     5) Hypothyroid  -Synthroid resumed        -DVT Prophylaxis: lovenox      The patient expressed appropriate understanding of and agreement with the discharge recommendations, medications, and plan.      Consults this admission:  Leonardo Becerril HOSPITALIST    Discharge Instruction:   Follow up appointments: Pulmo in 2 weeks  Primary care physician:  within 2 weeks    Diet:  regular diet   Activity: activity as tolerated  Disposition: Discharged to:   [x]Home, []C, []SNF, []Acute Rehab, []Hospice   Condition on discharge: Stable    Discharge Medications:      [place] Medication Instructions JOON:[id number]    Printed on:20 1008   Medication Information                      albuterol sulfate  (90 Base) MCG/ACT inhaler  Inhale 2 puffs into the lungs every 6 hours as needed for Wheezing or Shortness of Breath             azithromycin (ZITHROMAX) 250 MG tablet  Take 1 tablet by mouth daily for 2 days             benzonatate (TESSALON) 200 MG genitalia. Lin catheter is not present. HEME/LYMPH No palpable cervical lymphadenopathy and no hepatosplenomegaly. No petechiae or ecchymoses. MSK No gross joint deformities. SKIN Normal coloration, warm, dry. NEURO Cranial nerves appear grossly intact, normal speech, no lateralizing weakness. PSYCH Awake, alert, oriented x 4. Affect appropriate.     BMP/CBC  Recent Labs     02/03/20  0426 02/04/20  0649    144   K 3.7 3.6   * 116*   CO2 18* 17*   BUN 14 14   CREATININE 0.7 0.7       IMAGING:  As above    Discharge Time of 35 minutes    Electronically signed by Shiv Kurtz MD on 2/5/2020 at 10:09 AM

## 2020-02-06 LAB
EKG ATRIAL RATE: 102 BPM
EKG DIAGNOSIS: NORMAL
EKG P AXIS: 50 DEGREES
EKG P-R INTERVAL: 202 MS
EKG Q-T INTERVAL: 370 MS
EKG QRS DURATION: 92 MS
EKG QTC CALCULATION (BAZETT): 482 MS
EKG R AXIS: 28 DEGREES
EKG T AXIS: 50 DEGREES
EKG VENTRICULAR RATE: 102 BPM

## 2020-03-18 ENCOUNTER — HOSPITAL ENCOUNTER (OUTPATIENT)
Age: 50
Discharge: HOME | End: 2020-03-18
Payer: COMMERCIAL

## 2020-03-18 LAB
BASOPHILS ABSOLUTE: 0 K/CU MM
BASOPHILS RELATIVE PERCENT: 0.7 % (ref 0–1)
DIFFERENTIAL TYPE: ABNORMAL
EOSINOPHILS ABSOLUTE: 0.2 K/CU MM
EOSINOPHILS RELATIVE PERCENT: 3.9 % (ref 0–3)
HCT VFR BLD CALC: 39.7 % (ref 37–47)
HEMOGLOBIN: 12.2 GM/DL (ref 12.5–16)
IMMATURE NEUTROPHIL %: 0.3 % (ref 0–0.43)
LYMPHOCYTES ABSOLUTE: 2 K/CU MM
LYMPHOCYTES RELATIVE PERCENT: 34.2 % (ref 24–44)
MCH RBC QN AUTO: 29.1 PG (ref 27–31)
MCHC RBC AUTO-ENTMCNC: 30.7 % (ref 32–36)
MCV RBC AUTO: 94.7 FL (ref 78–100)
MONOCYTES ABSOLUTE: 0.5 K/CU MM
MONOCYTES RELATIVE PERCENT: 8.7 % (ref 0–4)
NUCLEATED RBC %: 0 %
PDW BLD-RTO: 13.6 % (ref 11.7–14.9)
PLATELET # BLD: 205 K/CU MM (ref 140–440)
PMV BLD AUTO: 11.2 FL (ref 7.5–11.1)
RBC # BLD: 4.19 M/CU MM (ref 4.2–5.4)
SEGMENTED NEUTROPHILS ABSOLUTE COUNT: 3.1 K/CU MM
SEGMENTED NEUTROPHILS RELATIVE PERCENT: 52.2 % (ref 36–66)
TOTAL IMMATURE NEUTOROPHIL: 0.02 K/CU MM
TOTAL NUCLEATED RBC: 0 K/CU MM
WBC # BLD: 6 K/CU MM (ref 4–10.5)

## 2020-03-18 PROCEDURE — 82785 ASSAY OF IGE: CPT

## 2020-03-18 PROCEDURE — 36415 COLL VENOUS BLD VENIPUNCTURE: CPT

## 2020-03-18 PROCEDURE — 85025 COMPLETE CBC W/AUTO DIFF WBC: CPT

## 2020-03-20 LAB
IMMUNOGLOBULIN E: 11 KU/L
IMMUNOGLOBULIN E: NORMAL KU/L

## 2020-06-02 ENCOUNTER — HOSPITAL ENCOUNTER (OUTPATIENT)
Dept: WOMENS IMAGING | Age: 50
Discharge: HOME | End: 2020-06-02
Payer: COMMERCIAL

## 2020-06-02 ENCOUNTER — HOSPITAL ENCOUNTER (OUTPATIENT)
Dept: ULTRASOUND IMAGING | Age: 50
Discharge: HOME | End: 2020-06-02
Payer: COMMERCIAL

## 2020-06-02 PROCEDURE — G0279 TOMOSYNTHESIS, MAMMO: HCPCS

## 2020-06-02 PROCEDURE — 76642 ULTRASOUND BREAST LIMITED: CPT

## 2021-09-16 ENCOUNTER — TELEPHONE (OUTPATIENT)
Dept: INFUSION THERAPY | Age: 51
End: 2021-09-16

## 2021-09-17 ENCOUNTER — HOSPITAL ENCOUNTER (OUTPATIENT)
Dept: INFUSION THERAPY | Age: 51
Setting detail: INFUSION SERIES
Discharge: HOME | DRG: 177 | End: 2021-09-17
Payer: COMMERCIAL

## 2021-09-17 VITALS
HEART RATE: 96 BPM | TEMPERATURE: 97.8 F | DIASTOLIC BLOOD PRESSURE: 65 MMHG | RESPIRATION RATE: 20 BRPM | SYSTOLIC BLOOD PRESSURE: 101 MMHG | OXYGEN SATURATION: 95 %

## 2021-09-17 PROCEDURE — 2500000003 HC RX 250 WO HCPCS: Performed by: FAMILY MEDICINE

## 2021-09-17 PROCEDURE — M0243 CASIRIVI AND IMDEVI INFUSION: HCPCS

## 2021-09-17 PROCEDURE — 2580000003 HC RX 258: Performed by: FAMILY MEDICINE

## 2021-09-17 RX ORDER — SODIUM CHLORIDE 9 MG/ML
25 INJECTION, SOLUTION INTRAVENOUS PRN
Status: DISCONTINUED | OUTPATIENT
Start: 2021-09-17 | End: 2021-09-18 | Stop reason: HOSPADM

## 2021-09-17 RX ORDER — SODIUM CHLORIDE 0.9 % (FLUSH) 0.9 %
5-40 SYRINGE (ML) INJECTION EVERY 12 HOURS SCHEDULED
Status: DISCONTINUED | OUTPATIENT
Start: 2021-09-17 | End: 2021-09-18 | Stop reason: HOSPADM

## 2021-09-17 RX ORDER — SODIUM CHLORIDE 0.9 % (FLUSH) 0.9 %
5-40 SYRINGE (ML) INJECTION PRN
Status: DISCONTINUED | OUTPATIENT
Start: 2021-09-17 | End: 2021-09-18 | Stop reason: HOSPADM

## 2021-09-17 RX ADMIN — SODIUM CHLORIDE, PRESERVATIVE FREE 10 ML: 5 INJECTION INTRAVENOUS at 10:55

## 2021-09-17 RX ADMIN — CASIRIVIMAB AND IMDEVIMAB: 600; 600 INJECTION, SOLUTION, CONCENTRATE INTRAVENOUS at 10:56

## 2021-09-17 RX ADMIN — SODIUM CHLORIDE, PRESERVATIVE FREE 10 ML: 5 INJECTION INTRAVENOUS at 11:27

## 2021-09-17 NOTE — PROGRESS NOTES
Pt taken to Covid room. Oriented to room and chair controls. Needs met at present. Pt agreeable for plan of care.

## 2021-09-18 ENCOUNTER — APPOINTMENT (OUTPATIENT)
Dept: GENERAL RADIOLOGY | Age: 51
DRG: 177 | End: 2021-09-18
Payer: COMMERCIAL

## 2021-09-18 ENCOUNTER — HOSPITAL ENCOUNTER (INPATIENT)
Age: 51
LOS: 11 days | Discharge: HOME | DRG: 177 | End: 2021-09-29
Attending: EMERGENCY MEDICINE | Admitting: INTERNAL MEDICINE
Payer: COMMERCIAL

## 2021-09-18 DIAGNOSIS — U07.1 PNEUMONIA DUE TO COVID-19 VIRUS: ICD-10-CM

## 2021-09-18 DIAGNOSIS — U07.1 COVID-19: Primary | ICD-10-CM

## 2021-09-18 DIAGNOSIS — J12.82 PNEUMONIA DUE TO COVID-19 VIRUS: ICD-10-CM

## 2021-09-18 DIAGNOSIS — R09.02 HYPOXIA: ICD-10-CM

## 2021-09-18 LAB
ALBUMIN SERPL-MCNC: 3.8 GM/DL (ref 3.4–5)
ALP BLD-CCNC: 45 IU/L (ref 40–129)
ALT SERPL-CCNC: 11 U/L (ref 10–40)
ANION GAP SERPL CALCULATED.3IONS-SCNC: 17 MMOL/L (ref 4–16)
AST SERPL-CCNC: 30 IU/L (ref 15–37)
BASE EXCESS MIXED: 0.5 (ref 0–2.3)
BASOPHILS ABSOLUTE: 0 K/CU MM
BASOPHILS RELATIVE PERCENT: 0.3 % (ref 0–1)
BILIRUB SERPL-MCNC: 0.4 MG/DL (ref 0–1)
BUN BLDV-MCNC: 20 MG/DL (ref 6–23)
CALCIUM SERPL-MCNC: 9.5 MG/DL (ref 8.3–10.6)
CHLORIDE BLD-SCNC: 102 MMOL/L (ref 99–110)
CO2: 20 MMOL/L (ref 21–32)
COMMENT: ABNORMAL
CREAT SERPL-MCNC: 0.5 MG/DL (ref 0.6–1.1)
DIFFERENTIAL TYPE: ABNORMAL
EOSINOPHILS ABSOLUTE: 0 K/CU MM
EOSINOPHILS RELATIVE PERCENT: 0.5 % (ref 0–3)
GFR AFRICAN AMERICAN: >60 ML/MIN/1.73M2
GFR NON-AFRICAN AMERICAN: >60 ML/MIN/1.73M2
GLUCOSE BLD-MCNC: 86 MG/DL (ref 70–99)
HCO3 VENOUS: 25.4 MMOL/L (ref 19–25)
HCT VFR BLD CALC: 46 % (ref 37–47)
HEMOGLOBIN: 14.4 GM/DL (ref 12.5–16)
IMMATURE NEUTROPHIL %: 0.3 % (ref 0–0.43)
LACTATE: 1 MMOL/L (ref 0.4–2)
LYMPHOCYTES ABSOLUTE: 1.4 K/CU MM
LYMPHOCYTES RELATIVE PERCENT: 37.5 % (ref 24–44)
MCH RBC QN AUTO: 28.5 PG (ref 27–31)
MCHC RBC AUTO-ENTMCNC: 31.3 % (ref 32–36)
MCV RBC AUTO: 90.9 FL (ref 78–100)
MONOCYTES ABSOLUTE: 0.5 K/CU MM
MONOCYTES RELATIVE PERCENT: 12.3 % (ref 0–4)
NUCLEATED RBC %: 0 %
O2 SAT, VEN: 89.7 % (ref 50–70)
PCO2, VEN: 41 MMHG (ref 38–52)
PDW BLD-RTO: 14.1 % (ref 11.7–14.9)
PH VENOUS: 7.4 (ref 7.32–7.42)
PLATELET # BLD: 144 K/CU MM (ref 140–440)
PMV BLD AUTO: 10.9 FL (ref 7.5–11.1)
PO2, VEN: 59 MMHG (ref 28–48)
POTASSIUM SERPL-SCNC: 3.8 MMOL/L (ref 3.5–5.1)
PRO-BNP: 24.1 PG/ML
RBC # BLD: 5.06 M/CU MM (ref 4.2–5.4)
SARS-COV-2, NAAT: DETECTED
SEGMENTED NEUTROPHILS ABSOLUTE COUNT: 1.9 K/CU MM
SEGMENTED NEUTROPHILS RELATIVE PERCENT: 49.1 % (ref 36–66)
SODIUM BLD-SCNC: 139 MMOL/L (ref 135–145)
SOURCE: ABNORMAL
TOTAL IMMATURE NEUTOROPHIL: 0.01 K/CU MM
TOTAL NUCLEATED RBC: 0 K/CU MM
TOTAL PROTEIN: 6.7 GM/DL (ref 6.4–8.2)
TROPONIN T: <0.01 NG/ML
WBC # BLD: 3.8 K/CU MM (ref 4–10.5)

## 2021-09-18 PROCEDURE — 93005 ELECTROCARDIOGRAM TRACING: CPT | Performed by: EMERGENCY MEDICINE

## 2021-09-18 PROCEDURE — 94640 AIRWAY INHALATION TREATMENT: CPT

## 2021-09-18 PROCEDURE — 71045 X-RAY EXAM CHEST 1 VIEW: CPT

## 2021-09-18 PROCEDURE — 2580000003 HC RX 258: Performed by: EMERGENCY MEDICINE

## 2021-09-18 PROCEDURE — 1200000000 HC SEMI PRIVATE

## 2021-09-18 PROCEDURE — 87040 BLOOD CULTURE FOR BACTERIA: CPT

## 2021-09-18 PROCEDURE — 85025 COMPLETE CBC W/AUTO DIFF WBC: CPT

## 2021-09-18 PROCEDURE — 2580000003 HC RX 258: Performed by: NURSE PRACTITIONER

## 2021-09-18 PROCEDURE — 80053 COMPREHEN METABOLIC PANEL: CPT

## 2021-09-18 PROCEDURE — 6370000000 HC RX 637 (ALT 250 FOR IP): Performed by: NURSE PRACTITIONER

## 2021-09-18 PROCEDURE — 6360000002 HC RX W HCPCS: Performed by: EMERGENCY MEDICINE

## 2021-09-18 PROCEDURE — 99285 EMERGENCY DEPT VISIT HI MDM: CPT

## 2021-09-18 PROCEDURE — 87635 SARS-COV-2 COVID-19 AMP PRB: CPT

## 2021-09-18 PROCEDURE — 83605 ASSAY OF LACTIC ACID: CPT

## 2021-09-18 PROCEDURE — 82805 BLOOD GASES W/O2 SATURATION: CPT

## 2021-09-18 PROCEDURE — 84484 ASSAY OF TROPONIN QUANT: CPT

## 2021-09-18 PROCEDURE — 96375 TX/PRO/DX INJ NEW DRUG ADDON: CPT

## 2021-09-18 PROCEDURE — 96365 THER/PROPH/DIAG IV INF INIT: CPT

## 2021-09-18 PROCEDURE — 83880 ASSAY OF NATRIURETIC PEPTIDE: CPT

## 2021-09-18 PROCEDURE — 6360000002 HC RX W HCPCS: Performed by: NURSE PRACTITIONER

## 2021-09-18 RX ORDER — KETOROLAC TROMETHAMINE 30 MG/ML
30 INJECTION, SOLUTION INTRAMUSCULAR; INTRAVENOUS ONCE
Status: COMPLETED | OUTPATIENT
Start: 2021-09-18 | End: 2021-09-18

## 2021-09-18 RX ORDER — ALBUTEROL SULFATE 90 UG/1
2 AEROSOL, METERED RESPIRATORY (INHALATION) EVERY 4 HOURS PRN
Status: DISCONTINUED | OUTPATIENT
Start: 2021-09-18 | End: 2021-09-29 | Stop reason: HOSPADM

## 2021-09-18 RX ORDER — MONTELUKAST SODIUM 10 MG/1
10 TABLET ORAL DAILY
Status: DISCONTINUED | OUTPATIENT
Start: 2021-09-18 | End: 2021-09-29 | Stop reason: HOSPADM

## 2021-09-18 RX ORDER — ACETAMINOPHEN 650 MG/1
650 SUPPOSITORY RECTAL EVERY 6 HOURS PRN
Status: DISCONTINUED | OUTPATIENT
Start: 2021-09-18 | End: 2021-09-21

## 2021-09-18 RX ORDER — DEXAMETHASONE SODIUM PHOSPHATE 4 MG/ML
6 INJECTION, SOLUTION INTRA-ARTICULAR; INTRALESIONAL; INTRAMUSCULAR; INTRAVENOUS; SOFT TISSUE EVERY 24 HOURS
Status: DISCONTINUED | OUTPATIENT
Start: 2021-09-19 | End: 2021-09-21

## 2021-09-18 RX ORDER — SODIUM CHLORIDE 0.9 % (FLUSH) 0.9 %
5-40 SYRINGE (ML) INJECTION EVERY 12 HOURS SCHEDULED
Status: DISCONTINUED | OUTPATIENT
Start: 2021-09-18 | End: 2021-09-29 | Stop reason: HOSPADM

## 2021-09-18 RX ORDER — BUDESONIDE AND FORMOTEROL FUMARATE DIHYDRATE 160; 4.5 UG/1; UG/1
2 AEROSOL RESPIRATORY (INHALATION) 2 TIMES DAILY
Status: DISCONTINUED | OUTPATIENT
Start: 2021-09-18 | End: 2021-09-22

## 2021-09-18 RX ORDER — SODIUM CHLORIDE 9 MG/ML
25 INJECTION, SOLUTION INTRAVENOUS PRN
Status: DISCONTINUED | OUTPATIENT
Start: 2021-09-18 | End: 2021-09-29 | Stop reason: HOSPADM

## 2021-09-18 RX ORDER — SODIUM CHLORIDE 0.9 % (FLUSH) 0.9 %
5-40 SYRINGE (ML) INJECTION PRN
Status: DISCONTINUED | OUTPATIENT
Start: 2021-09-18 | End: 2021-09-29 | Stop reason: HOSPADM

## 2021-09-18 RX ORDER — LEVOTHYROXINE SODIUM 0.05 MG/1
50 TABLET ORAL DAILY
Status: DISCONTINUED | OUTPATIENT
Start: 2021-09-19 | End: 2021-09-29 | Stop reason: HOSPADM

## 2021-09-18 RX ORDER — POLYETHYLENE GLYCOL 3350 17 G/17G
17 POWDER, FOR SOLUTION ORAL DAILY PRN
Status: DISCONTINUED | OUTPATIENT
Start: 2021-09-18 | End: 2021-09-29 | Stop reason: HOSPADM

## 2021-09-18 RX ORDER — ONDANSETRON 2 MG/ML
4 INJECTION INTRAMUSCULAR; INTRAVENOUS ONCE
Status: COMPLETED | OUTPATIENT
Start: 2021-09-18 | End: 2021-09-18

## 2021-09-18 RX ORDER — ONDANSETRON 4 MG/1
4 TABLET, ORALLY DISINTEGRATING ORAL EVERY 8 HOURS PRN
Status: DISCONTINUED | OUTPATIENT
Start: 2021-09-18 | End: 2021-09-29 | Stop reason: HOSPADM

## 2021-09-18 RX ORDER — GUAIFENESIN/DEXTROMETHORPHAN 100-10MG/5
5 SYRUP ORAL EVERY 4 HOURS PRN
Status: DISCONTINUED | OUTPATIENT
Start: 2021-09-18 | End: 2021-09-29 | Stop reason: HOSPADM

## 2021-09-18 RX ORDER — DEXAMETHASONE SODIUM PHOSPHATE 10 MG/ML
10 INJECTION, SOLUTION INTRAMUSCULAR; INTRAVENOUS ONCE
Status: COMPLETED | OUTPATIENT
Start: 2021-09-18 | End: 2021-09-18

## 2021-09-18 RX ORDER — ACETAMINOPHEN 325 MG/1
650 TABLET ORAL EVERY 6 HOURS PRN
Status: DISCONTINUED | OUTPATIENT
Start: 2021-09-18 | End: 2021-09-29 | Stop reason: HOSPADM

## 2021-09-18 RX ORDER — ONDANSETRON 2 MG/ML
4 INJECTION INTRAMUSCULAR; INTRAVENOUS EVERY 6 HOURS PRN
Status: DISCONTINUED | OUTPATIENT
Start: 2021-09-18 | End: 2021-09-29 | Stop reason: HOSPADM

## 2021-09-18 RX ORDER — DICYCLOMINE HYDROCHLORIDE 10 MG/1
10 CAPSULE ORAL 3 TIMES DAILY
Status: DISCONTINUED | OUTPATIENT
Start: 2021-09-18 | End: 2021-09-29 | Stop reason: HOSPADM

## 2021-09-18 RX ADMIN — CEFTRIAXONE 1000 MG: 1 INJECTION, POWDER, FOR SOLUTION INTRAMUSCULAR; INTRAVENOUS at 17:42

## 2021-09-18 RX ADMIN — ENOXAPARIN SODIUM 30 MG: 30 INJECTION SUBCUTANEOUS at 23:22

## 2021-09-18 RX ADMIN — MONTELUKAST SODIUM 10 MG: 10 TABLET, FILM COATED ORAL at 23:18

## 2021-09-18 RX ADMIN — SODIUM CHLORIDE 25 ML: 9 INJECTION, SOLUTION INTRAVENOUS at 20:31

## 2021-09-18 RX ADMIN — IPRATROPIUM BROMIDE 2 PUFF: 17 AEROSOL, METERED RESPIRATORY (INHALATION) at 19:54

## 2021-09-18 RX ADMIN — BISACODYL 5 MG: 5 TABLET, COATED ORAL at 23:15

## 2021-09-18 RX ADMIN — DEXAMETHASONE SODIUM PHOSPHATE 10 MG: 10 INJECTION, SOLUTION INTRAMUSCULAR; INTRAVENOUS at 17:11

## 2021-09-18 RX ADMIN — AZITHROMYCIN MONOHYDRATE 500 MG: 500 INJECTION, POWDER, LYOPHILIZED, FOR SOLUTION INTRAVENOUS at 19:10

## 2021-09-18 RX ADMIN — KETOROLAC TROMETHAMINE 30 MG: 30 INJECTION, SOLUTION INTRAMUSCULAR; INTRAVENOUS at 17:11

## 2021-09-18 RX ADMIN — DICYCLOMINE HYDROCHLORIDE 10 MG: 10 CAPSULE ORAL at 23:15

## 2021-09-18 RX ADMIN — SODIUM CHLORIDE, PRESERVATIVE FREE 10 ML: 5 INJECTION INTRAVENOUS at 22:36

## 2021-09-18 RX ADMIN — BUDESONIDE AND FORMOTEROL FUMARATE DIHYDRATE 2 PUFF: 160; 4.5 AEROSOL RESPIRATORY (INHALATION) at 19:53

## 2021-09-18 RX ADMIN — ONDANSETRON 4 MG: 2 INJECTION INTRAMUSCULAR; INTRAVENOUS at 17:11

## 2021-09-18 ASSESSMENT — PAIN SCALES - GENERAL
PAINLEVEL_OUTOF10: 9
PAINLEVEL_OUTOF10: 9

## 2021-09-18 ASSESSMENT — ENCOUNTER SYMPTOMS
DIARRHEA: 0
NAUSEA: 0
WHEEZING: 0
EYE REDNESS: 0
SHORTNESS OF BREATH: 1
VOMITING: 0
COUGH: 1
CHEST TIGHTNESS: 0

## 2021-09-18 ASSESSMENT — PAIN DESCRIPTION - LOCATION
LOCATION: HEAD
LOCATION: HEAD

## 2021-09-18 ASSESSMENT — PAIN DESCRIPTION - PAIN TYPE
TYPE: ACUTE PAIN
TYPE: ACUTE PAIN

## 2021-09-18 NOTE — H&P
HISTORY AND PHYSICAL             Date: 9/18/2021        Patient Name: Augustine SILVA [facility]     YOB: 1970      Age:  46 y.o. Chief Complaint     Chief Complaint   Patient presents with    Cough    Shortness of Breath     covid positive           History Obtained From   patient, electronic medical record    History of Present Illness   46year old female who tested positive for COVID 8 days ago, report symptoms begain about 10 days ago. Received Regeneron IV yesterday, reports feeling worse last 2 days . Endorses TMAX 102 at home , chills, SOB. Denies chest pain or pressure, N/V/D, hematuria or hematochezia or numbness or tingling. Additional 10 point ROS negative unless otherwise noted below       Past Medical History     Past Medical History:   Diagnosis Date    Acquired hypothyroidism 9/26/2018    Chronic back pain 7/16/2018    Chronic obstructive pulmonary disease ([address] 6/25/2018    COPD (chronic obstructive pulmonary disease) ([address] 10/2013    Essential hypertension 10/9/2018    Fibromyalgia 6/25/2018    Former tobacco use 9/26/2018    GERD (gastroesophageal reflux disease)     H/O 24 hour EKG monitoring 08/05/14-09/03/14    30 DAY EVENT MONITOR-Normal sinus rhythm    H/O fibromyalgia     History of 24 hour EKG monitoring 6/30/14    Sinus rhythm. Isolated  premature atrial contract. and premature ventricular contraction. Sinus tachycardia present.  History of echocardiogram 7/1/14    EF 55% Mild left ventricular hypertrophy noted. Mild mitral and TR noted. The patient is bradycardic during the study.  History of hysterectomy 6/25/2018    Hx of cardiovascular stress test 6/30/14    Lexiscan: EF 73% Negative electrocardiogram suggestive for ischemia.     Hyperlipidemia 6/25/2018    Hypertension     Schizoaffective disorder ([address] 6/25/2018    Simple chronic bronchitis ([address] 9/26/2018    Spastic paraparesis         Past Surgical History     Past Surgical History:   Procedure Laterality Date    BACK SURGERY      CARDIAC CATHETERIZATION      HYSTERECTOMY      INGUINAL HERNIA REPAIR Right     INNER EAR SURGERY Left     TUBAL LIGATION          Medications Prior to Admission     Prior to Admission medications    Medication Sig Start Date End Date Taking?  Authorizing Provider   albuterol sulfate  (90 Base) MCG/ACT inhaler INHALE 2 PUFFS INTO THE LUNGS EVERY 6 HOURS AS NEEDED FOR WHEEZING OR SHORTNESS OF BREATH 21   Felix Deleon MD   montelukast (SINGULAIR) 10 MG tablet TAKE 1 TABLET BY MOUTH EVERY DAY 21   Felix Deleon MD   SYMBICORT 160-4.5 MCG/ACT AERO INHALE 2 PUFFS INTO THE LUNGS 2 TIMES DAILY 21   Felix Deleon MD   baclofen (LIORESAL) 10 MG tablet Take 20 mg by mouth every 8 hours    Historical Provider, MD   LISINOPRIL PO Take by mouth    Historical Provider, MD   levothyroxine (SYNTHROID) 50 MCG tablet Take 1 tablet by mouth Daily 18   Hamida Velez MD   dicyclomine (BENTYL) 10 MG capsule Take 1 capsule by mouth 3 times daily 18   Hamida Velez MD        Allergies   Latex, Codeine, and Pcn [penicillins]    Social History     Social History     Tobacco History     Smoking Status  Former Smoker Quit date  2011 Smoking Frequency  1 pack/day for 25 years (25 pk yrs)    Smokeless Tobacco Use  Never Used          Alcohol History     Alcohol Use Status  No          Drug Use     Drug Use Status  No          Sexual Activity     Sexually Active  Yes Partners  Male Comment  seperated                Family History     Family History   Problem Relation Age of Onset    Heart Disease Mother     Elevated Lipids Mother     Hypertension Mother     Heart Disease Father     Diabetes Sister     Heart Disease Sister     Kidney Disease Sister     Breast Cancer Maternal Grandmother     Breast Cancer Paternal Aunt         2 aunts    Ovarian Cancer Paternal Aunt          in her 29's       Review of Systems   Review of Systems   Constitutional: Positive for activity change, chills, fatigue and fever. Negative for appetite change and diaphoresis. HENT: Positive for sneezing. Negative for congestion and postnasal drip. Eyes: Negative for redness and visual disturbance. Respiratory: Positive for cough and shortness of breath. Negative for chest tightness and wheezing. Cardiovascular: Negative for chest pain and palpitations. Gastrointestinal: Negative for diarrhea, nausea and vomiting. Genitourinary: Negative for difficulty urinating and dysuria. Musculoskeletal: Positive for myalgias. Negative for joint swelling and neck pain. Neurological: Negative for dizziness and speech difficulty. Psychiatric/Behavioral: Negative for agitation and confusion. Physical Exam   /88   Pulse 80   Temp 98.4 °F (36.9 °C) (Oral)   Resp 22   Ht 5' 5\" (1.651 m)   Wt 205 lb (93 kg)   LMP  (LMP Unknown)   SpO2 91%   BMI 34.11 kg/m²     Physical Exam  Vitals and nursing note reviewed. Constitutional:       General: She is not in acute distress. Appearance: Normal appearance. HENT:      Head: Normocephalic. Cardiovascular:      Rate and Rhythm: Normal rate and regular rhythm. Pulses: Normal pulses. Pulmonary:      Effort: Pulmonary effort is normal. No accessory muscle usage or respiratory distress. Breath sounds: Examination of the right-upper field reveals wheezing. Examination of the right-middle field reveals rhonchi. Examination of the right-lower field reveals rhonchi. Examination of the left-lower field reveals rhonchi. Wheezing and rhonchi present. No rales. Abdominal:      General: Abdomen is flat. Bowel sounds are normal. There is no distension. Musculoskeletal:         General: Normal range of motion. Skin:     General: Skin is warm and dry. Capillary Refill: Capillary refill takes 2 to 3 seconds. Neurological:      General: No focal deficit present.       Mental Status: She is alert and oriented to person, place, and time. Labs      Recent Results (from the past 24 hour(s))   Blood Gas, Venous    Collection Time: 09/18/21  4:15 PM   Result Value Ref Range    pH, Bib 7.40 7.32 - 7.42    pCO2, Bib 41 38 - 52 mmHG    pO2, Bib 59 (H) 28 - 48 mmHG    Base Exc, Mixed . 5 0 - 2.3    HCO3, Venous 25.4 (H) 19 - 25 MMOL/L    O2 Sat, Bib 89.7 (H) 50 - 70 %    Comment VBG    EKG 12 Lead    Collection Time: 09/18/21  4:37 PM   Result Value Ref Range    Ventricular Rate 79 BPM    Atrial Rate 79 BPM    P-R Interval 182 ms    QRS Duration 106 ms    Q-T Interval 372 ms    QTc Calculation (Bazett) 426 ms    P Axis 38 degrees    R Axis 3 degrees    T Axis 25 degrees    Diagnosis       Normal sinus rhythm  Normal ECG  When compared with ECG of 31-JAN-2020 22:07,  ST no longer depressed in Anterior leads  QT has shortened     CBC with Auto Diff    Collection Time: 09/18/21  4:49 PM   Result Value Ref Range    WBC 3.8 (L) 4.0 - 10.5 K/CU MM    RBC 5.06 4.2 - 5.4 M/CU MM    Hemoglobin 14.4 12.5 - 16.0 GM/DL    Hematocrit 46.0 37 - 47 %    MCV 90.9 78 - 100 FL    MCH 28.5 27 - 31 PG    MCHC 31.3 (L) 32.0 - 36.0 %    RDW 14.1 11.7 - 14.9 %    Platelets 790 594 - 420 K/CU MM    MPV 10.9 7.5 - 11.1 FL    Differential Type AUTOMATED DIFFERENTIAL     Segs Relative 49.1 36 - 66 %    Lymphocytes % 37.5 24 - 44 %    Monocytes % 12.3 (H) 0 - 4 %    Eosinophils % 0.5 0 - 3 %    Basophils % 0.3 0 - 1 %    Segs Absolute 1.9 K/CU MM    Lymphocytes Absolute 1.4 K/CU MM    Monocytes Absolute 0.5 K/CU MM    Eosinophils Absolute 0.0 K/CU MM    Basophils Absolute 0.0 K/CU MM    Nucleated RBC % 0.0 %    Total Nucleated RBC 0.0 K/CU MM    Total Immature Neutrophil 0.01 K/CU MM    Immature Neutrophil % 0.3 0 - 0.43 %   CMP    Collection Time: 09/18/21  4:49 PM   Result Value Ref Range    Sodium 139 135 - 145 MMOL/L    Potassium 3.8 3.5 - 5.1 MMOL/L    Chloride 102 99 - 110 mMol/L    CO2 20 (L) 21 - 32 MMOL/L    BUN 20 6 - 23 MG/DL    CREATININE 0.5 (L) 0.6 - 1.1 MG/DL    Glucose 86 70 - 99 MG/DL    Calcium 9.5 8.3 - 10.6 MG/DL    Albumin 3.8 3.4 - 5.0 GM/DL    Total Protein 6.7 6.4 - 8.2 GM/DL    Total Bilirubin 0.4 0.0 - 1.0 MG/DL    ALT 11 10 - 40 U/L    AST 30 15 - 37 IU/L    Alkaline Phosphatase 45 40 - 129 IU/L    GFR Non-African American >60 >60 mL/min/1.73m2    GFR African American >60 >60 mL/min/1.73m2    Anion Gap 17 (H) 4 - 16   Lactic acid, plasma    Collection Time: 09/18/21  4:49 PM   Result Value Ref Range    Lactate 1.0 0.4 - 2.0 mMOL/L   Troponin    Collection Time: 09/18/21  4:49 PM   Result Value Ref Range    Troponin T <0.010 <0.01 NG/ML   Brain Natriuretic Peptide    Collection Time: 09/18/21  4:49 PM   Result Value Ref Range    Pro-BNP 24.10 <300 PG/ML   COVID-19, Rapid    Collection Time: 09/18/21  5:00 PM    Specimen: Nasopharyngeal   Result Value Ref Range    Source THROAT     SARS-CoV-2, NAAT DETECTED (A) NOT DETECTED        Imaging/Diagnostics Last 24 Hours   XR CHEST PORTABLE    Result Date: 9/18/2021  EXAMINATION: ONE XRAY VIEW OF THE CHEST 9/18/2021 4:55 pm COMPARISON: Chest 01/31/2020 HISTORY: ORDERING SYSTEM PROVIDED HISTORY: Short of breath, COVID + FINDINGS: The cardiomediastinal and hilar silhouettes appear unremarkable. Scattered pulmonary opacities bilateral mid and lower lungs, greater on the right than left. No pleural effusion evident. No pneumothorax is seen. No acute osseous abnormality is identified. Nonspecific pulmonary infiltrates can be seen with atypical/viral pneumonia.        Assessment      Hospital Problems         Last Modified POA    COVID-19 9/18/2021 Yes          Plan   #COVID 23   #COVID 19 pneumonia  #Acute respiratory failure with hypoxia  #COPD exacerbation  #History of tobacco abuse    - Admit to inpatient , COVID 19 unit    -Chest x-ray with nonspecific pulmonary infiltrate consistent with atypical/viral pneumonia   -Blood cultures x2 check uric urine for Legionella antigen   -Start Decadron, continue to azithromycin and Rocephin as initiated in the ED   -Wean oxygen as tolerated   -Albuterol as needed for wheezing   Daily CBC, CMP, CRP, INR, D-dimer, procalcitonin q48hr   Telemetry and continuous pulse ox   Droplet plus precaution   -Not a candidate for Remdesevir d/t onset >7 days       Continue home medications for chornic medical conditions unless otherwise noted above including but not limited to hypothyroidim, chronic back pain, fibromyalgia, HTN, and GERD,         Diet ADULT DIET; Regular     DVT Prophylaxis [x] Lovenox, []  Heparin, [] SCDs, [] Ambulation   GI Prophylaxis [] PPI,  [] H2 Blocker,  [] Carafate,  [x] Diet/Tube Feeds   Code Status Full Code   Disposition Patient requires continued admission due to Covid 19 PNA      MDM [] Low, [] Moderate,[x]  High  Patient's risk as above      Patient discussed with and evaluated by Dr Tylor Baez  who agrees with the above diagnosis, treatment and disposition. All testing  and results reviewed with patient . All questions answered.  Patient and family voiced understanding              Consultations Ordered:  IP CONSULT TO HOSPITALIST    Electronically signed by MONTSE Holland CNP on 9/18/21 at 6:57 PM EDT

## 2021-09-18 NOTE — ED NOTES
Pulse ox on arrival 80% on room air.  Pt placed onto 6 liters nasal cannula with pulse ox slowly increasing to 89-90%     Ellen Connell RN  09/18/21 0922

## 2021-09-18 NOTE — ED NOTES
Bed: ED-25  Expected date:   Expected time:   Means of arrival:   Comments:  Sukhdeep Lange RN  09/18/21 [id number]

## 2021-09-18 NOTE — ED PROVIDER NOTES
Disease Sister     Breast Cancer Maternal Grandmother     Breast Cancer Paternal Aunt         2 aunts    Ovarian Cancer Paternal Aunt          in her 29's     Social History     Socioeconomic History    Marital status:      Spouse name: Not on file    Number of children: Not on file    Years of education: Not on file    Highest education level: Not on file   Occupational History    Not on file   Tobacco Use    Smoking status: Former Smoker     Packs/day: 1.00     Years: 25.00     Pack years: 25.00     Quit date: 2011     Years since quitting: 10.1    Smokeless tobacco: Never Used   Vaping Use    Vaping Use: Never used   Substance and Sexual Activity    Alcohol use: No    Drug use: No    Sexual activity: Yes     Partners: Male     Comment: seperated   Other Topics Concern    Not on file   Social History Narrative    Not on file     Social Determinants of Health     Financial Resource Strain:     Difficulty of Paying Living Expenses:    Food Insecurity:     Worried About Running Out of Food in the Last Year:     [address] in the Last Year:    Transportation Needs:     Lack of Transportation (Medical):      Lack of Transportation (Non-Medical):    Physical Activity:     Days of Exercise per Week:     Minutes of Exercise per Session:    Stress:     Feeling of Stress :    Social Connections:     Frequency of Communication with Friends and Family:     Frequency of Social Gatherings with Friends and Family:     Attends Alevism Services:     Active Member of Clubs or Organizations:     Attends Club or Organization Meetings:     Marital Status:    Intimate Partner Violence:     Fear of Current or Ex-Partner:     Emotionally Abused:     Physically Abused:     Sexually Abused:      Current Facility-Administered Medications   Medication Dose Route Frequency Provider Last Rate Last Admin    azithromycin (ZITHROMAX) 500 mg in dextrose 5 % 250 mL IVPB  500 mg IntraVENous Q24H Da Merino MD        And    cefTRIAXone (ROCEPHIN) 1000 mg IVPB in 50 mL D5W minibag  1,000 mg IntraVENous Q24H Da Merino  mL/hr at 09/18/21 1742 1,000 mg at 09/18/21 1742     Current Outpatient Medications   Medication Sig Dispense Refill    albuterol sulfate  (90 Base) MCG/ACT inhaler INHALE 2 PUFFS INTO THE LUNGS EVERY 6 HOURS AS NEEDED FOR WHEEZING OR SHORTNESS OF BREATH 8.5 each 1    montelukast (SINGULAIR) 10 MG tablet TAKE 1 TABLET BY MOUTH EVERY DAY 90 tablet 1    SYMBICORT 160-4.5 MCG/ACT AERO INHALE 2 PUFFS INTO THE LUNGS 2 TIMES DAILY 30.6 Inhaler 1    baclofen (LIORESAL) 10 MG tablet Take 20 mg by mouth every 8 hours      LISINOPRIL PO Take by mouth      levothyroxine (SYNTHROID) 50 MCG tablet Take 1 tablet by mouth Daily 30 tablet 3    dicyclomine (BENTYL) 10 MG capsule Take 1 capsule by mouth 3 times daily 90 capsule 3     Allergies   Allergen Reactions    Latex     Codeine Itching    Pcn [Penicillins] Hives     Nursing Notes Reviewed    ROS:  At least 10 systems reviewed and otherwise negative except as in the [place]. Physical Exam:  ED Triage Vitals [09/18/21 1544]   Enc Vitals Group      BP       Pulse       Resp       Temp       Temp src       SpO2       Weight 205 lb (93 kg)      Height 5' 5\" (1.651 m)      Head Circumference       Peak Flow       Pain Score       Pain Loc       Pain Edu? Excl. in [address]? My pulse oximetry interpretation is which is within the normal range    GENERAL APPEARANCE: Awake and alert. Cooperative. No acute distress. HEAD: Normocephalic. Atraumatic. EYES: EOM's grossly intact. Sclera anicteric. ENT: Mucous membranes are moist. Tolerates saliva. No trismus. NECK: Supple. No meningismus. Trachea midline. HEART: RRR. Radial pulses 2+. LUNGS: Respirations unlabored. CTAB  ABDOMEN: Soft. Non-tender. No guarding or rebound. EXTREMITIES: No acute deformities. No swelling to legs. SKIN: Warm and dry.   NEUROLOGICAL: No gross facial drooping. Moves all 4 extremities spontaneously. Patient is awake, alert, oriented x4. PSYCHIATRIC: Normal mood.     I have reviewed and interpreted all of the currently available lab results from this visit (if applicable):  Results for orders placed or performed during the hospital encounter of 09/18/21   COVID-19, Rapid    Specimen: Nasopharyngeal   Result Value Ref Range    Source THROAT     SARS-CoV-2, NAAT DETECTED (A) NOT DETECTED   CBC with Auto Diff   Result Value Ref Range    WBC 3.8 (L) 4.0 - 10.5 K/CU MM    RBC 5.06 4.2 - 5.4 M/CU MM    Hemoglobin 14.4 12.5 - 16.0 GM/DL    Hematocrit 46.0 37 - 47 %    MCV 90.9 78 - 100 FL    MCH 28.5 27 - 31 PG    MCHC 31.3 (L) 32.0 - 36.0 %    RDW 14.1 11.7 - 14.9 %    Platelets 959 167 - 851 K/CU MM    MPV 10.9 7.5 - 11.1 FL    Differential Type AUTOMATED DIFFERENTIAL     Segs Relative 49.1 36 - 66 %    Lymphocytes % 37.5 24 - 44 %    Monocytes % 12.3 (H) 0 - 4 %    Eosinophils % 0.5 0 - 3 %    Basophils % 0.3 0 - 1 %    Segs Absolute 1.9 K/CU MM    Lymphocytes Absolute 1.4 K/CU MM    Monocytes Absolute 0.5 K/CU MM    Eosinophils Absolute 0.0 K/CU MM    Basophils Absolute 0.0 K/CU MM    Nucleated RBC % 0.0 %    Total Nucleated RBC 0.0 K/CU MM    Total Immature Neutrophil 0.01 K/CU MM    Immature Neutrophil % 0.3 0 - 0.43 %   CMP   Result Value Ref Range    Sodium 139 135 - 145 MMOL/L    Potassium 3.8 3.5 - 5.1 MMOL/L    Chloride 102 99 - 110 mMol/L    CO2 20 (L) 21 - 32 MMOL/L    BUN 20 6 - 23 MG/DL    CREATININE 0.5 (L) 0.6 - 1.1 MG/DL    Glucose 86 70 - 99 MG/DL    Calcium 9.5 8.3 - 10.6 MG/DL    Albumin 3.8 3.4 - 5.0 GM/DL    Total Protein 6.7 6.4 - 8.2 GM/DL    Total Bilirubin 0.4 0.0 - 1.0 MG/DL    ALT 11 10 - 40 U/L    AST 30 15 - 37 IU/L    Alkaline Phosphatase 45 40 - 129 IU/L    GFR Non-African American >60 >60 mL/min/1.73m2    GFR African American >60 >60 mL/min/1.73m2    Anion Gap 17 (H) 4 - 16   Lactic acid, plasma   Result Value Ref Range Lactate 1.0 0.4 - 2.0 mMOL/L   Blood Gas, Venous   Result Value Ref Range    pH, Bib 7.40 7.32 - 7.42    pCO2, Bib 41 38 - 52 mmHG    pO2, Bib 59 (H) 28 - 48 mmHG    Base Exc, Mixed . 5 0 - 2.3    HCO3, Venous 25.4 (H) 19 - 25 MMOL/L    O2 Sat, Bib 89.7 (H) 50 - 70 %    Comment VBG    Troponin   Result Value Ref Range    Troponin T <0.010 <0.01 NG/ML   Brain Natriuretic Peptide   Result Value Ref Range    Pro-BNP 24.10 <300 PG/ML   EKG 12 Lead   Result Value Ref Range    Ventricular Rate 79 BPM    Atrial Rate 79 BPM    P-R Interval 182 ms    QRS Duration 106 ms    Q-T Interval 372 ms    QTc Calculation (Bazett) 426 ms    P Axis 38 degrees    R Axis 3 degrees    T Axis 25 degrees    Diagnosis       Normal sinus rhythm  Normal ECG  When compared with ECG of 31-JAN-2020 22:07,  ST no longer depressed in Anterior leads  QT has shortened          Radiographs:  [] Radiologist's Wet Read Report Reviewed:      XR CHEST PORTABLE (Final result)  Result time 09/18/21 17:01:35  Final result by Mary Barker MD (09/18/21 17:01:35)                Impression:    Nonspecific pulmonary infiltrates can be seen with atypical/viral pneumonia. Narrative:    EXAMINATION:   ONE XRAY VIEW OF THE CHEST     9/18/2021 4:55 pm     COMPARISON:   Chest 01/31/2020     HISTORY:   ORDERING SYSTEM PROVIDED HISTORY: Short of breath, COVID +     FINDINGS:   The cardiomediastinal and hilar silhouettes appear unremarkable. Scattered   pulmonary opacities bilateral mid and lower lungs, greater on the right than   left. No pleural effusion evident. No pneumothorax is seen. No acute osseous   abnormality is identified.                    [] Discussed with Radiologist:     [] The following radiograph was interpreted by myself in the absence of a radiologist:     EKG: (All EKG's are interpreted by myself in the absence of a cardiologist)  The Ekg interpreted by me shows  normal sinus rhythm with a rate of 79  Axis is   Normal  QTc is normal  Intervals and Durations are unremarkable. ST Segments: no acute change. Small Q waves in 3 and aVF   no significant change from prior EKG dated 1- did not have above q waves         MDM:  Vitals normotensive. Afebrile. Heart rate in the 80s. Sats 91% on oxygen. . Toradol IV, zofran IV, decadron IV given. Shows PCO2 41. Venous pH of 7.40. Chest x-ray shows nonspecific pulmonary infiltrates can be seen with atypical/viral pneumonia. Started on Rocephin and azithromycin. CBC shows a white count of 3.8. Hemoglobin of 14.4. Electrolytes shows a mildly decreased CO2 of 20. Mild elevation in anion gap of 17. Normal lactic acid of 1.0. Troponin normal.  BNP normal. covid detectable. Luis Armando results with patient. Will admit to hospital.  She has been weaned down to 2 L of oxygen but still requiring oxygen to maintain her saturations above 90%. She does voiced understanding and agree to admit    CRITICAL CARE NOTE:  There was a high probability of clinically significant life-threatening deterioration of the patient's condition requiring my urgent intervention due to dyspnea, hypoxia. Oxygen, IV steroids, chart review, reeval, admit was performed to address this. Total critical care time is at least  35 minutes. This includes vital sign monitoring, pulse oximetry monitoring, telemetry monitoring, clinical response to the IV medications, reviewing the nursing notes, consultation time, dictation/documentation time, and interpretation of the lab work. This time excludes time spent performing procedures and separately billable procedures and family discussion time. Clinical Impression:  1. COVID-19    2. Hypoxia    3. Pneumonia due to COVID-19 virus        Disposition Vitals:  [unfilled], [unfilled], [unfilled], [unfilled]    Disposition referral (if applicable):  No follow-up provider specified.     Disposition medications (if applicable):  New Prescriptions    No medications on file         (Please note that portions of this note may have been completed with a voice recognition program. Efforts were made to edit the dictations but occasionally words are mis-transcribed.)    MD Claudia Lovett MD  09/18/21 1800

## 2021-09-18 NOTE — ED NOTES
Patient states she has COPD and her oxygen saturation is normally 93-94% on r/a and has c pap     Robbi Jimenez RN  09/18/21 [id number]

## 2021-09-19 LAB
BANDED NEUTROPHILS ABSOLUTE COUNT: 0.31 K/CU MM
BANDED NEUTROPHILS RELATIVE PERCENT: 11 % (ref 5–11)
D DIMER: 552 NG/ML(DDU)
DIFFERENTIAL TYPE: ABNORMAL
FERRITIN: 492 NG/ML (ref 15–150)
FIBRINOGEN LEVEL: 489 MG/DL (ref 196.9–442.1)
HCT VFR BLD CALC: 44.7 % (ref 37–47)
HEMOGLOBIN: 14 GM/DL (ref 12.5–16)
HIGH SENSITIVE C-REACTIVE PROTEIN: 79.6 MG/L
LACTATE: 1.9 MMOL/L (ref 0.4–2)
LEGIONELLA URINARY AG: NEGATIVE
LYMPHOCYTES ABSOLUTE: 0.5 K/CU MM
LYMPHOCYTES RELATIVE PERCENT: 17 % (ref 24–44)
MCH RBC QN AUTO: 28.7 PG (ref 27–31)
MCHC RBC AUTO-ENTMCNC: 31.3 % (ref 32–36)
MCV RBC AUTO: 91.8 FL (ref 78–100)
MONOCYTES ABSOLUTE: 0 K/CU MM
MONOCYTES RELATIVE PERCENT: 1 % (ref 0–4)
PDW BLD-RTO: 13.6 % (ref 11.7–14.9)
PLATELET # BLD: 148 K/CU MM (ref 140–440)
PLT MORPHOLOGY: ABNORMAL
PMV BLD AUTO: 11.1 FL (ref 7.5–11.1)
PROCALCITONIN: 0.1
RBC # BLD: 4.87 M/CU MM (ref 4.2–5.4)
SEGMENTED NEUTROPHILS ABSOLUTE COUNT: 2 K/CU MM
SEGMENTED NEUTROPHILS RELATIVE PERCENT: 71 % (ref 36–66)
STREP PNEUMONIAE ANTIGEN: NORMAL
WBC # BLD: 2.8 K/CU MM (ref 4–10.5)
WBC # BLD: ABNORMAL 10*3/UL

## 2021-09-19 PROCEDURE — 86141 C-REACTIVE PROTEIN HS: CPT

## 2021-09-19 PROCEDURE — 6370000000 HC RX 637 (ALT 250 FOR IP): Performed by: FAMILY MEDICINE

## 2021-09-19 PROCEDURE — 6370000000 HC RX 637 (ALT 250 FOR IP): Performed by: NURSE PRACTITIONER

## 2021-09-19 PROCEDURE — 94761 N-INVAS EAR/PLS OXIMETRY MLT: CPT

## 2021-09-19 PROCEDURE — 85027 COMPLETE CBC AUTOMATED: CPT

## 2021-09-19 PROCEDURE — 6360000002 HC RX W HCPCS: Performed by: FAMILY MEDICINE

## 2021-09-19 PROCEDURE — 84145 PROCALCITONIN (PCT): CPT

## 2021-09-19 PROCEDURE — 83605 ASSAY OF LACTIC ACID: CPT

## 2021-09-19 PROCEDURE — 87449 NOS EACH ORGANISM AG IA: CPT

## 2021-09-19 PROCEDURE — 85384 FIBRINOGEN ACTIVITY: CPT

## 2021-09-19 PROCEDURE — 85007 BL SMEAR W/DIFF WBC COUNT: CPT

## 2021-09-19 PROCEDURE — 82728 ASSAY OF FERRITIN: CPT

## 2021-09-19 PROCEDURE — 6360000002 HC RX W HCPCS: Performed by: NURSE PRACTITIONER

## 2021-09-19 PROCEDURE — 2700000000 HC OXYGEN THERAPY PER DAY

## 2021-09-19 PROCEDURE — 36415 COLL VENOUS BLD VENIPUNCTURE: CPT

## 2021-09-19 PROCEDURE — 87899 AGENT NOS ASSAY W/OPTIC: CPT

## 2021-09-19 PROCEDURE — 85379 FIBRIN DEGRADATION QUANT: CPT

## 2021-09-19 PROCEDURE — 1200000000 HC SEMI PRIVATE

## 2021-09-19 PROCEDURE — 94640 AIRWAY INHALATION TREATMENT: CPT

## 2021-09-19 PROCEDURE — 2580000003 HC RX 258: Performed by: NURSE PRACTITIONER

## 2021-09-19 RX ORDER — BENZONATATE 100 MG/1
200 CAPSULE ORAL 3 TIMES DAILY PRN
Status: DISCONTINUED | OUTPATIENT
Start: 2021-09-19 | End: 2021-09-29 | Stop reason: HOSPADM

## 2021-09-19 RX ADMIN — IPRATROPIUM BROMIDE 2 PUFF: 17 AEROSOL, METERED RESPIRATORY (INHALATION) at 08:18

## 2021-09-19 RX ADMIN — ALBUTEROL SULFATE 2 PUFF: 90 AEROSOL, METERED RESPIRATORY (INHALATION) at 08:17

## 2021-09-19 RX ADMIN — MONTELUKAST SODIUM 10 MG: 10 TABLET, FILM COATED ORAL at 12:58

## 2021-09-19 RX ADMIN — ACETAMINOPHEN 650 MG: 325 TABLET ORAL at 18:24

## 2021-09-19 RX ADMIN — IPRATROPIUM BROMIDE 2 PUFF: 17 AEROSOL, METERED RESPIRATORY (INHALATION) at 16:16

## 2021-09-19 RX ADMIN — ONDANSETRON 4 MG: 2 INJECTION INTRAMUSCULAR; INTRAVENOUS at 12:58

## 2021-09-19 RX ADMIN — ALBUTEROL SULFATE 2 PUFF: 90 AEROSOL, METERED RESPIRATORY (INHALATION) at 16:16

## 2021-09-19 RX ADMIN — SODIUM CHLORIDE, PRESERVATIVE FREE 10 ML: 5 INJECTION INTRAVENOUS at 22:36

## 2021-09-19 RX ADMIN — DICYCLOMINE HYDROCHLORIDE 10 MG: 10 CAPSULE ORAL at 18:16

## 2021-09-19 RX ADMIN — DICYCLOMINE HYDROCHLORIDE 10 MG: 10 CAPSULE ORAL at 12:58

## 2021-09-19 RX ADMIN — ENOXAPARIN SODIUM 30 MG: 30 INJECTION SUBCUTANEOUS at 12:58

## 2021-09-19 RX ADMIN — DEXAMETHASONE SODIUM PHOSPHATE 6 MG: 4 INJECTION, SOLUTION INTRAMUSCULAR; INTRAVENOUS at 18:16

## 2021-09-19 RX ADMIN — GUAIFENESIN AND DEXTROMETHORPHAN 5 ML: 100; 10 SYRUP ORAL at 07:55

## 2021-09-19 RX ADMIN — IPRATROPIUM BROMIDE 2 PUFF: 17 AEROSOL, METERED RESPIRATORY (INHALATION) at 19:42

## 2021-09-19 RX ADMIN — ALBUTEROL SULFATE 2 PUFF: 90 AEROSOL, METERED RESPIRATORY (INHALATION) at 19:43

## 2021-09-19 RX ADMIN — LEVOTHYROXINE SODIUM 50 MCG: 0.05 TABLET ORAL at 06:35

## 2021-09-19 RX ADMIN — BENZONATATE 200 MG: 100 CAPSULE ORAL at 18:16

## 2021-09-19 RX ADMIN — BISACODYL 5 MG: 5 TABLET, COATED ORAL at 12:58

## 2021-09-19 RX ADMIN — BUDESONIDE AND FORMOTEROL FUMARATE DIHYDRATE 2 PUFF: 160; 4.5 AEROSOL RESPIRATORY (INHALATION) at 19:43

## 2021-09-19 RX ADMIN — BUDESONIDE AND FORMOTEROL FUMARATE DIHYDRATE 2 PUFF: 160; 4.5 AEROSOL RESPIRATORY (INHALATION) at 08:18

## 2021-09-19 RX ADMIN — DICYCLOMINE HYDROCHLORIDE 10 MG: 10 CAPSULE ORAL at 20:26

## 2021-09-19 RX ADMIN — ALBUTEROL SULFATE 2 PUFF: 90 AEROSOL, METERED RESPIRATORY (INHALATION) at 12:53

## 2021-09-19 RX ADMIN — SODIUM CHLORIDE, PRESERVATIVE FREE 10 ML: 5 INJECTION INTRAVENOUS at 12:59

## 2021-09-19 RX ADMIN — ENOXAPARIN SODIUM 40 MG: 40 INJECTION SUBCUTANEOUS at 22:35

## 2021-09-19 RX ADMIN — IPRATROPIUM BROMIDE 2 PUFF: 17 AEROSOL, METERED RESPIRATORY (INHALATION) at 12:52

## 2021-09-19 ASSESSMENT — PAIN DESCRIPTION - PROGRESSION
CLINICAL_PROGRESSION: NOT CHANGED

## 2021-09-19 ASSESSMENT — PAIN SCALES - GENERAL
PAINLEVEL_OUTOF10: 6
PAINLEVEL_OUTOF10: 0
PAINLEVEL_OUTOF10: 0
PAINLEVEL_OUTOF10: 8

## 2021-09-19 ASSESSMENT — PAIN DESCRIPTION - ORIENTATION: ORIENTATION: POSTERIOR

## 2021-09-19 ASSESSMENT — PAIN DESCRIPTION - FREQUENCY: FREQUENCY: CONTINUOUS

## 2021-09-19 ASSESSMENT — PAIN DESCRIPTION - ONSET: ONSET: ON-GOING

## 2021-09-19 ASSESSMENT — PAIN DESCRIPTION - PAIN TYPE: TYPE: ACUTE PAIN

## 2021-09-19 ASSESSMENT — PAIN DESCRIPTION - LOCATION: LOCATION: HEAD;NECK

## 2021-09-19 ASSESSMENT — PAIN - FUNCTIONAL ASSESSMENT: PAIN_FUNCTIONAL_ASSESSMENT: ACTIVITIES ARE NOT PREVENTED

## 2021-09-19 ASSESSMENT — PAIN DESCRIPTION - DESCRIPTORS: DESCRIPTORS: ACHING;SHARP

## 2021-09-19 NOTE — PROGRESS NOTES
Hospitalist Progress Note      Name:  Augustine Ackerman /Age/Sex: 1970  (46 y.o. female)   MRN & CSN:  [id number] & [id number] Admission Date/Time: 2021  3:41 PM   Location:  [address] [place] [address] PCP: Carmen Varghese, [facility] Day: 2    Assessment and Plan:   Augustine Ackerman is a 46 y.o.  female  who presents with <principal problem not specified>    1. PNEUMONIA DUE TO COVID-19  -Given Regeneron, also on dexamethasone. Procal is low, DC antibiotics    2. ACUTE RESPIRATORY FAILURE WITH HYPOXIA  -continue oxygen and wean as tolerated    3. COPD  -Does not appear to be in exacerbation, see plan above    4. ESSENTIAL HYPERTENSION  -continue medications and monitor    5. TOBACCO DEPENDENCE      Diet ADULT DIET; Regular   DVT Prophylaxis [x] Lovenox, []  Heparin, [] SCDs, [] Ambulation   GI Prophylaxis [] PPI,  [] H2 Blocker,  [] Carafate,  [] Diet/Tube Feeds   Code Status Full Code   Disposition Patient requires continued admission due to covid   MDM [] Low, [] Moderate,[]  High  Patient's risk as above due to covid     History of Present Illness:     Chief Complaint: <principal problem not specified>  Augustine Ackerman is a 46 y.o.  female  who presents with covid, hypoxia    Chart reviewed. Patient is currently comfortable in no distress. Her appetite is good no GI complaints       14 point ROS reviewed negative, unless as noted above    Objective: Intake/Output Summary (Last 24 hours) at 2021 1336  Last data filed at 2021 1314  Gross per 24 hour   Intake 250 ml   Output 300 ml   Net -50 ml      Vitals:   Vitals:    21 1255   BP:    Pulse:    Resp:    Temp:    SpO2: (!) 85%     Physical Exam:   GEN Awake female, sitting upright in bed in no apparent distress. Appears given age. EYES Pupils are equally round. No scleral erythema, discharge, or conjunctivitis. HENT Mucous membranes are moist. Oral pharynx without exudates, no evidence of thrush.   NECK Supple, no apparent thyromegaly or masses. RESP Clear to auscultation, no wheezes, rales or rhonchi. Symmetric chest movement while on room air. CARDIO/VASC S1/S2 auscultated. Regular rate without appreciable murmurs, rubs, or gallops. No JVD or carotid bruits. Peripheral pulses equal bilaterally and palpable. No peripheral edema. GI Abdomen is soft without significant tenderness, masses, or guarding. Bowel sounds are normoactive. Rectal exam deferred. HEME/LYMPH No palpable cervical lymphadenopathy and no hepatosplenomegaly. No petechiae or ecchymoses. MSK No gross joint deformities. SKIN Normal coloration, warm, dry. NEURO Cranial nerves appear grossly intact, normal speech, no lateralizing weakness. PSYCH Awake, alert, oriented x 4. Affect appropriate.     Medications:   Medications:    azithromycin  500 mg IntraVENous Q24H    And    cefTRIAXone (ROCEPHIN) IV  1,000 mg IntraVENous Q24H    sodium chloride flush  5-40 mL IntraVENous 2 times per day    enoxaparin  30 mg SubCUTAneous BID    ipratropium  2 puff Inhalation 4x daily    bisacodyl  5 mg Oral Daily    dexamethasone  6 mg IntraVENous Q24H    levothyroxine  50 mcg Oral Daily    dicyclomine  10 mg Oral TID    montelukast  10 mg Oral Daily    budesonide-formoterol  2 puff Inhalation BID      Infusions:    sodium chloride Stopped (09/18/21 2152)     PRN Meds: sodium chloride flush, 5-40 mL, PRN  sodium chloride, 25 mL, PRN  ondansetron, 4 mg, Q8H PRN   Or  ondansetron, 4 mg, Q6H PRN  polyethylene glycol, 17 g, Daily PRN  acetaminophen, 650 mg, Q6H PRN   Or  acetaminophen, 650 mg, Q6H PRN  guaiFENesin-dextromethorphan, 5 mL, Q4H PRN  albuterol sulfate HFA, 2 puff, Q4H PRN

## 2021-09-20 LAB
D DIMER: 292 NG/ML(DDU)
EKG ATRIAL RATE: 79 BPM
EKG DIAGNOSIS: NORMAL
EKG P AXIS: 38 DEGREES
EKG P-R INTERVAL: 182 MS
EKG Q-T INTERVAL: 372 MS
EKG QRS DURATION: 106 MS
EKG QTC CALCULATION (BAZETT): 426 MS
EKG R AXIS: 3 DEGREES
EKG T AXIS: 25 DEGREES
EKG VENTRICULAR RATE: 79 BPM
HIGH SENSITIVE C-REACTIVE PROTEIN: 17.8 MG/L

## 2021-09-20 PROCEDURE — 6370000000 HC RX 637 (ALT 250 FOR IP): Performed by: FAMILY MEDICINE

## 2021-09-20 PROCEDURE — 2700000000 HC OXYGEN THERAPY PER DAY

## 2021-09-20 PROCEDURE — 6360000002 HC RX W HCPCS: Performed by: NURSE PRACTITIONER

## 2021-09-20 PROCEDURE — 94761 N-INVAS EAR/PLS OXIMETRY MLT: CPT

## 2021-09-20 PROCEDURE — 86141 C-REACTIVE PROTEIN HS: CPT

## 2021-09-20 PROCEDURE — 2580000003 HC RX 258: Performed by: NURSE PRACTITIONER

## 2021-09-20 PROCEDURE — 85379 FIBRIN DEGRADATION QUANT: CPT

## 2021-09-20 PROCEDURE — 1200000000 HC SEMI PRIVATE

## 2021-09-20 PROCEDURE — 6360000002 HC RX W HCPCS: Performed by: FAMILY MEDICINE

## 2021-09-20 PROCEDURE — 94640 AIRWAY INHALATION TREATMENT: CPT

## 2021-09-20 PROCEDURE — 36415 COLL VENOUS BLD VENIPUNCTURE: CPT

## 2021-09-20 PROCEDURE — 93010 ELECTROCARDIOGRAM REPORT: CPT | Performed by: INTERNAL MEDICINE

## 2021-09-20 PROCEDURE — 6370000000 HC RX 637 (ALT 250 FOR IP): Performed by: NURSE PRACTITIONER

## 2021-09-20 RX ADMIN — IPRATROPIUM BROMIDE 2 PUFF: 17 AEROSOL, METERED RESPIRATORY (INHALATION) at 19:41

## 2021-09-20 RX ADMIN — LEVOTHYROXINE SODIUM 50 MCG: 0.05 TABLET ORAL at 06:17

## 2021-09-20 RX ADMIN — DICYCLOMINE HYDROCHLORIDE 10 MG: 10 CAPSULE ORAL at 13:47

## 2021-09-20 RX ADMIN — DICYCLOMINE HYDROCHLORIDE 10 MG: 10 CAPSULE ORAL at 21:22

## 2021-09-20 RX ADMIN — BENZONATATE 200 MG: 100 CAPSULE ORAL at 21:22

## 2021-09-20 RX ADMIN — SALINE NASAL SPRAY 1 SPRAY: 1.5 SOLUTION NASAL at 16:29

## 2021-09-20 RX ADMIN — BUDESONIDE AND FORMOTEROL FUMARATE DIHYDRATE 2 PUFF: 160; 4.5 AEROSOL RESPIRATORY (INHALATION) at 19:41

## 2021-09-20 RX ADMIN — DEXAMETHASONE SODIUM PHOSPHATE 6 MG: 4 INJECTION, SOLUTION INTRAMUSCULAR; INTRAVENOUS at 16:29

## 2021-09-20 RX ADMIN — BISACODYL 5 MG: 5 TABLET, COATED ORAL at 07:53

## 2021-09-20 RX ADMIN — IPRATROPIUM BROMIDE 2 PUFF: 17 AEROSOL, METERED RESPIRATORY (INHALATION) at 07:58

## 2021-09-20 RX ADMIN — SODIUM CHLORIDE, PRESERVATIVE FREE 10 ML: 5 INJECTION INTRAVENOUS at 07:54

## 2021-09-20 RX ADMIN — ENOXAPARIN SODIUM 40 MG: 40 INJECTION SUBCUTANEOUS at 07:53

## 2021-09-20 RX ADMIN — IPRATROPIUM BROMIDE 2 PUFF: 17 AEROSOL, METERED RESPIRATORY (INHALATION) at 16:09

## 2021-09-20 RX ADMIN — ENOXAPARIN SODIUM 40 MG: 40 INJECTION SUBCUTANEOUS at 21:21

## 2021-09-20 RX ADMIN — ALBUTEROL SULFATE 2 PUFF: 90 AEROSOL, METERED RESPIRATORY (INHALATION) at 16:09

## 2021-09-20 RX ADMIN — DICYCLOMINE HYDROCHLORIDE 10 MG: 10 CAPSULE ORAL at 07:53

## 2021-09-20 RX ADMIN — IPRATROPIUM BROMIDE 2 PUFF: 17 AEROSOL, METERED RESPIRATORY (INHALATION) at 12:36

## 2021-09-20 RX ADMIN — ACETAMINOPHEN 650 MG: 325 TABLET ORAL at 21:22

## 2021-09-20 RX ADMIN — MONTELUKAST SODIUM 10 MG: 10 TABLET, FILM COATED ORAL at 07:53

## 2021-09-20 RX ADMIN — ALBUTEROL SULFATE 2 PUFF: 90 AEROSOL, METERED RESPIRATORY (INHALATION) at 07:58

## 2021-09-20 RX ADMIN — BUDESONIDE AND FORMOTEROL FUMARATE DIHYDRATE 2 PUFF: 160; 4.5 AEROSOL RESPIRATORY (INHALATION) at 07:58

## 2021-09-20 RX ADMIN — SODIUM CHLORIDE, PRESERVATIVE FREE 10 ML: 5 INJECTION INTRAVENOUS at 21:21

## 2021-09-20 ASSESSMENT — PAIN DESCRIPTION - PAIN TYPE: TYPE: ACUTE PAIN

## 2021-09-20 ASSESSMENT — PAIN SCALES - GENERAL
PAINLEVEL_OUTOF10: 0
PAINLEVEL_OUTOF10: 5
PAINLEVEL_OUTOF10: 0
PAINLEVEL_OUTOF10: 5

## 2021-09-20 ASSESSMENT — PAIN DESCRIPTION - LOCATION: LOCATION: HEAD

## 2021-09-20 NOTE — PROGRESS NOTES
Paged Dr. Fidelina Stevens. Pt is requesting nasal saline spray for dry nares; waiting for response.

## 2021-09-20 NOTE — CARE COORDINATION
Covid + 9/18. pt is currently on O2 high flow at 15 lm. Per review pt is from home . Pt has a PCP. Pt has insurance and is able to obtain her prescriptions. Will follow.  [facility]

## 2021-09-20 NOTE — PROGRESS NOTES
Hospitalist Progress Note      Name:  Augustine Ackerman /Age/Sex: 1970  (46 y.o. female)   MRN & CSN:  [id number] & [id number] Admission Date/Time: 2021  3:41 PM   Location:  [facility] PCP: Sage Mendez, [facility] Day: 3    Assessment and Plan:   Augustine Ackerman is a 46 y.o.  female  who presents with <principal problem not specified>    1. PNEUMONIA DUE TO COVID-19  -Given Regeneron, also on dexamethasone. CRP dropped from 79-17. She is clinically improving. Continue current treatment plan     2. ACUTE RESPIRATORY FAILURE WITH HYPOXIA  -continue oxygen and wean as tolerated     3. COPD  -Does not appear to be in exacerbation, see plan above     4. ESSENTIAL HYPERTENSION  -continue medications and monitor     5. TOBACCO DEPENDENCE  -n oissues not on NRT    Diet ADULT DIET; Regular   DVT Prophylaxis [x] Lovenox, []  Heparin, [] SCDs, [] Ambulation   GI Prophylaxis [] PPI,  [] H2 Blocker,  [] Carafate,  [] Diet/Tube Feeds   Code Status Full Code   Disposition Patient requires continued admission due to covid   MDM [] Low, [] Moderate,[]  High  Patient's risk as above due to covid     History of Present Illness:     Chief Complaint: <principal problem not specified>  Augustine Ackerman is a 46 y.o.  female  who presents with covid  No issues overnight. She is feeling some improvement. Appetite ok. No other specific complaints       14 point ROS reviewed negative, unless as noted above    Objective: Intake/Output Summary (Last 24 hours) at 2021 1623  Last data filed at 2021 2143  Gross per 24 hour   Intake 53.07 ml   Output --   Net 53.07 ml      Vitals:   Vitals:    21 1613   BP:    Pulse:    Resp: 26   Temp:    SpO2: 92%     Physical Exam:   GEN Awake female, sitting upright in bed in no apparent distress. Appears given age. EYES Pupils are equally round. No scleral erythema, discharge, or conjunctivitis.   HENT Mucous membranes are moist. Oral pharynx without exudates, no evidence of thrush. NECK Supple, no apparent thyromegaly or masses. RESP Clear to auscultation with decreased BS, no wheezes, rales or rhonchi. Symmetric chest movement while on room air. CARDIO/VASC S1/S2 auscultated. Regular rate without appreciable murmurs, rubs, or gallops. No JVD or carotid bruits. Peripheral pulses equal bilaterally and palpable. No peripheral edema. GI Abdomen is soft without significant tenderness, masses, or guarding. Bowel sounds are normoactive. Rectal exam deferred. HEME/LYMPH No palpable cervical lymphadenopathy and no hepatosplenomegaly. No petechiae or ecchymoses. MSK No gross joint deformities. SKIN Normal coloration, warm, dry. NEURO Cranial nerves appear grossly intact, normal speech, no lateralizing weakness. PSYCH Awake, alert, oriented x 4. Affect appropriate.     Medications:   Medications:    enoxaparin  40 mg SubCUTAneous BID    sodium chloride flush  5-40 mL IntraVENous 2 times per day    ipratropium  2 puff Inhalation 4x daily    bisacodyl  5 mg Oral Daily    dexamethasone  6 mg IntraVENous Q24H    levothyroxine  50 mcg Oral Daily    dicyclomine  10 mg Oral TID    montelukast  10 mg Oral Daily    budesonide-formoterol  2 puff Inhalation BID      Infusions:    sodium chloride Stopped (09/18/21 2152)     PRN Meds: sodium chloride, 1 spray, PRN  benzonatate, 200 mg, TID PRN  sodium chloride flush, 5-40 mL, PRN  sodium chloride, 25 mL, PRN  ondansetron, 4 mg, Q8H PRN   Or  ondansetron, 4 mg, Q6H PRN  polyethylene glycol, 17 g, Daily PRN  acetaminophen, 650 mg, Q6H PRN   Or  acetaminophen, 650 mg, Q6H PRN  guaiFENesin-dextromethorphan, 5 mL, Q4H PRN  albuterol sulfate HFA, 2 puff, Q4H PRN

## 2021-09-20 NOTE — PLAN OF CARE
minimized  9/20/2021 0748 by Amanda Kiser RN  Outcome: Ongoing  9/20/2021 0000 by Kavin Solomon RN  Outcome: Ongoing  9/19/2021 2359 by Kavin Solomon RN  Outcome: Ongoing  9/19/2021 2121 by Fatemeh Hillman. Dimitry Martinez RN  Outcome: Ongoing     Problem: Isolation Precautions - Risk of Spread of Infection  Goal: Prevent transmission of infection  9/20/2021 0748 by Amanda Kiser, RN  Outcome: Ongoing  9/20/2021 0000 by Kavin Solomon RN  Outcome: Ongoing  9/19/2021 2359 by Kavin Solomon, RN  Outcome: Ongoing  9/19/2021 2121 by Fatemeh Hillman. Dimitry Martinez RN  Outcome: Ongoing     Problem: Nutrition Deficits  Goal: Optimize nutritional status  9/20/2021 0748 by Amanda Kiser, RN  Outcome: Ongoing  9/20/2021 0000 by Kavin Solomon RN  Outcome: Ongoing  9/19/2021 2359 by Kavin Solomon, RN  Outcome: Ongoing  9/19/2021 2121 by Fatemeh Hillman. Dimitry Martinez RN  Outcome: Ongoing     Problem: Risk for Fluid Volume Deficit  Goal: Maintain normal heart rhythm  9/20/2021 0748 by Amanda Kiser, RN  Outcome: Ongoing  9/20/2021 0000 by Kavin Solomon, RN  Outcome: Ongoing  9/19/2021 2359 by Kavin Solomon, RN  Outcome: Ongoing  9/19/2021 2121 by Fatemeh Hillman. Dimitry Martinez RN  Outcome: Ongoing  Goal: Maintain absence of muscle cramping  9/20/2021 0748 by Amanda Kiser, RN  Outcome: Ongoing  9/20/2021 0000 by Kavin Solomon, RN  Outcome: Ongoing  9/19/2021 2359 by Kavin Solomon, RN  Outcome: Ongoing  9/19/2021 2121 by Fatemeh Hillman. Dimitry Martinez RN  Outcome: Ongoing  Goal: Maintain normal serum potassium, sodium, calcium, phosphorus, and pH  9/20/2021 0748 by Amanda Kiser, RN  Outcome: Ongoing  9/20/2021 0000 by Kavin Solomon RN  Outcome: Ongoing  9/19/2021 2359 by Kavin Solomon, RN  Outcome: Ongoing  9/19/2021 2121 by Fatemeh Hillman.  Dimitry Martinez RN  Outcome: Ongoing     Problem: Loneliness or Risk for Loneliness  Goal: Demonstrate positive use of time alone when socialization is not possible  9/20/2021 0748 by Amanda Kiser RN  Outcome: Ongoing  9/20/2021 0000 by Sandip Bray RN  Outcome: Ongoing  9/19/2021 2359 by Sandip Bray RN  Outcome: Ongoing  9/19/2021 2121 by Mary Fong. Junito Padilla RN  Outcome: Ongoing     Problem: Fatigue  Goal: Verbalize increase energy and improved vitality  9/20/2021 0748 by Mlevi Perry RN  Outcome: Ongoing  9/20/2021 0000 by Sandip Bray RN  Outcome: Ongoing  9/19/2021 2359 by Sandip Bray RN  Outcome: Ongoing  9/19/2021 2121 by Mary Fong. Junito Padilla RN  Outcome: Ongoing     Problem: Patient Education: Go to Patient Education Activity  Goal: Patient/Family Education  9/20/2021 6310 by Melvi Perry RN  Outcome: Ongoing  9/20/2021 0000 by Sandip Bray RN  Outcome: Ongoing  9/19/2021 2359 by Sandip Bray RN  Outcome: Ongoing  9/19/2021 2121 by Mary Fong.  Junito Padilla RN  Outcome: Ongoing     Problem: Falls - Risk of:  Goal: Will remain free from falls  Description: Will remain free from falls  9/20/2021 0748 by Melvi Perry RN  Outcome: Ongoing  9/20/2021 0000 by Sandip Bray RN  Outcome: Ongoing  9/19/2021 2359 by Sandip Bray RN  Outcome: Ongoing  Goal: Absence of physical injury  Description: Absence of physical injury  9/20/2021 0748 by Melvi Perry RN  Outcome: Ongoing  9/20/2021 0000 by Sandip Bray RN  Outcome: Ongoing  9/19/2021 2359 by Sandip rBay RN  Outcome: Ongoing

## 2021-09-21 ENCOUNTER — APPOINTMENT (OUTPATIENT)
Dept: GENERAL RADIOLOGY | Age: 51
DRG: 177 | End: 2021-09-21
Payer: COMMERCIAL

## 2021-09-21 PROCEDURE — 94761 N-INVAS EAR/PLS OXIMETRY MLT: CPT

## 2021-09-21 PROCEDURE — 2700000000 HC OXYGEN THERAPY PER DAY

## 2021-09-21 PROCEDURE — 2580000003 HC RX 258: Performed by: NURSE PRACTITIONER

## 2021-09-21 PROCEDURE — 6360000002 HC RX W HCPCS: Performed by: HOSPITALIST

## 2021-09-21 PROCEDURE — 6370000000 HC RX 637 (ALT 250 FOR IP): Performed by: HOSPITALIST

## 2021-09-21 PROCEDURE — 1200000000 HC SEMI PRIVATE

## 2021-09-21 PROCEDURE — 6360000002 HC RX W HCPCS: Performed by: FAMILY MEDICINE

## 2021-09-21 PROCEDURE — 6370000000 HC RX 637 (ALT 250 FOR IP): Performed by: NURSE PRACTITIONER

## 2021-09-21 PROCEDURE — 71045 X-RAY EXAM CHEST 1 VIEW: CPT

## 2021-09-21 PROCEDURE — 94640 AIRWAY INHALATION TREATMENT: CPT

## 2021-09-21 RX ORDER — DEXAMETHASONE SODIUM PHOSPHATE 4 MG/ML
10 INJECTION, SOLUTION INTRA-ARTICULAR; INTRALESIONAL; INTRAMUSCULAR; INTRAVENOUS; SOFT TISSUE EVERY 24 HOURS
Status: COMPLETED | OUTPATIENT
Start: 2021-09-21 | End: 2021-09-28

## 2021-09-21 RX ORDER — ASCORBIC ACID 500 MG
500 TABLET ORAL 3 TIMES DAILY
Status: DISCONTINUED | OUTPATIENT
Start: 2021-09-21 | End: 2021-09-29 | Stop reason: HOSPADM

## 2021-09-21 RX ORDER — ZINC SULFATE 50(220)MG
50 CAPSULE ORAL DAILY
Status: DISCONTINUED | OUTPATIENT
Start: 2021-09-21 | End: 2021-09-29 | Stop reason: HOSPADM

## 2021-09-21 RX ORDER — FUROSEMIDE 10 MG/ML
40 INJECTION INTRAMUSCULAR; INTRAVENOUS ONCE
Status: COMPLETED | OUTPATIENT
Start: 2021-09-21 | End: 2021-09-21

## 2021-09-21 RX ORDER — LANOLIN ALCOHOL/MO/W.PET/CERES
100 CREAM (GRAM) TOPICAL DAILY
Status: DISCONTINUED | OUTPATIENT
Start: 2021-09-21 | End: 2021-09-29 | Stop reason: HOSPADM

## 2021-09-21 RX ADMIN — DICYCLOMINE HYDROCHLORIDE 10 MG: 10 CAPSULE ORAL at 21:29

## 2021-09-21 RX ADMIN — FUROSEMIDE 40 MG: 10 INJECTION, SOLUTION INTRAMUSCULAR; INTRAVENOUS at 10:19

## 2021-09-21 RX ADMIN — IPRATROPIUM BROMIDE 2 PUFF: 17 AEROSOL, METERED RESPIRATORY (INHALATION) at 20:43

## 2021-09-21 RX ADMIN — BUDESONIDE AND FORMOTEROL FUMARATE DIHYDRATE 2 PUFF: 160; 4.5 AEROSOL RESPIRATORY (INHALATION) at 08:01

## 2021-09-21 RX ADMIN — ACETAMINOPHEN 650 MG: 325 TABLET ORAL at 21:33

## 2021-09-21 RX ADMIN — DEXAMETHASONE SODIUM PHOSPHATE 10 MG: 4 INJECTION, SOLUTION INTRAMUSCULAR; INTRAVENOUS at 16:55

## 2021-09-21 RX ADMIN — BISACODYL 5 MG: 5 TABLET, COATED ORAL at 08:15

## 2021-09-21 RX ADMIN — BUDESONIDE AND FORMOTEROL FUMARATE DIHYDRATE 2 PUFF: 160; 4.5 AEROSOL RESPIRATORY (INHALATION) at 20:43

## 2021-09-21 RX ADMIN — LEVOTHYROXINE SODIUM 50 MCG: 0.05 TABLET ORAL at 06:41

## 2021-09-21 RX ADMIN — ACETAMINOPHEN 650 MG: 325 TABLET ORAL at 08:19

## 2021-09-21 RX ADMIN — DICYCLOMINE HYDROCHLORIDE 10 MG: 10 CAPSULE ORAL at 08:15

## 2021-09-21 RX ADMIN — ALBUTEROL SULFATE 2 PUFF: 90 AEROSOL, METERED RESPIRATORY (INHALATION) at 08:01

## 2021-09-21 RX ADMIN — DICYCLOMINE HYDROCHLORIDE 10 MG: 10 CAPSULE ORAL at 13:26

## 2021-09-21 RX ADMIN — ZINC SULFATE 220 MG (50 MG) CAPSULE 50 MG: CAPSULE at 10:15

## 2021-09-21 RX ADMIN — SODIUM CHLORIDE, PRESERVATIVE FREE 10 ML: 5 INJECTION INTRAVENOUS at 08:15

## 2021-09-21 RX ADMIN — MONTELUKAST SODIUM 10 MG: 10 TABLET, FILM COATED ORAL at 08:15

## 2021-09-21 RX ADMIN — Medication 100 MG: at 10:14

## 2021-09-21 RX ADMIN — ENOXAPARIN SODIUM 40 MG: 40 INJECTION SUBCUTANEOUS at 08:15

## 2021-09-21 RX ADMIN — SODIUM CHLORIDE, PRESERVATIVE FREE 10 ML: 5 INJECTION INTRAVENOUS at 21:29

## 2021-09-21 RX ADMIN — OXYCODONE HYDROCHLORIDE AND ACETAMINOPHEN 500 MG: 500 TABLET ORAL at 10:14

## 2021-09-21 RX ADMIN — OXYCODONE HYDROCHLORIDE AND ACETAMINOPHEN 500 MG: 500 TABLET ORAL at 13:27

## 2021-09-21 RX ADMIN — OXYCODONE HYDROCHLORIDE AND ACETAMINOPHEN 500 MG: 500 TABLET ORAL at 21:29

## 2021-09-21 RX ADMIN — IPRATROPIUM BROMIDE 2 PUFF: 17 AEROSOL, METERED RESPIRATORY (INHALATION) at 08:02

## 2021-09-21 RX ADMIN — ENOXAPARIN SODIUM 40 MG: 40 INJECTION SUBCUTANEOUS at 21:29

## 2021-09-21 ASSESSMENT — PAIN SCALES - GENERAL
PAINLEVEL_OUTOF10: 2
PAINLEVEL_OUTOF10: 3
PAINLEVEL_OUTOF10: 3
PAINLEVEL_OUTOF10: 0

## 2021-09-21 ASSESSMENT — PAIN DESCRIPTION - PAIN TYPE: TYPE: ACUTE PAIN

## 2021-09-21 NOTE — PROGRESS NOTES
Hospital Medicine Progress Note     Date:  9/21/2021    PCP: Anay Pacheco (Tel: [phone])    Date of Admission: 9/18/2021    Chief complaint:   Chief Complaint   Patient presents with    Cough    Shortness of Breath     covid positive       Assessment:  Active Problems:    COVID-19  Resolved Problems:    * No resolved hospital problems. *      Plan:  1. Acute respiratory failure with hypoxia  Secondary to COVID-19 pneumonia  On high flow oxygen, wean as tolerated  Patient received Regeneron  On IV Decadron  Start on IV Lasix  Multivitamin cocktail  DVT prophylaxis     2. Pneumonia due to COVID-19  Management as above     3. COPD  Continue breathing treatment     4. ESSENTIAL HYPERTENSION  Continue current management     5. TOBACCO DEPENDENCE      Diet: ADULT DIET; Regular    Code status: Full Code   ----------  Subjective  Patient seen and examined at bedside, laying in bed, not in acute distress, no new complaints today. Continues to require high flow oxygen. Objective  Physical exam:  Vitals: /86   Pulse (!) 47   Temp 98 °F (36.7 °C) (Oral)   Resp 20   Ht 5' 5\" (1.651 m)   Wt 199 lb 3.2 oz (90.4 kg)   LMP  (LMP Unknown)   SpO2 96%   BMI 33.15 kg/m²   Gen/overall appearance: Not in acute distress. Alert. Head: Normocephalic, atraumatic  Eyes: EOMI, good acuity  ENT: Oral mucosa moist  Neck: No JVD, thyromegaly  CVS: Nml S1S2, no MRG, RRR  Pulm: Bilateral crackles, tachypnea  Gastrointestinal: Soft, NT/ND, +BS  Musculoskeletal: No edema. Warm  Neuro: No focal deficit. Moves extremity spontaneously. Psychiatry: Appropriate affect. Not agitated. Skin: Warm, dry with normal turgor. No rash  Capillary refill: Brisk,< 3 seconds   Peripheral Pulses: +2 palpable, equal bilaterally     24HR INTAKE/OUTPUT:  No intake or output data in the 24 hours ending 09/21/21 1522  No intake/output data recorded. No intake/output data recorded.   Meds:    dexamethasone  10 mg IntraVENous Q24H

## 2021-09-21 NOTE — CARE COORDINATION
CM reviewed notes pt remains on High flow 14 lmin. CM team will follow asO2 requirements lessen.  [address]

## 2021-09-22 ENCOUNTER — APPOINTMENT (OUTPATIENT)
Dept: GENERAL RADIOLOGY | Age: 51
DRG: 177 | End: 2021-09-22
Payer: COMMERCIAL

## 2021-09-22 LAB
BANDED NEUTROPHILS ABSOLUTE COUNT: 0.23 K/CU MM
BANDED NEUTROPHILS RELATIVE PERCENT: 2 % (ref 5–11)
D DIMER: 355 NG/ML(DDU)
DIFFERENTIAL TYPE: ABNORMAL
HCT VFR BLD CALC: 48.4 % (ref 37–47)
HEMOGLOBIN: 15.3 GM/DL (ref 12.5–16)
HIGH SENSITIVE C-REACTIVE PROTEIN: 5.2 MG/L
LYMPHOCYTES ABSOLUTE: 1.8 K/CU MM
LYMPHOCYTES RELATIVE PERCENT: 16 % (ref 24–44)
MCH RBC QN AUTO: 29.1 PG (ref 27–31)
MCHC RBC AUTO-ENTMCNC: 31.6 % (ref 32–36)
MCV RBC AUTO: 92 FL (ref 78–100)
MONOCYTES ABSOLUTE: 0.9 K/CU MM
MONOCYTES RELATIVE PERCENT: 8 % (ref 0–4)
PDW BLD-RTO: 13.4 % (ref 11.7–14.9)
PLATELET # BLD: 340 K/CU MM (ref 140–440)
PMV BLD AUTO: 10.8 FL (ref 7.5–11.1)
RBC # BLD: 5.26 M/CU MM (ref 4.2–5.4)
RBC # BLD: ABNORMAL 10*6/UL
SEGMENTED NEUTROPHILS ABSOLUTE COUNT: 8.6 K/CU MM
SEGMENTED NEUTROPHILS RELATIVE PERCENT: 74 % (ref 36–66)
WBC # BLD: 11.5 K/CU MM (ref 4–10.5)

## 2021-09-22 PROCEDURE — 94640 AIRWAY INHALATION TREATMENT: CPT

## 2021-09-22 PROCEDURE — 85027 COMPLETE CBC AUTOMATED: CPT

## 2021-09-22 PROCEDURE — 36415 COLL VENOUS BLD VENIPUNCTURE: CPT

## 2021-09-22 PROCEDURE — 1200000000 HC SEMI PRIVATE

## 2021-09-22 PROCEDURE — 6370000000 HC RX 637 (ALT 250 FOR IP): Performed by: HOSPITALIST

## 2021-09-22 PROCEDURE — 87205 SMEAR GRAM STAIN: CPT

## 2021-09-22 PROCEDURE — 6360000002 HC RX W HCPCS: Performed by: HOSPITALIST

## 2021-09-22 PROCEDURE — 87070 CULTURE OTHR SPECIMN AEROBIC: CPT

## 2021-09-22 PROCEDURE — 71045 X-RAY EXAM CHEST 1 VIEW: CPT

## 2021-09-22 PROCEDURE — 80053 COMPREHEN METABOLIC PANEL: CPT

## 2021-09-22 PROCEDURE — 6360000002 HC RX W HCPCS: Performed by: FAMILY MEDICINE

## 2021-09-22 PROCEDURE — 2580000003 HC RX 258: Performed by: NURSE PRACTITIONER

## 2021-09-22 PROCEDURE — 85379 FIBRIN DEGRADATION QUANT: CPT

## 2021-09-22 PROCEDURE — 2700000000 HC OXYGEN THERAPY PER DAY

## 2021-09-22 PROCEDURE — 85007 BL SMEAR W/DIFF WBC COUNT: CPT

## 2021-09-22 PROCEDURE — 86141 C-REACTIVE PROTEIN HS: CPT

## 2021-09-22 PROCEDURE — 6370000000 HC RX 637 (ALT 250 FOR IP): Performed by: NURSE PRACTITIONER

## 2021-09-22 PROCEDURE — 94761 N-INVAS EAR/PLS OXIMETRY MLT: CPT

## 2021-09-22 PROCEDURE — 6370000000 HC RX 637 (ALT 250 FOR IP): Performed by: FAMILY MEDICINE

## 2021-09-22 RX ORDER — ALBUTEROL SULFATE 90 UG/1
2 AEROSOL, METERED RESPIRATORY (INHALATION) 4 TIMES DAILY
Status: DISCONTINUED | OUTPATIENT
Start: 2021-09-22 | End: 2021-09-28

## 2021-09-22 RX ORDER — IPRATROPIUM BROMIDE AND ALBUTEROL SULFATE 2.5; .5 MG/3ML; MG/3ML
1 SOLUTION RESPIRATORY (INHALATION)
Status: DISCONTINUED | OUTPATIENT
Start: 2021-09-22 | End: 2021-09-22

## 2021-09-22 RX ADMIN — DEXAMETHASONE SODIUM PHOSPHATE 10 MG: 4 INJECTION, SOLUTION INTRAMUSCULAR; INTRAVENOUS at 16:40

## 2021-09-22 RX ADMIN — DICYCLOMINE HYDROCHLORIDE 10 MG: 10 CAPSULE ORAL at 13:43

## 2021-09-22 RX ADMIN — DICYCLOMINE HYDROCHLORIDE 10 MG: 10 CAPSULE ORAL at 08:34

## 2021-09-22 RX ADMIN — SODIUM CHLORIDE, PRESERVATIVE FREE 10 ML: 5 INJECTION INTRAVENOUS at 08:35

## 2021-09-22 RX ADMIN — ENOXAPARIN SODIUM 40 MG: 40 INJECTION SUBCUTANEOUS at 20:56

## 2021-09-22 RX ADMIN — ACETAMINOPHEN 650 MG: 325 TABLET ORAL at 08:38

## 2021-09-22 RX ADMIN — MONTELUKAST SODIUM 10 MG: 10 TABLET, FILM COATED ORAL at 08:34

## 2021-09-22 RX ADMIN — OXYCODONE HYDROCHLORIDE AND ACETAMINOPHEN 500 MG: 500 TABLET ORAL at 08:34

## 2021-09-22 RX ADMIN — IPRATROPIUM BROMIDE 2 PUFF: 17 AEROSOL, METERED RESPIRATORY (INHALATION) at 20:38

## 2021-09-22 RX ADMIN — ACETAMINOPHEN 650 MG: 325 TABLET ORAL at 20:55

## 2021-09-22 RX ADMIN — GUAIFENESIN AND DEXTROMETHORPHAN 5 ML: 100; 10 SYRUP ORAL at 04:00

## 2021-09-22 RX ADMIN — BISACODYL 5 MG: 5 TABLET, COATED ORAL at 08:34

## 2021-09-22 RX ADMIN — ALBUTEROL SULFATE 2 PUFF: 90 AEROSOL, METERED RESPIRATORY (INHALATION) at 15:25

## 2021-09-22 RX ADMIN — ZINC SULFATE 220 MG (50 MG) CAPSULE 50 MG: CAPSULE at 08:34

## 2021-09-22 RX ADMIN — IPRATROPIUM BROMIDE 2 PUFF: 17 AEROSOL, METERED RESPIRATORY (INHALATION) at 11:48

## 2021-09-22 RX ADMIN — IPRATROPIUM BROMIDE 2 PUFF: 17 AEROSOL, METERED RESPIRATORY (INHALATION) at 15:26

## 2021-09-22 RX ADMIN — LEVOTHYROXINE SODIUM 50 MCG: 0.05 TABLET ORAL at 06:50

## 2021-09-22 RX ADMIN — ALBUTEROL SULFATE 2 PUFF: 90 AEROSOL, METERED RESPIRATORY (INHALATION) at 20:39

## 2021-09-22 RX ADMIN — Medication 100 MG: at 08:34

## 2021-09-22 RX ADMIN — OXYCODONE HYDROCHLORIDE AND ACETAMINOPHEN 500 MG: 500 TABLET ORAL at 20:56

## 2021-09-22 RX ADMIN — ALBUTEROL SULFATE 2 PUFF: 90 AEROSOL, METERED RESPIRATORY (INHALATION) at 11:47

## 2021-09-22 RX ADMIN — ALBUTEROL SULFATE 2 PUFF: 90 AEROSOL, METERED RESPIRATORY (INHALATION) at 08:08

## 2021-09-22 RX ADMIN — BENZONATATE 200 MG: 100 CAPSULE ORAL at 20:56

## 2021-09-22 RX ADMIN — SODIUM CHLORIDE, PRESERVATIVE FREE 10 ML: 5 INJECTION INTRAVENOUS at 20:56

## 2021-09-22 RX ADMIN — DICYCLOMINE HYDROCHLORIDE 10 MG: 10 CAPSULE ORAL at 20:56

## 2021-09-22 RX ADMIN — BUDESONIDE AND FORMOTEROL FUMARATE DIHYDRATE 2 PUFF: 160; 4.5 AEROSOL RESPIRATORY (INHALATION) at 08:09

## 2021-09-22 RX ADMIN — ENOXAPARIN SODIUM 40 MG: 40 INJECTION SUBCUTANEOUS at 08:34

## 2021-09-22 RX ADMIN — IPRATROPIUM BROMIDE 2 PUFF: 17 AEROSOL, METERED RESPIRATORY (INHALATION) at 08:09

## 2021-09-22 RX ADMIN — OXYCODONE HYDROCHLORIDE AND ACETAMINOPHEN 500 MG: 500 TABLET ORAL at 13:43

## 2021-09-22 ASSESSMENT — PAIN DESCRIPTION - LOCATION
LOCATION: HEAD
LOCATION: HEAD

## 2021-09-22 ASSESSMENT — PAIN SCALES - GENERAL
PAINLEVEL_OUTOF10: 3
PAINLEVEL_OUTOF10: 0
PAINLEVEL_OUTOF10: 7
PAINLEVEL_OUTOF10: 3

## 2021-09-22 ASSESSMENT — PAIN DESCRIPTION - PAIN TYPE: TYPE: ACUTE PAIN

## 2021-09-22 NOTE — PLAN OF CARE
Problem: Airway Clearance - Ineffective  Goal: Achieve or maintain patent airway  9/22/2021 1044 by Amalia Lane LPN  Outcome: Ongoing  9/22/2021 0350 by Marisel Strong RN  Outcome: Ongoing     Problem: Gas Exchange - Impaired  Goal: Absence of hypoxia  9/22/2021 1044 by Amalia Lane LPN  Outcome: Ongoing  9/22/2021 0350 by Marisel Strong RN  Outcome: Ongoing  Goal: Promote optimal lung function  9/22/2021 1044 by Amalia Lane LPN  Outcome: Ongoing  9/22/2021 0350 by Marisel Strong RN  Outcome: Ongoing     Problem: Breathing Pattern - Ineffective  Goal: Ability to achieve and maintain a regular respiratory rate  9/22/2021 1044 by Amalia Lane LPN  Outcome: Ongoing  9/22/2021 0350 by Marisel Strong RN  Outcome: Ongoing     Problem:  Body Temperature -  Risk of, Imbalanced  Goal: Ability to maintain a body temperature within defined limits  9/22/2021 1044 by Amalia Lane LPN  Outcome: Ongoing  9/22/2021 0350 by Marisel Strong RN  Outcome: Ongoing  Goal: Will regain or maintain usual level of consciousness  9/22/2021 1044 by Amalia Lane LPN  Outcome: Ongoing  9/22/2021 0350 by Marisel Strong RN  Outcome: Ongoing  Goal: Complications related to the disease process, condition or treatment will be avoided or minimized  9/22/2021 1044 by Amalia Lane LPN  Outcome: Ongoing  9/22/2021 0350 by Marisel Strong RN  Outcome: Ongoing     Problem: Isolation Precautions - Risk of Spread of Infection  Goal: Prevent transmission of infection  9/22/2021 1044 by Amalia Lane LPN  Outcome: Ongoing  9/22/2021 0350 by Marisel Strong RN  Outcome: Ongoing     Problem: Nutrition Deficits  Goal: Optimize nutritional status  9/22/2021 1044 by Amalia Lane LPN  Outcome: Ongoing  9/22/2021 0350 by Marisel Strong RN  Outcome: Ongoing     Problem: Risk for Fluid Volume Deficit  Goal: Maintain normal heart rhythm  9/22/2021 1044 by Amalia Lane LPN  Outcome: Ongoing  9/22/2021 0350 by Rashida Sanchez RN  Outcome: Ongoing  Goal: Maintain absence of muscle cramping  9/22/2021 1044 by Shannan Abreu LPN  Outcome: Ongoing  9/22/2021 0350 by Rashida Sanchez RN  Outcome: Ongoing  Goal: Maintain normal serum potassium, sodium, calcium, phosphorus, and pH  9/22/2021 1044 by Shannan Abreu LPN  Outcome: Ongoing  9/22/2021 0350 by Rashida Sanchez RN  Outcome: Ongoing     Problem: Loneliness or Risk for Loneliness  Goal: Demonstrate positive use of time alone when socialization is not possible  9/22/2021 1044 by Shannan Abreu LPN  Outcome: Ongoing  9/22/2021 0350 by Rashida Sanchez RN  Outcome: Ongoing     Problem: Fatigue  Goal: Verbalize increase energy and improved vitality  9/22/2021 1044 by Shannan Abreu LPN  Outcome: Ongoing  9/22/2021 0350 by Rashida Sanchez RN  Outcome: Ongoing     Problem: Patient Education: Go to Patient Education Activity  Goal: Patient/Family Education  9/22/2021 1044 by Shannan Abreu LPN  Outcome: Ongoing  9/22/2021 0350 by Rashida Sanchez RN  Outcome: Ongoing     Problem: Falls - Risk of:  Goal: Will remain free from falls  Description: Will remain free from falls  9/22/2021 1044 by Shannan Abreu LPN  Outcome: Ongoing  9/22/2021 0350 by Rashida Sanchez RN  Outcome: Ongoing  Goal: Absence of physical injury  Description: Absence of physical injury  9/22/2021 1044 by Shannan Abreu LPN  Outcome: Ongoing  9/22/2021 0350 by Rashida Sanchez RN  Outcome: Ongoing     Problem: Pain:  Goal: Pain level will decrease  Description: Pain level will decrease  9/22/2021 1044 by Shannan Abreu LPN  Outcome: Ongoing  9/22/2021 0350 by Rashida Sanchez RN  Outcome: Ongoing  Goal: Control of acute pain  Description: Control of acute pain  9/22/2021 1044 by Shannan Abreu LPN  Outcome: Ongoing  9/22/2021 0350 by Rashida Sanchez RN  Outcome: Ongoing  Goal: Control of chronic pain  Description: Control of chronic pain  9/22/2021 1044 by Shannan Abreu LPN  Outcome: Ongoing  9/22/2021 0350 by Get Choudhury RN  Outcome: Ongoing

## 2021-09-22 NOTE — PROGRESS NOTES
Hospital Medicine Progress Note     Date:  9/22/2021    PCP: Naomie Govea (Tel: [phone])    Date of Admission: 9/18/2021    Chief complaint:   Chief Complaint   Patient presents with    Cough    Shortness of Breath     covid positive       Assessment:  Active Problems:    COVID-19  Resolved Problems:    * No resolved hospital problems. *      Plan:  1. Acute respiratory failure with hypoxia  Secondary to COVID-19 pneumonia  On high flow oxygen, wean as tolerated  Patient received Regeneron  Continue on IV Decadron  Start on IV Lasix  Multivitamin cocktail  DVT prophylaxis     2. Pneumonia due to COVID-19  Management as above     3. COPD  Continue breathing treatment     4.  Essential hypertension  Continue current management     5.  Tobacco abuse      Diet: ADULT DIET; Regular    Code status: Full Code   ----------  Subjective  Patient seen and examined at bedside, laying in bed, in mild respiratory distress. Continues to require high flow oxygen. Objective  Physical exam:  Vitals: /80   Pulse 72   Temp 98 °F (36.7 °C) (Oral)   Resp 21   Ht 5' 5\" (1.651 m)   Wt 198 lb 1.6 oz (89.9 kg)   LMP  (LMP Unknown)   SpO2 (!) 87%   BMI 32.97 kg/m²   Gen/overall appearance: Not in acute distress. Alert. Head: Normocephalic, atraumatic  Eyes: EOMI, good acuity  ENT: Oral mucosa moist  Neck: No JVD, thyromegaly  CVS: Nml S1S2, no MRG, RRR  Pulm: Bilateral crackles, tachypnea  Gastrointestinal: Soft, NT/ND, +BS  Musculoskeletal: No edema. Warm  Neuro: No focal deficit. Moves extremity spontaneously. Psychiatry: Appropriate affect. Not agitated. Skin: Warm, dry with normal turgor.  No rash  Capillary refill: Brisk,< 3 seconds   Peripheral Pulses: +2 palpable, equal bilaterally     24HR INTAKE/OUTPUT:      Intake/Output Summary (Last 24 hours) at 9/22/2021 1204  Last data filed at 9/22/2021 0835  Gross per 24 hour   Intake 20 ml   Output --   Net 20 ml     I/O last 3 completed shifts: In: 10 [I.V.:10]  Out: -   I/O this shift:  In: 10 [I.V.:10]  Out: -   Meds:    albuterol sulfate HFA  2 puff Inhalation 4x daily    ipratropium  2 puff Inhalation 4x daily    dexamethasone  10 mg IntraVENous Q24H    ascorbic acid  500 mg Oral TID    thiamine  100 mg Oral Daily    zinc sulfate  50 mg Oral Daily    enoxaparin  40 mg SubCUTAneous BID    sodium chloride flush  5-40 mL IntraVENous 2 times per day    bisacodyl  5 mg Oral Daily    levothyroxine  50 mcg Oral Daily    dicyclomine  10 mg Oral TID    montelukast  10 mg Oral Daily     Infusions:    sodium chloride Stopped (09/18/21 2152)     PRN Meds: sodium chloride, benzonatate, sodium chloride flush, sodium chloride, ondansetron **OR** ondansetron, polyethylene glycol, acetaminophen **OR** [DISCONTINUED] acetaminophen, guaiFENesin-dextromethorphan, albuterol sulfate HFA    Labs/imaging:  CBC:   Recent Labs     09/22/21  1111   WBC 11.5*   HGB 15.3     BMP:    No results for input(s): NA, K, CL, CO2, BUN, CREATININE, GLUCOSE in the last 72 hours. Hepatic:   No results for input(s): AST, ALT, ALB, BILITOT, ALKPHOS in the last 72 hours.     Julie Hodgkins, MD  -------------------------------  Rounding hospitalist

## 2021-09-22 NOTE — CONSULTS
[address] [place] [address], [address] [place] [address]                                  CONSULTATION    PATIENT NAME: Rose Forrester                     :        1970  MED REC NO:   [id number]                          ROOM:       [id number]  ACCOUNT NO:   [de-identified]                           ADMIT DATE: 2021  PROVIDER:     Jaycee Ormond, MD    DATE OF CONSULTATION:  2021    HISTORY OF PRESENT ILLNESS:  The patient is a 64-year-old lady with  multiple medical problems including COPD, chronic bronchitis,  obstructive sleep apnea, on CPAP, who was admitted through the emergency  room with complaints of increasing shortness of breath, fever, chills,  and a temperature of 102 degrees Fahrenheit. She denied any hemoptysis. She denied any nausea or vomiting. She denied any chest pains. She  tested positive for COVID-19 around 09/10/2021. She had a chest x-ray,  which showed patchy ground-glass opacities consistent with COVID-19. She was subsequently admitted to the hospital.  The patient did receive  Regeneron antibody treatment a day prior to admission. She is not  vaccinated. PAST MEDICAL HISTORY:  Significant for COPD; obstructive sleep apnea, on  CPAP; hyperlipidemia; hypertension. PAST SURGICAL HISTORY:  Remarkable for hysterectomy, back surgery, inner  ear surgery, tubal ligation, inguinal hernia repair, cardiac  catheterization. FAMILY HISTORY:  Reveals that her mother had hypertension, heart  disease, hypercholesterolemia. Father had heart disease. SOCIAL HISTORY:  Reveals that she quit smoking in , but used to  smoke a pack per day for 25 years. Prior to that, no history of alcohol  or drug abuse. MEDICATIONS:  Reviewed. ALLERGIES:  She is allergic to LATEX, CODEINE, and PENICILLIN.     REVIEW OF SYSTEMS:  A 10-to 14-point review of systems were reviewed and  are negative except for what is mentioned in the history of present  illness. PHYSICAL EXAMINATION:  GENERAL:  The patient is alert, oriented x3 in no acute respiratory  distress. VITAL SIGNS:  Her blood pressure is 130/74 mmHg, pulse of 70 per minute,  respiratory rate of 25 per minute, and saturation is 95% on high-flow  nasal cannula. HEENT:  Exam is essentially unremarkable. There is no JVD, no  lymphadenopathy. NECK:  Supple. LUNGS:  Exam revealed diminished breath sounds and basilar crackles. HEART:  Exam showed normal S1, S2. There was no S3, S4 noted. ABDOMEN:  Exam is benign. There is no evidence of any organomegaly. The bowel sounds are present. NEUROLOGIC:  Exam reveals that she is awake and responsive, answering  questions appropriately, moving her extremities. Her chest x-ray showed subtle patchy ground-glass opacities consistent  with COVID-19. Her CBC showed a white count of 2.8, hemoglobin 14.0,  hematocrit of 44.7. Her electrolytes showed a sodium 139, potassium  3.8, chloride 102, carbon dioxide 20, BUN 20, creatinine 0.5. A proBNP  was 24.10. Her CRP on 09/20 was 17.8. IMPRESSION:  1. Acute respiratory failure secondary to bilateral pneumonia secondary  to COVID-19.  2.  Bilateral pneumonia secondary to COVID-19.  3.  COPD.  4.  Obstructive sleep apnea on CPAP. PLAN:  1. Continue present bronchodilator regimen. 2.  The patient is not a candidate for remdesivir. 3.  Continue steroids. 4.  We will start auto-CPAP. 5.  Sputum for culture and sensitivity. 6.  Check procalcitonin level. 7.  Check D-dimer. 8.  Check CRP. 9.  As per orders.         Paola Mansfield MD    D: 09/22/2021 11:27:38       T: 09/22/2021 11:30:24     [email or web address]  Job#: [id number]     Doc#: [id number]    CC:

## 2021-09-23 ENCOUNTER — APPOINTMENT (OUTPATIENT)
Dept: GENERAL RADIOLOGY | Age: 51
DRG: 177 | End: 2021-09-23
Payer: COMMERCIAL

## 2021-09-23 LAB
ALBUMIN SERPL-MCNC: 4.1 GM/DL (ref 3.4–5)
ALP BLD-CCNC: 43 IU/L (ref 40–129)
ALT SERPL-CCNC: 23 U/L (ref 10–40)
ANION GAP SERPL CALCULATED.3IONS-SCNC: 14 MMOL/L (ref 4–16)
AST SERPL-CCNC: 16 IU/L (ref 15–37)
BILIRUB SERPL-MCNC: 0.2 MG/DL (ref 0–1)
BUN BLDV-MCNC: 25 MG/DL (ref 6–23)
CALCIUM SERPL-MCNC: 10.6 MG/DL (ref 8.3–10.6)
CHLORIDE BLD-SCNC: 104 MMOL/L (ref 99–110)
CO2: 24 MMOL/L (ref 21–32)
CREAT SERPL-MCNC: 0.5 MG/DL (ref 0.6–1.1)
CULTURE: NORMAL
CULTURE: NORMAL
D DIMER: 276 NG/ML(DDU)
DIFFERENTIAL TYPE: ABNORMAL
GFR AFRICAN AMERICAN: >60 ML/MIN/1.73M2
GFR NON-AFRICAN AMERICAN: >60 ML/MIN/1.73M2
GLUCOSE BLD-MCNC: 116 MG/DL (ref 70–99)
HCT VFR BLD CALC: 42.6 % (ref 37–47)
HEMOGLOBIN: 13.5 GM/DL (ref 12.5–16)
LYMPHOCYTES ABSOLUTE: 1.6 K/CU MM
LYMPHOCYTES RELATIVE PERCENT: 14 % (ref 24–44)
Lab: NORMAL
Lab: NORMAL
MAGNESIUM: 2.3 MG/DL (ref 1.8–2.4)
MCH RBC QN AUTO: 28.5 PG (ref 27–31)
MCHC RBC AUTO-ENTMCNC: 31.7 % (ref 32–36)
MCV RBC AUTO: 90.1 FL (ref 78–100)
MICROCYTES: ABNORMAL
MONOCYTES ABSOLUTE: 1 K/CU MM
MONOCYTES RELATIVE PERCENT: 9 % (ref 0–4)
PDW BLD-RTO: 13.2 % (ref 11.7–14.9)
PHOSPHORUS: 2.6 MG/DL (ref 2.5–4.9)
PLATELET # BLD: 230 K/CU MM (ref 140–440)
PMV BLD AUTO: 10.5 FL (ref 7.5–11.1)
POTASSIUM SERPL-SCNC: 3.9 MMOL/L (ref 3.5–5.1)
PRO-BNP: 60.12 PG/ML
PROCALCITONIN: 0.05
RBC # BLD: 4.73 M/CU MM (ref 4.2–5.4)
SEGMENTED NEUTROPHILS ABSOLUTE COUNT: 8.7 K/CU MM
SEGMENTED NEUTROPHILS RELATIVE PERCENT: 77 % (ref 36–66)
SODIUM BLD-SCNC: 142 MMOL/L (ref 135–145)
SPECIMEN: NORMAL
SPECIMEN: NORMAL
TOTAL PROTEIN: 6.3 GM/DL (ref 6.4–8.2)
WBC # BLD: 11.3 K/CU MM (ref 4–10.5)

## 2021-09-23 PROCEDURE — 71045 X-RAY EXAM CHEST 1 VIEW: CPT

## 2021-09-23 PROCEDURE — 85007 BL SMEAR W/DIFF WBC COUNT: CPT

## 2021-09-23 PROCEDURE — 2580000003 HC RX 258: Performed by: NURSE PRACTITIONER

## 2021-09-23 PROCEDURE — 6360000002 HC RX W HCPCS: Performed by: FAMILY MEDICINE

## 2021-09-23 PROCEDURE — 6370000000 HC RX 637 (ALT 250 FOR IP): Performed by: HOSPITALIST

## 2021-09-23 PROCEDURE — 36415 COLL VENOUS BLD VENIPUNCTURE: CPT

## 2021-09-23 PROCEDURE — 85379 FIBRIN DEGRADATION QUANT: CPT

## 2021-09-23 PROCEDURE — 83735 ASSAY OF MAGNESIUM: CPT

## 2021-09-23 PROCEDURE — 80053 COMPREHEN METABOLIC PANEL: CPT

## 2021-09-23 PROCEDURE — 83880 ASSAY OF NATRIURETIC PEPTIDE: CPT

## 2021-09-23 PROCEDURE — 6370000000 HC RX 637 (ALT 250 FOR IP): Performed by: NURSE PRACTITIONER

## 2021-09-23 PROCEDURE — 84100 ASSAY OF PHOSPHORUS: CPT

## 2021-09-23 PROCEDURE — 2700000000 HC OXYGEN THERAPY PER DAY

## 2021-09-23 PROCEDURE — 84145 PROCALCITONIN (PCT): CPT

## 2021-09-23 PROCEDURE — 94761 N-INVAS EAR/PLS OXIMETRY MLT: CPT

## 2021-09-23 PROCEDURE — 6360000002 HC RX W HCPCS: Performed by: HOSPITALIST

## 2021-09-23 PROCEDURE — 94150 VITAL CAPACITY TEST: CPT

## 2021-09-23 PROCEDURE — 1200000000 HC SEMI PRIVATE

## 2021-09-23 PROCEDURE — 85027 COMPLETE CBC AUTOMATED: CPT

## 2021-09-23 PROCEDURE — 94640 AIRWAY INHALATION TREATMENT: CPT

## 2021-09-23 RX ADMIN — LEVOTHYROXINE SODIUM 50 MCG: 0.05 TABLET ORAL at 06:08

## 2021-09-23 RX ADMIN — ENOXAPARIN SODIUM 40 MG: 40 INJECTION SUBCUTANEOUS at 08:59

## 2021-09-23 RX ADMIN — SODIUM CHLORIDE, PRESERVATIVE FREE 10 ML: 5 INJECTION INTRAVENOUS at 09:00

## 2021-09-23 RX ADMIN — Medication 100 MG: at 08:59

## 2021-09-23 RX ADMIN — IPRATROPIUM BROMIDE 2 PUFF: 17 AEROSOL, METERED RESPIRATORY (INHALATION) at 15:23

## 2021-09-23 RX ADMIN — IPRATROPIUM BROMIDE 2 PUFF: 17 AEROSOL, METERED RESPIRATORY (INHALATION) at 11:16

## 2021-09-23 RX ADMIN — IPRATROPIUM BROMIDE 2 PUFF: 17 AEROSOL, METERED RESPIRATORY (INHALATION) at 07:16

## 2021-09-23 RX ADMIN — DEXAMETHASONE SODIUM PHOSPHATE 10 MG: 4 INJECTION, SOLUTION INTRAMUSCULAR; INTRAVENOUS at 17:54

## 2021-09-23 RX ADMIN — ALBUTEROL SULFATE 2 PUFF: 90 AEROSOL, METERED RESPIRATORY (INHALATION) at 15:24

## 2021-09-23 RX ADMIN — SODIUM CHLORIDE, PRESERVATIVE FREE 10 ML: 5 INJECTION INTRAVENOUS at 20:52

## 2021-09-23 RX ADMIN — BISACODYL 5 MG: 5 TABLET, COATED ORAL at 08:59

## 2021-09-23 RX ADMIN — OXYCODONE HYDROCHLORIDE AND ACETAMINOPHEN 500 MG: 500 TABLET ORAL at 20:51

## 2021-09-23 RX ADMIN — IPRATROPIUM BROMIDE 2 PUFF: 17 AEROSOL, METERED RESPIRATORY (INHALATION) at 19:31

## 2021-09-23 RX ADMIN — ALBUTEROL SULFATE 2 PUFF: 90 AEROSOL, METERED RESPIRATORY (INHALATION) at 19:31

## 2021-09-23 RX ADMIN — ALBUTEROL SULFATE 2 PUFF: 90 AEROSOL, METERED RESPIRATORY (INHALATION) at 07:16

## 2021-09-23 RX ADMIN — OXYCODONE HYDROCHLORIDE AND ACETAMINOPHEN 500 MG: 500 TABLET ORAL at 13:31

## 2021-09-23 RX ADMIN — DICYCLOMINE HYDROCHLORIDE 10 MG: 10 CAPSULE ORAL at 08:59

## 2021-09-23 RX ADMIN — ACETAMINOPHEN 650 MG: 325 TABLET ORAL at 20:54

## 2021-09-23 RX ADMIN — ALBUTEROL SULFATE 2 PUFF: 90 AEROSOL, METERED RESPIRATORY (INHALATION) at 11:14

## 2021-09-23 RX ADMIN — ZINC SULFATE 220 MG (50 MG) CAPSULE 50 MG: CAPSULE at 13:31

## 2021-09-23 RX ADMIN — OXYCODONE HYDROCHLORIDE AND ACETAMINOPHEN 500 MG: 500 TABLET ORAL at 08:59

## 2021-09-23 RX ADMIN — DICYCLOMINE HYDROCHLORIDE 10 MG: 10 CAPSULE ORAL at 13:31

## 2021-09-23 RX ADMIN — ENOXAPARIN SODIUM 40 MG: 40 INJECTION SUBCUTANEOUS at 20:51

## 2021-09-23 RX ADMIN — DICYCLOMINE HYDROCHLORIDE 10 MG: 10 CAPSULE ORAL at 20:51

## 2021-09-23 RX ADMIN — MONTELUKAST SODIUM 10 MG: 10 TABLET, FILM COATED ORAL at 08:59

## 2021-09-23 RX ADMIN — ACETAMINOPHEN 650 MG: 325 TABLET ORAL at 15:11

## 2021-09-23 ASSESSMENT — PAIN DESCRIPTION - ONSET: ONSET: ON-GOING

## 2021-09-23 ASSESSMENT — PAIN SCALES - GENERAL
PAINLEVEL_OUTOF10: 7
PAINLEVEL_OUTOF10: 3
PAINLEVEL_OUTOF10: 0

## 2021-09-23 ASSESSMENT — PAIN DESCRIPTION - PAIN TYPE: TYPE: ACUTE PAIN

## 2021-09-23 ASSESSMENT — PAIN DESCRIPTION - PROGRESSION: CLINICAL_PROGRESSION: NOT CHANGED

## 2021-09-23 ASSESSMENT — PAIN DESCRIPTION - DESCRIPTORS: DESCRIPTORS: ACHING

## 2021-09-23 ASSESSMENT — PAIN DESCRIPTION - ORIENTATION: ORIENTATION: POSTERIOR

## 2021-09-23 ASSESSMENT — PAIN - FUNCTIONAL ASSESSMENT: PAIN_FUNCTIONAL_ASSESSMENT: ACTIVITIES ARE NOT PREVENTED

## 2021-09-23 ASSESSMENT — PAIN DESCRIPTION - FREQUENCY: FREQUENCY: CONTINUOUS

## 2021-09-23 ASSESSMENT — PAIN DESCRIPTION - LOCATION: LOCATION: HEAD

## 2021-09-23 NOTE — PLAN OF CARE
Problem: Airway Clearance - Ineffective  Goal: Achieve or maintain patent airway  9/22/2021 2105 by Yaron Birch RN  Outcome: Ongoing  9/22/2021 1044 by Kate Conroy LPN  Outcome: Ongoing     Problem: Gas Exchange - Impaired  Goal: Absence of hypoxia  9/22/2021 2105 by Yaron Birch RN  Outcome: Ongoing  9/22/2021 1044 by Kate Conroy LPN  Outcome: Ongoing  Goal: Promote optimal lung function  9/22/2021 2105 by Yaron Birch RN  Outcome: Ongoing  9/22/2021 1044 by Kate Conroy LPN  Outcome: Ongoing     Problem: Breathing Pattern - Ineffective  Goal: Ability to achieve and maintain a regular respiratory rate  9/22/2021 2105 by Yaron Birch RN  Outcome: Ongoing  9/22/2021 1044 by Kate Conroy LPN  Outcome: Ongoing     Problem:  Body Temperature -  Risk of, Imbalanced  Goal: Ability to maintain a body temperature within defined limits  9/22/2021 2105 by Yaron Birch RN  Outcome: Ongoing  9/22/2021 1044 by Kate Cnoroy LPN  Outcome: Ongoing  Goal: Will regain or maintain usual level of consciousness  9/22/2021 2105 by Yaron Birch RN  Outcome: Ongoing  9/22/2021 1044 by Kate Conroy LPN  Outcome: Ongoing  Goal: Complications related to the disease process, condition or treatment will be avoided or minimized  9/22/2021 2105 by Yaron Birch RN  Outcome: Ongoing  9/22/2021 1044 by Kate Conroy LPN  Outcome: Ongoing     Problem: Isolation Precautions - Risk of Spread of Infection  Goal: Prevent transmission of infection  9/22/2021 2105 by Yaron Birch RN  Outcome: Ongoing  9/22/2021 1044 by Kate Conroy LPN  Outcome: Ongoing     Problem: Nutrition Deficits  Goal: Optimize nutritional status  9/22/2021 2105 by Yaron Birch RN  Outcome: Ongoing  9/22/2021 1044 by Kate Conroy LPN  Outcome: Ongoing     Problem: Risk for Fluid Volume Deficit  Goal: Maintain normal heart rhythm  9/22/2021 2105 by Yarno Birch RN  Outcome: Ongoing  9/22/2021 1044 by Kate Conroy LPN  Outcome: Ongoing  Goal: Maintain absence of muscle cramping  9/22/2021 2105 by Tino Ozuna RN  Outcome: Ongoing  9/22/2021 1044 by Sulma Giron LPN  Outcome: Ongoing  Goal: Maintain normal serum potassium, sodium, calcium, phosphorus, and pH  9/22/2021 2105 by Tino Ozuna RN  Outcome: Ongoing  9/22/2021 1044 by Sulma Giron LPN  Outcome: Ongoing     Problem: Loneliness or Risk for Loneliness  Goal: Demonstrate positive use of time alone when socialization is not possible  9/22/2021 2105 by Tino Ozuna RN  Outcome: Ongoing  9/22/2021 1044 by Sulma Giron LPN  Outcome: Ongoing     Problem: Fatigue  Goal: Verbalize increase energy and improved vitality  9/22/2021 2105 by Tino Ozuna RN  Outcome: Ongoing  9/22/2021 1044 by Sulma Giron LPN  Outcome: Ongoing     Problem: Patient Education: Go to Patient Education Activity  Goal: Patient/Family Education  9/22/2021 2105 by Tino Ozuna RN  Outcome: Ongoing  9/22/2021 1044 by Sulma Giron LPN  Outcome: Ongoing     Problem: Falls - Risk of:  Goal: Will remain free from falls  Description: Will remain free from falls  9/22/2021 2105 by Tino Ozuna RN  Outcome: Ongoing  9/22/2021 1044 by Sulma Giron LPN  Outcome: Ongoing  Goal: Absence of physical injury  Description: Absence of physical injury  9/22/2021 2105 by Tino Ozuna RN  Outcome: Ongoing  9/22/2021 1044 by Sulma Giron LPN  Outcome: Ongoing     Problem: Pain:  Goal: Pain level will decrease  Description: Pain level will decrease  9/22/2021 2105 by Tino Ozuna RN  Outcome: Ongoing  9/22/2021 1044 by Sulma Giron LPN  Outcome: Ongoing  Goal: Control of acute pain  Description: Control of acute pain  9/22/2021 2105 by Tino Ozuna RN  Outcome: Ongoing  9/22/2021 1044 by Sulma Giron LPN  Outcome: Ongoing  Goal: Control of chronic pain  Description: Control of chronic pain  9/22/2021 2105 by Tino Ozuna RN  Outcome: Ongoing  9/22/2021 1044 by [place] Kristi Rojas LPN  Outcome: Ongoing     Problem: Airway Clearance - Ineffective  Goal: Achieve or maintain patent airway  9/22/2021 2105 by Clinton Guerrero RN  Outcome: Ongoing  9/22/2021 1044 by Priscilla Alba LPN  Outcome: Ongoing     Problem: Gas Exchange - Impaired  Goal: Absence of hypoxia  9/22/2021 2105 by Clinton Guerrero RN  Outcome: Ongoing  9/22/2021 1044 by Priscilla Alba LPN  Outcome: Ongoing  Goal: Promote optimal lung function  9/22/2021 2105 by Clinton Guerrero RN  Outcome: Ongoing  9/22/2021 1044 by Priscilla Alba LPN  Outcome: Ongoing     Problem: Breathing Pattern - Ineffective  Goal: Ability to achieve and maintain a regular respiratory rate  9/22/2021 2105 by Clinton Guerrero RN  Outcome: Ongoing  9/22/2021 1044 by Priscilla Alba LPN  Outcome: Ongoing     Problem:  Body Temperature -  Risk of, Imbalanced  Goal: Ability to maintain a body temperature within defined limits  9/22/2021 2105 by Clinton Guerrero RN  Outcome: Ongoing  9/22/2021 1044 by Priscilla Alba LPN  Outcome: Ongoing  Goal: Will regain or maintain usual level of consciousness  9/22/2021 2105 by Clinton Guerrero RN  Outcome: Ongoing  9/22/2021 1044 by Priscilla Alba LPN  Outcome: Ongoing  Goal: Complications related to the disease process, condition or treatment will be avoided or minimized  9/22/2021 2105 by Clinton Guerrero RN  Outcome: Ongoing  9/22/2021 1044 by Priscilla Alba LPN  Outcome: Ongoing     Problem: Isolation Precautions - Risk of Spread of Infection  Goal: Prevent transmission of infection  9/22/2021 2105 by Clinton Guerrero RN  Outcome: Ongoing  9/22/2021 1044 by Priscilla Alba LPN  Outcome: Ongoing     Problem: Nutrition Deficits  Goal: Optimize nutritional status  9/22/2021 2105 by Clinton Guerrero RN  Outcome: Ongoing  9/22/2021 1044 by Priscilla Alba LPN  Outcome: Ongoing     Problem: Risk for Fluid Volume Deficit  Goal: Maintain normal heart rhythm  9/22/2021 2105 by Donivan Blotter, RN  Outcome: Ongoing  9/22/2021 1044 by Colin Dunne LPN  Outcome: Ongoing  Goal: Maintain absence of muscle cramping  9/22/2021 2105 by Bernita Snellen, RN  Outcome: Ongoing  9/22/2021 1044 by Colin Dunne LPN  Outcome: Ongoing  Goal: Maintain normal serum potassium, sodium, calcium, phosphorus, and pH  9/22/2021 2105 by Bernita Snellen, RN  Outcome: Ongoing  9/22/2021 1044 by Colin Dunne LPN  Outcome: Ongoing     Problem: Loneliness or Risk for Loneliness  Goal: Demonstrate positive use of time alone when socialization is not possible  9/22/2021 2105 by Bernita Snellen, RN  Outcome: Ongoing  9/22/2021 1044 by Colin Dunne LPN  Outcome: Ongoing     Problem: Fatigue  Goal: Verbalize increase energy and improved vitality  9/22/2021 2105 by Bernita Snellen, RN  Outcome: Ongoing  9/22/2021 1044 by Colin Dunne LPN  Outcome: Ongoing     Problem: Patient Education: Go to Patient Education Activity  Goal: Patient/Family Education  9/22/2021 2105 by Bernita Snellen, RN  Outcome: Ongoing  9/22/2021 1044 by Colin Dunne LPN  Outcome: Ongoing     Problem: Falls - Risk of:  Goal: Will remain free from falls  Description: Will remain free from falls  9/22/2021 2105 by Bernita Snellen, RN  Outcome: Ongoing  9/22/2021 1044 by Colin Dunne LPN  Outcome: Ongoing  Goal: Absence of physical injury  Description: Absence of physical injury  9/22/2021 2105 by Bernita Snellen, RN  Outcome: Ongoing  9/22/2021 1044 by Colin Dunne LPN  Outcome: Ongoing     Problem: Pain:  Goal: Pain level will decrease  Description: Pain level will decrease  9/22/2021 2105 by Bernita Snellen, RN  Outcome: Ongoing  9/22/2021 1044 by Colin Dunne LPN  Outcome: Ongoing  Goal: Control of acute pain  Description: Control of acute pain  9/22/2021 2105 by Bernita Snellen, RN  Outcome: Ongoing  9/22/2021 1044 by Colin Dunne LPN  Outcome: Ongoing  Goal: Control of chronic pain  Description: Control of chronic pain  9/22/2021 2105 by Bernita Snellen, RN  Outcome: Ongoing  9/22/2021 1044 by Fredy Snow LPN  Outcome: Ongoing

## 2021-09-23 NOTE — PROGRESS NOTES
Hospital Medicine Progress Note     Date:  9/23/2021    PCP: Beena Ho (Tel: [phone])    Date of Admission: 9/18/2021    Chief complaint:   Chief Complaint   Patient presents with    Cough    Shortness of Breath     covid positive       Assessment:  Active Problems:    COVID-19  Resolved Problems:    * No resolved hospital problems. *      Plan:  1. Acute respiratory failure with hypoxia  Secondary to COVID-19 pneumonia  On high flow oxygen, wean as tolerated  Patient received Regeneron  Continue on IV Decadron  on IV Lasix  Multivitamin cocktail  DVT prophylaxis  Start incentive spirometer     2. Pneumonia due to COVID-19  Management as above     3. COPD  Continue breathing treatment     4.  Essential hypertension  Continue current management     5.  Tobacco abuse      Diet: ADULT DIET; Regular    Code status: Full Code   ----------  Subjective  Patient seen and examined at bedside, laying in bed, in mild respiratory distress. Continues to require high flow oxygen. Objective  Physical exam:  Vitals: /70   Pulse 63   Temp 98.1 °F (36.7 °C) (Oral)   Resp 16   Ht 5' 5\" (1.651 m)   Wt 198 lb 1.6 oz (89.9 kg)   LMP  (LMP Unknown)   SpO2 92%   BMI 32.97 kg/m²   Gen/overall appearance: Not in acute distress. Alert. Head: Normocephalic, atraumatic  Eyes: EOMI, good acuity  ENT: Oral mucosa moist  Neck: No JVD, thyromegaly  CVS: Nml S1S2, no MRG, RRR  Pulm: Bilateral crackles, tachypnea  Gastrointestinal: Soft, NT/ND, +BS  Musculoskeletal: No edema. Warm  Neuro: No focal deficit. Moves extremity spontaneously. Psychiatry: Appropriate affect. Not agitated. Skin: Warm, dry with normal turgor.  No rash  Capillary refill: Brisk,< 3 seconds   Peripheral Pulses: +2 palpable, equal bilaterally     24HR INTAKE/OUTPUT:      Intake/Output Summary (Last 24 hours) at 9/23/2021 1058  Last data filed at 9/23/2021 0625  Gross per 24 hour   Intake 500 ml   Output 700 ml   Net -200 ml     I/O

## 2021-09-23 NOTE — PROGRESS NOTES
Intake/Output Summary (Last 24 hours) at 9/23/2021 1705  Last data filed at 9/23/2021 1676  Gross per 24 hour   Intake 500 ml   Output 700 ml   Net -200 ml       CONSTITUTIONAL:  awake, alert, cooperative, no apparent distress, and appears stated age  LUNGS:  decreased breath sounds, basilar crackles. CARDIOVASCULAR:  normal S1 and S2 and negative JVD  ABD:Abdomen soft, non-tender. BS normal. No masses,  No organomegaly  NEURO:Alert and oriented x3. Gait normal. Reflexes and motor strength normal and symmetric. Cranial nerves 2-12 and sensation grossly intact. DATA:    CBC:  Recent Labs     09/22/21  1111 09/23/21  1241   WBC 11.5* 11.3*   RBC 5.26 4.73   HGB 15.3 13.5   HCT 48.4* 42.6    230   MCV 92.0 90.1   MCH 29.1 28.5   MCHC 31.6* 31.7*   RDW 13.4 13.2   SEGSPCT 74.0* 77.0*   BANDSPCT 2*  --       BMP:  Recent Labs     09/23/21  1241      K 3.9      CO2 24   BUN 25*   CREATININE 0.5*   CALCIUM 10.6   GLUCOSE 116*      ABG:  No results for input(s): PH, PO2ART, BSB7LTZ, HCO3, BEART, O2SAT in the last 72 hours. Lab Results   Component Value Date    PROBNP 60.12 09/23/2021    PROBNP 24.10 09/18/2021    PROBNP 7.88 06/29/2014     No results found for: [address] [facility]    Radiology Review:  Pertinent images / reports were reviewed as a part of this visit. Assessment:     Patient Active Problem List   Diagnosis    Hx of cardiovascular stress test    Palpitation    H/O 24 hour EKG monitoring    Fibromyalgia    Chronic obstructive pulmonary disease (HCC)    Schizoaffective disorder ([address]    History of hysterectomy    Hyperlipidemia    Fatty liver    Chronic back pain    Chronic low back pain    Acquired hypothyroidism    Former tobacco use    Simple chronic bronchitis ([address]    Essential hypertension    Perforated left tympanic membrane on examination    COPD exacerbation ([address]    COVID-19       Plan:   1. Overall the patient is unchanged  2. Inc. activity. 3. Wean FiO2.   4. Advised to wear Cpap.   [address], [place]  9/23/2021  5:05 PM

## 2021-09-23 NOTE — PROGRESS NOTES
I brought the V60 in to th patient's room and was going to place her on CPAP, but she says she is allergic to silicon; it makes her face swell up.

## 2021-09-24 ENCOUNTER — APPOINTMENT (OUTPATIENT)
Dept: GENERAL RADIOLOGY | Age: 51
DRG: 177 | End: 2021-09-24
Payer: COMMERCIAL

## 2021-09-24 LAB
ALBUMIN SERPL-MCNC: 3.6 GM/DL (ref 3.4–5)
ALP BLD-CCNC: 49 IU/L (ref 40–128)
ALT SERPL-CCNC: 21 U/L (ref 10–40)
ANION GAP SERPL CALCULATED.3IONS-SCNC: 11 MMOL/L (ref 4–16)
AST SERPL-CCNC: 14 IU/L (ref 15–37)
BANDED NEUTROPHILS ABSOLUTE COUNT: 0.58 K/CU MM
BANDED NEUTROPHILS RELATIVE PERCENT: 4 % (ref 5–11)
BILIRUB SERPL-MCNC: 0.2 MG/DL (ref 0–1)
BUN BLDV-MCNC: 24 MG/DL (ref 6–23)
CALCIUM SERPL-MCNC: 10.1 MG/DL (ref 8.3–10.6)
CHLORIDE BLD-SCNC: 106 MMOL/L (ref 99–110)
CO2: 24 MMOL/L (ref 21–32)
CREAT SERPL-MCNC: 0.6 MG/DL (ref 0.6–1.1)
DIFFERENTIAL TYPE: ABNORMAL
GFR AFRICAN AMERICAN: >60 ML/MIN/1.73M2
GFR NON-AFRICAN AMERICAN: >60 ML/MIN/1.73M2
GLUCOSE BLD-MCNC: 127 MG/DL (ref 70–99)
HCT VFR BLD CALC: 41.8 % (ref 37–47)
HEMOGLOBIN: 13.1 GM/DL (ref 12.5–16)
LYMPHOCYTES ABSOLUTE: 1 K/CU MM
LYMPHOCYTES RELATIVE PERCENT: 7 % (ref 24–44)
MCH RBC QN AUTO: 28.5 PG (ref 27–31)
MCHC RBC AUTO-ENTMCNC: 31.3 % (ref 32–36)
MCV RBC AUTO: 90.9 FL (ref 78–100)
METAMYELOCYTES ABSOLUTE COUNT: 0.29 K/CU MM
METAMYELOCYTES PERCENT: 2 %
MONOCYTES ABSOLUTE: 1.3 K/CU MM
MONOCYTES RELATIVE PERCENT: 9 % (ref 0–4)
PDW BLD-RTO: 13.2 % (ref 11.7–14.9)
PLATELET # BLD: 241 K/CU MM (ref 140–440)
PLT MORPHOLOGY: ABNORMAL
PMV BLD AUTO: 10.9 FL (ref 7.5–11.1)
POTASSIUM SERPL-SCNC: 4.1 MMOL/L (ref 3.5–5.1)
RBC # BLD: 4.6 M/CU MM (ref 4.2–5.4)
SEGMENTED NEUTROPHILS ABSOLUTE COUNT: 11.3 K/CU MM
SEGMENTED NEUTROPHILS RELATIVE PERCENT: 78 % (ref 36–66)
SODIUM BLD-SCNC: 141 MMOL/L (ref 135–145)
TOTAL PROTEIN: 5.9 GM/DL (ref 6.4–8.2)
WBC # BLD: 14.5 K/CU MM (ref 4–10.5)
WBC # BLD: ABNORMAL 10*3/UL

## 2021-09-24 PROCEDURE — 36415 COLL VENOUS BLD VENIPUNCTURE: CPT

## 2021-09-24 PROCEDURE — 80053 COMPREHEN METABOLIC PANEL: CPT

## 2021-09-24 PROCEDURE — 2580000003 HC RX 258: Performed by: NURSE PRACTITIONER

## 2021-09-24 PROCEDURE — 85027 COMPLETE CBC AUTOMATED: CPT

## 2021-09-24 PROCEDURE — 94761 N-INVAS EAR/PLS OXIMETRY MLT: CPT

## 2021-09-24 PROCEDURE — 71045 X-RAY EXAM CHEST 1 VIEW: CPT

## 2021-09-24 PROCEDURE — 6370000000 HC RX 637 (ALT 250 FOR IP): Performed by: FAMILY MEDICINE

## 2021-09-24 PROCEDURE — 1200000000 HC SEMI PRIVATE

## 2021-09-24 PROCEDURE — 2700000000 HC OXYGEN THERAPY PER DAY

## 2021-09-24 PROCEDURE — 85007 BL SMEAR W/DIFF WBC COUNT: CPT

## 2021-09-24 PROCEDURE — 6360000002 HC RX W HCPCS: Performed by: FAMILY MEDICINE

## 2021-09-24 PROCEDURE — 6370000000 HC RX 637 (ALT 250 FOR IP): Performed by: NURSE PRACTITIONER

## 2021-09-24 PROCEDURE — 94640 AIRWAY INHALATION TREATMENT: CPT

## 2021-09-24 PROCEDURE — 6360000002 HC RX W HCPCS: Performed by: HOSPITALIST

## 2021-09-24 PROCEDURE — 6370000000 HC RX 637 (ALT 250 FOR IP): Performed by: HOSPITALIST

## 2021-09-24 RX ADMIN — ENOXAPARIN SODIUM 40 MG: 40 INJECTION SUBCUTANEOUS at 10:26

## 2021-09-24 RX ADMIN — OXYCODONE HYDROCHLORIDE AND ACETAMINOPHEN 500 MG: 500 TABLET ORAL at 20:47

## 2021-09-24 RX ADMIN — DEXAMETHASONE SODIUM PHOSPHATE 10 MG: 4 INJECTION, SOLUTION INTRAMUSCULAR; INTRAVENOUS at 18:30

## 2021-09-24 RX ADMIN — DICYCLOMINE HYDROCHLORIDE 10 MG: 10 CAPSULE ORAL at 20:47

## 2021-09-24 RX ADMIN — IPRATROPIUM BROMIDE 2 PUFF: 17 AEROSOL, METERED RESPIRATORY (INHALATION) at 15:25

## 2021-09-24 RX ADMIN — POLYETHYLENE GLYCOL (3350) 17 G: 17 POWDER, FOR SOLUTION ORAL at 20:51

## 2021-09-24 RX ADMIN — Medication 100 MG: at 10:25

## 2021-09-24 RX ADMIN — SODIUM CHLORIDE, PRESERVATIVE FREE 10 ML: 5 INJECTION INTRAVENOUS at 20:48

## 2021-09-24 RX ADMIN — BISACODYL 5 MG: 5 TABLET, COATED ORAL at 10:25

## 2021-09-24 RX ADMIN — OXYCODONE HYDROCHLORIDE AND ACETAMINOPHEN 500 MG: 500 TABLET ORAL at 10:26

## 2021-09-24 RX ADMIN — DICYCLOMINE HYDROCHLORIDE 10 MG: 10 CAPSULE ORAL at 15:13

## 2021-09-24 RX ADMIN — ALBUTEROL SULFATE 2 PUFF: 90 AEROSOL, METERED RESPIRATORY (INHALATION) at 08:19

## 2021-09-24 RX ADMIN — ALBUTEROL SULFATE 2 PUFF: 90 AEROSOL, METERED RESPIRATORY (INHALATION) at 15:24

## 2021-09-24 RX ADMIN — ALBUTEROL SULFATE 2 PUFF: 90 AEROSOL, METERED RESPIRATORY (INHALATION) at 20:41

## 2021-09-24 RX ADMIN — ACETAMINOPHEN 650 MG: 325 TABLET ORAL at 20:47

## 2021-09-24 RX ADMIN — ZINC SULFATE 220 MG (50 MG) CAPSULE 50 MG: CAPSULE at 10:25

## 2021-09-24 RX ADMIN — DICYCLOMINE HYDROCHLORIDE 10 MG: 10 CAPSULE ORAL at 10:25

## 2021-09-24 RX ADMIN — OXYCODONE HYDROCHLORIDE AND ACETAMINOPHEN 500 MG: 500 TABLET ORAL at 15:13

## 2021-09-24 RX ADMIN — IPRATROPIUM BROMIDE 2 PUFF: 17 AEROSOL, METERED RESPIRATORY (INHALATION) at 20:41

## 2021-09-24 RX ADMIN — IPRATROPIUM BROMIDE 2 PUFF: 17 AEROSOL, METERED RESPIRATORY (INHALATION) at 08:19

## 2021-09-24 RX ADMIN — BENZONATATE 200 MG: 100 CAPSULE ORAL at 20:47

## 2021-09-24 RX ADMIN — ACETAMINOPHEN 650 MG: 325 TABLET ORAL at 10:28

## 2021-09-24 RX ADMIN — ENOXAPARIN SODIUM 40 MG: 40 INJECTION SUBCUTANEOUS at 20:47

## 2021-09-24 RX ADMIN — MONTELUKAST SODIUM 10 MG: 10 TABLET, FILM COATED ORAL at 10:25

## 2021-09-24 RX ADMIN — LEVOTHYROXINE SODIUM 50 MCG: 0.05 TABLET ORAL at 10:25

## 2021-09-24 RX ADMIN — SODIUM CHLORIDE, PRESERVATIVE FREE 10 ML: 5 INJECTION INTRAVENOUS at 10:28

## 2021-09-24 ASSESSMENT — PAIN SCALES - GENERAL
PAINLEVEL_OUTOF10: 5
PAINLEVEL_OUTOF10: 0
PAINLEVEL_OUTOF10: 7
PAINLEVEL_OUTOF10: 7
PAINLEVEL_OUTOF10: 2

## 2021-09-24 ASSESSMENT — PAIN DESCRIPTION - PAIN TYPE
TYPE: ACUTE PAIN
TYPE: ACUTE PAIN

## 2021-09-24 ASSESSMENT — PAIN DESCRIPTION - LOCATION: LOCATION: HEAD

## 2021-09-24 NOTE — PLAN OF CARE
Nutrition Problem #1: Predicted inadequate energy intake  Intervention: Food and/or Nutrient Delivery: Continue Current Diet, Start Oral Nutrition Supplement, Vitamin Supplement  Nutritional Goals: Pt will consume at least half of her meals and supplements

## 2021-09-24 NOTE — PROGRESS NOTES
Inhalation 4x daily    ipratropium  2 puff Inhalation 4x daily    dexamethasone  10 mg IntraVENous Q24H    ascorbic acid  500 mg Oral TID    thiamine  100 mg Oral Daily    zinc sulfate  50 mg Oral Daily    enoxaparin  40 mg SubCUTAneous BID    sodium chloride flush  5-40 mL IntraVENous 2 times per day    bisacodyl  5 mg Oral Daily    levothyroxine  50 mcg Oral Daily    dicyclomine  10 mg Oral TID    montelukast  10 mg Oral Daily     Infusions:    sodium chloride Stopped (09/18/21 2152)     PRN Meds: sodium chloride, benzonatate, sodium chloride flush, sodium chloride, ondansetron **OR** ondansetron, polyethylene glycol, acetaminophen **OR** [DISCONTINUED] acetaminophen, guaiFENesin-dextromethorphan, albuterol sulfate HFA    Labs/imaging:  CBC:   Recent Labs     09/22/21  1111 09/23/21  1241   WBC 11.5* 11.3*   HGB 15.3 13.5    230     BMP:    Recent Labs     09/23/21  1241      K 3.9      CO2 24   BUN 25*   CREATININE 0.5*   GLUCOSE 116*     Hepatic:   Recent Labs     09/23/21  1241   AST 16   ALT 23   BILITOT 0.2   ALKPHOS 37       Luz Raines MD  -------------------------------  Rounding hospitalist

## 2021-09-24 NOTE — PLAN OF CARE
Problem: Airway Clearance - Ineffective  Goal: Achieve or maintain patent airway  9/24/2021 1933 by Deo Montaño RN  Outcome: Met This Shift  9/24/2021 1211 by Reji Brown. Gracie Baumann RN  Outcome: Ongoing     Problem: Gas Exchange - Impaired  Goal: Absence of hypoxia  9/24/2021 1933 by Deo Montaño RN  Outcome: Met This Shift  9/24/2021 1211 by Reji Brown. Gracie Baumann RN  Outcome: Ongoing  Goal: Promote optimal lung function  9/24/2021 1933 by Deo Montaño RN  Outcome: Met This Shift  9/24/2021 1211 by Reji Brown. Gracie Baumann RN  Outcome: Ongoing     Problem: Breathing Pattern - Ineffective  Goal: Ability to achieve and maintain a regular respiratory rate  9/24/2021 1933 by Deo Montaño RN  Outcome: Met This Shift  9/24/2021 1211 by Reji Brown. Gracie Baumann RN  Outcome: Ongoing     Problem: Body Temperature -  Risk of, Imbalanced  Goal: Ability to maintain a body temperature within defined limits  9/24/2021 1933 by Deo Montaño RN  Outcome: Met This Shift  9/24/2021 1211 by Reji Brown. Gracie Baumann RN  Outcome: Ongoing  Goal: Will regain or maintain usual level of consciousness  9/24/2021 1933 by Deo Montaño RN  Outcome: Met This Shift  9/24/2021 1211 by Reji Brown. Gracie Baumann RN  Outcome: Ongoing  Goal: Complications related to the disease process, condition or treatment will be avoided or minimized  9/24/2021 1933 by Deo Montaño RN  Outcome: Met This Shift  9/24/2021 1211 by Reji Brown. Gracie Baumann RN  Outcome: Ongoing     Problem: Isolation Precautions - Risk of Spread of Infection  Goal: Prevent transmission of infection  9/24/2021 1933 by Deo Montaño RN  Outcome: Met This Shift  9/24/2021 1211 by Reji Brown. Gracie Baumann RN  Outcome: Ongoing     Problem: Nutrition Deficits  Goal: Optimize nutritional status  9/24/2021 1933 by Deo Montaño RN  Outcome: Met This Shift  9/24/2021 1211 by Reji Brown.  Gracie Baumann RN  Outcome: Ongoing     Problem: Risk for Fluid Volume Deficit  Goal: Maintain normal heart rhythm  9/24/2021 1933 by Hellen Nelson RN  Outcome: Met This Shift  9/24/2021 1211 by Salbador Dickey RN  Outcome: Ongoing  Goal: Maintain absence of muscle cramping  9/24/2021 1933 by Hellen Nelson RN  Outcome: Met This Shift  9/24/2021 1211 by Salbador Dickey RN  Outcome: Ongoing  Goal: Maintain normal serum potassium, sodium, calcium, phosphorus, and pH  9/24/2021 1933 by Hellen Nelson RN  Outcome: Met This Shift  9/24/2021 1211 by Salbador Dickey RN  Outcome: Ongoing     Problem: Loneliness or Risk for Loneliness  Goal: Demonstrate positive use of time alone when socialization is not possible  9/24/2021 1933 by Hellen Nelson RN  Outcome: Met This Shift  9/24/2021 1211 by Salbador Dickey RN  Outcome: Ongoing     Problem: Fatigue  Goal: Verbalize increase energy and improved vitality  9/24/2021 1933 by Hellen Nelson RN  Outcome: Met This Shift  9/24/2021 1211 by Salbador Dickey RN  Outcome: Ongoing     Problem: Patient Education: Go to Patient Education Activity  Goal: Patient/Family Education  9/24/2021 1933 by Hellen Nelson RN  Outcome: Met This Shift  9/24/2021 1211 by Salbador Dickey RN  Outcome: Ongoing     Problem: Falls - Risk of:  Goal: Will remain free from falls  Description: Will remain free from falls  9/24/2021 1933 by Hellen Nelson RN  Outcome: Met This Shift  9/24/2021 1211 by Salbador Dickey RN  Outcome: Ongoing  Goal: Absence of physical injury  Description: Absence of physical injury  9/24/2021 1933 by Hellen Nelson RN  Outcome: Met This Shift  9/24/2021 1211 by Salbador Dickey RN  Outcome: Ongoing     Problem: Pain:  Goal: Pain level will decrease  Description: Pain level will decrease  9/24/2021 1933 by Hellen Nelson RN  Outcome: Met This Shift  9/24/2021 1211 by Salbador Lindsay.  Jose Luis Dickey, SCARLET  Outcome: Ongoing  Goal: Control of acute pain  Description: Control of acute pain  9/24/2021 1933 by Hellen Nelson, RN  Outcome: Met This Shift  9/24/2021 1211 by Roselyn Guo. Barry Scanlon RN  Outcome: Ongoing  Goal: Control of chronic pain  Description: Control of chronic pain  9/24/2021 1933 by Carrol Solo RN  Outcome: Met This Shift  9/24/2021 1211 by Roselyn Guo. Barry Scanlon RN  Outcome: Ongoing     Problem: Nutrition  Goal: Optimal nutrition therapy  9/24/2021 1933 by Carrol Solo RN  Outcome: Met This Shift  9/24/2021 1211 by Roselyn Guo.  Barry Scanlon RN  Outcome: Ongoing

## 2021-09-24 NOTE — PROGRESS NOTES
intake or output data in the 24 hours ending 09/24/21 1254    CONSTITUTIONAL:  awake, alert, cooperative, no apparent distress, and appears stated age  LUNGS:  decreased breath sounds, basilar crackles. CARDIOVASCULAR:  normal S1 and S2 and negative JVD  ABD:Abdomen soft, non-tender. BS normal. No masses,  No organomegaly  NEURO:Alert and oriented x3. Gait normal. Reflexes and motor strength normal and symmetric. Cranial nerves 2-12 and sensation grossly intact. DATA:    CBC:  Recent Labs     09/22/21  1111 09/23/21  1241   WBC 11.5* 11.3*   RBC 5.26 4.73   HGB 15.3 13.5   HCT 48.4* 42.6    230   MCV 92.0 90.1   MCH 29.1 28.5   MCHC 31.6* 31.7*   RDW 13.4 13.2   SEGSPCT 74.0* 77.0*   BANDSPCT 2*  --       BMP:  Recent Labs     09/23/21  1241      K 3.9      CO2 24   BUN 25*   CREATININE 0.5*   CALCIUM 10.6   GLUCOSE 116*      ABG:  No results for input(s): PH, PO2ART, UUL1HIZ, HCO3, BEART, O2SAT in the last 72 hours. Lab Results   Component Value Date    PROBNP 60.12 09/23/2021    PROBNP 24.10 09/18/2021    PROBNP 7.88 06/29/2014     No results found for: [facility]    Radiology Review:  Pertinent images / reports were reviewed as a part of this visit. Assessment:     Patient Active Problem List   Diagnosis    Hx of cardiovascular stress test    Palpitation    H/O 24 hour EKG monitoring    Fibromyalgia    Chronic obstructive pulmonary disease (HCC)    Schizoaffective disorder ([address]    History of hysterectomy    Hyperlipidemia    Fatty liver    Chronic back pain    Chronic low back pain    Acquired hypothyroidism    Former tobacco use    Simple chronic bronchitis ([address]    Essential hypertension    Perforated left tympanic membrane on examination    COPD exacerbation ([address]    COVID-19       Plan:   1. Overall the patient is unchanged  2. Inc. activity. 3. Wean FiO2.   Farhad Palencia MD  9/24/2021  12:54 PM

## 2021-09-24 NOTE — PROGRESS NOTES
Comprehensive Nutrition Assessment    Type and Reason for Visit:  Initial (los 6 d)    Nutrition Recommendations/Plan:   · Continue current diet  · Begin daily low andre high protein oral nutrition supplement to optimize po intake with current illness    Nutrition Assessment:  Pt was admitted with acute respiratory failure with hypoxia 2/2 COVID-19 pneumonia. H/O GERD, COPD. Continues to require high flow oxygen. Lasix, multivitamin cocktail noted. Good appetite noted upon admission. Currently feeding self with fair po intake. No recent wt loss noted/reported. Will continue to follow as moderate nutrition risk. Malnutrition Assessment:  Malnutrition Status:  No malnutrition    Context:  Acute Illness       Estimated Daily Nutrient Needs:  Energy (kcal):  6574-4651 (Catron-St Jeor); Weight Used for Energy Requirements:  Current     Protein (g):  57-68 (1-1.2 g/kg IBW); Weight Used for Protein Requirements:  Ideal        Fluid (ml/day):  7231-4698; Method Used for Fluid Requirements:  1 ml/kcal      Wounds:  None       Current Nutrition Therapies:    ADULT DIET; Regular    Anthropometric Measures:  · Height: 5' 5\" (165.1 cm)  · Current Body Weight: 198 lb 1.6 oz (89.9 kg)   · Admission Body Weight: 199 lb 3.2 oz (90.4 kg)    · Usual Body Weight: 200 lb (90.7 kg)     · Ideal Body Weight: 125 lbs; % Ideal Body Weight 158.5 %   · BMI: 33  · BMI Categories: Obese Class 1 (BMI 30.0-34. 9)       Nutrition Diagnosis:   · Predicted inadequate energy intake related to impaired respiratory function as evidenced by intake 26-50%    Nutrition Interventions:   Food and/or Nutrient Delivery:  Continue Current Diet, Start Oral Nutrition Supplement, Vitamin Supplement  Nutrition Education/Counseling:  No recommendation at this time   Coordination of Nutrition Care:  Continue to monitor while inpatient    Goals:  Pt will consume at least half of her meals and supplements       Nutrition Monitoring and Evaluation: Behavioral-Environmental Outcomes:  None Identified   Food/Nutrient Intake Outcomes:  Food and Nutrient Intake, Supplement Intake  Physical Signs/Symptoms Outcomes:  Biochemical Data, Fluid Status or Edema, Weight     Discharge Planning:    No discharge needs at this time     Electronically signed by Yessica Conn RD, LD on 9/24/21 at 10:27 AM EDT    Contact: [phone]

## 2021-09-25 ENCOUNTER — APPOINTMENT (OUTPATIENT)
Dept: GENERAL RADIOLOGY | Age: 51
DRG: 177 | End: 2021-09-25
Payer: COMMERCIAL

## 2021-09-25 LAB
ALBUMIN SERPL-MCNC: 3.8 GM/DL (ref 3.4–5)
ALP BLD-CCNC: 43 IU/L (ref 40–129)
ALT SERPL-CCNC: 20 U/L (ref 10–40)
ANION GAP SERPL CALCULATED.3IONS-SCNC: 13 MMOL/L (ref 4–16)
AST SERPL-CCNC: 11 IU/L (ref 15–37)
BILIRUB SERPL-MCNC: 0.2 MG/DL (ref 0–1)
BUN BLDV-MCNC: 19 MG/DL (ref 6–23)
CALCIUM SERPL-MCNC: 9.6 MG/DL (ref 8.3–10.6)
CHLORIDE BLD-SCNC: 102 MMOL/L (ref 99–110)
CO2: 23 MMOL/L (ref 21–32)
CREAT SERPL-MCNC: 0.5 MG/DL (ref 0.6–1.1)
DIFFERENTIAL TYPE: ABNORMAL
GFR AFRICAN AMERICAN: >60 ML/MIN/1.73M2
GFR NON-AFRICAN AMERICAN: >60 ML/MIN/1.73M2
GLUCOSE BLD-MCNC: 212 MG/DL (ref 70–99)
HCT VFR BLD CALC: 41.6 % (ref 37–47)
HEMOGLOBIN: 13 GM/DL (ref 12.5–16)
LYMPHOCYTES ABSOLUTE: 1.5 K/CU MM
LYMPHOCYTES RELATIVE PERCENT: 13 % (ref 24–44)
MCH RBC QN AUTO: 28.3 PG (ref 27–31)
MCHC RBC AUTO-ENTMCNC: 31.3 % (ref 32–36)
MCV RBC AUTO: 90.6 FL (ref 78–100)
METAMYELOCYTES ABSOLUTE COUNT: 0.12 K/CU MM
METAMYELOCYTES PERCENT: 1 %
MONOCYTES ABSOLUTE: 0.6 K/CU MM
MONOCYTES RELATIVE PERCENT: 5 % (ref 0–4)
PDW BLD-RTO: 13.2 % (ref 11.7–14.9)
PLATELET # BLD: 247 K/CU MM (ref 140–440)
PLT MORPHOLOGY: ADEQUATE
PMV BLD AUTO: 10.5 FL (ref 7.5–11.1)
POTASSIUM SERPL-SCNC: 4.1 MMOL/L (ref 3.5–5.1)
RBC # BLD: 4.59 M/CU MM (ref 4.2–5.4)
SEGMENTED NEUTROPHILS ABSOLUTE COUNT: 9.5 K/CU MM
SEGMENTED NEUTROPHILS RELATIVE PERCENT: 81 % (ref 36–66)
SODIUM BLD-SCNC: 138 MMOL/L (ref 135–145)
TOTAL PROTEIN: 5.5 GM/DL (ref 6.4–8.2)
WBC # BLD: 11.7 K/CU MM (ref 4–10.5)
WBC # BLD: ABNORMAL 10*3/UL

## 2021-09-25 PROCEDURE — 36415 COLL VENOUS BLD VENIPUNCTURE: CPT

## 2021-09-25 PROCEDURE — 94761 N-INVAS EAR/PLS OXIMETRY MLT: CPT

## 2021-09-25 PROCEDURE — 85007 BL SMEAR W/DIFF WBC COUNT: CPT

## 2021-09-25 PROCEDURE — 6370000000 HC RX 637 (ALT 250 FOR IP): Performed by: FAMILY MEDICINE

## 2021-09-25 PROCEDURE — 6370000000 HC RX 637 (ALT 250 FOR IP): Performed by: HOSPITALIST

## 2021-09-25 PROCEDURE — 85027 COMPLETE CBC AUTOMATED: CPT

## 2021-09-25 PROCEDURE — 6360000002 HC RX W HCPCS: Performed by: FAMILY MEDICINE

## 2021-09-25 PROCEDURE — 1200000000 HC SEMI PRIVATE

## 2021-09-25 PROCEDURE — 6370000000 HC RX 637 (ALT 250 FOR IP): Performed by: NURSE PRACTITIONER

## 2021-09-25 PROCEDURE — 94150 VITAL CAPACITY TEST: CPT

## 2021-09-25 PROCEDURE — 2700000000 HC OXYGEN THERAPY PER DAY

## 2021-09-25 PROCEDURE — 80053 COMPREHEN METABOLIC PANEL: CPT

## 2021-09-25 PROCEDURE — 94640 AIRWAY INHALATION TREATMENT: CPT

## 2021-09-25 PROCEDURE — 2580000003 HC RX 258: Performed by: NURSE PRACTITIONER

## 2021-09-25 PROCEDURE — 6360000002 HC RX W HCPCS: Performed by: HOSPITALIST

## 2021-09-25 PROCEDURE — 71045 X-RAY EXAM CHEST 1 VIEW: CPT

## 2021-09-25 PROCEDURE — 94664 DEMO&/EVAL PT USE INHALER: CPT

## 2021-09-25 RX ORDER — BUDESONIDE AND FORMOTEROL FUMARATE DIHYDRATE 160; 4.5 UG/1; UG/1
2 AEROSOL RESPIRATORY (INHALATION) 2 TIMES DAILY
Status: DISCONTINUED | OUTPATIENT
Start: 2021-09-25 | End: 2021-09-29 | Stop reason: HOSPADM

## 2021-09-25 RX ADMIN — ZINC SULFATE 220 MG (50 MG) CAPSULE 50 MG: CAPSULE at 10:17

## 2021-09-25 RX ADMIN — Medication 100 MG: at 10:17

## 2021-09-25 RX ADMIN — ALBUTEROL SULFATE 2 PUFF: 90 AEROSOL, METERED RESPIRATORY (INHALATION) at 22:01

## 2021-09-25 RX ADMIN — ALBUTEROL SULFATE 2 PUFF: 90 AEROSOL, METERED RESPIRATORY (INHALATION) at 07:36

## 2021-09-25 RX ADMIN — ACETAMINOPHEN 650 MG: 325 TABLET ORAL at 05:30

## 2021-09-25 RX ADMIN — ALBUTEROL SULFATE 2 PUFF: 90 AEROSOL, METERED RESPIRATORY (INHALATION) at 11:39

## 2021-09-25 RX ADMIN — ONDANSETRON 4 MG: 4 TABLET, ORALLY DISINTEGRATING ORAL at 05:30

## 2021-09-25 RX ADMIN — SODIUM CHLORIDE, PRESERVATIVE FREE 10 ML: 5 INJECTION INTRAVENOUS at 21:45

## 2021-09-25 RX ADMIN — OXYCODONE HYDROCHLORIDE AND ACETAMINOPHEN 500 MG: 500 TABLET ORAL at 10:17

## 2021-09-25 RX ADMIN — ACETAMINOPHEN 650 MG: 325 TABLET ORAL at 21:44

## 2021-09-25 RX ADMIN — IPRATROPIUM BROMIDE 2 PUFF: 17 AEROSOL, METERED RESPIRATORY (INHALATION) at 11:40

## 2021-09-25 RX ADMIN — OXYCODONE HYDROCHLORIDE AND ACETAMINOPHEN 500 MG: 500 TABLET ORAL at 15:50

## 2021-09-25 RX ADMIN — DICYCLOMINE HYDROCHLORIDE 10 MG: 10 CAPSULE ORAL at 10:18

## 2021-09-25 RX ADMIN — IPRATROPIUM BROMIDE 2 PUFF: 17 AEROSOL, METERED RESPIRATORY (INHALATION) at 15:14

## 2021-09-25 RX ADMIN — ENOXAPARIN SODIUM 40 MG: 40 INJECTION SUBCUTANEOUS at 10:18

## 2021-09-25 RX ADMIN — DEXAMETHASONE SODIUM PHOSPHATE 10 MG: 4 INJECTION, SOLUTION INTRAMUSCULAR; INTRAVENOUS at 17:37

## 2021-09-25 RX ADMIN — ALBUTEROL SULFATE 2 PUFF: 90 AEROSOL, METERED RESPIRATORY (INHALATION) at 15:13

## 2021-09-25 RX ADMIN — OXYCODONE HYDROCHLORIDE AND ACETAMINOPHEN 500 MG: 500 TABLET ORAL at 21:44

## 2021-09-25 RX ADMIN — DICYCLOMINE HYDROCHLORIDE 10 MG: 10 CAPSULE ORAL at 21:44

## 2021-09-25 RX ADMIN — POLYETHYLENE GLYCOL (3350) 17 G: 17 POWDER, FOR SOLUTION ORAL at 21:51

## 2021-09-25 RX ADMIN — ENOXAPARIN SODIUM 40 MG: 40 INJECTION SUBCUTANEOUS at 21:44

## 2021-09-25 RX ADMIN — LEVOTHYROXINE SODIUM 50 MCG: 0.05 TABLET ORAL at 05:30

## 2021-09-25 RX ADMIN — SODIUM CHLORIDE, PRESERVATIVE FREE 10 ML: 5 INJECTION INTRAVENOUS at 10:17

## 2021-09-25 RX ADMIN — DICYCLOMINE HYDROCHLORIDE 10 MG: 10 CAPSULE ORAL at 15:50

## 2021-09-25 RX ADMIN — IPRATROPIUM BROMIDE 2 PUFF: 17 AEROSOL, METERED RESPIRATORY (INHALATION) at 22:02

## 2021-09-25 RX ADMIN — POLYETHYLENE GLYCOL (3350) 17 G: 17 POWDER, FOR SOLUTION ORAL at 10:17

## 2021-09-25 RX ADMIN — MONTELUKAST SODIUM 10 MG: 10 TABLET, FILM COATED ORAL at 10:18

## 2021-09-25 RX ADMIN — BENZONATATE 200 MG: 100 CAPSULE ORAL at 21:44

## 2021-09-25 RX ADMIN — BISACODYL 5 MG: 5 TABLET, COATED ORAL at 10:18

## 2021-09-25 RX ADMIN — IPRATROPIUM BROMIDE 2 PUFF: 17 AEROSOL, METERED RESPIRATORY (INHALATION) at 07:37

## 2021-09-25 ASSESSMENT — PAIN SCALES - GENERAL
PAINLEVEL_OUTOF10: 4
PAINLEVEL_OUTOF10: 8
PAINLEVEL_OUTOF10: 8
PAINLEVEL_OUTOF10: 7
PAINLEVEL_OUTOF10: 8

## 2021-09-25 ASSESSMENT — PAIN DESCRIPTION - PROGRESSION: CLINICAL_PROGRESSION: NOT CHANGED

## 2021-09-25 ASSESSMENT — PAIN DESCRIPTION - DESCRIPTORS: DESCRIPTORS: HEADACHE

## 2021-09-25 ASSESSMENT — PAIN DESCRIPTION - LOCATION
LOCATION: HEAD
LOCATION: HEAD

## 2021-09-25 ASSESSMENT — PAIN DESCRIPTION - PAIN TYPE: TYPE: ACUTE PAIN

## 2021-09-25 ASSESSMENT — PAIN DESCRIPTION - FREQUENCY: FREQUENCY: CONTINUOUS

## 2021-09-25 ASSESSMENT — PAIN DESCRIPTION - ONSET: ONSET: ON-GOING

## 2021-09-25 NOTE — PROGRESS NOTES
Hospital Medicine Progress Note     Date:  9/25/2021    PCP: Elias Jonas (Tel: [phone])    Date of Admission: 9/18/2021    Chief complaint:   Chief Complaint   Patient presents with    Cough    Shortness of Breath     covid positive       Assessment:  Active Problems:    COVID-19  Resolved Problems:    * No resolved hospital problems. *      Plan:  1. Acute respiratory failure with hypoxia  Secondary to COVID-19 pneumonia  On high flow oxygen, wean as tolerated  Patient received Regeneron  Continue on IV Decadron  on IV Lasix  Multivitamin cocktail  DVT prophylaxis  Start incentive spirometer  --Remains on HFNC   --Trend CRP, evaluate eligibility for mono-clonal abs per [facility] protocol     2. Pneumonia due to COVID-19  Management as above     3. COPD  Continue breathing treatment     4.  Essential hypertension  Continue current management     5.  Tobacco abuse      Diet: ADULT DIET; Regular  Adult Oral Nutrition Supplement; Low Calorie/High Protein Oral Supplement    Code status: Full Code   ----------  Subjective  No acute distress, remains on HFNC    Objective  Physical exam:  Vitals: /86   Pulse 91   Temp 98.2 °F (36.8 °C) (Oral)   Resp 22   Ht 5' 5\" (1.651 m)   Wt 198 lb 1.6 oz (89.9 kg)   LMP  (LMP Unknown)   SpO2 97%   BMI 32.97 kg/m²   Gen/overall appearance: Not in acute distress. Alert. Head: Normocephalic, atraumatic  Eyes: EOMI, good acuity  ENT: Oral mucosa moist  Neck: No JVD, thyromegaly  CVS: Nml S1S2, no MRG, RRR  Pulm: Bilateral crackles, tachypnea  Gastrointestinal: Soft, NT/ND, +BS  Musculoskeletal: No edema. Warm  Neuro: No focal deficit. Moves extremity spontaneously. Psychiatry: Appropriate affect. Not agitated. Skin: Warm, dry with normal turgor.  No rash  Capillary refill: Brisk,< 3 seconds   Peripheral Pulses: +2 palpable, equal bilaterally     24HR INTAKE/OUTPUT:      Intake/Output Summary (Last 24 hours) at 9/25/2021 6390  Last data filed at 9/25/2021 0644  Gross per 24 hour   Intake --   Output 2700 ml   Net -2700 ml     I/O last 3 completed shifts:  In: -   Out: 2700 [Urine:2700]  No intake/output data recorded.   Meds:    budesonide-formoterol  2 puff Inhalation BID    albuterol sulfate HFA  2 puff Inhalation 4x daily    ipratropium  2 puff Inhalation 4x daily    dexamethasone  10 mg IntraVENous Q24H    ascorbic acid  500 mg Oral TID    thiamine  100 mg Oral Daily    zinc sulfate  50 mg Oral Daily    enoxaparin  40 mg SubCUTAneous BID    sodium chloride flush  5-40 mL IntraVENous 2 times per day    bisacodyl  5 mg Oral Daily    levothyroxine  50 mcg Oral Daily    dicyclomine  10 mg Oral TID    montelukast  10 mg Oral Daily     Infusions:    sodium chloride Stopped (09/18/21 2152)     PRN Meds: sodium chloride, benzonatate, sodium chloride flush, sodium chloride, ondansetron **OR** ondansetron, polyethylene glycol, acetaminophen **OR** [DISCONTINUED] acetaminophen, guaiFENesin-dextromethorphan, albuterol sulfate HFA    Labs/imaging:  CBC:   Recent Labs     09/23/21  1241 09/24/21  1226 09/25/21  1041   WBC 11.3* 14.5* 11.7*   HGB 13.5 13.1 13.0    241 247     BMP:    Recent Labs     09/23/21  1241 09/24/21  1226 09/25/21  1041    141 138   K 3.9 4.1 4.1    106 102   CO2 24 24 23   BUN 25* 24* 19   CREATININE 0.5* 0.6 0.5*   GLUCOSE 116* 127* 212*     Hepatic:   Recent Labs     09/23/21  1241 09/24/21  1226 09/25/21  1041   AST 16 14* 11*   ALT 23 21 20   BILITOT 0.2 0.2 0.2   ALKPHOS 43 52 43       Gunnar Childress MD  -------------------------------  Rounding hospitalist

## 2021-09-25 NOTE — PROGRESS NOTES
Pulmonary and Critical Care  Progress Note    Subjective: The patient is unchanged. On HFNC. Shortness of breath is unchanged. Chest pain none  Addressing respiratory complaints Patient is negative for  hemoptysis and cyanosis  CONSTITUTIONAL:  negative for fevers and chills      Past Medical History:     has a past medical history of Acquired hypothyroidism, Chronic back pain, Chronic obstructive pulmonary disease ([address] COPD (chronic obstructive pulmonary disease) ([address] COVID-19, Essential hypertension, Fibromyalgia, Former tobacco use, GERD (gastroesophageal reflux disease), H/O 24 hour EKG monitoring, H/O fibromyalgia, History of 24 hour EKG monitoring, History of echocardiogram, History of hysterectomy, Hx of cardiovascular stress test, Hyperlipidemia, Hypertension, Schizoaffective disorder ([address] Simple chronic bronchitis [address] [address] and Spastic paraparesis. has a past surgical history that includes Hysterectomy; back surgery; Inner ear surgery (Left); Tubal ligation; Inguinal hernia repair (Right); and Cardiac catheterization. reports that she quit smoking about 10 years ago. She has a 25.00 pack-year smoking history. She has never used smokeless tobacco. She reports that she does not drink alcohol and does not use drugs. Family history:  family history includes Breast Cancer in her maternal grandmother and paternal aunt; Diabetes in her sister; Elevated Lipids in her mother; Heart Disease in her father, mother, and sister; Hypertension in her mother; Kidney Disease in her sister; Ovarian Cancer in her paternal aunt.     Allergies   Allergen Reactions    Latex     Codeine Itching    Pcn [Penicillins] Hives     Social History:    Reviewed; no changes    Objective:   PHYSICAL EXAM:        VITALS:  /66   Pulse 60   Temp 98.1 °F (36.7 °C) (Oral)   Resp 22   Ht 5' 5\" (1.651 m)   Wt 198 lb 1.6 oz (89.9 kg)   LMP  (LMP Unknown)   SpO2 90%   BMI 32.97 kg/m²     24HR INTAKE/OUTPUT: Intake/Output Summary (Last 24 hours) at 9/25/2021 1206  Last data filed at 9/25/2021 0644  Gross per 24 hour   Intake --   Output 2700 ml   Net -2700 ml       CONSTITUTIONAL:  awake, alert, cooperative, no apparent distress, and appears stated age  LUNGS:  decreased breath sounds, basilar crackles. CARDIOVASCULAR:  normal S1 and S2 and negative JVD  ABD:Abdomen soft, non-tender. BS normal. No masses,  No organomegaly  NEURO:Alert and oriented x3. Gait normal. Reflexes and motor strength normal and symmetric. Cranial nerves 2-12 and sensation grossly intact. DATA:    CBC:  Recent Labs     09/23/21  1241 09/24/21  1226 09/25/21  1041   WBC 11.3* 14.5* 11.7*   RBC 4.73 4.60 4.59   HGB 13.5 13.1 13.0   HCT 42.6 41.8 41.6    241 247   MCV 90.1 90.9 90.6   MCH 28.5 28.5 28.3   MCHC 31.7* 31.3* 31.3*   RDW 13.2 13.2 13.2   SEGSPCT 77.0* 78.0*  --    BANDSPCT  --  4*  --       BMP:  Recent Labs     09/23/21  1241 09/24/21  1226 09/25/21  1041    141 138   K 3.9 4.1 4.1    106 102   CO2 24 24 23   BUN 25* 24* 19   CREATININE 0.5* 0.6 0.5*   CALCIUM 10.6 10.1 9.6   GLUCOSE 116* 127* 212*      ABG:  No results for input(s): PH, PO2ART, AFT5TQA, HCO3, BEART, O2SAT in the last 72 hours. Lab Results   Component Value Date    PROBNP 60.12 09/23/2021    PROBNP 24.10 09/18/2021    PROBNP 7.88 06/29/2014     No results found for: [facility]    Radiology Review:  Pertinent images / reports were reviewed as a part of this visit.     Assessment:     Patient Active Problem List   Diagnosis    Hx of cardiovascular stress test    Palpitation    H/O 24 hour EKG monitoring    Fibromyalgia    Chronic obstructive pulmonary disease (HCC)    [facility]oa[facility]ive disorder [address] [address]    History of hysterectomy    Hyperlipidemia    Fatty liver    Chronic back pain    Chronic low back pain    Acquired hypothyroidism    Former tobacco use    Simple chronic bronchitis ([address]    Essential hypertension    Perforated left tympanic membrane on examination    COPD exacerbation ([address]    COVID-19       Plan:   1. Overall the patient is unchanged. 2. Inc. activity. 3. Wean FiO2.   Ronnie Elaine MD  9/25/2021  12:06 PM

## 2021-09-26 LAB
ANION GAP SERPL CALCULATED.3IONS-SCNC: 12 MMOL/L (ref 4–16)
BUN BLDV-MCNC: 19 MG/DL (ref 6–23)
C-REACTIVE PROTEIN, HIGH SENSITIVITY: 1.1 MG/L
CALCIUM SERPL-MCNC: 9.7 MG/DL (ref 8.3–10.6)
CHLORIDE BLD-SCNC: 104 MMOL/L (ref 99–110)
CO2: 24 MMOL/L (ref 21–32)
CREAT SERPL-MCNC: 0.6 MG/DL (ref 0.6–1.1)
GFR AFRICAN AMERICAN: >60 ML/MIN/1.73M2
GFR NON-AFRICAN AMERICAN: >60 ML/MIN/1.73M2
GLUCOSE BLD-MCNC: 123 MG/DL (ref 70–99)
POTASSIUM SERPL-SCNC: 5.1 MMOL/L (ref 3.5–5.1)
SODIUM BLD-SCNC: 140 MMOL/L (ref 135–145)

## 2021-09-26 PROCEDURE — 80048 BASIC METABOLIC PNL TOTAL CA: CPT

## 2021-09-26 PROCEDURE — 2580000003 HC RX 258: Performed by: NURSE PRACTITIONER

## 2021-09-26 PROCEDURE — 2700000000 HC OXYGEN THERAPY PER DAY

## 2021-09-26 PROCEDURE — 94640 AIRWAY INHALATION TREATMENT: CPT

## 2021-09-26 PROCEDURE — 6360000002 HC RX W HCPCS: Performed by: FAMILY MEDICINE

## 2021-09-26 PROCEDURE — 6360000002 HC RX W HCPCS: Performed by: HOSPITALIST

## 2021-09-26 PROCEDURE — 1200000000 HC SEMI PRIVATE

## 2021-09-26 PROCEDURE — 6370000000 HC RX 637 (ALT 250 FOR IP): Performed by: NURSE PRACTITIONER

## 2021-09-26 PROCEDURE — 94761 N-INVAS EAR/PLS OXIMETRY MLT: CPT

## 2021-09-26 PROCEDURE — 6370000000 HC RX 637 (ALT 250 FOR IP): Performed by: HOSPITALIST

## 2021-09-26 PROCEDURE — 6370000000 HC RX 637 (ALT 250 FOR IP): Performed by: FAMILY MEDICINE

## 2021-09-26 PROCEDURE — 6360000002 HC RX W HCPCS: Performed by: NURSE PRACTITIONER

## 2021-09-26 PROCEDURE — 86140 C-REACTIVE PROTEIN: CPT

## 2021-09-26 PROCEDURE — 36415 COLL VENOUS BLD VENIPUNCTURE: CPT

## 2021-09-26 RX ORDER — SUMATRIPTAN 6 MG/.5ML
6 INJECTION, SOLUTION SUBCUTANEOUS ONCE
Status: COMPLETED | OUTPATIENT
Start: 2021-09-26 | End: 2021-09-26

## 2021-09-26 RX ORDER — METOCLOPRAMIDE HYDROCHLORIDE 5 MG/ML
10 INJECTION INTRAMUSCULAR; INTRAVENOUS ONCE
Status: COMPLETED | OUTPATIENT
Start: 2021-09-26 | End: 2021-09-26

## 2021-09-26 RX ORDER — DIPHENHYDRAMINE HYDROCHLORIDE 50 MG/ML
25 INJECTION INTRAMUSCULAR; INTRAVENOUS ONCE
Status: COMPLETED | OUTPATIENT
Start: 2021-09-26 | End: 2021-09-26

## 2021-09-26 RX ORDER — KETOROLAC TROMETHAMINE 30 MG/ML
30 INJECTION, SOLUTION INTRAMUSCULAR; INTRAVENOUS ONCE
Status: COMPLETED | OUTPATIENT
Start: 2021-09-26 | End: 2021-09-26

## 2021-09-26 RX ADMIN — ACETAMINOPHEN 650 MG: 325 TABLET ORAL at 09:01

## 2021-09-26 RX ADMIN — SUMATRIPTAN SUCCINATE 6 MG: 6 INJECTION SUBCUTANEOUS at 21:16

## 2021-09-26 RX ADMIN — MONTELUKAST SODIUM 10 MG: 10 TABLET, FILM COATED ORAL at 12:18

## 2021-09-26 RX ADMIN — IPRATROPIUM BROMIDE 2 PUFF: 17 AEROSOL, METERED RESPIRATORY (INHALATION) at 15:39

## 2021-09-26 RX ADMIN — SODIUM CHLORIDE, PRESERVATIVE FREE 10 ML: 5 INJECTION INTRAVENOUS at 21:03

## 2021-09-26 RX ADMIN — DIPHENHYDRAMINE HYDROCHLORIDE 25 MG: 50 INJECTION INTRAMUSCULAR; INTRAVENOUS at 15:50

## 2021-09-26 RX ADMIN — ENOXAPARIN SODIUM 40 MG: 40 INJECTION SUBCUTANEOUS at 12:19

## 2021-09-26 RX ADMIN — ALBUTEROL SULFATE 2 PUFF: 90 AEROSOL, METERED RESPIRATORY (INHALATION) at 15:39

## 2021-09-26 RX ADMIN — IPRATROPIUM BROMIDE 2 PUFF: 17 AEROSOL, METERED RESPIRATORY (INHALATION) at 11:52

## 2021-09-26 RX ADMIN — DICYCLOMINE HYDROCHLORIDE 10 MG: 10 CAPSULE ORAL at 21:02

## 2021-09-26 RX ADMIN — DICYCLOMINE HYDROCHLORIDE 10 MG: 10 CAPSULE ORAL at 12:18

## 2021-09-26 RX ADMIN — ONDANSETRON 4 MG: 2 INJECTION INTRAMUSCULAR; INTRAVENOUS at 09:01

## 2021-09-26 RX ADMIN — BENZONATATE 200 MG: 100 CAPSULE ORAL at 21:02

## 2021-09-26 RX ADMIN — BISACODYL 5 MG: 5 TABLET, COATED ORAL at 12:18

## 2021-09-26 RX ADMIN — DEXAMETHASONE SODIUM PHOSPHATE 10 MG: 4 INJECTION, SOLUTION INTRAMUSCULAR; INTRAVENOUS at 15:50

## 2021-09-26 RX ADMIN — ACETAMINOPHEN 650 MG: 325 TABLET ORAL at 21:02

## 2021-09-26 RX ADMIN — OXYCODONE HYDROCHLORIDE AND ACETAMINOPHEN 500 MG: 500 TABLET ORAL at 21:02

## 2021-09-26 RX ADMIN — Medication 100 MG: at 12:18

## 2021-09-26 RX ADMIN — METOCLOPRAMIDE HYDROCHLORIDE 10 MG: 5 INJECTION INTRAMUSCULAR; INTRAVENOUS at 15:51

## 2021-09-26 RX ADMIN — SUMATRIPTAN SUCCINATE 6 MG: 6 INJECTION, SOLUTION SUBCUTANEOUS at 12:18

## 2021-09-26 RX ADMIN — ONDANSETRON 4 MG: 2 INJECTION INTRAMUSCULAR; INTRAVENOUS at 15:50

## 2021-09-26 RX ADMIN — LEVOTHYROXINE SODIUM 50 MCG: 0.05 TABLET ORAL at 06:08

## 2021-09-26 RX ADMIN — SODIUM CHLORIDE, PRESERVATIVE FREE 10 ML: 5 INJECTION INTRAVENOUS at 12:19

## 2021-09-26 RX ADMIN — KETOROLAC TROMETHAMINE 30 MG: 30 INJECTION, SOLUTION INTRAMUSCULAR; INTRAVENOUS at 15:50

## 2021-09-26 RX ADMIN — ENOXAPARIN SODIUM 40 MG: 40 INJECTION SUBCUTANEOUS at 21:02

## 2021-09-26 RX ADMIN — ZINC SULFATE 220 MG (50 MG) CAPSULE 50 MG: CAPSULE at 12:18

## 2021-09-26 RX ADMIN — OXYCODONE HYDROCHLORIDE AND ACETAMINOPHEN 500 MG: 500 TABLET ORAL at 12:18

## 2021-09-26 RX ADMIN — ALBUTEROL SULFATE 2 PUFF: 90 AEROSOL, METERED RESPIRATORY (INHALATION) at 11:52

## 2021-09-26 ASSESSMENT — PAIN DESCRIPTION - PAIN TYPE
TYPE: ACUTE PAIN

## 2021-09-26 ASSESSMENT — PAIN DESCRIPTION - PROGRESSION
CLINICAL_PROGRESSION: NOT CHANGED
CLINICAL_PROGRESSION: NOT CHANGED

## 2021-09-26 ASSESSMENT — PAIN DESCRIPTION - DESCRIPTORS
DESCRIPTORS: HEADACHE
DESCRIPTORS: HEADACHE

## 2021-09-26 ASSESSMENT — PAIN DESCRIPTION - FREQUENCY
FREQUENCY: CONTINUOUS
FREQUENCY: CONTINUOUS

## 2021-09-26 ASSESSMENT — PAIN DESCRIPTION - LOCATION
LOCATION: GENERALIZED
LOCATION: HEAD
LOCATION: HEAD

## 2021-09-26 ASSESSMENT — PAIN SCALES - GENERAL
PAINLEVEL_OUTOF10: 10
PAINLEVEL_OUTOF10: 2
PAINLEVEL_OUTOF10: 4
PAINLEVEL_OUTOF10: 10

## 2021-09-26 ASSESSMENT — PAIN DESCRIPTION - ONSET
ONSET: ON-GOING
ONSET: ON-GOING

## 2021-09-26 NOTE — PROGRESS NOTES
Hospital Medicine Progress Note     Date:  9/26/2021    PCP: Dina Saravia (Tel: [phone])    Date of Admission: 9/18/2021    Chief complaint:   Chief Complaint   Patient presents with    Cough    Shortness of Breath     covid positive       Assessment:  Active Problems:    COVID-19  Resolved Problems:    * No resolved hospital problems. *      Plan:  1. Acute respiratory failure with hypoxia  Secondary to COVID-19 pneumonia  On high flow oxygen, wean as tolerated  Patient received Regeneron  Continue on IV Decadron  on IV Lasix  Multivitamin cocktail  DVT prophylaxis  Start incentive spirometer  --O2 requirements trending down today    Migraine Headache   -Reports has them intermittently at home, sumatriptan usually provides relief  -Sumatriptan x1, repeat dose if not effective  -HA cocktail      2. Pneumonia due to COVID-19  Management as above     3. COPD  Continue breathing treatment     4.  Essential hypertension  Continue current management     5.  Tobacco abuse      Diet: ADULT DIET; Regular  Adult Oral Nutrition Supplement; Low Calorie/High Protein Oral Supplement    Code status: Full Code   ----------  Subjective  Reporting severe migraine headache today, reports taking sumatriptan at home     Objective  Physical exam:  Vitals: /61   Pulse 64   Temp 98.2 °F (36.8 °C) (Oral)   Resp 17   Ht 5' 5\" (1.651 m)   Wt 198 lb 1.6 oz (89.9 kg)   LMP  (LMP Unknown)   SpO2 93%   BMI 32.97 kg/m²   Gen/overall appearance: Not in acute distress. Alert. Head: Normocephalic, atraumatic  Eyes: EOMI, good acuity  ENT: Oral mucosa moist  Neck: No JVD, thyromegaly  CVS: Nml S1S2, no MRG, RRR  Pulm: Bilateral crackles, tachypnea  Gastrointestinal: Soft, NT/ND, +BS  Musculoskeletal: No edema. Warm  Neuro: No focal deficit. Moves extremity spontaneously. Psychiatry: Appropriate affect. Not agitated. Skin: Warm, dry with normal turgor.  No rash  Capillary refill: Brisk,< 3 seconds   Peripheral Pulses: +2 palpable, equal bilaterally     24HR INTAKE/OUTPUT:    No intake or output data in the 24 hours ending 09/26/21 1831  No intake/output data recorded. No intake/output data recorded.   Meds:    SUMAtriptan  6 mg SubCUTAneous Once    budesonide-formoterol  2 puff Inhalation BID    albuterol sulfate HFA  2 puff Inhalation 4x daily    ipratropium  2 puff Inhalation 4x daily    dexamethasone  10 mg IntraVENous Q24H    ascorbic acid  500 mg Oral TID    thiamine  100 mg Oral Daily    zinc sulfate  50 mg Oral Daily    enoxaparin  40 mg SubCUTAneous BID    sodium chloride flush  5-40 mL IntraVENous 2 times per day    bisacodyl  5 mg Oral Daily    levothyroxine  50 mcg Oral Daily    dicyclomine  10 mg Oral TID    montelukast  10 mg Oral Daily     Infusions:    sodium chloride Stopped (09/18/21 2152)     PRN Meds: sodium chloride, benzonatate, sodium chloride flush, sodium chloride, ondansetron **OR** ondansetron, polyethylene glycol, acetaminophen **OR** [DISCONTINUED] acetaminophen, guaiFENesin-dextromethorphan, albuterol sulfate HFA    Labs/imaging:  CBC:   Recent Labs     09/24/21  1226 09/25/21  1041   WBC 14.5* 11.7*   HGB 13.1 13.0    247     BMP:    Recent Labs     09/24/21  1226 09/25/21  1041 09/26/21  0828    138 140   K 4.1 4.1 5.1    102 104   CO2 24 23 24   BUN 24* 19 19   CREATININE 0.6 0.5* 0.6   GLUCOSE 127* 212* 123*     Hepatic:   Recent Labs     09/24/21  1226 09/25/21  1041   AST 14* 11*   ALT 21 20   BILITOT 0.2 0.2   ALKPHOS 52 43       Odalys Winchester MD  -------------------------------  [facility] hospitalist

## 2021-09-26 NOTE — PROGRESS NOTES
Pulmonary and Critical Care  Progress Note    Subjective: The patient is comfortable in bed. On HFNC. Shortness of breath is better. Chest pain none  Addressing respiratory complaints Patient is negative for  hemoptysis and cyanosis  CONSTITUTIONAL:  negative for fevers and chills      Past Medical History:     has a past medical history of Acquired hypothyroidism, Chronic back pain, Chronic obstructive pulmonary disease ([address] COPD (chronic obstructive pulmonary disease) ([address] COVID-19, Essential hypertension, Fibromyalgia, Former tobacco use, GERD (gastroesophageal reflux disease), H/O 24 hour EKG monitoring, H/O fibromyalgia, History of 24 hour EKG monitoring, History of echocardiogram, History of hysterectomy, Hx of cardiovascular stress test, Hyperlipidemia, Hypertension, Schizoaffective disorder ([address] Simple chronic bronchitis ([facility] [address] and Spastic paraparesis. has a past surgical history that includes Hysterectomy; back surgery; Inner ear surgery (Left); Tubal ligation; Inguinal hernia repair (Right); and Cardiac catheterization. reports that she quit smoking about 10 years ago. She has a 25.00 pack-year smoking history. She has never used smokeless tobacco. She reports that she does not drink alcohol and does not use drugs. Family history:  family history includes Breast Cancer in her maternal grandmother and paternal aunt; Diabetes in her sister; Elevated Lipids in her mother; Heart Disease in her father, mother, and sister; Hypertension in her mother; Kidney Disease in her sister; Ovarian Cancer in her paternal aunt.     Allergies   Allergen Reactions    Latex     Codeine Itching    Pcn [Penicillins] Hives     Social History:    Reviewed; no changes    Objective:   PHYSICAL EXAM:        VITALS:  /73   Pulse (!) 46   Temp 98.1 °F (36.7 °C) (Oral)   Resp 17   Ht 5' 5\" (1.651 m)   Wt 198 lb 1.6 oz (89.9 kg)   LMP  (LMP Unknown)   SpO2 (!) 89%   BMI 32.97 kg/m²     24HR INTAKE/OUTPUT: No intake or output data in the 24 hours ending 09/26/21 1423    CONSTITUTIONAL:  awake, alert, cooperative, no apparent distress, and appears stated age  LUNGS:  decreased breath sounds, basilar crackles. CARDIOVASCULAR:  normal S1 and S2 and negative JVD  ABD:Abdomen soft, non-tender. BS normal. No masses,  No organomegaly  NEURO:Alert and oriented x3. Gait normal. Reflexes and motor strength normal and symmetric. Cranial nerves 2-12 and sensation grossly intact. DATA:    CBC:  Recent Labs     09/24/21  1226 09/25/21  1041   WBC 14.5* 11.7*   RBC 4.60 4.59   HGB 13.1 13.0   HCT 41.8 41.6    247   MCV 90.9 90.6   MCH 28.5 28.3   MCHC 31.3* 31.3*   RDW 13.2 13.2   SEGSPCT 78.0* 81.0*   BANDSPCT 4*  --       BMP:  Recent Labs     09/24/21  1226 09/25/21  1041 09/26/21  0828    138 140   K 4.1 4.1 5.1    102 104   CO2 24 23 24   BUN 24* 19 19   CREATININE 0.6 0.5* 0.6   CALCIUM 10.1 9.6 9.7   GLUCOSE 127* 212* 123*      ABG:  No results for input(s): PH, PO2ART, VCH0GSW, HCO3, BEART, O2SAT in the last 72 hours. Lab Results   Component Value Date    PROBNP 60.12 09/23/2021    PROBNP 24.10 09/18/2021    PROBNP 7.88 06/29/2014     No results found for: [facility]    Radiology Review:  Pertinent images / reports were reviewed as a part of this visit. Assessment:     Patient Active Problem List   Diagnosis    Hx of cardiovascular stress test    Palpitation    H/O 24 hour EKG monitoring    Fibromyalgia    Chronic obstructive pulmonary disease (HCC)    Schizoaffective disorder ([address]    History of hysterectomy    Hyperlipidemia    Fatty liver    Chronic back pain    Chronic low back pain    Acquired hypothyroidism    Former tobacco use    Simple chronic bronchitis ([address]    Essential hypertension    Perforated left tympanic membrane on examination    COPD exacerbation ([facility] [facility]    COVID-19       Plan:   1. Overall the patient is better. 2. Inc. activity. 3. Wean FiO2.   Alee Ing

## 2021-09-27 LAB
ANION GAP SERPL CALCULATED.3IONS-SCNC: 15 MMOL/L (ref 4–16)
BUN BLDV-MCNC: 26 MG/DL (ref 6–23)
C-REACTIVE PROTEIN, HIGH SENSITIVITY: 0.8 MG/L
CALCIUM SERPL-MCNC: 9.8 MG/DL (ref 8.3–10.6)
CHLORIDE BLD-SCNC: 103 MMOL/L (ref 99–110)
CO2: 22 MMOL/L (ref 21–32)
CREAT SERPL-MCNC: 0.8 MG/DL (ref 0.6–1.1)
GFR AFRICAN AMERICAN: >60 ML/MIN/1.73M2
GFR NON-AFRICAN AMERICAN: >60 ML/MIN/1.73M2
GLUCOSE BLD-MCNC: 208 MG/DL (ref 70–99)
POTASSIUM SERPL-SCNC: 4.3 MMOL/L (ref 3.5–5.1)
SODIUM BLD-SCNC: 140 MMOL/L (ref 135–145)

## 2021-09-27 PROCEDURE — 1200000000 HC SEMI PRIVATE

## 2021-09-27 PROCEDURE — 94761 N-INVAS EAR/PLS OXIMETRY MLT: CPT

## 2021-09-27 PROCEDURE — 2580000003 HC RX 258: Performed by: NURSE PRACTITIONER

## 2021-09-27 PROCEDURE — 6370000000 HC RX 637 (ALT 250 FOR IP): Performed by: HOSPITALIST

## 2021-09-27 PROCEDURE — 94640 AIRWAY INHALATION TREATMENT: CPT

## 2021-09-27 PROCEDURE — 86140 C-REACTIVE PROTEIN: CPT

## 2021-09-27 PROCEDURE — 6370000000 HC RX 637 (ALT 250 FOR IP): Performed by: INTERNAL MEDICINE

## 2021-09-27 PROCEDURE — 6360000002 HC RX W HCPCS: Performed by: HOSPITALIST

## 2021-09-27 PROCEDURE — 2700000000 HC OXYGEN THERAPY PER DAY

## 2021-09-27 PROCEDURE — 6360000002 HC RX W HCPCS: Performed by: NURSE PRACTITIONER

## 2021-09-27 PROCEDURE — 36415 COLL VENOUS BLD VENIPUNCTURE: CPT

## 2021-09-27 PROCEDURE — 80048 BASIC METABOLIC PNL TOTAL CA: CPT

## 2021-09-27 PROCEDURE — 6360000002 HC RX W HCPCS: Performed by: FAMILY MEDICINE

## 2021-09-27 PROCEDURE — 6370000000 HC RX 637 (ALT 250 FOR IP): Performed by: FAMILY MEDICINE

## 2021-09-27 PROCEDURE — 6370000000 HC RX 637 (ALT 250 FOR IP): Performed by: NURSE PRACTITIONER

## 2021-09-27 RX ORDER — POLYETHYLENE GLYCOL 3350 17 G/17G
17 POWDER, FOR SOLUTION ORAL DAILY
Status: DISCONTINUED | OUTPATIENT
Start: 2021-09-27 | End: 2021-09-27

## 2021-09-27 RX ORDER — LACTULOSE 10 G/15ML
20 SOLUTION ORAL 3 TIMES DAILY
Status: COMPLETED | OUTPATIENT
Start: 2021-09-27 | End: 2021-09-27

## 2021-09-27 RX ORDER — PROMETHAZINE HYDROCHLORIDE 25 MG/ML
25 INJECTION, SOLUTION INTRAMUSCULAR; INTRAVENOUS EVERY 6 HOURS PRN
Status: DISCONTINUED | OUTPATIENT
Start: 2021-09-27 | End: 2021-09-29 | Stop reason: HOSPADM

## 2021-09-27 RX ORDER — KETOROLAC TROMETHAMINE 30 MG/ML
15 INJECTION, SOLUTION INTRAMUSCULAR; INTRAVENOUS EVERY 6 HOURS PRN
Status: DISCONTINUED | OUTPATIENT
Start: 2021-09-27 | End: 2021-09-29 | Stop reason: HOSPADM

## 2021-09-27 RX ADMIN — IPRATROPIUM BROMIDE 2 PUFF: 17 AEROSOL, METERED RESPIRATORY (INHALATION) at 15:38

## 2021-09-27 RX ADMIN — Medication 100 MG: at 10:57

## 2021-09-27 RX ADMIN — LACTULOSE 20 G: 10 SOLUTION ORAL at 10:58

## 2021-09-27 RX ADMIN — ALBUTEROL SULFATE 2 PUFF: 90 AEROSOL, METERED RESPIRATORY (INHALATION) at 08:18

## 2021-09-27 RX ADMIN — SODIUM CHLORIDE, PRESERVATIVE FREE 10 ML: 5 INJECTION INTRAVENOUS at 10:58

## 2021-09-27 RX ADMIN — DICYCLOMINE HYDROCHLORIDE 10 MG: 10 CAPSULE ORAL at 11:09

## 2021-09-27 RX ADMIN — BISACODYL 5 MG: 5 TABLET, COATED ORAL at 10:58

## 2021-09-27 RX ADMIN — LACTULOSE 20 G: 10 SOLUTION ORAL at 14:12

## 2021-09-27 RX ADMIN — PROMETHAZINE HYDROCHLORIDE 25 MG: 25 INJECTION INTRAMUSCULAR; INTRAVENOUS at 14:13

## 2021-09-27 RX ADMIN — ALBUTEROL SULFATE 2 PUFF: 90 AEROSOL, METERED RESPIRATORY (INHALATION) at 00:26

## 2021-09-27 RX ADMIN — OXYCODONE HYDROCHLORIDE AND ACETAMINOPHEN 500 MG: 500 TABLET ORAL at 21:48

## 2021-09-27 RX ADMIN — ONDANSETRON 4 MG: 2 INJECTION INTRAMUSCULAR; INTRAVENOUS at 04:59

## 2021-09-27 RX ADMIN — OXYCODONE HYDROCHLORIDE AND ACETAMINOPHEN 500 MG: 500 TABLET ORAL at 10:57

## 2021-09-27 RX ADMIN — DICYCLOMINE HYDROCHLORIDE 10 MG: 10 CAPSULE ORAL at 21:35

## 2021-09-27 RX ADMIN — ACETAMINOPHEN 650 MG: 325 TABLET ORAL at 14:18

## 2021-09-27 RX ADMIN — ALBUTEROL SULFATE 2 PUFF: 90 AEROSOL, METERED RESPIRATORY (INHALATION) at 15:38

## 2021-09-27 RX ADMIN — SODIUM CHLORIDE, PRESERVATIVE FREE 10 ML: 5 INJECTION INTRAVENOUS at 21:35

## 2021-09-27 RX ADMIN — ALBUTEROL SULFATE 2 PUFF: 90 AEROSOL, METERED RESPIRATORY (INHALATION) at 19:28

## 2021-09-27 RX ADMIN — OXYCODONE HYDROCHLORIDE AND ACETAMINOPHEN 500 MG: 500 TABLET ORAL at 14:13

## 2021-09-27 RX ADMIN — LACTULOSE 20 G: 10 SOLUTION ORAL at 21:35

## 2021-09-27 RX ADMIN — DEXAMETHASONE SODIUM PHOSPHATE 10 MG: 4 INJECTION, SOLUTION INTRAMUSCULAR; INTRAVENOUS at 17:53

## 2021-09-27 RX ADMIN — ENOXAPARIN SODIUM 40 MG: 40 INJECTION SUBCUTANEOUS at 10:58

## 2021-09-27 RX ADMIN — ENOXAPARIN SODIUM 40 MG: 40 INJECTION SUBCUTANEOUS at 21:35

## 2021-09-27 RX ADMIN — BENZONATATE 200 MG: 100 CAPSULE ORAL at 21:35

## 2021-09-27 RX ADMIN — IPRATROPIUM BROMIDE 2 PUFF: 17 AEROSOL, METERED RESPIRATORY (INHALATION) at 08:17

## 2021-09-27 RX ADMIN — IPRATROPIUM BROMIDE 2 PUFF: 17 AEROSOL, METERED RESPIRATORY (INHALATION) at 00:26

## 2021-09-27 RX ADMIN — DICYCLOMINE HYDROCHLORIDE 10 MG: 10 CAPSULE ORAL at 14:13

## 2021-09-27 RX ADMIN — BUDESONIDE AND FORMOTEROL FUMARATE DIHYDRATE 2 PUFF: 160; 4.5 AEROSOL RESPIRATORY (INHALATION) at 19:28

## 2021-09-27 RX ADMIN — ZINC SULFATE 220 MG (50 MG) CAPSULE 50 MG: CAPSULE at 10:57

## 2021-09-27 RX ADMIN — MONTELUKAST SODIUM 10 MG: 10 TABLET, FILM COATED ORAL at 10:58

## 2021-09-27 RX ADMIN — ALBUTEROL SULFATE 2 PUFF: 90 AEROSOL, METERED RESPIRATORY (INHALATION) at 12:15

## 2021-09-27 RX ADMIN — ACETAMINOPHEN 650 MG: 325 TABLET ORAL at 21:35

## 2021-09-27 RX ADMIN — IPRATROPIUM BROMIDE 2 PUFF: 17 AEROSOL, METERED RESPIRATORY (INHALATION) at 19:28

## 2021-09-27 RX ADMIN — IPRATROPIUM BROMIDE 2 PUFF: 17 AEROSOL, METERED RESPIRATORY (INHALATION) at 12:15

## 2021-09-27 RX ADMIN — KETOROLAC TROMETHAMINE 15 MG: 30 INJECTION, SOLUTION INTRAMUSCULAR; INTRAVENOUS at 21:35

## 2021-09-27 RX ADMIN — LEVOTHYROXINE SODIUM 50 MCG: 0.05 TABLET ORAL at 11:08

## 2021-09-27 ASSESSMENT — PAIN SCALES - GENERAL
PAINLEVEL_OUTOF10: 5
PAINLEVEL_OUTOF10: 3
PAINLEVEL_OUTOF10: 9
PAINLEVEL_OUTOF10: 4

## 2021-09-27 ASSESSMENT — PAIN DESCRIPTION - PAIN TYPE
TYPE: ACUTE PAIN
TYPE: ACUTE PAIN

## 2021-09-27 ASSESSMENT — PAIN DESCRIPTION - LOCATION
LOCATION: ABDOMEN
LOCATION: HEAD

## 2021-09-27 NOTE — PROGRESS NOTES
Hospital Medicine Progress Note     Date:  9/27/2021    PCP: Allie Shea (Tel: [phone])    Date of Admission: 9/18/2021    Chief complaint:   Chief Complaint   Patient presents with    Cough    Shortness of Breath     covid positive       Assessment:  Active Problems:    COVID-19  Resolved Problems:    * No resolved hospital problems. *      Plan:  1. Acute respiratory failure with hypoxia  Secondary to COVID-19 pneumonia  On high flow oxygen, wean as tolerated  Patient received Regeneron  Continue on IV Decadron  on IV Lasix  Multivitamin cocktail  DVT prophylaxis  Start incentive spirometer  --O2 requirements trending down today    Migraine Headache   -Reports has them intermittently at home, sumatriptan usually provides relief  -S/p Sumatriptan x2  -HA cocktail   -Continues to report nausea, some headache, add toradol, phenergan     2. Pneumonia due to COVID-19  Management as above     3. COPD  Continue breathing treatment     4.  Essential hypertension  Continue current management     5.  Tobacco abuse    Dispo: O2 requirements trending down, will order home O2 eval tomorrow     Diet: ADULT DIET; Regular  Adult Oral Nutrition Supplement; Low Calorie/High Protein Oral Supplement    Code status: Full Code   ----------  Subjective  Continuing to report some headaches and nausea refractory to zofran    Objective  Physical exam:  Vitals: /75   Pulse 83   Temp 98.9 °F (37.2 °C) (Oral)   Resp 14   Ht 5' 5\" (1.651 m)   Wt 198 lb 1.6 oz (89.9 kg)   LMP  (LMP Unknown)   SpO2 94%   BMI 32.97 kg/m²   Gen/overall appearance: Not in acute distress. Alert. Head: Normocephalic, atraumatic  Eyes: EOMI, good acuity  ENT: Oral mucosa moist  Neck: No JVD, thyromegaly  CVS: Nml S1S2, no MRG, RRR  Pulm: Bilateral crackles, tachypnea  Gastrointestinal: Soft, NT/ND, +BS  Musculoskeletal: No edema. Warm  Neuro: No focal deficit. Moves extremity spontaneously. Psychiatry: Appropriate affect.  Not agitated. Skin: Warm, dry with normal turgor. No rash  Capillary refill: Brisk,< 3 seconds   Peripheral Pulses: +2 palpable, equal bilaterally     24HR INTAKE/OUTPUT:    No intake or output data in the 24 hours ending 09/27/21 1827  No intake/output data recorded. No intake/output data recorded.   Meds:    lactulose  20 g Oral TID    budesonide-formoterol  2 puff Inhalation BID    albuterol sulfate HFA  2 puff Inhalation 4x daily    ipratropium  2 puff Inhalation 4x daily    dexamethasone  10 mg IntraVENous Q24H    ascorbic acid  500 mg Oral TID    thiamine  100 mg Oral Daily    zinc sulfate  50 mg Oral Daily    enoxaparin  40 mg SubCUTAneous BID    sodium chloride flush  5-40 mL IntraVENous 2 times per day    bisacodyl  5 mg Oral Daily    levothyroxine  50 mcg Oral Daily    dicyclomine  10 mg Oral TID    montelukast  10 mg Oral Daily     Infusions:    sodium chloride Stopped (09/18/21 2152)     PRN Meds: promethazine, sodium chloride, benzonatate, sodium chloride flush, sodium chloride, ondansetron **OR** ondansetron, polyethylene glycol, acetaminophen **OR** [DISCONTINUED] acetaminophen, guaiFENesin-dextromethorphan, albuterol sulfate HFA    Labs/imaging:  CBC:   Recent Labs     09/25/21  1041   WBC 11.7*   HGB 13.0        BMP:    Recent Labs     09/25/21  1041 09/26/21  0828 09/27/21  1058    140 140   K 4.1 5.1 4.3    104 103   CO2 23 24 22   BUN 19 19 26*   CREATININE 0.5* 0.6 0.8   GLUCOSE 212* 123* 208*     Hepatic:   Recent Labs     09/25/21  1041   AST 11*   ALT 20   BILITOT 0.2   ALKPHOS 37       Johanna James MD  -------------------------------  Rounding hospitalist

## 2021-09-27 NOTE — PROGRESS NOTES
Pulmonary and Critical Care  Progress Note    Subjective: The patient is feeling sl better. On HFNC. Shortness of breath is unchanged. Chest pain none  Addressing respiratory complaints Patient is negative for  hemoptysis and cyanosis  CONSTITUTIONAL:  negative for fevers and chills      Past Medical History:     has a past medical history of Acquired hypothyroidism, Chronic back pain, Chronic obstructive pulmonary disease ([address] COPD (chronic obstructive pulmonary disease) ([address] COVID-19, Essential hypertension, Fibromyalgia, Former tobacco use, GERD (gastroesophageal reflux disease), H/O 24 hour EKG monitoring, H/O fibromyalgia, History of 24 hour EKG monitoring, History of echocardiogram, History of hysterectomy, Hx of cardiovascular stress test, Hyperlipidemia, Hypertension, Schizoaffective disorder ([address] Simple chronic bronchitis ([facility] [address] and Spastic paraparesis. has a past surgical history that includes Hysterectomy; back surgery; Inner ear surgery (Left); Tubal ligation; Inguinal hernia repair (Right); and Cardiac catheterization. reports that she quit smoking about 10 years ago. She has a 25.00 pack-year smoking history. She has never used smokeless tobacco. She reports that she does not drink alcohol and does not use drugs. Family history:  family history includes Breast Cancer in her maternal grandmother and paternal aunt; Diabetes in her sister; Elevated Lipids in her mother; Heart Disease in her father, mother, and sister; Hypertension in her mother; Kidney Disease in her sister; Ovarian Cancer in her paternal aunt.     Allergies   Allergen Reactions    Latex     Codeine Itching    Pcn [Penicillins] Hives     Social History:    Reviewed; no changes    Objective:   PHYSICAL EXAM:        VITALS:  /75   Pulse 83   Temp 98.9 °F (37.2 °C) (Oral)   Resp 14   Ht 5' 5\" (1.651 m)   Wt 198 lb 1.6 oz (89.9 kg)   LMP  (LMP Unknown)   SpO2 94%   BMI 32.97 kg/m²     24HR INTAKE/OUTPUT:    No intake or output data in the 24 hours ending 09/27/21 1735    CONSTITUTIONAL:  awake, alert, cooperative, no apparent distress, and appears stated age  LUNGS:  decreased breath sounds, basilar crackles. CARDIOVASCULAR:  normal S1 and S2 and negative JVD  ABD:Abdomen soft, non-tender. BS normal. No masses,  No organomegaly  NEURO:Alert and oriented x3. Gait normal. Reflexes and motor strength normal and symmetric. Cranial nerves 2-12 and sensation grossly intact. DATA:    CBC:  Recent Labs     09/25/21  1041   WBC 11.7*   RBC 4.59   HGB 13.0   HCT 41.6      MCV 90.6   MCH 28.3   MCHC 31.3*   RDW 13.2   SEGSPCT 81.0*      BMP:  Recent Labs     09/25/21  1041 09/26/21  0828 09/27/21  1058    140 140   K 4.1 5.1 4.3    104 103   CO2 23 24 22   BUN 19 19 26*   CREATININE 0.5* 0.6 0.8   CALCIUM 9.6 9.7 9.8   GLUCOSE 212* 123* 208*      ABG:  No results for input(s): PH, PO2ART, KDF7XPW, HCO3, BEART, O2SAT in the last 72 hours. Lab Results   Component Value Date    PROBNP 60.12 09/23/2021    PROBNP 24.10 09/18/2021    PROBNP 7.88 06/29/2014     No results found for: [facility]    Radiology Review:  Pertinent images / reports were reviewed as a part of this visit. Assessment:     Patient Active Problem List   Diagnosis    Hx of cardiovascular stress test    Palpitation    H/O 24 hour EKG monitoring    Fibromyalgia    Chronic obstructive pulmonary disease (HCC)    Schizoaffective disorder ([address]    History of hysterectomy    Hyperlipidemia    Fatty liver    Chronic back pain    Chronic low back pain    Acquired hypothyroidism    Former tobacco use    Simple chronic bronchitis ([address]    Essential hypertension    Perforated left tympanic membrane on examination    COPD exacerbation ([address]    COVID-19       Plan:   1. Overall the patient is sl better. 2. Inc. activity. 3. Wean FiO2.   Honor Ahumada, MD  9/27/2021  5:35 PM

## 2021-09-27 NOTE — CARE COORDINATION
Spoke with patient on phone on [facility] unit regarding discharge planning . Patient is from home and lives with a roommate . Patient reported being independent with all mobility and ADL's. Patient has PCP and insurance to assist with RX coverage . Patient stated she plans to return home and discharge and denied having any discharge needs.  Case Management to follow in case needs would arise

## 2021-09-28 LAB
ANION GAP SERPL CALCULATED.3IONS-SCNC: 11 MMOL/L (ref 4–16)
BUN BLDV-MCNC: 31 MG/DL (ref 6–23)
C-REACTIVE PROTEIN, HIGH SENSITIVITY: 0.6 MG/L
CALCIUM SERPL-MCNC: 9.7 MG/DL (ref 8.3–10.6)
CHLORIDE BLD-SCNC: 105 MMOL/L (ref 99–110)
CO2: 25 MMOL/L (ref 21–32)
CREAT SERPL-MCNC: 0.6 MG/DL (ref 0.6–1.1)
GFR AFRICAN AMERICAN: >60 ML/MIN/1.73M2
GFR NON-AFRICAN AMERICAN: >60 ML/MIN/1.73M2
GLUCOSE BLD-MCNC: 104 MG/DL (ref 70–99)
POTASSIUM SERPL-SCNC: 4.6 MMOL/L (ref 3.5–5.1)
SODIUM BLD-SCNC: 141 MMOL/L (ref 135–145)

## 2021-09-28 PROCEDURE — 36415 COLL VENOUS BLD VENIPUNCTURE: CPT

## 2021-09-28 PROCEDURE — 2580000003 HC RX 258: Performed by: NURSE PRACTITIONER

## 2021-09-28 PROCEDURE — 6360000002 HC RX W HCPCS: Performed by: FAMILY MEDICINE

## 2021-09-28 PROCEDURE — 94618 PULMONARY STRESS TESTING: CPT

## 2021-09-28 PROCEDURE — 6360000002 HC RX W HCPCS: Performed by: NURSE PRACTITIONER

## 2021-09-28 PROCEDURE — 2700000000 HC OXYGEN THERAPY PER DAY

## 2021-09-28 PROCEDURE — 6360000002 HC RX W HCPCS: Performed by: HOSPITALIST

## 2021-09-28 PROCEDURE — 80048 BASIC METABOLIC PNL TOTAL CA: CPT

## 2021-09-28 PROCEDURE — 94761 N-INVAS EAR/PLS OXIMETRY MLT: CPT

## 2021-09-28 PROCEDURE — 6370000000 HC RX 637 (ALT 250 FOR IP): Performed by: FAMILY MEDICINE

## 2021-09-28 PROCEDURE — 1200000000 HC SEMI PRIVATE

## 2021-09-28 PROCEDURE — 94640 AIRWAY INHALATION TREATMENT: CPT

## 2021-09-28 PROCEDURE — 6370000000 HC RX 637 (ALT 250 FOR IP): Performed by: NURSE PRACTITIONER

## 2021-09-28 PROCEDURE — 86140 C-REACTIVE PROTEIN: CPT

## 2021-09-28 PROCEDURE — 6370000000 HC RX 637 (ALT 250 FOR IP): Performed by: HOSPITALIST

## 2021-09-28 PROCEDURE — 6370000000 HC RX 637 (ALT 250 FOR IP): Performed by: INTERNAL MEDICINE

## 2021-09-28 RX ORDER — ALBUTEROL SULFATE 90 UG/1
2 AEROSOL, METERED RESPIRATORY (INHALATION) 2 TIMES DAILY
Status: DISCONTINUED | OUTPATIENT
Start: 2021-09-28 | End: 2021-09-29

## 2021-09-28 RX ADMIN — SODIUM CHLORIDE, PRESERVATIVE FREE 10 ML: 5 INJECTION INTRAVENOUS at 21:32

## 2021-09-28 RX ADMIN — ONDANSETRON 4 MG: 2 INJECTION INTRAMUSCULAR; INTRAVENOUS at 21:31

## 2021-09-28 RX ADMIN — ALBUTEROL SULFATE 2 PUFF: 90 AEROSOL, METERED RESPIRATORY (INHALATION) at 08:50

## 2021-09-28 RX ADMIN — ZINC SULFATE 220 MG (50 MG) CAPSULE 50 MG: CAPSULE at 11:23

## 2021-09-28 RX ADMIN — PROMETHAZINE HYDROCHLORIDE 25 MG: 25 INJECTION INTRAMUSCULAR; INTRAVENOUS at 15:18

## 2021-09-28 RX ADMIN — KETOROLAC TROMETHAMINE 15 MG: 30 INJECTION, SOLUTION INTRAMUSCULAR; INTRAVENOUS at 15:18

## 2021-09-28 RX ADMIN — OXYCODONE HYDROCHLORIDE AND ACETAMINOPHEN 500 MG: 500 TABLET ORAL at 15:18

## 2021-09-28 RX ADMIN — OXYCODONE HYDROCHLORIDE AND ACETAMINOPHEN 500 MG: 500 TABLET ORAL at 21:32

## 2021-09-28 RX ADMIN — LEVOTHYROXINE SODIUM 50 MCG: 0.05 TABLET ORAL at 06:24

## 2021-09-28 RX ADMIN — Medication 100 MG: at 11:23

## 2021-09-28 RX ADMIN — DICYCLOMINE HYDROCHLORIDE 10 MG: 10 CAPSULE ORAL at 15:18

## 2021-09-28 RX ADMIN — BUDESONIDE AND FORMOTEROL FUMARATE DIHYDRATE 2 PUFF: 160; 4.5 AEROSOL RESPIRATORY (INHALATION) at 19:15

## 2021-09-28 RX ADMIN — BENZONATATE 200 MG: 100 CAPSULE ORAL at 21:31

## 2021-09-28 RX ADMIN — ENOXAPARIN SODIUM 40 MG: 40 INJECTION SUBCUTANEOUS at 21:32

## 2021-09-28 RX ADMIN — ALBUTEROL SULFATE 2 PUFF: 90 AEROSOL, METERED RESPIRATORY (INHALATION) at 19:14

## 2021-09-28 RX ADMIN — KETOROLAC TROMETHAMINE 15 MG: 30 INJECTION, SOLUTION INTRAMUSCULAR; INTRAVENOUS at 21:31

## 2021-09-28 RX ADMIN — BISACODYL 5 MG: 5 TABLET, COATED ORAL at 11:23

## 2021-09-28 RX ADMIN — SODIUM CHLORIDE, PRESERVATIVE FREE 10 ML: 5 INJECTION INTRAVENOUS at 11:22

## 2021-09-28 RX ADMIN — OXYCODONE HYDROCHLORIDE AND ACETAMINOPHEN 500 MG: 500 TABLET ORAL at 11:24

## 2021-09-28 RX ADMIN — DICYCLOMINE HYDROCHLORIDE 10 MG: 10 CAPSULE ORAL at 21:32

## 2021-09-28 RX ADMIN — MONTELUKAST SODIUM 10 MG: 10 TABLET, FILM COATED ORAL at 11:23

## 2021-09-28 RX ADMIN — DICYCLOMINE HYDROCHLORIDE 10 MG: 10 CAPSULE ORAL at 11:23

## 2021-09-28 RX ADMIN — ENOXAPARIN SODIUM 40 MG: 40 INJECTION SUBCUTANEOUS at 11:24

## 2021-09-28 RX ADMIN — DEXAMETHASONE SODIUM PHOSPHATE 10 MG: 4 INJECTION, SOLUTION INTRAMUSCULAR; INTRAVENOUS at 18:42

## 2021-09-28 ASSESSMENT — PAIN DESCRIPTION - LOCATION: LOCATION: HEAD

## 2021-09-28 ASSESSMENT — PAIN SCALES - GENERAL
PAINLEVEL_OUTOF10: 0
PAINLEVEL_OUTOF10: 6
PAINLEVEL_OUTOF10: 2
PAINLEVEL_OUTOF10: 7

## 2021-09-28 ASSESSMENT — PAIN DESCRIPTION - PAIN TYPE: TYPE: ACUTE PAIN

## 2021-09-28 NOTE — PROGRESS NOTES
Doctor Please copy and paste below in your progress note per DME requirements.     Patient was seen in hospital for COVID-19 . I am prescribing oxygen because the diagnosis and testing requires the patient to have oxygen in the home. Conditions will improve or be benefited by oxygen use. The patient is able to perform good mobility and therefore requires the use of a portable oxygen system for ambulation.

## 2021-09-28 NOTE — RT PROTOCOL NOTE
RT Inhaler-Nebulizer Bronchodilator Protocol Note    There is a bronchodilator order in the chart from a provider indicating to follow the RT Bronchodilator Protocol and there is an Initiate RT Inhaler-Nebulizer Bronchodilator Protocol order as well (see protocol at bottom of note). CXR Findings:  No results found. The findings from the last RT Protocol Assessment were as follows:   History Pulmonary Disease: Smoker 15 pack years or more  Respiratory Pattern: Dyspnea on exertion or RR 21-25 bpm  Breath Sounds: Slightly diminished and/or crackles  Cough: Strong, spontaneous, non-productive  Indication for Bronchodilator Therapy:    Bronchodilator Assessment Score: 5    Aerosolized bronchodilator medication orders have been revised according to the RT Inhaler-Nebulizer Bronchodilator Protocol below. Respiratory Therapist to perform RT Therapy Protocol Assessment initially then follow the protocol. Repeat RT Therapy Protocol Assessment PRN for score 0-3 or on second treatment, BID, and PRN for scores above 3. No Indications - adjust the frequency to every 6 hours PRN wheezing or bronchospasm, if no treatments needed after 48 hours then discontinue using Per Protocol order mode. If indication present, adjust the RT bronchodilator orders based on the Bronchodilator Assessment Score as indicated below. Use Inhaler orders unless patient has one or more of the following: on home nebulizer, not able to hold breath for 10 seconds, is not alert and oriented, cannot activate and use MDI correctly, or respiratory rate 25 breaths per minute or more, then use the equivalent nebulizer order(s) with same Frequency and PRN reasons based on the score. If a patient is on this medication at home then do not decrease Frequency below that used at home.     0-3 - enter or revise RT bronchodilator order(s) to equivalent RT Bronchodilator order with Frequency of every 4 hours PRN for wheezing or increased work of breathing using Per Protocol order mode. 4-6 - enter or revise RT Bronchodilator order(s) to two equivalent RT bronchodilator orders with one order with BID Frequency and one order with Frequency of every 4 hours PRN wheezing or increased work of breathing using Per Protocol order mode. 7-10 - enter or revise RT Bronchodilator order(s) to two equivalent RT bronchodilator orders with one order with TID Frequency and one order with Frequency of every 4 hours PRN wheezing or increased work of breathing using Per Protocol order mode. 11-13 - enter or revise RT Bronchodilator order(s) to one equivalent RT bronchodilator order with QID Frequency and an Albuterol order with Frequency of every 4 hours PRN wheezing or increased work of breathing using Per Protocol order mode. Greater than 13 - enter or revise RT Bronchodilator order(s) to one equivalent RT bronchodilator order with every 4 hours Frequency and an Albuterol order with Frequency of every 2 hours PRN wheezing or increased work of breathing using Per Protocol order mode. RT to enter RT Home Evaluation for COPD & MDI Assessment order using Per Protocol order mode.     Electronically signed by Tato Rivero RCP on 9/28/2021 at 10:28 AM

## 2021-09-28 NOTE — PROGRESS NOTES
Pt home O2 paperwork faxed to [facility]. Please do not discharge pt without oxygen. This testing will be good for 48 hours and will have to be repeated if pt has not discharged prior to then. If IGO has not been delivered prior to pt being ready to leave, please call PFT @ [phone] or Respiratory Therapy @ [phone]. Thanks.

## 2021-09-28 NOTE — PROGRESS NOTES
Patient was seen in hospital for COVID-19 .  I am prescribing oxygen because the diagnosis and testing requires the patient to have oxygen in the home.  Conditions will improve or be benefited by oxygen use.  The patient is able to perform good mobility and therefore requires the use of a portable oxygen system for ambulation.

## 2021-09-28 NOTE — PROGRESS NOTES
Pulmonary and Critical Care  Progress Note    Subjective: The patient is comfortable in bed, feeling better. On HFNC. Shortness of breath is better. Chest pain none  Addressing respiratory complaints Patient is negative for  hemoptysis and cyanosis  CONSTITUTIONAL:  negative for fevers and chills      Past Medical History:     has a past medical history of Acquired hypothyroidism, Chronic back pain, Chronic obstructive pulmonary disease ([address] COPD (chronic obstructive pulmonary disease) ([address] COVID-19, Essential hypertension, Fibromyalgia, Former tobacco use, GERD (gastroesophageal reflux disease), H/O 24 hour EKG monitoring, H/O fibromyalgia, History of 24 hour EKG monitoring, History of echocardiogram, History of hysterectomy, Hx of cardiovascular stress test, Hyperlipidemia, Hypertension, Schizoaffective disorder ([address] Simple chronic bronchitis ([address] and Spastic paraparesis. has a past surgical history that includes Hysterectomy; back surgery; Inner ear surgery (Left); Tubal ligation; Inguinal hernia repair (Right); and Cardiac catheterization. reports that she quit smoking about 10 years ago. She has a 25.00 pack-year smoking history. She has never used smokeless tobacco. She reports that she does not drink alcohol and does not use drugs. Family history:  family history includes Breast Cancer in her maternal grandmother and paternal aunt; Diabetes in her sister; Elevated Lipids in her mother; Heart Disease in her father, mother, and sister; Hypertension in her mother; Kidney Disease in her sister; Ovarian Cancer in her paternal aunt.     Allergies   Allergen Reactions    Latex     Codeine Itching    Pcn [Penicillins] Hives     Social History:    Reviewed; no changes    Objective:   PHYSICAL EXAM:        VITALS:  /69   Pulse 63   Temp 97.5 °F (36.4 °C) (Oral)   Resp 20   Ht 5' 5\" (1.651 m)   Wt 209 lb 6.4 oz (95 kg)   LMP  (LMP Unknown)   SpO2 94%   BMI 34.85 kg/m²     24HR INTAKE/OUTPUT:    No intake or output data in the 24 hours ending 09/28/21 1107    CONSTITUTIONAL:  awake, alert, cooperative, no apparent distress, and appears stated age  LUNGS:  decreased breath sounds, basilar crackles. CARDIOVASCULAR:  normal S1 and S2 and negative JVD  ABD:Abdomen soft, non-tender. BS normal. No masses,  No organomegaly  NEURO:Alert and oriented x3. Gait normal. Reflexes and motor strength normal and symmetric. Cranial nerves 2-12 and sensation grossly intact. DATA:    CBC:  No results for input(s): WBC, RBC, HGB, HCT, PLT, MCV, MCH, MCHC, RDW, NRBC, SEGSPCT, BANDSPCT in the last 72 hours. BMP:  Recent Labs     09/26/21  0828 09/27/21  1058    140   K 5.1 4.3    103   CO2 24 22   BUN 19 26*   CREATININE 0.6 0.8   CALCIUM 9.7 9.8   GLUCOSE 123* 208*      ABG:  No results for input(s): PH, PO2ART, [id number], HCO3, BEART, O2SAT in the last 72 hours. Lab Results   Component Value Date    PROBNP 60.12 09/23/2021    PROBNP 24.10 09/18/2021    PROBNP 7.88 06/29/2014     No results found for: [facility]    Radiology Review:  Pertinent images / reports were reviewed as a part of this visit. Assessment:     Patient Active Problem List   Diagnosis    Hx of cardiovascular stress test    Palpitation    H/O 24 hour EKG monitoring    Fibromyalgia    Chronic obstructive pulmonary disease (HCC)    Schizoaffective disorder ([address]    History of hysterectomy    Hyperlipidemia    Fatty liver    Chronic back pain    Chronic low back pain    Acquired hypothyroidism    Former tobacco use    Simple chronic bronchitis ([address]    Essential hypertension    Perforated left tympanic membrane on examination    COPD exacerbation ([address]    COVID-19       Plan:   1. Overall the patient is better. 2. Inc. activity. 3. Wean FiO2.   Kaur Barr MD  9/28/2021  11:07 AM

## 2021-09-28 NOTE — PROGRESS NOTES
Hospital Medicine Progress Note     Date:  9/28/2021    PCP: Roberto Duckworth (Tel: [phone])    Date of Admission: 9/18/2021    Chief complaint:   Chief Complaint   Patient presents with    Cough    Shortness of Breath     covid positive       Assessment:  Active Problems:    COVID-19  Resolved Problems:    * No resolved hospital problems. *      Plan:  1. Acute respiratory failure with hypoxia  Secondary to COVID-19 pneumonia  On high flow oxygen, wean as tolerated  Patient received Regeneron  Continue on IV Decadron  on IV Lasix  Multivitamin cocktail  DVT prophylaxis  Start incentive spirometer  --O2 requirements trending down today    Migraine Headache   -Reports has them intermittently at home, sumatriptan usually provides relief  -S/p Sumatriptan x2  -HA cocktail   -Continues to report nausea, some headache, add toradol, phenergan     2. Pneumonia due to COVID-19  Management as above     3. COPD  Continue breathing treatment     4.  Essential hypertension  Continue current management     5.  Tobacco abuse    Dispo: O2 requirements trending down, will order home O2 eval tomorrow     Diet: ADULT DIET; Regular  Adult Oral Nutrition Supplement; Low Calorie/High Protein Oral Supplement    Code status: Full Code   ----------  Subjective  Continuing to report some headaches and nausea refractory to zofran    Objective  Physical exam:  Vitals: BP (!) 143/82   Pulse 64   Temp 98.7 °F (37.1 °C) (Oral)   Resp 17   Ht 5' 5\" (1.651 m)   Wt 209 lb 6.4 oz (95 kg)   LMP  (LMP Unknown)   SpO2 90%   BMI 34.85 kg/m²   Gen/overall appearance: Not in acute distress. Alert. Head: Normocephalic, atraumatic  Eyes: EOMI, good acuity  ENT: Oral mucosa moist  Neck: No JVD, thyromegaly  CVS: Nml S1S2, no MRG, RRR  Pulm: Bilateral crackles, tachypnea  Gastrointestinal: Soft, NT/ND, +BS  Musculoskeletal: No edema. Warm  Neuro: No focal deficit. Moves extremity spontaneously. Psychiatry: Appropriate affect. Not agitated. Skin: Warm, dry with normal turgor. No rash  Capillary refill: Brisk,< 3 seconds   Peripheral Pulses: +2 palpable, equal bilaterally     24HR INTAKE/OUTPUT:    No intake or output data in the 24 hours ending 09/28/21 1856  No intake/output data recorded. No intake/output data recorded. Meds:    albuterol sulfate HFA  2 puff Inhalation BID    ipratropium  2 puff Inhalation BID    budesonide-formoterol  2 puff Inhalation BID    ascorbic acid  500 mg Oral TID    thiamine  100 mg Oral Daily    zinc sulfate  50 mg Oral Daily    enoxaparin  40 mg SubCUTAneous BID    sodium chloride flush  5-40 mL IntraVENous 2 times per day    bisacodyl  5 mg Oral Daily    levothyroxine  50 mcg Oral Daily    dicyclomine  10 mg Oral TID    montelukast  10 mg Oral Daily     Infusions:    sodium chloride Stopped (09/18/21 2152)     PRN Meds: promethazine, ketorolac, sodium chloride, benzonatate, sodium chloride flush, sodium chloride, ondansetron **OR** ondansetron, polyethylene glycol, acetaminophen **OR** [DISCONTINUED] acetaminophen, guaiFENesin-dextromethorphan, albuterol sulfate HFA    Labs/imaging:  CBC:   No results for input(s): WBC, HGB, PLT in the last 72 hours. BMP:    Recent Labs     09/26/21  0828 09/27/21  1058 09/28/21  1550    140 141   K 5.1 4.3 4.6    103 105   CO2 24 22 25   BUN 19 26* 31*   CREATININE 0.6 0.8 0.6   GLUCOSE 123* 208* 104*     Hepatic:   No results for input(s): AST, ALT, ALB, BILITOT, ALKPHOS in the last 72 hours.     Fina Rodríguez MD  -------------------------------  [facility]

## 2021-09-28 NOTE — PROGRESS NOTES
9/28/2021 3:09 PM  Patient Room #: 2925/6742-J  Patient Name: Nicolette Guerin    (Step 1 Done by RN if possible otherwise call Pulmonary Diagnostics)  1. Place patient on room air at rest for at least 30 minutes. If patient falls below 88% before 30 minutes then you can record the level and stop. Record room air saturation level _85_ %. If patient is at 88% or below, they will qualify for home oxygen and you can stop. If level does not fall below 88%, fill in level above. If indicated continue to Step 2. Signature:__Bella Lindsey RRT __ Date: _09/28/2021__  (Step 2&3 Done by P)  2. Ambulate patient on room air until saturation falls below 89%. Record level of room air saturation with ambulation___ %. Next, place patient back on ___lpm oxygen and ambulate, record level __%. (Note:  this level must show improvement from room air level done with ambulation.)  If patients saturation on room air with ambulation is 88% or below AND patient shows improvement with oxygen during ambulation, they will qualify for home oxygen and you can stop. If patient does not drop below 89%, then patient should have an overnight oximetry trending on room air to see if level falls below 88%. Complete level in Step 3 below. 3. Room air overnight oximetry level 88 % for___  cumulative minutes. If patients room air oxygen level is < 89% for at least 5 cumulative minutes, patient will qualify for home oxygen and you can stop.         (Attach Night Trending Report)    Complete order below: Diagnosis:__COVID-19__  Home oxygen at:  Length of Need: ? 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

## 2021-09-29 VITALS
HEIGHT: 65 IN | HEART RATE: 55 BPM | OXYGEN SATURATION: 95 % | BODY MASS INDEX: 34.89 KG/M2 | DIASTOLIC BLOOD PRESSURE: 74 MMHG | TEMPERATURE: 98 F | WEIGHT: 209.4 LBS | RESPIRATION RATE: 15 BRPM | SYSTOLIC BLOOD PRESSURE: 137 MMHG

## 2021-09-29 LAB
ANION GAP SERPL CALCULATED.3IONS-SCNC: 13 MMOL/L (ref 4–16)
BUN BLDV-MCNC: 33 MG/DL (ref 6–23)
C-REACTIVE PROTEIN, HIGH SENSITIVITY: 0.6 MG/L
CALCIUM SERPL-MCNC: 9.6 MG/DL (ref 8.3–10.6)
CHLORIDE BLD-SCNC: 105 MMOL/L (ref 99–110)
CO2: 21 MMOL/L (ref 21–32)
CREAT SERPL-MCNC: 0.7 MG/DL (ref 0.6–1.1)
GFR AFRICAN AMERICAN: >60 ML/MIN/1.73M2
GFR NON-AFRICAN AMERICAN: >60 ML/MIN/1.73M2
GLUCOSE BLD-MCNC: 130 MG/DL (ref 70–99)
POTASSIUM SERPL-SCNC: 5 MMOL/L (ref 3.5–5.1)
SODIUM BLD-SCNC: 139 MMOL/L (ref 135–145)

## 2021-09-29 PROCEDURE — 86140 C-REACTIVE PROTEIN: CPT

## 2021-09-29 PROCEDURE — 94761 N-INVAS EAR/PLS OXIMETRY MLT: CPT

## 2021-09-29 PROCEDURE — 80048 BASIC METABOLIC PNL TOTAL CA: CPT

## 2021-09-29 PROCEDURE — 94640 AIRWAY INHALATION TREATMENT: CPT

## 2021-09-29 PROCEDURE — 6360000002 HC RX W HCPCS: Performed by: HOSPITALIST

## 2021-09-29 PROCEDURE — 2700000000 HC OXYGEN THERAPY PER DAY

## 2021-09-29 PROCEDURE — 6370000000 HC RX 637 (ALT 250 FOR IP): Performed by: NURSE PRACTITIONER

## 2021-09-29 PROCEDURE — 94664 DEMO&/EVAL PT USE INHALER: CPT

## 2021-09-29 PROCEDURE — 6360000002 HC RX W HCPCS: Performed by: FAMILY MEDICINE

## 2021-09-29 PROCEDURE — 6370000000 HC RX 637 (ALT 250 FOR IP): Performed by: HOSPITALIST

## 2021-09-29 PROCEDURE — 2580000003 HC RX 258: Performed by: NURSE PRACTITIONER

## 2021-09-29 PROCEDURE — 36415 COLL VENOUS BLD VENIPUNCTURE: CPT

## 2021-09-29 RX ORDER — PROMETHAZINE HYDROCHLORIDE 25 MG/1
25 TABLET ORAL 3 TIMES DAILY PRN
Qty: 12 TABLET | Refills: 0 | Status: SHIPPED | OUTPATIENT
Start: 2021-09-29 | End: 2021-10-03

## 2021-09-29 RX ORDER — ALBUTEROL SULFATE 90 UG/1
2 AEROSOL, METERED RESPIRATORY (INHALATION) 3 TIMES DAILY
Status: DISCONTINUED | OUTPATIENT
Start: 2021-09-29 | End: 2021-09-29 | Stop reason: HOSPADM

## 2021-09-29 RX ADMIN — BISACODYL 5 MG: 5 TABLET, COATED ORAL at 10:43

## 2021-09-29 RX ADMIN — OXYCODONE HYDROCHLORIDE AND ACETAMINOPHEN 500 MG: 500 TABLET ORAL at 10:43

## 2021-09-29 RX ADMIN — KETOROLAC TROMETHAMINE 15 MG: 30 INJECTION, SOLUTION INTRAMUSCULAR; INTRAVENOUS at 04:18

## 2021-09-29 RX ADMIN — PROMETHAZINE HYDROCHLORIDE 25 MG: 25 INJECTION INTRAMUSCULAR; INTRAVENOUS at 10:42

## 2021-09-29 RX ADMIN — KETOROLAC TROMETHAMINE 15 MG: 30 INJECTION, SOLUTION INTRAMUSCULAR; INTRAVENOUS at 10:42

## 2021-09-29 RX ADMIN — DICYCLOMINE HYDROCHLORIDE 10 MG: 10 CAPSULE ORAL at 10:43

## 2021-09-29 RX ADMIN — ALBUTEROL SULFATE 2 PUFF: 90 AEROSOL, METERED RESPIRATORY (INHALATION) at 07:52

## 2021-09-29 RX ADMIN — ENOXAPARIN SODIUM 40 MG: 40 INJECTION SUBCUTANEOUS at 10:43

## 2021-09-29 RX ADMIN — SODIUM CHLORIDE, PRESERVATIVE FREE 10 ML: 5 INJECTION INTRAVENOUS at 10:42

## 2021-09-29 RX ADMIN — LEVOTHYROXINE SODIUM 50 MCG: 0.05 TABLET ORAL at 04:19

## 2021-09-29 RX ADMIN — MONTELUKAST SODIUM 10 MG: 10 TABLET, FILM COATED ORAL at 10:43

## 2021-09-29 RX ADMIN — ACETAMINOPHEN 650 MG: 325 TABLET ORAL at 04:18

## 2021-09-29 RX ADMIN — BUDESONIDE AND FORMOTEROL FUMARATE DIHYDRATE 2 PUFF: 160; 4.5 AEROSOL RESPIRATORY (INHALATION) at 07:53

## 2021-09-29 RX ADMIN — Medication 100 MG: at 10:43

## 2021-09-29 RX ADMIN — ZINC SULFATE 220 MG (50 MG) CAPSULE 50 MG: CAPSULE at 10:43

## 2021-09-29 ASSESSMENT — PAIN DESCRIPTION - PAIN TYPE
TYPE: ACUTE PAIN
TYPE: ACUTE PAIN

## 2021-09-29 ASSESSMENT — PAIN DESCRIPTION - LOCATION
LOCATION: HEAD
LOCATION: HEAD

## 2021-09-29 ASSESSMENT — PAIN SCALES - GENERAL
PAINLEVEL_OUTOF10: 6
PAINLEVEL_OUTOF10: 5
PAINLEVEL_OUTOF10: 8

## 2021-09-29 NOTE — PROGRESS NOTES
RT Inhaler-Nebulizer Bronchodilator Protocol Note    There is a bronchodilator order in the chart from a provider indicating to follow the RT Bronchodilator Protocol and there is an Initiate RT Inhaler-Nebulizer Bronchodilator Protocol order as well (see protocol at bottom of note). CXR Findings:  No results found. The findings from the last RT Protocol Assessment were as follows:   History Pulmonary Disease: Chronic pulmonary disease  Respiratory Pattern: Dyspnea on exertion or RR 21-25 bpm  Breath Sounds: Intermittent or unilateral wheezes  Cough: Strong, spontaneous, non-productive  Indication for Bronchodilator Therapy: Wheezing associated with pulm disorder, Decreased or absent breath sounds  Bronchodilator Assessment Score: 8    Aerosolized bronchodilator medication orders have been revised according to the RT Inhaler-Nebulizer Bronchodilator Protocol below. Respiratory Therapist to perform RT Therapy Protocol Assessment initially then follow the protocol. Repeat RT Therapy Protocol Assessment PRN for score 0-3 or on second treatment, BID, and PRN for scores above 3. No Indications - adjust the frequency to every 6 hours PRN wheezing or bronchospasm, if no treatments needed after 48 hours then discontinue using Per Protocol order mode. If indication present, adjust the RT bronchodilator orders based on the Bronchodilator Assessment Score as indicated below. Use Inhaler orders unless patient has one or more of the following: on home nebulizer, not able to hold breath for 10 seconds, is not alert and oriented, cannot activate and use MDI correctly, or respiratory rate 25 breaths per minute or more, then use the equivalent nebulizer order(s) with same Frequency and PRN reasons based on the score. If a patient is on this medication at home then do not decrease Frequency below that used at home.     0-3 - enter or revise RT bronchodilator order(s) to equivalent RT Bronchodilator order with Frequency of every 4 hours PRN for wheezing or increased work of breathing using Per Protocol order mode. 4-6 - enter or revise RT Bronchodilator order(s) to two equivalent RT bronchodilator orders with one order with BID Frequency and one order with Frequency of every 4 hours PRN wheezing or increased work of breathing using Per Protocol order mode. 7-10 - enter or revise RT Bronchodilator order(s) to two equivalent RT bronchodilator orders with one order with TID Frequency and one order with Frequency of every 4 hours PRN wheezing or increased work of breathing using Per Protocol order mode. 11-13 - enter or revise RT Bronchodilator order(s) to one equivalent RT bronchodilator order with QID Frequency and an Albuterol order with Frequency of every 4 hours PRN wheezing or increased work of breathing using Per Protocol order mode. Greater than 13 - enter or revise RT Bronchodilator order(s) to one equivalent RT bronchodilator order with every 4 hours Frequency and an Albuterol order with Frequency of every 2 hours PRN wheezing or increased work of breathing using Per Protocol order mode. RT to enter RT Home Evaluation for COPD & MDI Assessment order using Per Protocol order mode.     Electronically signed by Harlan Salazar RCP on 9/29/2021 at 11:20 AM

## 2021-09-29 NOTE — PROGRESS NOTES
Pulmonary and Critical Care  Progress Note    Subjective: The patient is better. On HFNC. Shortness of breath is unchanged. Chest pain none  Addressing respiratory complaints Patient is negative for  hemoptysis and cyanosis  CONSTITUTIONAL:  negative for fevers and chills      Past Medical History:     has a past medical history of Acquired hypothyroidism, Chronic back pain, Chronic obstructive pulmonary disease ([address] COPD (chronic obstructive pulmonary disease) ([address] COVID-19, Essential hypertension, Fibromyalgia, Former tobacco use, GERD (gastroesophageal reflux disease), H/O 24 hour EKG monitoring, H/O fibromyalgia, History of 24 hour EKG monitoring, History of echocardiogram, History of hysterectomy, Hx of cardiovascular stress test, Hyperlipidemia, Hypertension, Schizoaffective disorder ([facility] [address] Simple chronic bronchitis ([facility] [address] and Spastic paraparesis. has a past surgical history that includes Hysterectomy; back surgery; Inner ear surgery (Left); Tubal ligation; Inguinal hernia repair (Right); and Cardiac catheterization. reports that she quit smoking about 10 years ago. She has a 25.00 pack-year smoking history. She has never used smokeless tobacco. She reports that she does not drink alcohol and does not use drugs. Family history:  family history includes Breast Cancer in her maternal grandmother and paternal aunt; Diabetes in her sister; Elevated Lipids in her mother; Heart Disease in her father, mother, and sister; Hypertension in her mother; Kidney Disease in her sister; Ovarian Cancer in her paternal aunt.     Allergies   Allergen Reactions    Latex     Codeine Itching    Pcn [Penicillins] Hives     Social History:    Reviewed; no changes    Objective:   PHYSICAL EXAM:        VITALS:  /74   Pulse 55   Temp 98 °F (36.7 °C) (Oral)   Resp 15   Ht 5' 5\" (1.651 m)   Wt 209 lb 6.4 oz (95 kg)   LMP  (LMP Unknown)   SpO2 95%   BMI 34.85 kg/m²     24HR INTAKE/OUTPUT:    No intake or output data in the 24 hours ending 09/29/21 1134    CONSTITUTIONAL:  awake, alert, cooperative, no apparent distress, and appears stated age  LUNGS:  decreased breath sounds, basilar crackles. CARDIOVASCULAR:  normal S1 and S2 and negative JVD  ABD:Abdomen soft, non-tender. BS normal. No masses,  No organomegaly  NEURO:Alert and oriented x3. Gait normal. Reflexes and motor strength normal and symmetric. Cranial nerves 2-12 and sensation grossly intact. DATA:    CBC:  No results for input(s): WBC, RBC, HGB, HCT, PLT, MCV, MCH, MCHC, RDW, NRBC, SEGSPCT, BANDSPCT in the last 72 hours. BMP:  Recent Labs     09/27/21  1058 09/28/21  1550 09/29/21  0811    141 139   K 4.3 4.6 5.0    105 105   CO2 22 25 21   BUN 26* 31* 33*   CREATININE 0.8 0.6 0.7   CALCIUM 9.8 9.7 9.6   GLUCOSE 208* 104* 130*      ABG:  No results for input(s): PH, PO2ART, [id number], HCO3, BEART, O2SAT in the last 72 hours. Lab Results   Component Value Date    PROBNP 60.12 09/23/2021    PROBNP 24.10 09/18/2021    PROBNP 7.88 06/29/2014     No results found for: [facility]    Radiology Review:  Pertinent images / reports were reviewed as a part of this visit. Assessment:     Patient Active Problem List   Diagnosis    Hx of cardiovascular stress test    Palpitation    H/O 24 hour EKG monitoring    Fibromyalgia    Chronic obstructive pulmonary disease (HCC)    Schizoaffective disorder ([address]    History of hysterectomy    Hyperlipidemia    Fatty liver    Chronic back pain    Chronic low back pain    Acquired hypothyroidism    Former tobacco use    Simple chronic bronchitis ([address]    Essential hypertension    Perforated left tympanic membrane on examination    COPD exacerbation ([address]    COVID-19       Plan:   1. Overall the patient is better. 2. Inc. activity. 3. Wean FiO2.   Barry Medina MD  9/29/2021  11:34 AM

## 2021-09-30 ENCOUNTER — CARE COORDINATION (OUTPATIENT)
Dept: CASE MANAGEMENT | Age: 51
End: 2021-09-30

## 2021-09-30 DIAGNOSIS — U07.1 COVID-19: Primary | ICD-10-CM

## 2021-09-30 PROCEDURE — 1111F DSCHRG MED/CURRENT MED MERGE: CPT | Performed by: FAMILY MEDICINE

## 2021-09-30 NOTE — CARE COORDINATION
Hiignio 45 Transitions Initial Follow Up Call    Call within 2 business days of discharge: Yes    Patient: [address] Patient : 1970   MRN: [id number]  Reason for Admission: Juan Day, Ac Resp Failure  Discharge Date: 21 RARS: Readmission Risk Score: 10  Facility: [address] [place] [address]       Complaint Diagnosis Description Type Department Provider    21 Cough; Shortness of Breath COVID-19 . .. ED to Hosp-Admission (Discharged) (ADMITTED) Vinny Joshi MD; Megha Grajeda. .. Non-face-to-face services provided:  Obtained and reviewed discharge summary and/or continuity of care documents    Care Transitions 24 Hour Call    Care Transitions Interventions       Future Appointments   Date Time Provider Pancho Nguyen   10/11/2021  3:30 PM Willa Jean MD [facility] Ran     Challenges to be reviewed by the provider   Additional needs identified to be addressed with provider:  21-21 [facility] for Covid19 Pna, Ac Resp Failure    >Patient still w/ generalized weakness, fatigue, dry cough, sob on exertion and nausea. Denies ac resp distress, fever, chills. >Patient requesting rx for shower chair. >Patient in need of 7 day hosp f/u appt       Method of communication with provider : chart routing    Doug Mariajose    Was this a readmission? No  Patient stated reason for admission: Cough, sob  Patients top risk factors for readmission: functional physical ability and medical condition-Covid19 Pna, Copd    Spoke with Patient for Care Transition discharge call, verified Patient name and  as identifiers. Introduced self and explained CT program. Agreeable to ongoing CT follow up. Using O2 as directed at 3L continuously. MHDME visit scheduled for this afternoon. Phone Assessment: Reports feeling \"exhausted\" since home.  Still w/ generalized weakness, fatigue, nausea, dry cough and sob on exertion which resolves w/ rest. Noted to be speaking in complete unlabored sentences however does have to stop from time to time d/t cough. Denies ac resp distress, fever, chills. Reports she has been able to ambulate in home for short distances. CTN to notify PCP of ongoing sx. Education Provided:  Reviewed discharge instructions, medical action plan and Covid19 red flags such as increased shortness of breath, increasing fever and signs of decompensation. Advised rest, pacing activity, adequate hydration/nutrition (within any MD recommended dietary/fluid restrictions). Discussed increased risk of blood clots associated w/ Covid19, sx and preventative measures. Discussed exposure protocols and quarantine with CDC Guidelines What to do if you are sick with coronavirus disease 2019.  Patient verbalizes understanding. Has family/friends who are able to drop off any needed supplies or food. Advised to contact Health Department PCP re: any additional Covid19 questions. Patient has not received Covid 19 vaccine series, advised to speak further w/ PCP due to risk factors. Advised continued Covid19 preventative measures including mask covering nose and mouth, social distancing, hand washing, contacting MD at first sign of any flu-like sx. AVS/Meds: Confirmed copy of AVS received, reviewed with Patient. To have phenergan rx picked up today. Stressed importance of med compliance. 1111F medication review completed with Patient . Advised obtaining 90 day supply of routine, prn meds. Advised contacting pharmacy/md for refill needs.      Resource Need Assessment:   Living Arrangements: lives w/ roomate   ADLS:independent, drives, employed as senior caregiver   DME:O2 per [address] ; reports need for shower chair- CTN to notify PCP  Transportation: self  HHC:none, denies need   Community Assistance:none, denies need   Referrals Made per CTN with Patient/Family Approval: [facility] Follow Up Appointments: Discussed importance of 7 day hospital follow up appointment for continuity of care. Agreeable for CT staff to schedule appts. Advanced Care Planning / HCDM:  Healthcare Decision Maker:    Primary Decision Maker: Vasile Barlow [facility] - [phone]  Full code. No advance directives, considering. Not interested in ACP referral at this time. Advised to contact PCP 24/7 re: any health concerns for early outpatient intervention in an effort to avoid hospitalization. Discussed benefits of WIC, Urgent Care. Advised 911 if noting acute distress. Note routed to Zenia Dumont,  for appt scheduling.    Evy Jo RN

## 2021-10-01 ENCOUNTER — CARE COORDINATION (OUTPATIENT)
Dept: CASE MANAGEMENT | Age: 51
End: 2021-10-01

## 2021-10-01 NOTE — CARE COORDINATION
Request from [facility]. ,  Please schedule hospital f/u. Called provider, states patient is scheduled for Oct 14th. Requested appt sooner. Was given a telehealth visit due to covid +  Moved patient to Monday Oct 4th at [facility].    Also, patient seemed to be struggling while speaking. Ask patient if she had a pulse ox. Patient states her sister is bringing one over to her. Advised patient this writer would escalate to nurse who called her yesterday. Patient agreeable. Escalated to Nacho Galeas sending secure email with patient info. Ellie nair.     Anel Art, [address]  Care Coordination Transition

## 2021-10-01 NOTE — CARE COORDINATION
Higinio 45 Transitions Follow Up Call    10/1/2021    Patient: [address]  Patient : 1970   MRN: [id number]  Reason for Admission:   Discharge Date: 21 RARS: Readmission Risk Score: 10    Received message from Randa Alfaro,  -- patient seemed to be struggling while speaking upon Haydee's call to her for appt scheduling.  Has PCP appt 10/4/21 10am.     Attempt to reach for Covid19 Monitoring follow up call unsuccessful; detailed message left requesting call back and recommendation for same day appt at the following as well as contact info:   [address] [place] [address] [phone] (telehealth appts aval)  [address] TallJE kennedy 1942, [place]

## 2021-10-06 ENCOUNTER — CARE COORDINATION (OUTPATIENT)
Dept: CASE MANAGEMENT | Age: 51
End: 2021-10-06

## 2021-10-06 NOTE — CARE COORDINATION
Higinio 45 Transitions Follow Up Call    10/6/2021    Patient: [address] [facility]  Patient : 1970   MRN: [id number]  Reason for Admission: Sukhi Thomas Ac Resp Failure  Discharge Date: 21 RARS: Readmission Risk Score: 10    Care Transitions Subsequent and Final Call    Schedule Follow Up Appointment with PCP: Declined  Subsequent and Final Calls  Do you have any ongoing symptoms?: Yes  Onset of Patient-reported symptoms: Other  Patient-reported symptoms: Shortness of Breath, Cough  Interventions for patient-reported symptoms: Other  Have your medications changed?: Yes  Patient Reports: 10/4/21 + Medrol Taper Dose, Z Pk, Cough Syrup, Mucinex per PCP  Do you have any questions related to your medications?: No  Do you currently have any active services?: No  Do you have any needs or concerns that I can assist you with?: No  Identified Barriers: Other  Care Transitions Interventions   Home Care Waiver: Declined        Transportation Support: Declined      DME Assistance: Completed   Other Interventions:         Future Appointments   Date Time Provider Pancho Nguyen   10/11/2021  3:30 PM [facility]     COVID19 + MONITORING    PCP: Freddy CALIX  PULM: Dr Leanne Hein w/ Patient for follow up call. Reports doing somewhat better since last call, \"improving a little everyday\". Noted to be speaking in clear, unlabored sentences; no coughing noted at this time. Reports continued sob on exertion amd occasional cough. Still using [employer] continuously. O2 sat 90-93% at rest w/ O2 on and dips down to 85%+ on exertion. Using prn albuterol. Was seen by PCP 10/4/021 as scheduled w/ new rx for Z Pack, Medrol Dose Pk (taper), Mucinex and Phenergan cough syrup. Patient taking all new rx as directed. Stressed importance of med compliance, not skipping doses and completing unless otherwise directed by MD. Denies fever, chills or ac distress requiring MD notification. Advised rest, pacing activity, adequate hydration/nutrition (within any MD recommended dietary/fluid restrictions. Has f/u appts w/ PCP and Dr Marah Link 10/11/21, transportation confirmed. Advised continued Covid19 preventative measures as previously discussed and speaking w/ PCP re: vaccination. No reported resource needs. Advised to contact PCP 24/7 re: any health concerns for early outpatient intervention in an effort to avoid hospitalization. Discussed benefits of WIC, Urgent Care.   Antelmo Dia RN

## 2021-10-11 NOTE — DISCHARGE SUMMARY
Discharge Summary    Name:  Augustine Garrett Riesel /Age/Sex: 1970  (46 y.o. female)   MRN & CSN:  [id number] & [id number] Admission Date/Time: 2021  3:41 PM   Attending:  No att. providers found Discharging Physician: Jaquelyn Seip, MD     Hospital Course:   Augustine Ackerman is a 46 y.o.  female  who presents with     1. Acute respiratory failure with hypoxia  Secondary to COVID-19 pneumonia  On high flow oxygen, wean as tolerated  Patient received Regeneron  Continue on IV Decadron  on IV Lasix  Multivitamin cocktail  DVT prophylaxis  Start incentive spirometer  --O2 requirements trending down today     Migraine Headache   -Reports has them intermittently at home, sumatriptan usually provides relief  -S/p Sumatriptan x2  -HA cocktail   -Continues to report nausea, some headache, add toradol, phenergan     2. Pneumonia due to COVID-19  Management as above     3. COPD  Continue breathing treatment     4.  Essential hypertension  Continue current management     5.  Tobacco abuse    Discharged in stable condition, HA improved prior to discharge. The patient expressed appropriate understanding of and agreement with the discharge recommendations, medications, and plan.      Consults this admission:  IP CONSULT TO HOSPITALIST  IP CONSULT TO PULMONOLOGY    Discharge Instruction:   Follow up appointments: PCP w/in 1-2 weeks   Primary care physician:  within 2 weeks    Diet:  regular diet   Activity: activity as tolerated  Disposition: Discharged to:   [x]Home, [][facility], []SNF, []Acute Rehab, []Hospice   Condition on discharge: Stable    Discharge Medications:      Mely Aditya   [place] Medication Instructions ADILSON:[id number]    Printed on:10/11/21 1050   Medication Information                      albuterol sulfate  (90 Base) MCG/ACT inhaler  INHALE 2 PUFFS INTO THE LUNGS EVERY 6 HOURS AS NEEDED FOR WHEEZING OR SHORTNESS OF BREATH             baclofen (LIORESAL) 10 MG tablet  Take 20 mg by mouth every 8 hours             dicyclomine (BENTYL) 10 MG capsule  Take 1 capsule by mouth 3 times daily             levothyroxine (SYNTHROID) 50 MCG tablet  Take 1 tablet by mouth Daily             LISINOPRIL PO  Take 12.5 mg by mouth nightly              montelukast (SINGULAIR) 10 MG tablet  TAKE 1 TABLET BY MOUTH EVERY DAY             SYMBICORT 160-4.5 MCG/ACT AERO  INHALE 2 PUFFS INTO THE LUNGS 2 TIMES DAILY                 Objective Findings at Discharge:   /74   Pulse 55   Temp 98 °F (36.7 °C) (Oral)   Resp 15   Ht 5' 5\" (1.651 m)   Wt 209 lb 6.4 oz (95 kg)   LMP  (LMP Unknown)   SpO2 95%   BMI 34.85 kg/m²            PHYSICAL EXAM  GEN Awake female, sitting upright in bed in no apparent distress. Appears given age. EYES Pupils are equally round. No scleral erythema, discharge, or conjunctivitis. NECK Supple, no apparent thyromegaly or masses. RESP Clear to auscultation, no wheezes, rales or rhonchi. Symmetric chest movement while on room air. CARDIO/VASC S1/S2 auscultated. Regular rate without appreciable murmurs, rubs, or gallops. GI Abdomen is soft without significant tenderness, masses, or guarding. MSK No gross joint deformities. SKIN Normal coloration, warm, dry. PSYCH Awake, alert, oriented x 4. Affect appropriate. BMP/CBC  No results for input(s): NA, K, CL, CO2, BUN, CREATININE, WBC, HEMOGLOBIN, HCT, PLT in the last 72 hours.     Invalid input(s): GLU      Discharge Time of 40 minutes    Electronically signed by Jaquelyn Seip, MD on 10/11/2021 at 10:52 AM

## 2021-10-22 ENCOUNTER — CARE COORDINATION (OUTPATIENT)
Dept: CASE MANAGEMENT | Age: 51
End: 2021-10-22

## 2021-10-22 NOTE — CARE COORDINATION
Higinio 45 Transitions Follow Up Call    10/22/2021    Patient: [address]  Patient : 1970   MRN: [id number]  Reason for Admission:   Discharge Date: 21 RARS: Readmission Risk Score: 10    Attempted to contact patient for COVID transition follow up. Unable to reach patient. Left message with contact information and request for call back.         Follow Up  Future Appointments   Date Time Provider Pancho Nguyen   2021  2:45 PM Farhad Palencia MD [facility] Henrique Shaw RN

## 2021-10-29 ENCOUNTER — CARE COORDINATION (OUTPATIENT)
Dept: CASE MANAGEMENT | Age: 51
End: 2021-10-29

## 2021-10-29 NOTE — CARE COORDINATION
Higinio 45 Transitions Follow Up Call    10/29/2021    Patient: [address]  Patient : 1970   MRN: <[id number]>  Reason for Admission: Covid 19  Discharge Date: 21 RARS: Readmission Risk Score: 10    COVID NON Reached Care Transitions Follow Up Call: Attempted to contact patient for COVID Transitions Follow Up call. Left HIPAA Compliant message on Voice Mail to call CTN (number given) with any questions and an update on patient's condition since discharge. Left HIPAA Compliant message on voice mail for Patient that this is the Final Transition Call and to call their Specialist/PCP for any problems or issues that may occur. Tressa Casanova LPN    [phone]  Ana Murphy / Jose Zamarripa 50 Transitions Subsequent and Final Call    Subsequent and Final Calls  Care Transitions Interventions  Other Interventions:            Follow Up  Future Appointments   Date Time Provider Pancho Nguyen   2021  2:45 PM Yusra Sloan MD [facility] Henrique Casanova, [place]

## 2021-12-17 ENCOUNTER — HOSPITAL ENCOUNTER (OUTPATIENT)
Age: 51
Discharge: HOME | End: 2021-12-17
Payer: COMMERCIAL

## 2021-12-17 ENCOUNTER — HOSPITAL ENCOUNTER (OUTPATIENT)
Dept: GENERAL RADIOLOGY | Age: 51
Discharge: HOME | End: 2021-12-17
Payer: COMMERCIAL

## 2021-12-17 DIAGNOSIS — J43.2 CENTRILOBULAR EMPHYSEMA (HCC): ICD-10-CM

## 2021-12-17 DIAGNOSIS — J12.82 PNEUMONIA DUE TO COVID-19 VIRUS: ICD-10-CM

## 2021-12-17 DIAGNOSIS — U07.1 PNEUMONIA DUE TO COVID-19 VIRUS: ICD-10-CM

## 2021-12-17 PROCEDURE — 71046 X-RAY EXAM CHEST 2 VIEWS: CPT

## 2021-12-20 ENCOUNTER — HOSPITAL ENCOUNTER (OUTPATIENT)
Age: 51
Discharge: HOME | End: 2021-12-20
Payer: COMMERCIAL

## 2021-12-20 ENCOUNTER — HOSPITAL ENCOUNTER (OUTPATIENT)
Dept: GENERAL RADIOLOGY | Age: 51
Discharge: HOME | End: 2021-12-20
Payer: COMMERCIAL

## 2021-12-20 DIAGNOSIS — R07.82 INTERCOSTAL PAIN: ICD-10-CM

## 2021-12-20 LAB
ALBUMIN SERPL-MCNC: 4.7 GM/DL (ref 3.4–5)
ALP BLD-CCNC: 84 IU/L (ref 40–129)
ALT SERPL-CCNC: 22 U/L (ref 10–40)
ANION GAP SERPL CALCULATED.3IONS-SCNC: 10 MMOL/L (ref 4–16)
AST SERPL-CCNC: 20 IU/L (ref 15–37)
BASOPHILS ABSOLUTE: 0.1 K/CU MM
BASOPHILS RELATIVE PERCENT: 0.9 % (ref 0–1)
BILIRUB SERPL-MCNC: 0.2 MG/DL (ref 0–1)
BUN BLDV-MCNC: 16 MG/DL (ref 6–23)
CALCIUM SERPL-MCNC: 11.3 MG/DL (ref 8.3–10.6)
CHLORIDE BLD-SCNC: 104 MMOL/L (ref 99–110)
CO2: 28 MMOL/L (ref 21–32)
CREAT SERPL-MCNC: 0.7 MG/DL (ref 0.6–1.1)
DIFFERENTIAL TYPE: ABNORMAL
EOSINOPHILS ABSOLUTE: 0.4 K/CU MM
EOSINOPHILS RELATIVE PERCENT: 5.3 % (ref 0–3)
FOLLICLE STIMULATING HORMONE: 119.1 MLU/ML
GFR AFRICAN AMERICAN: >60 ML/MIN/1.73M2
GFR NON-AFRICAN AMERICAN: >60 ML/MIN/1.73M2
GLUCOSE BLD-MCNC: 102 MG/DL (ref 70–99)
HCT VFR BLD CALC: 42.1 % (ref 37–47)
HEMOGLOBIN: 13.2 GM/DL (ref 12.5–16)
IMMATURE NEUTROPHIL %: 0.4 % (ref 0–0.43)
LH: 52.1 MIU/L
LYMPHOCYTES ABSOLUTE: 2.7 K/CU MM
LYMPHOCYTES RELATIVE PERCENT: 34.9 % (ref 24–44)
MCH RBC QN AUTO: 29.7 PG (ref 27–31)
MCHC RBC AUTO-ENTMCNC: 31.4 % (ref 32–36)
MCV RBC AUTO: 94.6 FL (ref 78–100)
MONOCYTES ABSOLUTE: 0.9 K/CU MM
MONOCYTES RELATIVE PERCENT: 12.1 % (ref 0–4)
NUCLEATED RBC %: 0 %
PDW BLD-RTO: 13.1 % (ref 11.7–14.9)
PLATELET # BLD: 249 K/CU MM (ref 140–440)
PMV BLD AUTO: 11.9 FL (ref 7.5–11.1)
POTASSIUM SERPL-SCNC: 4.7 MMOL/L (ref 3.5–5.1)
RBC # BLD: 4.45 M/CU MM (ref 4.2–5.4)
SEGMENTED NEUTROPHILS ABSOLUTE COUNT: 3.6 K/CU MM
SEGMENTED NEUTROPHILS RELATIVE PERCENT: 46.4 % (ref 36–66)
SODIUM BLD-SCNC: 142 MMOL/L (ref 135–145)
T3 FREE: 3.2 PG/ML (ref 2.3–4.2)
T4 FREE: 1.04 NG/DL (ref 0.9–1.8)
TOTAL IMMATURE NEUTOROPHIL: 0.03 K/CU MM
TOTAL NUCLEATED RBC: 0 K/CU MM
TOTAL PROTEIN: 6.6 GM/DL (ref 6.4–8.2)
TSH HIGH SENSITIVITY: 1.04 UIU/ML (ref 0.27–4.2)
WBC # BLD: 7.7 K/CU MM (ref 4–10.5)

## 2021-12-20 PROCEDURE — 83001 ASSAY OF GONADOTROPIN (FSH): CPT

## 2021-12-20 PROCEDURE — 84481 FREE ASSAY (FT-3): CPT

## 2021-12-20 PROCEDURE — 36415 COLL VENOUS BLD VENIPUNCTURE: CPT

## 2021-12-20 PROCEDURE — 84270 ASSAY OF SEX HORMONE GLOBUL: CPT

## 2021-12-20 PROCEDURE — 84403 ASSAY OF TOTAL TESTOSTERONE: CPT

## 2021-12-20 PROCEDURE — 85025 COMPLETE CBC W/AUTO DIFF WBC: CPT

## 2021-12-20 PROCEDURE — 82671 ASSAY OF ESTROGENS: CPT

## 2021-12-20 PROCEDURE — 83036 HEMOGLOBIN GLYCOSYLATED A1C: CPT

## 2021-12-20 PROCEDURE — 83002 ASSAY OF GONADOTROPIN (LH): CPT

## 2021-12-20 PROCEDURE — 71100 X-RAY EXAM RIBS UNI 2 VIEWS: CPT

## 2021-12-20 PROCEDURE — 84443 ASSAY THYROID STIM HORMONE: CPT

## 2021-12-20 PROCEDURE — 82626 DEHYDROEPIANDROSTERONE: CPT

## 2021-12-20 PROCEDURE — 80053 COMPREHEN METABOLIC PANEL: CPT

## 2021-12-20 PROCEDURE — 84439 ASSAY OF FREE THYROXINE: CPT

## 2021-12-21 LAB
ESTIMATED AVERAGE GLUCOSE: 111 MG/DL
HBA1C MFR BLD: 5.5 % (ref 4.2–6.3)

## 2021-12-23 LAB
DHEA: 0.94 NG/ML (ref 0.63–4.7)
SEX HORMONE BINDING GLOBULIN-NONMALE: 38 NMOL/L (ref 30–135)
TESTOSTERONE FREE (PG/ML): 2.1 PG/ML (ref 0.6–3.8)
TESTOSTERONE TOTAL-NONMALE: 14 NG/DL (ref 9–55)

## 2021-12-24 LAB
ESTRADIOL MALE CHILD, POSTMEN: 4.1 PG/ML
ESTROGEN TOTAL: 16.3 PG/ML
ESTRONE SERUM: 12.2 PG/ML

## 2022-04-15 ENCOUNTER — HOSPITAL ENCOUNTER (OUTPATIENT)
Age: 52
Discharge: HOME | End: 2022-04-15
Payer: COMMERCIAL

## 2022-04-15 LAB
ALT SERPL-CCNC: 13 U/L (ref 10–40)
AST SERPL-CCNC: 15 IU/L (ref 15–37)
BASOPHILS ABSOLUTE: 0 K/CU MM
BASOPHILS RELATIVE PERCENT: 0.6 % (ref 0–1)
BUN BLDV-MCNC: 13 MG/DL (ref 6–23)
CREAT SERPL-MCNC: 0.6 MG/DL (ref 0.6–1.1)
DIFFERENTIAL TYPE: ABNORMAL
EOSINOPHILS ABSOLUTE: 0.2 K/CU MM
EOSINOPHILS RELATIVE PERCENT: 3.3 % (ref 0–3)
ERYTHROCYTE SEDIMENTATION RATE: 11 MM/HR (ref 0–30)
GFR AFRICAN AMERICAN: >60 ML/MIN/1.73M2
GFR NON-AFRICAN AMERICAN: >60 ML/MIN/1.73M2
HCT VFR BLD CALC: 42.9 % (ref 37–47)
HEMOGLOBIN: 13.4 GM/DL (ref 12.5–16)
IMMATURE NEUTROPHIL %: 0.4 % (ref 0–0.43)
LYMPHOCYTES ABSOLUTE: 2.8 K/CU MM
LYMPHOCYTES RELATIVE PERCENT: 41.1 % (ref 24–44)
MCH RBC QN AUTO: 29.1 PG (ref 27–31)
MCHC RBC AUTO-ENTMCNC: 31.2 % (ref 32–36)
MCV RBC AUTO: 93.1 FL (ref 78–100)
MONOCYTES ABSOLUTE: 0.8 K/CU MM
MONOCYTES RELATIVE PERCENT: 11 % (ref 0–4)
NUCLEATED RBC %: 0 %
PDW BLD-RTO: 14.4 % (ref 11.7–14.9)
PLATELET # BLD: 237 K/CU MM (ref 140–440)
PMV BLD AUTO: 11.6 FL (ref 7.5–11.1)
RBC # BLD: 4.61 M/CU MM (ref 4.2–5.4)
SEGMENTED NEUTROPHILS ABSOLUTE COUNT: 3 K/CU MM
SEGMENTED NEUTROPHILS RELATIVE PERCENT: 43.6 % (ref 36–66)
TOTAL IMMATURE NEUTOROPHIL: 0.03 K/CU MM
TOTAL NUCLEATED RBC: 0 K/CU MM
WBC # BLD: 6.9 K/CU MM (ref 4–10.5)

## 2022-04-15 PROCEDURE — 82565 ASSAY OF CREATININE: CPT

## 2022-04-15 PROCEDURE — 84460 ALANINE AMINO (ALT) (SGPT): CPT

## 2022-04-15 PROCEDURE — 36415 COLL VENOUS BLD VENIPUNCTURE: CPT

## 2022-04-15 PROCEDURE — 84520 ASSAY OF UREA NITROGEN: CPT

## 2022-04-15 PROCEDURE — 84450 TRANSFERASE (AST) (SGOT): CPT

## 2022-04-15 PROCEDURE — 85025 COMPLETE CBC W/AUTO DIFF WBC: CPT

## 2022-04-15 PROCEDURE — 85652 RBC SED RATE AUTOMATED: CPT

## 2022-10-20 ENCOUNTER — HOSPITAL ENCOUNTER (OUTPATIENT)
Dept: GENERAL RADIOLOGY | Age: 52
Discharge: HOME | End: 2022-10-20
Payer: COMMERCIAL

## 2022-10-20 ENCOUNTER — HOSPITAL ENCOUNTER (OUTPATIENT)
Age: 52
Discharge: HOME | End: 2022-10-20
Payer: COMMERCIAL

## 2022-10-20 DIAGNOSIS — M54.2 CERVICALGIA: ICD-10-CM

## 2022-10-20 LAB
ALBUMIN SERPL-MCNC: 4.7 GM/DL (ref 3.4–5)
ALP BLD-CCNC: 86 IU/L (ref 40–129)
ALT SERPL-CCNC: 34 U/L (ref 10–40)
ALT SERPL-CCNC: 34 U/L (ref 10–40)
ANION GAP SERPL CALCULATED.3IONS-SCNC: 12 MMOL/L (ref 4–16)
AST SERPL-CCNC: 21 IU/L (ref 15–37)
AST SERPL-CCNC: 21 IU/L (ref 15–37)
BASOPHILS ABSOLUTE: 0 K/CU MM
BASOPHILS ABSOLUTE: 0.1 K/CU MM
BASOPHILS RELATIVE PERCENT: 0.5 % (ref 0–1)
BASOPHILS RELATIVE PERCENT: 0.6 % (ref 0–1)
BILIRUB SERPL-MCNC: 0.2 MG/DL (ref 0–1)
BUN BLDV-MCNC: 22 MG/DL (ref 6–23)
BUN BLDV-MCNC: 22 MG/DL (ref 6–23)
CALCIUM SERPL-MCNC: 10.6 MG/DL (ref 8.3–10.6)
CHLORIDE BLD-SCNC: 105 MMOL/L (ref 99–110)
CHOLESTEROL: 267 MG/DL
CO2: 21 MMOL/L (ref 21–32)
CREAT SERPL-MCNC: 0.8 MG/DL (ref 0.6–1.1)
CREAT SERPL-MCNC: 0.9 MG/DL (ref 0.6–1.1)
DIFFERENTIAL TYPE: ABNORMAL
DIFFERENTIAL TYPE: ABNORMAL
EOSINOPHILS ABSOLUTE: 0.2 K/CU MM
EOSINOPHILS ABSOLUTE: 0.2 K/CU MM
EOSINOPHILS RELATIVE PERCENT: 2 % (ref 0–3)
EOSINOPHILS RELATIVE PERCENT: 2.2 % (ref 0–3)
ERYTHROCYTE SEDIMENTATION RATE: 22 MM/HR (ref 0–30)
GFR SERPL CREATININE-BSD FRML MDRD: >60 ML/MIN/1.73M2
GFR SERPL CREATININE-BSD FRML MDRD: >60 ML/MIN/1.73M2
GLUCOSE BLD-MCNC: 89 MG/DL (ref 70–99)
HCT VFR BLD CALC: 43.9 % (ref 37–47)
HCT VFR BLD CALC: 44.7 % (ref 37–47)
HDLC SERPL-MCNC: 73 MG/DL
HEMOGLOBIN: 13.9 GM/DL (ref 12.5–16)
HEMOGLOBIN: 14 GM/DL (ref 12.5–16)
IGA: 129 MG/DL (ref 69–382)
IGG,SERUM: 646 MG/DL (ref 723–1685)
IMMATURE NEUTROPHIL %: 0.5 % (ref 0–0.43)
IMMATURE NEUTROPHIL %: 0.5 % (ref 0–0.43)
LDL CHOLESTEROL CALCULATED: 169 MG/DL
LYMPHOCYTES ABSOLUTE: 2.3 K/CU MM
LYMPHOCYTES ABSOLUTE: 2.4 K/CU MM
LYMPHOCYTES RELATIVE PERCENT: 28.6 % (ref 24–44)
LYMPHOCYTES RELATIVE PERCENT: 29.9 % (ref 24–44)
MCH RBC QN AUTO: 28.6 PG (ref 27–31)
MCH RBC QN AUTO: 28.6 PG (ref 27–31)
MCHC RBC AUTO-ENTMCNC: 31.3 % (ref 32–36)
MCHC RBC AUTO-ENTMCNC: 31.7 % (ref 32–36)
MCV RBC AUTO: 90.3 FL (ref 78–100)
MCV RBC AUTO: 91.2 FL (ref 78–100)
MONOCYTES ABSOLUTE: 0.6 K/CU MM
MONOCYTES ABSOLUTE: 0.7 K/CU MM
MONOCYTES RELATIVE PERCENT: 7.9 % (ref 0–4)
MONOCYTES RELATIVE PERCENT: 8 % (ref 0–4)
NUCLEATED RBC %: 0 %
NUCLEATED RBC %: 0 %
PDW BLD-RTO: 13.3 % (ref 11.7–14.9)
PDW BLD-RTO: 13.3 % (ref 11.7–14.9)
PLATELET # BLD: 250 K/CU MM (ref 140–440)
PLATELET # BLD: 258 K/CU MM (ref 140–440)
PMV BLD AUTO: 11.7 FL (ref 7.5–11.1)
PMV BLD AUTO: 11.9 FL (ref 7.5–11.1)
POTASSIUM SERPL-SCNC: 4.5 MMOL/L (ref 3.5–5.1)
RBC # BLD: 4.86 M/CU MM (ref 4.2–5.4)
RBC # BLD: 4.9 M/CU MM (ref 4.2–5.4)
SEGMENTED NEUTROPHILS ABSOLUTE COUNT: 4.8 K/CU MM
SEGMENTED NEUTROPHILS ABSOLUTE COUNT: 4.9 K/CU MM
SEGMENTED NEUTROPHILS RELATIVE PERCENT: 59.2 % (ref 36–66)
SEGMENTED NEUTROPHILS RELATIVE PERCENT: 60.1 % (ref 36–66)
SODIUM BLD-SCNC: 138 MMOL/L (ref 135–145)
T3 FREE: 2.6 PG/ML (ref 2.3–4.2)
TOTAL CK: 75 IU/L (ref 26–140)
TOTAL IMMATURE NEUTOROPHIL: 0.04 K/CU MM
TOTAL IMMATURE NEUTOROPHIL: 0.04 K/CU MM
TOTAL NUCLEATED RBC: 0 K/CU MM
TOTAL NUCLEATED RBC: 0 K/CU MM
TOTAL PROTEIN: 7 GM/DL (ref 6.4–8.2)
TRIGL SERPL-MCNC: 125 MG/DL
TSH HIGH SENSITIVITY: 0.92 UIU/ML (ref 0.27–4.2)
WBC # BLD: 8.1 K/CU MM (ref 4–10.5)
WBC # BLD: 8.2 K/CU MM (ref 4–10.5)

## 2022-10-20 PROCEDURE — 84460 ALANINE AMINO (ALT) (SGPT): CPT

## 2022-10-20 PROCEDURE — 85025 COMPLETE CBC W/AUTO DIFF WBC: CPT

## 2022-10-20 PROCEDURE — 84436 ASSAY OF TOTAL THYROXINE: CPT

## 2022-10-20 PROCEDURE — 84450 TRANSFERASE (AST) (SGOT): CPT

## 2022-10-20 PROCEDURE — 84520 ASSAY OF UREA NITROGEN: CPT

## 2022-10-20 PROCEDURE — 82550 ASSAY OF CK (CPK): CPT

## 2022-10-20 PROCEDURE — 84443 ASSAY THYROID STIM HORMONE: CPT

## 2022-10-20 PROCEDURE — 86430 RHEUMATOID FACTOR TEST QUAL: CPT

## 2022-10-20 PROCEDURE — 82784 ASSAY IGA/IGD/IGG/IGM EACH: CPT

## 2022-10-20 PROCEDURE — 36415 COLL VENOUS BLD VENIPUNCTURE: CPT

## 2022-10-20 PROCEDURE — 80053 COMPREHEN METABOLIC PANEL: CPT

## 2022-10-20 PROCEDURE — 84481 FREE ASSAY (FT-3): CPT

## 2022-10-20 PROCEDURE — 85652 RBC SED RATE AUTOMATED: CPT

## 2022-10-20 PROCEDURE — 82565 ASSAY OF CREATININE: CPT

## 2022-10-20 PROCEDURE — 86200 CCP ANTIBODY: CPT

## 2022-10-20 PROCEDURE — 80061 LIPID PANEL: CPT

## 2022-10-20 PROCEDURE — 86038 ANTINUCLEAR ANTIBODIES: CPT

## 2022-10-20 PROCEDURE — 72050 X-RAY EXAM NECK SPINE 4/5VWS: CPT

## 2022-10-22 LAB
ANTI-NUCLEAR ANTIBODY (ANA): NORMAL
CYCLIC CITRULLINATED PEPTIDE ANTIBODY IGG: 4 UNITS (ref 0–19)
RHEUMATOID FACTOR: <10 IU/ML (ref 0–14)
T4 TOTAL: 6.1 UG/DL (ref 4.5–11.7)

## 2023-05-04 ENCOUNTER — HOSPITAL ENCOUNTER (EMERGENCY)
Age: 53
Discharge: HOME | End: 2023-05-04
Attending: EMERGENCY MEDICINE
Payer: COMMERCIAL

## 2023-05-04 VITALS
HEART RATE: 60 BPM | BODY MASS INDEX: 33.11 KG/M2 | OXYGEN SATURATION: 94 % | RESPIRATION RATE: 16 BRPM | TEMPERATURE: 97 F | HEIGHT: 66 IN | DIASTOLIC BLOOD PRESSURE: 109 MMHG | WEIGHT: 206 LBS | SYSTOLIC BLOOD PRESSURE: 175 MMHG

## 2023-05-04 DIAGNOSIS — M62.838 SPASM OF MUSCLE: ICD-10-CM

## 2023-05-04 DIAGNOSIS — M54.2 NECK PAIN: Primary | ICD-10-CM

## 2023-05-04 DIAGNOSIS — S16.1XXA STRAIN OF NECK MUSCLE, INITIAL ENCOUNTER: ICD-10-CM

## 2023-05-04 PROCEDURE — 6360000002 HC RX W HCPCS: Performed by: EMERGENCY MEDICINE

## 2023-05-04 PROCEDURE — 99284 EMERGENCY DEPT VISIT MOD MDM: CPT

## 2023-05-04 PROCEDURE — 96372 THER/PROPH/DIAG INJ SC/IM: CPT

## 2023-05-04 PROCEDURE — 6370000000 HC RX 637 (ALT 250 FOR IP): Performed by: EMERGENCY MEDICINE

## 2023-05-04 RX ORDER — LIDOCAINE 50 MG/G
1 PATCH TOPICAL DAILY
Qty: 30 PATCH | Refills: 0 | Status: SHIPPED | OUTPATIENT
Start: 2023-05-04 | End: 2023-06-03

## 2023-05-04 RX ORDER — HYDROCODONE BITARTRATE AND ACETAMINOPHEN 5; 325 MG/1; MG/1
1 TABLET ORAL EVERY 6 HOURS PRN
Qty: 10 TABLET | Refills: 0 | Status: SHIPPED | OUTPATIENT
Start: 2023-05-04 | End: 2023-05-07

## 2023-05-04 RX ORDER — LIDOCAINE 4 G/G
1 PATCH TOPICAL ONCE
Status: DISCONTINUED | OUTPATIENT
Start: 2023-05-04 | End: 2023-05-04 | Stop reason: HOSPADM

## 2023-05-04 RX ORDER — PANTOPRAZOLE SODIUM 40 MG/1
40 TABLET, DELAYED RELEASE ORAL DAILY
COMMUNITY
Start: 2023-02-09

## 2023-05-04 RX ORDER — MECLIZINE HYDROCHLORIDE 25 MG/1
TABLET ORAL
COMMUNITY
Start: 2023-05-03

## 2023-05-04 RX ORDER — METHOCARBAMOL 500 MG/1
500 TABLET, FILM COATED ORAL 4 TIMES DAILY
Qty: 40 TABLET | Refills: 0 | Status: SHIPPED | OUTPATIENT
Start: 2023-05-04 | End: 2023-05-14

## 2023-05-04 RX ORDER — MORPHINE SULFATE 4 MG/ML
4 INJECTION, SOLUTION INTRAMUSCULAR; INTRAVENOUS ONCE
Status: COMPLETED | OUTPATIENT
Start: 2023-05-04 | End: 2023-05-04

## 2023-05-04 RX ORDER — METHOCARBAMOL 500 MG/1
500 TABLET, FILM COATED ORAL ONCE
Status: COMPLETED | OUTPATIENT
Start: 2023-05-04 | End: 2023-05-04

## 2023-05-04 RX ORDER — SERTRALINE HYDROCHLORIDE 100 MG/1
TABLET, FILM COATED ORAL
COMMUNITY
Start: 2023-04-27

## 2023-05-04 RX ORDER — AMITRIPTYLINE HYDROCHLORIDE 50 MG/1
TABLET, FILM COATED ORAL
COMMUNITY
Start: 2023-03-09

## 2023-05-04 RX ADMIN — MORPHINE SULFATE 4 MG: 4 INJECTION, SOLUTION INTRAMUSCULAR; INTRAVENOUS at 16:03

## 2023-05-04 RX ADMIN — METHOCARBAMOL 500 MG: 500 TABLET ORAL at 16:03

## 2023-05-04 ASSESSMENT — PAIN DESCRIPTION - ORIENTATION: ORIENTATION: RIGHT;LEFT

## 2023-05-04 ASSESSMENT — PAIN SCALES - GENERAL
PAINLEVEL_OUTOF10: 10
PAINLEVEL_OUTOF10: 10
PAINLEVEL_OUTOF10: 8

## 2023-05-04 ASSESSMENT — PAIN - FUNCTIONAL ASSESSMENT
PAIN_FUNCTIONAL_ASSESSMENT: 0-10
PAIN_FUNCTIONAL_ASSESSMENT: 0-10
PAIN_FUNCTIONAL_ASSESSMENT: PREVENTS OR INTERFERES SOME ACTIVE ACTIVITIES AND ADLS

## 2023-05-04 ASSESSMENT — PAIN DESCRIPTION - DESCRIPTORS: DESCRIPTORS: ACHING

## 2023-05-04 ASSESSMENT — PAIN DESCRIPTION - LOCATION: LOCATION: NECK

## 2023-05-04 ASSESSMENT — PAIN DESCRIPTION - PAIN TYPE: TYPE: ACUTE PAIN

## 2023-05-04 ASSESSMENT — PAIN DESCRIPTION - FREQUENCY: FREQUENCY: CONTINUOUS

## 2023-05-04 NOTE — ED PROVIDER NOTES
Emergency Department Encounter    Patient: Kiesha Pacheco  MRN: [id number]  : 1970  Date of Evaluation: 2023  ED Provider:  Jaime Lu MD    Triage Chief Complaint:   Dizziness and Neck Pain (Reports of dizziness for 2 days saw the PCP yesterday reports neck pain)    Sycuan:  Kiesha Pacheco is a 45 y.o. female that presents with complaint of dizziness, neck pain, neck stiffness. She has been having back issues for some time, has known herniated disks in her neck, has had MRIs in the past.  No new weakness or numbness in extremities. No falls or trauma. Reports a couple days ago at work, she is a patient aide, she helped lift a patient and felt like it strained and has been a lot worse over the last couple of days. Saw her primary care provider yesterday, and got a shot of a steroid and a shot of Toradol, and took the edge off. But this morning woke up and it was worse. She does have a history of vertigo and takes meclizine for this. She does have a history of sensorineural hearing loss, and has a damaged left TM, has seen ENT in the past.  No chest pain or shortness of breath. No fevers. No vomiting or diarrhea. She does occasionally have nausea with the pain. Has a history of fibromyalgia, takes ibuprofen to help with inflammation. .  She had similar injury last month and her back locked up, has been having intermittent injuries with lifting and pulling patients. Has been having neck and back pain for quite some time. ROS - see HPI, below listed is current ROS at time of my eval:  10 systems reviewed and negative except as above.      Past Medical History:   Diagnosis Date    Acquired hypothyroidism 2018    Chronic back pain 2018    Chronic obstructive pulmonary disease ([address] 2018    COPD (chronic obstructive pulmonary disease) ([address] 10/2013    COVID-19 2021    Essential hypertension 10/09/2018    Fibromyalgia 2018    Former tobacco use 2018    GERD Principal Discharge DX:	Injury of head, initial encounter

## 2023-05-04 NOTE — ED NOTES
Patient verbalizes understanding of discharge instructions. Patient walks out of ED with an upright and steady gait with even and unlabored respirations.       Jacob Crane RN  05/04/23 8302

## 2023-05-09 ENCOUNTER — HOSPITAL ENCOUNTER (OUTPATIENT)
Age: 53
Discharge: HOME | End: 2023-05-09
Payer: COMMERCIAL

## 2023-05-09 LAB
ALBUMIN SERPL-MCNC: 4.6 GM/DL (ref 3.4–5)
ALP BLD-CCNC: 83 IU/L (ref 40–128)
ALT SERPL-CCNC: 19 U/L (ref 10–40)
ALT SERPL-CCNC: 19 U/L (ref 10–40)
ANION GAP SERPL CALCULATED.3IONS-SCNC: 12 MMOL/L (ref 4–16)
AST SERPL-CCNC: 16 IU/L (ref 15–37)
AST SERPL-CCNC: 16 IU/L (ref 15–37)
BASOPHILS ABSOLUTE: 0.1 K/CU MM
BASOPHILS RELATIVE PERCENT: 0.6 % (ref 0–1)
BILIRUB SERPL-MCNC: 0.2 MG/DL (ref 0–1)
BUN SERPL-MCNC: 22 MG/DL (ref 6–23)
BUN SERPL-MCNC: 22 MG/DL (ref 6–23)
CALCIUM SERPL-MCNC: 11.2 MG/DL (ref 8.3–10.6)
CHLORIDE BLD-SCNC: 105 MMOL/L (ref 99–110)
CHOLEST SERPL-MCNC: 269 MG/DL
CO2: 23 MMOL/L (ref 21–32)
CREAT SERPL-MCNC: 0.6 MG/DL (ref 0.6–1.1)
CREAT SERPL-MCNC: 0.7 MG/DL (ref 0.6–1.1)
DIFFERENTIAL TYPE: ABNORMAL
EOSINOPHILS ABSOLUTE: 0.2 K/CU MM
EOSINOPHILS RELATIVE PERCENT: 2.4 % (ref 0–3)
ERYTHROCYTE SEDIMENTATION RATE: 17 MM/HR (ref 0–30)
GFR SERPL CREATININE-BSD FRML MDRD: >60 ML/MIN/1.73M2
GFR SERPL CREATININE-BSD FRML MDRD: >60 ML/MIN/1.73M2
GLUCOSE SERPL-MCNC: 88 MG/DL (ref 70–99)
HCT VFR BLD CALC: 45.5 % (ref 37–47)
HDLC SERPL-MCNC: 86 MG/DL
HEMOGLOBIN: 14.2 GM/DL (ref 12.5–16)
IMMATURE NEUTROPHIL %: 0.4 % (ref 0–0.43)
LDLC SERPL CALC-MCNC: 151 MG/DL
LYMPHOCYTES ABSOLUTE: 2.7 K/CU MM
LYMPHOCYTES RELATIVE PERCENT: 26.4 % (ref 24–44)
MCH RBC QN AUTO: 29.3 PG (ref 27–31)
MCHC RBC AUTO-ENTMCNC: 31.2 % (ref 32–36)
MCV RBC AUTO: 93.8 FL (ref 78–100)
MONOCYTES ABSOLUTE: 1 K/CU MM
MONOCYTES RELATIVE PERCENT: 10.2 % (ref 0–4)
NUCLEATED RBC %: 0 %
PDW BLD-RTO: 14 % (ref 11.7–14.9)
PLATELET # BLD: 285 K/CU MM (ref 140–440)
PMV BLD AUTO: 11.4 FL (ref 7.5–11.1)
POTASSIUM SERPL-SCNC: 4.3 MMOL/L (ref 3.5–5.1)
RBC # BLD: 4.85 M/CU MM (ref 4.2–5.4)
SEGMENTED NEUTROPHILS ABSOLUTE COUNT: 6.1 K/CU MM
SEGMENTED NEUTROPHILS RELATIVE PERCENT: 60 % (ref 36–66)
SODIUM BLD-SCNC: 140 MMOL/L (ref 135–145)
TOTAL CK: 50 IU/L (ref 26–140)
TOTAL IMMATURE NEUTOROPHIL: 0.04 K/CU MM
TOTAL NUCLEATED RBC: 0 K/CU MM
TOTAL PROTEIN: 6.8 GM/DL (ref 6.4–8.2)
TRIGL SERPL-MCNC: 158 MG/DL
TSH SERPL DL<=0.005 MIU/L-ACNC: 1.41 UIU/ML (ref 0.27–4.2)
WBC # BLD: 10.2 K/CU MM (ref 4–10.5)

## 2023-05-09 PROCEDURE — 84443 ASSAY THYROID STIM HORMONE: CPT

## 2023-05-09 PROCEDURE — 80061 LIPID PANEL: CPT

## 2023-05-09 PROCEDURE — 85652 RBC SED RATE AUTOMATED: CPT

## 2023-05-09 PROCEDURE — 84450 TRANSFERASE (AST) (SGOT): CPT

## 2023-05-09 PROCEDURE — 82565 ASSAY OF CREATININE: CPT

## 2023-05-09 PROCEDURE — 86200 CCP ANTIBODY: CPT

## 2023-05-09 PROCEDURE — 86430 RHEUMATOID FACTOR TEST QUAL: CPT

## 2023-05-09 PROCEDURE — 84520 ASSAY OF UREA NITROGEN: CPT

## 2023-05-09 PROCEDURE — 85025 COMPLETE CBC W/AUTO DIFF WBC: CPT

## 2023-05-09 PROCEDURE — 36415 COLL VENOUS BLD VENIPUNCTURE: CPT

## 2023-05-09 PROCEDURE — 80053 COMPREHEN METABOLIC PANEL: CPT

## 2023-05-09 PROCEDURE — 82550 ASSAY OF CK (CPK): CPT

## 2023-05-09 PROCEDURE — 84460 ALANINE AMINO (ALT) (SGPT): CPT

## 2023-05-10 ENCOUNTER — HOSPITAL ENCOUNTER (OUTPATIENT)
Dept: WOMENS IMAGING | Age: 53
Discharge: HOME | End: 2023-05-10
Payer: COMMERCIAL

## 2023-05-10 DIAGNOSIS — Z12.31 ENCOUNTER FOR SCREENING MAMMOGRAM FOR BREAST CANCER: ICD-10-CM

## 2023-05-10 PROCEDURE — 77063 BREAST TOMOSYNTHESIS BI: CPT

## 2023-05-11 LAB
CCP IGG SERPL-ACNC: 4 UNITS (ref 0–19)
RHEUMATOID FACTOR: <10 IU/ML (ref 0–14)

## 2023-07-14 ENCOUNTER — HOSPITAL ENCOUNTER (OUTPATIENT)
Dept: GENERAL RADIOLOGY | Age: 53
Discharge: HOME | End: 2023-07-14
Payer: COMMERCIAL

## 2023-07-14 ENCOUNTER — HOSPITAL ENCOUNTER (OUTPATIENT)
Age: 53
Discharge: HOME | End: 2023-07-14
Payer: COMMERCIAL

## 2023-07-14 DIAGNOSIS — M54.50 LOW BACK PAIN, UNSPECIFIED BACK PAIN LATERALITY, UNSPECIFIED CHRONICITY, UNSPECIFIED WHETHER SCIATICA PRESENT: ICD-10-CM

## 2023-07-14 PROCEDURE — 72120 X-RAY BEND ONLY L-S SPINE: CPT

## 2023-07-18 NOTE — FLOWSHEET NOTE
Spoke to lab, [facility], states they can run resp disease panel off influenza swabs received yesterday     Harriet Paz RN 9:05 AM Alternatives Discussed Intro (Do Not Add Period): I discussed alternative treatments to Mohs surgery and specifically discussed the risks and benefits of

## 2024-01-03 ENCOUNTER — HOSPITAL ENCOUNTER (OUTPATIENT)
Dept: WOMENS IMAGING | Age: 54
Discharge: HOME | End: 2024-01-03
Attending: FAMILY MEDICINE
Payer: COMMERCIAL

## 2024-01-03 DIAGNOSIS — N95.9 MENOPAUSAL AND POSTMENOPAUSAL DISORDER: ICD-10-CM

## 2024-01-03 PROCEDURE — 77080 DXA BONE DENSITY AXIAL: CPT

## 2024-02-13 ENCOUNTER — HOSPITAL ENCOUNTER (OUTPATIENT)
Dept: CT IMAGING | Age: 54
Discharge: HOME | End: 2024-02-13
Attending: INTERNAL MEDICINE
Payer: COMMERCIAL

## 2024-02-13 DIAGNOSIS — Z87.891 PERSONAL HISTORY OF TOBACCO USE, PRESENTING HAZARDS TO HEALTH: ICD-10-CM

## 2024-02-13 PROCEDURE — 71271 CT THORAX LUNG CANCER SCR C-: CPT

## 2024-04-09 ENCOUNTER — APPOINTMENT (OUTPATIENT)
Dept: CT IMAGING | Age: 54
End: 2024-04-09
Payer: COMMERCIAL

## 2024-04-09 ENCOUNTER — APPOINTMENT (OUTPATIENT)
Dept: GENERAL RADIOLOGY | Age: 54
End: 2024-04-09
Payer: COMMERCIAL

## 2024-04-09 ENCOUNTER — HOSPITAL ENCOUNTER (EMERGENCY)
Age: 54
Discharge: HOME | End: 2024-04-09
Payer: COMMERCIAL

## 2024-04-09 VITALS
SYSTOLIC BLOOD PRESSURE: 146 MMHG | OXYGEN SATURATION: 98 % | BODY MASS INDEX: 35.19 KG/M2 | WEIGHT: 218 LBS | TEMPERATURE: 98.2 F | RESPIRATION RATE: 13 BRPM | HEART RATE: 84 BPM | DIASTOLIC BLOOD PRESSURE: 111 MMHG

## 2024-04-09 DIAGNOSIS — R42 DIZZINESS: ICD-10-CM

## 2024-04-09 DIAGNOSIS — R06.02 SHORTNESS OF BREATH: Primary | ICD-10-CM

## 2024-04-09 LAB
ALBUMIN SERPL-MCNC: 4.5 GM/DL (ref 3.4–5)
ALP BLD-CCNC: 71 IU/L (ref 40–128)
ALT SERPL-CCNC: 17 U/L (ref 10–40)
ANION GAP SERPL CALCULATED.3IONS-SCNC: 15 MMOL/L (ref 7–16)
AST SERPL-CCNC: 17 IU/L (ref 15–37)
BASOPHILS ABSOLUTE: 0.1 K/CU MM
BASOPHILS RELATIVE PERCENT: 0.9 % (ref 0–1)
BILIRUB SERPL-MCNC: 0.5 MG/DL (ref 0–1)
BUN SERPL-MCNC: 16 MG/DL (ref 6–23)
CALCIUM SERPL-MCNC: 10.7 MG/DL (ref 8.3–10.6)
CHLORIDE BLD-SCNC: 105 MMOL/L (ref 99–110)
CO2: 21 MMOL/L (ref 21–32)
CREAT SERPL-MCNC: 0.8 MG/DL (ref 0.6–1.1)
D DIMER: 0.57 UG/ML (FEU)
DIFFERENTIAL TYPE: ABNORMAL
EKG ATRIAL RATE: 111 BPM
EKG DIAGNOSIS: NORMAL
EKG P AXIS: 44 DEGREES
EKG P-R INTERVAL: 174 MS
EKG Q-T INTERVAL: 330 MS
EKG QRS DURATION: 88 MS
EKG QTC CALCULATION (BAZETT): 448 MS
EKG R AXIS: -17 DEGREES
EKG T AXIS: 17 DEGREES
EKG VENTRICULAR RATE: 111 BPM
EOSINOPHILS ABSOLUTE: 0.2 K/CU MM
EOSINOPHILS RELATIVE PERCENT: 2.9 % (ref 0–3)
GFR SERPL CREATININE-BSD FRML MDRD: 87 ML/MIN/1.73M2
GLUCOSE SERPL-MCNC: 108 MG/DL (ref 70–99)
HCT VFR BLD CALC: 48.9 % (ref 37–47)
HEMOGLOBIN: 15.2 GM/DL (ref 12.5–16)
IMMATURE NEUTROPHIL %: 0.2 % (ref 0–0.43)
INFLUENZA A ANTIGEN: NOT DETECTED
INFLUENZA B ANTIGEN: NOT DETECTED
LYMPHOCYTES ABSOLUTE: 3.7 K/CU MM
LYMPHOCYTES RELATIVE PERCENT: 44.8 % (ref 24–44)
MCH RBC QN AUTO: 28.2 PG (ref 27–31)
MCHC RBC AUTO-ENTMCNC: 31.1 % (ref 32–36)
MCV RBC AUTO: 90.7 FL (ref 78–100)
MONOCYTES ABSOLUTE: 0.9 K/CU MM
MONOCYTES RELATIVE PERCENT: 11.1 % (ref 0–4)
NEUTROPHILS RELATIVE PERCENT: 40.1 % (ref 36–66)
NUCLEATED RBC %: 0 %
PDW BLD-RTO: 13.8 % (ref 11.7–14.9)
PLATELET # BLD: 237 K/CU MM (ref 140–440)
PMV BLD AUTO: 12 FL (ref 7.5–11.1)
POTASSIUM SERPL-SCNC: 3.7 MMOL/L (ref 3.5–5.1)
PRO-BNP: <36 PG/ML
RBC # BLD: 5.39 M/CU MM (ref 4.2–5.4)
REASON FOR REJECTION: NORMAL
REJECTED TEST: NORMAL
SARS-COV-2 RDRP RESP QL NAA+PROBE: NOT DETECTED
SEGMENTED NEUTROPHILS ABSOLUTE COUNT: 3.3 K/CU MM
SODIUM BLD-SCNC: 141 MMOL/L (ref 135–145)
SOURCE: NORMAL
TOTAL IMMATURE NEUTOROPHIL: 0.02 K/CU MM
TOTAL NUCLEATED RBC: 0 K/CU MM
TOTAL PROTEIN: 7.2 GM/DL (ref 6.4–8.2)
TROPONIN, HIGH SENSITIVITY: 9 NG/L (ref 0–14)
TROPONIN, HIGH SENSITIVITY: 9 NG/L (ref 0–14)
WBC # BLD: 8.2 K/CU MM (ref 4–10.5)

## 2024-04-09 PROCEDURE — 80053 COMPREHEN METABOLIC PANEL: CPT

## 2024-04-09 PROCEDURE — 87502 INFLUENZA DNA AMP PROBE: CPT

## 2024-04-09 PROCEDURE — 85025 COMPLETE CBC W/AUTO DIFF WBC: CPT

## 2024-04-09 PROCEDURE — 85379 FIBRIN DEGRADATION QUANT: CPT

## 2024-04-09 PROCEDURE — 93005 ELECTROCARDIOGRAM TRACING: CPT | Performed by: STUDENT IN AN ORGANIZED HEALTH CARE EDUCATION/TRAINING PROGRAM

## 2024-04-09 PROCEDURE — 87635 SARS-COV-2 COVID-19 AMP PRB: CPT

## 2024-04-09 PROCEDURE — 71045 X-RAY EXAM CHEST 1 VIEW: CPT

## 2024-04-09 PROCEDURE — 71275 CT ANGIOGRAPHY CHEST: CPT

## 2024-04-09 PROCEDURE — 93010 ELECTROCARDIOGRAM REPORT: CPT | Performed by: INTERNAL MEDICINE

## 2024-04-09 PROCEDURE — 83880 ASSAY OF NATRIURETIC PEPTIDE: CPT

## 2024-04-09 PROCEDURE — 99285 EMERGENCY DEPT VISIT HI MDM: CPT

## 2024-04-09 PROCEDURE — 6360000004 HC RX CONTRAST MEDICATION: Performed by: PHYSICIAN ASSISTANT

## 2024-04-09 PROCEDURE — 84484 ASSAY OF TROPONIN QUANT: CPT

## 2024-04-09 RX ORDER — METHYLPREDNISOLONE 4 MG/1
TABLET ORAL
Qty: 1 KIT | Refills: 0 | Status: SHIPPED | OUTPATIENT
Start: 2024-04-09 | End: 2024-04-15

## 2024-04-09 RX ORDER — ALBUTEROL SULFATE 90 UG/1
2 AEROSOL, METERED RESPIRATORY (INHALATION) EVERY 6 HOURS PRN
Qty: 18 G | Refills: 0 | Status: SHIPPED | OUTPATIENT
Start: 2024-04-09

## 2024-04-09 RX ADMIN — IOPAMIDOL 80 ML: 755 INJECTION, SOLUTION INTRAVENOUS at 17:10

## 2024-04-09 NOTE — ED PROVIDER NOTES
Emergency Department Encounter  Location: [facility] EMERGENCY DEPARTMENT    Patient: Coty Stearns  MRN: [id number]  : 1970  Date of evaluation: 2024  ED Provider: Melania Knutson PA-C    1700 PM  Coty Stearns was checked out to me by Abad Guzman PA-C. Please see his/her initial documentation for details of the patient's initial ED presentation, physical exam and completed studies.    In brief, Coty Stearns is a 53 y.o. female that presented to the emergency department for dyspnea on exertion x 3-4 days.  She is pending CTA chest and delta troponin at time of sign-out.      I have reviewed and interpreted all of the currently available lab results and diagnostics from this visit:  Results for orders placed or performed during the hospital encounter of 24   COVID-19, Rapid    Specimen: Nasopharyngeal   Result Value Ref Range    Source UNKNOWN     SARS-CoV-2, NAAT NOT DETECTED NOT DETECTED   Influenza A/B, Molecular   Result Value Ref Range    Influenza A Antigen NOT DETECTED NOT DETECTED    Influenza B Antigen NOT DETECTED NOT DETECTED   CBC with Auto Differential   Result Value Ref Range    WBC 8.2 4.0 - 10.5 K/CU MM    RBC 5.39 4.2 - 5.4 M/CU MM    Hemoglobin 15.2 12.5 - 16.0 GM/DL    Hematocrit 48.9 (H) 37 - 47 %    MCV 90.7 78 - 100 FL    MCH 28.2 27 - 31 PG    MCHC 31.1 (L) 32.0 - 36.0 %    RDW 13.8 11.7 - 14.9 %    Platelets 237 140 - 440 K/CU MM    MPV 12.0 (H) 7.5 - 11.1 FL    Differential Type AUTOMATED DIFFERENTIAL     Neutrophils % 40.1 36 - 66 %    Lymphocytes % 44.8 (H) 24 - 44 %    Monocytes % 11.1 (H) 0 - 4 %    Eosinophils % 2.9 0 - 3 %    Basophils % 0.9 0 - 1 %    Segs Absolute 3.3 K/CU MM    Lymphocytes Absolute 3.7 K/CU MM    Monocytes Absolute 0.9 K/CU MM    Eosinophils Absolute 0.2 K/CU MM    Basophils Absolute 0.1 K/CU MM    Nucleated RBC % 0.0 %    Total Nucleated RBC 0.0 K/CU MM    Total Immature Neutrophil 0.02 K/CU MM    
55% Mild left ventricular hypertrophy noted. Mild mitral and TR noted. The patient is bradycardic during the study.    History of hysterectomy 2018    Hx of cardiovascular stress test 2014    Lexiscan: EF 73% Negative electrocardiogram suggestive for ischemia.    Hyperlipidemia 2018    Hypertension     Schizoaffective disorder (HCC) 2018    Simple chronic bronchitis (HCC) 2018    Spastic paraparesis      Past Surgical History:   Procedure Laterality Date    BACK SURGERY      CARDIAC CATHETERIZATION      HYSTERECTOMY (CERVIX STATUS UNKNOWN)      INGUINAL HERNIA REPAIR Right     INNER EAR SURGERY Left     TUBAL LIGATION       Family History   Problem Relation Age of Onset    Heart Disease Mother     Elevated Lipids Mother     Hypertension Mother     Heart Disease Father     Diabetes Sister     Heart Disease Sister     Kidney Disease Sister     Breast Cancer Maternal Grandmother     Breast Cancer Paternal Aunt         2 aunts    Ovarian Cancer Paternal Aunt          in her 30's     Social History     Socioeconomic History    Marital status:      Spouse name: Not on file    Number of children: Not on file    Years of education: Not on file    Highest education level: Not on file   Occupational History    Not on file   Tobacco Use    Smoking status: Former     Current packs/day: 0.00     Average packs/day: 1 pack/day for 25.0 years (25.0 ttl pk-yrs)     Types: Cigarettes     Start date: 1986     Quit date: 2011     Years since quittin.6    Smokeless tobacco: Never   Vaping Use    Vaping Use: Never used   Substance and Sexual Activity    Alcohol use: No    Drug use: No    Sexual activity: Yes     Partners: Male     Comment: seperated   Other Topics Concern    Not on file   Social History Narrative    Not on file     Social Determinants of Health     Financial Resource Strain: Not on file   Food Insecurity: Not on file   Transportation Needs: Not on file   Physical

## 2024-06-03 ENCOUNTER — HOSPITAL ENCOUNTER (OUTPATIENT)
Dept: WOMENS IMAGING | Age: 54
Discharge: HOME | End: 2024-06-03
Attending: FAMILY MEDICINE
Payer: COMMERCIAL

## 2024-06-03 VITALS — BODY MASS INDEX: 30.37 KG/M2 | WEIGHT: 189 LBS | HEIGHT: 66 IN

## 2024-06-03 DIAGNOSIS — Z12.31 ENCOUNTER FOR SCREENING MAMMOGRAM FOR MALIGNANT NEOPLASM OF BREAST: ICD-10-CM

## 2024-06-03 PROCEDURE — 77063 BREAST TOMOSYNTHESIS BI: CPT

## 2024-07-01 ENCOUNTER — APPOINTMENT (OUTPATIENT)
Dept: NON INVASIVE DIAGNOSTICS | Age: 54
DRG: 322 | End: 2024-07-01
Payer: COMMERCIAL

## 2024-07-01 ENCOUNTER — APPOINTMENT (OUTPATIENT)
Dept: GENERAL RADIOLOGY | Age: 54
DRG: 322 | End: 2024-07-01
Payer: COMMERCIAL

## 2024-07-01 ENCOUNTER — HOSPITAL ENCOUNTER (INPATIENT)
Age: 54
LOS: 1 days | Discharge: HOME | DRG: 322 | End: 2024-07-03
Attending: STUDENT IN AN ORGANIZED HEALTH CARE EDUCATION/TRAINING PROGRAM | Admitting: INTERNAL MEDICINE
Payer: COMMERCIAL

## 2024-07-01 DIAGNOSIS — R07.9 CHEST PAIN, UNSPECIFIED TYPE: ICD-10-CM

## 2024-07-01 DIAGNOSIS — R79.89 ELEVATED TROPONIN: Primary | ICD-10-CM

## 2024-07-01 DIAGNOSIS — I21.3 STEMI (ST ELEVATION MYOCARDIAL INFARCTION) (HCC): ICD-10-CM

## 2024-07-01 DIAGNOSIS — I20.9 ANGINA PECTORIS (HCC): ICD-10-CM

## 2024-07-01 LAB
ALBUMIN SERPL-MCNC: 4.5 GM/DL (ref 3.4–5)
ALP BLD-CCNC: 71 IU/L (ref 40–129)
ALT SERPL-CCNC: 15 U/L (ref 10–40)
ANION GAP SERPL CALCULATED.3IONS-SCNC: 10 MMOL/L (ref 7–16)
AST SERPL-CCNC: 15 IU/L (ref 15–37)
BILIRUB SERPL-MCNC: 0.3 MG/DL (ref 0–1)
BUN SERPL-MCNC: 25 MG/DL (ref 6–23)
CALCIUM SERPL-MCNC: 11.5 MG/DL (ref 8.3–10.6)
CHLORIDE BLD-SCNC: 105 MMOL/L (ref 99–110)
CHOLEST SERPL-MCNC: 244 MG/DL
CO2: 26 MMOL/L (ref 21–32)
CREAT SERPL-MCNC: 1.2 MG/DL (ref 0.6–1.1)
ECHO AO ROOT DIAM: 3 CM
ECHO AO ROOT INDEX: 1.54 CM/M2
ECHO AV AREA PEAK VELOCITY: 2.2 CM2
ECHO AV AREA VTI: 2 CM2
ECHO AV AREA/BSA PEAK VELOCITY: 1.1 CM2/M2
ECHO AV AREA/BSA VTI: 1 CM2/M2
ECHO AV MEAN GRADIENT: 3 MMHG
ECHO AV MEAN VELOCITY: 0.9 M/S
ECHO AV PEAK GRADIENT: 5 MMHG
ECHO AV PEAK VELOCITY: 1.1 M/S
ECHO AV VELOCITY RATIO: 0.73
ECHO AV VTI: 24.1 CM
ECHO BSA: 2 M2
ECHO EST RA PRESSURE: 3 MMHG
ECHO IVC PROX: 1.8 CM
ECHO LA AREA 4C: 12.1 CM2
ECHO LA DIAMETER INDEX: 1.28 CM/M2
ECHO LA DIAMETER: 2.5 CM
ECHO LA MAJOR AXIS: 4.8 CM
ECHO LA TO AORTIC ROOT RATIO: 0.83
ECHO LA VOL MOD A4C: 23 ML (ref 22–52)
ECHO LA VOLUME INDEX MOD A4C: 12 ML/M2 (ref 16–34)
ECHO LV EDV A4C: 90 ML
ECHO LV EDV INDEX A4C: 46 ML/M2
ECHO LV EJECTION FRACTION A4C: 60 %
ECHO LV ESV A4C: 36 ML
ECHO LV ESV INDEX A4C: 18 ML/M2
ECHO LV FRACTIONAL SHORTENING: 40 % (ref 28–44)
ECHO LV INTERNAL DIMENSION DIASTOLE INDEX: 2.46 CM/M2
ECHO LV INTERNAL DIMENSION DIASTOLIC: 4.8 CM (ref 3.9–5.3)
ECHO LV INTERNAL DIMENSION SYSTOLIC INDEX: 1.49 CM/M2
ECHO LV INTERNAL DIMENSION SYSTOLIC: 2.9 CM
ECHO LV IVSD: 1 CM (ref 0.6–0.9)
ECHO LV MASS 2D: 170.2 G (ref 67–162)
ECHO LV MASS INDEX 2D: 87.3 G/M2 (ref 43–95)
ECHO LV POSTERIOR WALL DIASTOLIC: 1 CM (ref 0.6–0.9)
ECHO LV RELATIVE WALL THICKNESS RATIO: 0.42
ECHO LVOT AREA: 3.1 CM2
ECHO LVOT AV VTI INDEX: 0.63
ECHO LVOT DIAM: 2 CM
ECHO LVOT MEAN GRADIENT: 1 MMHG
ECHO LVOT PEAK GRADIENT: 2 MMHG
ECHO LVOT PEAK VELOCITY: 0.8 M/S
ECHO LVOT STROKE VOLUME INDEX: 24.5 ML/M2
ECHO LVOT SV: 47.7 ML
ECHO LVOT VTI: 15.2 CM
ECHO MV A VELOCITY: 0.58 M/S
ECHO MV E VELOCITY: 0.66 M/S
ECHO MV E/A RATIO: 1.14
ECHO RIGHT VENTRICULAR SYSTOLIC PRESSURE (RVSP): 18 MMHG
ECHO RV MID DIMENSION: 3.1 CM
ECHO TV REGURGITANT MAX VELOCITY: 1.93 M/S
ECHO TV REGURGITANT PEAK GRADIENT: 15 MMHG
EKG ATRIAL RATE: 62 BPM
EKG ATRIAL RATE: 62 BPM
EKG DIAGNOSIS: NORMAL
EKG DIAGNOSIS: NORMAL
EKG P AXIS: 53 DEGREES
EKG P AXIS: 62 DEGREES
EKG P-R INTERVAL: 180 MS
EKG P-R INTERVAL: 190 MS
EKG Q-T INTERVAL: 388 MS
EKG Q-T INTERVAL: 420 MS
EKG QRS DURATION: 74 MS
EKG QRS DURATION: 86 MS
EKG QTC CALCULATION (BAZETT): 393 MS
EKG QTC CALCULATION (BAZETT): 426 MS
EKG R AXIS: 33 DEGREES
EKG R AXIS: 6 DEGREES
EKG T AXIS: 34 DEGREES
EKG T AXIS: 56 DEGREES
EKG VENTRICULAR RATE: 62 BPM
EKG VENTRICULAR RATE: 62 BPM
GFR, ESTIMATED: 53 ML/MIN/1.73M2
GLUCOSE BLD-MCNC: 110 MG/DL (ref 70–99)
GLUCOSE BLD-MCNC: 75 MG/DL (ref 70–99)
GLUCOSE SERPL-MCNC: 98 MG/DL (ref 70–99)
HCT VFR BLD CALC: 44.8 % (ref 37–47)
HDLC SERPL-MCNC: 56 MG/DL
HEMOGLOBIN: 14 GM/DL (ref 12.5–16)
LDLC SERPL CALC-MCNC: 153 MG/DL
LIPASE: 46 IU/L (ref 13–60)
MAGNESIUM: 2 MG/DL (ref 1.8–2.4)
MCH RBC QN AUTO: 29.7 PG (ref 27–31)
MCHC RBC AUTO-ENTMCNC: 31.3 % (ref 32–36)
MCV RBC AUTO: 95.1 FL (ref 78–100)
PDW BLD-RTO: 14.3 % (ref 11.7–14.9)
PLATELET # BLD: 231 K/CU MM (ref 140–440)
PMV BLD AUTO: 11.4 FL (ref 7.5–11.1)
POTASSIUM SERPL-SCNC: 4.4 MMOL/L (ref 3.5–5.1)
RBC # BLD: 4.71 M/CU MM (ref 4.2–5.4)
SODIUM BLD-SCNC: 141 MMOL/L (ref 135–145)
TOTAL PROTEIN: 7.1 GM/DL (ref 6.4–8.2)
TRIGL SERPL-MCNC: 177 MG/DL
TROPONIN, HIGH SENSITIVITY: 15 NG/L (ref 0–14)
TROPONIN, HIGH SENSITIVITY: 17 NG/L (ref 0–14)
WBC # BLD: 11 K/CU MM (ref 4–10.5)

## 2024-07-01 PROCEDURE — 82962 GLUCOSE BLOOD TEST: CPT

## 2024-07-01 PROCEDURE — 85027 COMPLETE CBC AUTOMATED: CPT

## 2024-07-01 PROCEDURE — 84484 ASSAY OF TROPONIN QUANT: CPT

## 2024-07-01 PROCEDURE — 94761 N-INVAS EAR/PLS OXIMETRY MLT: CPT

## 2024-07-01 PROCEDURE — 36415 COLL VENOUS BLD VENIPUNCTURE: CPT

## 2024-07-01 PROCEDURE — 71045 X-RAY EXAM CHEST 1 VIEW: CPT

## 2024-07-01 PROCEDURE — 6370000000 HC RX 637 (ALT 250 FOR IP)

## 2024-07-01 PROCEDURE — G0378 HOSPITAL OBSERVATION PER HR: HCPCS

## 2024-07-01 PROCEDURE — 6370000000 HC RX 637 (ALT 250 FOR IP): Performed by: STUDENT IN AN ORGANIZED HEALTH CARE EDUCATION/TRAINING PROGRAM

## 2024-07-01 PROCEDURE — 94640 AIRWAY INHALATION TREATMENT: CPT

## 2024-07-01 PROCEDURE — 83690 ASSAY OF LIPASE: CPT

## 2024-07-01 PROCEDURE — 99285 EMERGENCY DEPT VISIT HI MDM: CPT

## 2024-07-01 PROCEDURE — 93005 ELECTROCARDIOGRAM TRACING: CPT

## 2024-07-01 PROCEDURE — 2580000003 HC RX 258: Performed by: STUDENT IN AN ORGANIZED HEALTH CARE EDUCATION/TRAINING PROGRAM

## 2024-07-01 PROCEDURE — 83735 ASSAY OF MAGNESIUM: CPT

## 2024-07-01 PROCEDURE — 80053 COMPREHEN METABOLIC PANEL: CPT

## 2024-07-01 PROCEDURE — 93306 TTE W/DOPPLER COMPLETE: CPT

## 2024-07-01 PROCEDURE — 93306 TTE W/DOPPLER COMPLETE: CPT | Performed by: INTERNAL MEDICINE

## 2024-07-01 PROCEDURE — 80061 LIPID PANEL: CPT

## 2024-07-01 PROCEDURE — 93010 ELECTROCARDIOGRAM REPORT: CPT | Performed by: INTERNAL MEDICINE

## 2024-07-01 PROCEDURE — 83036 HEMOGLOBIN GLYCOSYLATED A1C: CPT

## 2024-07-01 PROCEDURE — 99223 1ST HOSP IP/OBS HIGH 75: CPT | Performed by: INTERNAL MEDICINE

## 2024-07-01 RX ORDER — ONDANSETRON 2 MG/ML
4 INJECTION INTRAMUSCULAR; INTRAVENOUS EVERY 6 HOURS PRN
Status: DISCONTINUED | OUTPATIENT
Start: 2024-07-01 | End: 2024-07-03 | Stop reason: HOSPADM

## 2024-07-01 RX ORDER — POTASSIUM CHLORIDE 7.45 MG/ML
10 INJECTION INTRAVENOUS PRN
Status: DISCONTINUED | OUTPATIENT
Start: 2024-07-01 | End: 2024-07-03 | Stop reason: HOSPADM

## 2024-07-01 RX ORDER — POLYETHYLENE GLYCOL 3350 17 G/17G
17 POWDER, FOR SOLUTION ORAL DAILY PRN
Status: DISCONTINUED | OUTPATIENT
Start: 2024-07-01 | End: 2024-07-03 | Stop reason: HOSPADM

## 2024-07-01 RX ORDER — ALBUTEROL SULFATE 90 UG/1
2 AEROSOL, METERED RESPIRATORY (INHALATION) EVERY 6 HOURS PRN
Status: DISCONTINUED | OUTPATIENT
Start: 2024-07-01 | End: 2024-07-03 | Stop reason: HOSPADM

## 2024-07-01 RX ORDER — ONDANSETRON 4 MG/1
4 TABLET, ORALLY DISINTEGRATING ORAL EVERY 8 HOURS PRN
Status: DISCONTINUED | OUTPATIENT
Start: 2024-07-01 | End: 2024-07-03 | Stop reason: HOSPADM

## 2024-07-01 RX ORDER — LEVOTHYROXINE SODIUM 0.05 MG/1
50 TABLET ORAL DAILY
Status: DISCONTINUED | OUTPATIENT
Start: 2024-07-02 | End: 2024-07-03 | Stop reason: HOSPADM

## 2024-07-01 RX ORDER — SODIUM CHLORIDE 0.9 % (FLUSH) 0.9 %
5-40 SYRINGE (ML) INJECTION PRN
Status: DISCONTINUED | OUTPATIENT
Start: 2024-07-01 | End: 2024-07-03 | Stop reason: HOSPADM

## 2024-07-01 RX ORDER — PANTOPRAZOLE SODIUM 40 MG/1
40 TABLET, DELAYED RELEASE ORAL DAILY
Status: DISCONTINUED | OUTPATIENT
Start: 2024-07-01 | End: 2024-07-03 | Stop reason: HOSPADM

## 2024-07-01 RX ORDER — INSULIN LISPRO 100 [IU]/ML
0-4 INJECTION, SOLUTION INTRAVENOUS; SUBCUTANEOUS NIGHTLY
Status: DISCONTINUED | OUTPATIENT
Start: 2024-07-01 | End: 2024-07-03 | Stop reason: HOSPADM

## 2024-07-01 RX ORDER — GLUCAGON 1 MG/ML
1 KIT INJECTION PRN
Status: DISCONTINUED | OUTPATIENT
Start: 2024-07-01 | End: 2024-07-03 | Stop reason: HOSPADM

## 2024-07-01 RX ORDER — ASPIRIN 81 MG/1
324 TABLET, CHEWABLE ORAL ONCE
Status: COMPLETED | OUTPATIENT
Start: 2024-07-01 | End: 2024-07-01

## 2024-07-01 RX ORDER — ACETAMINOPHEN 325 MG/1
650 TABLET ORAL EVERY 6 HOURS PRN
Status: DISCONTINUED | OUTPATIENT
Start: 2024-07-01 | End: 2024-07-02 | Stop reason: ALTCHOICE

## 2024-07-01 RX ORDER — LISINOPRIL AND HYDROCHLOROTHIAZIDE 12.5; 1 MG/1; MG/1
1 TABLET ORAL DAILY
Status: DISCONTINUED | OUTPATIENT
Start: 2024-07-01 | End: 2024-07-03 | Stop reason: HOSPADM

## 2024-07-01 RX ORDER — ONDANSETRON 4 MG/1
4 TABLET, ORALLY DISINTEGRATING ORAL ONCE
Status: COMPLETED | OUTPATIENT
Start: 2024-07-01 | End: 2024-07-01

## 2024-07-01 RX ORDER — POTASSIUM CHLORIDE 20 MEQ/1
40 TABLET, EXTENDED RELEASE ORAL PRN
Status: DISCONTINUED | OUTPATIENT
Start: 2024-07-01 | End: 2024-07-03 | Stop reason: HOSPADM

## 2024-07-01 RX ORDER — ENOXAPARIN SODIUM 100 MG/ML
40 INJECTION SUBCUTANEOUS EVERY 24 HOURS
Status: DISCONTINUED | OUTPATIENT
Start: 2024-07-01 | End: 2024-07-03 | Stop reason: HOSPADM

## 2024-07-01 RX ORDER — BUDESONIDE AND FORMOTEROL FUMARATE DIHYDRATE 160; 4.5 UG/1; UG/1
2 AEROSOL RESPIRATORY (INHALATION) 2 TIMES DAILY
Status: DISCONTINUED | OUTPATIENT
Start: 2024-07-01 | End: 2024-07-03 | Stop reason: HOSPADM

## 2024-07-01 RX ORDER — MONTELUKAST SODIUM 10 MG/1
10 TABLET ORAL NIGHTLY
Status: DISCONTINUED | OUTPATIENT
Start: 2024-07-01 | End: 2024-07-03 | Stop reason: HOSPADM

## 2024-07-01 RX ORDER — INSULIN LISPRO 100 [IU]/ML
0-8 INJECTION, SOLUTION INTRAVENOUS; SUBCUTANEOUS
Status: DISCONTINUED | OUTPATIENT
Start: 2024-07-01 | End: 2024-07-03 | Stop reason: HOSPADM

## 2024-07-01 RX ORDER — MAGNESIUM SULFATE IN WATER 40 MG/ML
2000 INJECTION, SOLUTION INTRAVENOUS PRN
Status: DISCONTINUED | OUTPATIENT
Start: 2024-07-01 | End: 2024-07-03 | Stop reason: HOSPADM

## 2024-07-01 RX ORDER — ACETAMINOPHEN 650 MG/1
650 SUPPOSITORY RECTAL EVERY 6 HOURS PRN
Status: DISCONTINUED | OUTPATIENT
Start: 2024-07-01 | End: 2024-07-02 | Stop reason: ALTCHOICE

## 2024-07-01 RX ORDER — SODIUM CHLORIDE 9 MG/ML
INJECTION, SOLUTION INTRAVENOUS PRN
Status: DISCONTINUED | OUTPATIENT
Start: 2024-07-01 | End: 2024-07-03 | Stop reason: HOSPADM

## 2024-07-01 RX ORDER — DICYCLOMINE HCL 20 MG
20 TABLET ORAL 3 TIMES DAILY
COMMUNITY
Start: 2024-06-07

## 2024-07-01 RX ORDER — SODIUM CHLORIDE 0.9 % (FLUSH) 0.9 %
5-40 SYRINGE (ML) INJECTION EVERY 12 HOURS SCHEDULED
Status: DISCONTINUED | OUTPATIENT
Start: 2024-07-01 | End: 2024-07-03 | Stop reason: HOSPADM

## 2024-07-01 RX ORDER — DEXTROSE MONOHYDRATE 100 MG/ML
INJECTION, SOLUTION INTRAVENOUS CONTINUOUS PRN
Status: DISCONTINUED | OUTPATIENT
Start: 2024-07-01 | End: 2024-07-03 | Stop reason: HOSPADM

## 2024-07-01 RX ORDER — SULFAMETHOXAZOLE AND TRIMETHOPRIM 800; 160 MG/1; MG/1
1 TABLET ORAL 2 TIMES DAILY
COMMUNITY
Start: 2024-06-26 | End: 2024-07-06

## 2024-07-01 RX ADMIN — METOPROLOL TARTRATE 25 MG: 25 TABLET, FILM COATED ORAL at 20:58

## 2024-07-01 RX ADMIN — MONTELUKAST 10 MG: 10 TABLET, FILM COATED ORAL at 20:59

## 2024-07-01 RX ADMIN — LISINOPRIL AND HYDROCHLOROTHIAZIDE 1 TABLET: 12.5; 1 TABLET ORAL at 19:14

## 2024-07-01 RX ADMIN — PANTOPRAZOLE SODIUM 40 MG: 40 TABLET, DELAYED RELEASE ORAL at 19:14

## 2024-07-01 RX ADMIN — BUDESONIDE AND FORMOTEROL FUMARATE DIHYDRATE 2 PUFF: 160; 4.5 AEROSOL RESPIRATORY (INHALATION) at 21:48

## 2024-07-01 RX ADMIN — ONDANSETRON 4 MG: 4 TABLET, ORALLY DISINTEGRATING ORAL at 11:34

## 2024-07-01 RX ADMIN — ASPIRIN 324 MG: 81 TABLET, CHEWABLE ORAL at 11:34

## 2024-07-01 RX ADMIN — SODIUM CHLORIDE, PRESERVATIVE FREE 10 ML: 5 INJECTION INTRAVENOUS at 20:59

## 2024-07-01 ASSESSMENT — HEART SCORE
ECG: NORMAL
ECG: NORMAL

## 2024-07-01 ASSESSMENT — PAIN SCALES - GENERAL: PAINLEVEL_OUTOF10: 0

## 2024-07-01 NOTE — CONSULTS
effusion.     XR CHEST PORTABLE    Result Date: 7/1/2024  EXAMINATION: XR CHEST PORTABLE, 7/1/2024 12:59 PM HISTORY: Chest Pain COMPARISON:  No comparisons available.  Technique: Single view. Findings: The lungs are clear, no effusion. No pneumothorax. Heart is normal size. Mediastinal and hilar contours are within normal limits. Bony thorax no acute abnormality.     No acute cardiopulmonary abnormality. Electronically signed by Lucas Lechuga    [facility] ALFRED DIGITAL SCREEN BILATERAL    Result Date: 6/3/2024  EXAM: [facility] ALFRED DIGITAL SCREEN BILATERAL INDICATION: Screening mammography. COMPARISON: 5/10/2023, 6/2/2020, 6/7/2018 LIMITATIONS: Inframammary folds LIFETIME RISK: 10.3% TECHNIQUE: Digital mammographic and tomosynthesis images were obtained in MLO and CC projections. This study was performed and interpreted using Full Field Digital Mammography and Computer Aided Detection.. FINDINGS: BREAST DENSITY: There are scattered areas of fibroglandular density. No marginated mass, new architectural distortion, or malignant-type microcalcifications. No mammogram evidence of malignancy in either breast.     RIGHT BREAST: No direct or indirect signs of malignancy. LEFT BREAST: No direct or indirect signs of malignancy. ASSESSMENT: BI-RADS 1 Negative RECOMMENDATIONS: Routine screening 1  year. Please note that mammography is not 100% accurate in diagnosing breast cancer. A routine breast exam is recommended to correlate with mammographic findings. Any palpable abnormality must be assessed clinically.  If the patient has a new palpable abnormality, then a Diagnostic Mammogram and/or Breast Ultrasound is indicated if clinically appropriate.      All labs, medications and tests reviewed by myself including data  from outside source , patient and available family .  Continue all other medications of all above medical condition listed as is.     Impression:  Principal Problem:    Chest pain, rule out acute myocardial infarction  Active  Problems:    Fibromyalgia    Essential hypertension  Resolved Problems:    * No resolved hospital problems. *      Assessment: 54 y.o.year old with PMH of  has a past medical history of Acquired hypothyroidism, Chronic back pain, Chronic obstructive pulmonary disease (HCC), COPD (chronic obstructive pulmonary disease) (HCC), COVID-19, Essential hypertension, Fibromyalgia, Former tobacco use, GERD (gastroesophageal reflux disease), H/O 24 hour EKG monitoring, H/O fibromyalgia, History of 24 hour EKG monitoring, History of echocardiogram, History of hysterectomy, Hx of cardiovascular stress test, Hyperlipidemia, Hypertension, Schizoaffective disorder (HCC), Simple chronic bronchitis (HCC), and Spastic paraparesis.      Plan and Recommendations:    Chest Pain: serial cardiac enzymes, EKG reviewed, further plan as per enzymes and clinical course  Will get stress test keep n.p.o. after midnight  Get echo     Diabetes management as per primary team  HTN: stable, continue present medications   DVT prophylaxis if no contraindication  6.   Dyslipidemia: Check lipid panel  Discussed with primary team, hospitalist service ,bedside staff ,nursing staff, patient and family        Thank you  much for consult and giving us the opportunity in contributing in the care of this patient. Please feel free to call me for any questions.       Sayed Hilario España MD, 7/1/2024 4:27 PM     The above note is prepared with the intention to serve as communication with trained medical care practitioners only. Some of the information is provided by secondary sources as well as from our patient and/or family member(s) recollection, thus inaccuracies may occur. In addition, other professionals integrate data into the electronic medical record, and the Heart Team MD and NPs are not responsible for these. Any errors will be corrected upon verification with documented reports.

## 2024-07-01 NOTE — ED NOTES
Medication History  [facility]    Patient Name: Coty Stearns 1970     Medication history has been completed by: Lisa Cuellar CPhT    Source(s) of information: patient and insurance claims     Primary Care Physician: Grazyna English MD     Pharmacy: [facility]    Allergies as of 07/01/2024 - Fully Reviewed 07/01/2024   Allergen Reaction Noted    Latex  06/25/2018    Codeine Itching 08/05/2014    Pcn [penicillins] Hives 06/29/2014    Haemophilus influenzae vaccines Rash 02/23/2022        Prior to Admission medications    Medication Sig Start Date End Date Taking? Authorizing Provider   dicyclomine (BENTYL) 20 MG tablet Take 1 tablet by mouth 3 times daily 6/7/24  Yes Milla Low MD   sulfamethoxazole-trimethoprim (BACTRIM DS;SEPTRA DS) 800-160 MG per tablet Take 1 tablet by mouth in the morning and at bedtime 6/26/24 7/6/24 Yes Milla Low MD   metFORMIN (GLUCOPHAGE) 500 MG tablet Take 0.5 tablets by mouth 2 times daily (with meals)    Milla Low MD   montelukast (SINGULAIR) 10 MG tablet Take 1 tablet by mouth nightly 5/29/24   Felix Dean MD   budesonide-formoterol (SYMBICORT) 160-4.5 MCG/ACT AERO Inhale 2 puffs into the lungs in the morning and at bedtime 5/29/24   Felix Dean MD   albuterol sulfate HFA (PROVENTIL HFA) 108 (90 Base) MCG/ACT inhaler Inhale 2 puffs into the lungs every 6 hours as needed for Wheezing or Shortness of Breath 5/29/24   Felix Dean MD   traMADol (ULTRAM) 50 MG tablet Take 1 tablet by mouth 2 times daily as needed. 2/11/24   Milla Low MD   promethazine (PHENERGAN) 12.5 MG tablet  1/18/24   Milla Low MD   lisinopril-hydroCHLOROthiazide (PRINZIDE;ZESTORETIC) 10-12.5 MG per tablet  2/7/24   Milla Low MD   metoprolol tartrate (LOPRESSOR) 25 MG tablet Take 1 tablet by mouth 2 times daily 3/19/23   Milla Low MD   baclofen (LIORESAL) 10 MG tablet

## 2024-07-01 NOTE — H&P
ventricular contraction. Sinus tachycardia present.     History of echocardiogram 2014    EF 55% Mild left ventricular hypertrophy noted. Mild mitral and TR noted. The patient is bradycardic during the study.    History of hysterectomy 2018    Hx of cardiovascular stress test 2014    Lexiscan: EF 73% Negative electrocardiogram suggestive for ischemia.    Hyperlipidemia 2018    Hypertension     Schizoaffective disorder (HCC) 2018    Simple chronic bronchitis (HCC) 2018    Spastic paraparesis         PSHX:  has a past surgical history that includes Hysterectomy; back surgery; Inner ear surgery (Left); Tubal ligation; Inguinal hernia repair (Right); Cardiac catheterization; and Breast biopsy.    Fam HX: family history includes Breast Cancer in her maternal grandmother and paternal aunt; Diabetes in her sister; Elevated Lipids in her mother; Heart Disease in her father, mother, and sister; Hypertension in her mother; Kidney Disease in her sister; Ovarian Cancer in her paternal aunt.    Soc HX:   Social History     Socioeconomic History    Marital status:    Tobacco Use    Smoking status: Former     Current packs/day: 0.00     Average packs/day: 1 pack/day for 25.0 years (25.0 ttl pk-yrs)     Types: Cigarettes     Start date: 1986     Quit date: 2011     Years since quittin.9    Smokeless tobacco: Never   Vaping Use    Vaping Use: Never used   Substance and Sexual Activity    Alcohol use: No    Drug use: No    Sexual activity: Yes     Partners: Male     Comment: seperated       Allergies:   Allergies:   Allergies   Allergen Reactions    Latex     Codeine Itching    Pcn [Penicillins] Hives    Haemophilus Influenzae Vaccines Rash       Medications:   Medications:    budesonide-formoterol  2 puff Inhalation BID    metoprolol tartrate  25 mg Oral BID    lisinopril-hydroCHLOROthiazide  1 tablet Oral Daily    pantoprazole  40 mg Oral Daily    montelukast  10 mg Oral     BACTERIA NEGATIVE 11/23/2019 11:39 PM    CLARITYU CLEAR 11/23/2019 11:39 PM    SPECGRAV 1.015 06/07/2018 11:23 AM    LEUKOCYTESUR TRACE 11/23/2019 11:39 PM    UROBILINOGEN NORMAL 11/23/2019 11:39 PM    BILIRUBINUR NEGATIVE 11/23/2019 11:39 PM    BILIRUBINUR Negative 06/07/2018 11:23 AM    BLOODU NEGATIVE 11/23/2019 11:39 PM    GLUCOSEU NEGATIVE 11/23/2019 11:39 PM    GLUCOSEU Negative 06/07/2018 11:23 AM    KETUA NEGATIVE 11/23/2019 11:39 PM     Urine Cultures:   Lab Results   Component Value Date/Time    LABURIN No growth at 18-36 hours 06/07/2018 11:25 AM     Blood Cultures: No results found for: \"BC\"  No results found for: \"BLOODCULT2\"  Organism: No results found for: \"ORG\"    Imaging/Diagnostics Last 24 Hours   XR CHEST PORTABLE    Result Date: 7/1/2024  EXAMINATION: XR CHEST PORTABLE, 7/1/2024 12:59 PM HISTORY: Chest Pain COMPARISON:  No comparisons available.  Technique: Single view. Findings: The lungs are clear, no effusion. No pneumothorax. Heart is normal size. Mediastinal and hilar contours are within normal limits. Bony thorax no acute abnormality.     No acute cardiopulmonary abnormality. Electronically signed by Lucas Lechuga      Electronically signed by Teresa Lopez MD on 7/1/2024 at 4:15 PM

## 2024-07-01 NOTE — ED PROVIDER NOTES
[facility] EMERGENCY DEPARTMENT  EMERGENCY DEPARTMENT ENCOUNTER        Pt Name: Coty Stearns  MRN: [id number]  Birthdate 1970  Date of evaluation: 7/1/2024  Provider: MONTSE Juan - CNP  PCP: Grazyna nEglish MD  Note Started: 11:06 AM EDT 7/1/24      AISSATOU. I have evaluated this patient.        CHIEF COMPLAINT       Chief Complaint   Patient presents with    Jaw Pain     Pt reports \"off and on for a few days\" pain radiates to both arms    Arm Pain     bilateral    Nausea       HISTORY OF PRESENT ILLNESS: 1 or more Elements     History From: Patient    Limitations to history : None    Social Determinants Significantly Affecting Health : None    Chief Complaint: Jaw pain nausea shortness of breath arm pain chest pain    Coty Stearns is a 54 y.o. female with a history of schizoaffective, HLD, COPD, hypothyroidism, HTN who presents to ED stating that her primary care doctor told her she needed to be evaluated.  She states for last 4 days she has been having this sharp pain that comes into her jaws goes down into her arms.  She states it lasts a few minutes.  Reports it happens a couple times a day.  Stated the last time was last night.  Reports when it occurs she also gets a midsternal sharp chest pain as well as shortness of breath.  Denies any recent fevers illnesses or coughs.  States her COPD is under control and she does not believe it is related to this.  Denies any abdominal pain nausea vomiting or diarrhea.  Denies any lower extremity edema.  Denies any history of smoking or drug use.    Nursing Notes were all reviewed and agreed with or any disagreements were addressed in the HPI.    REVIEW OF SYSTEMS :      Review of Systems    Positives and Pertinent negatives as per HPI.     SURGICAL HISTORY     Past Surgical History:   Procedure Laterality Date    BACK SURGERY      BREAST BIOPSY      benign    CARDIAC CATHETERIZATION      HYSTERECTOMY (CERVIX  0.8/87.     Due to heart score of 4 with suspicious symptoms patient sent in from PCP as well for chest pain jaw pain arm pain elevated troponin would recommend observation admission with cardiology evaluation.    Consulted inpatient hospitalist team for admission to hospital      Disposition Considerations (tests considered but not done, Admit vs D/C, Shared Decision Making, Pt Expectation of Test or Tx.): Patient admitted in stable condition    The patient tolerated their visit well.  The patient and / or the family were informed of the results of any tests, a time was given to answer questions, a plan was proposed and they agreed with plan.    I am the Primary Clinician of Record.  FINAL IMPRESSION      1. Elevated troponin    2. Chest pain, unspecified type          DISPOSITION/PLAN     DISPOSITION        PATIENT REFERRED TO:  No follow-up provider specified.    DISCHARGE MEDICATIONS:  New Prescriptions    No medications on file       DISCONTINUED MEDICATIONS:  Discontinued Medications    No medications on file              (Please note that portions of this note were completed with a voice recognition program.  Efforts were made to edit the dictations but occasionally words are mis-transcribed.)    MONTSE Juan CNP (electronically signed)        Bree Mota APRN - CNP  07/01/24 1867

## 2024-07-01 NOTE — ED NOTES
ED TO INPATIENT SBAR HANDOFF    Patient Name: Coty Stearns   :  1970  54 y.o.   Preferred Name  Coty   Family/Caregiver Present no   Restraints no   C-SSRS: Risk of Suicide: No Risk  Sitter no   Sepsis Risk Score        Situation  Chief Complaint   Patient presents with    Jaw Pain     Pt reports \"off and on for a few days\" pain radiates to both arms    Arm Pain     bilateral    Nausea     Brief Description of Patient's Condition: pt presents to the ED with complaints of jaw pain, arm pain, and nausea. States that the pain has been on and off for a few days.   Mental Status: oriented  Arrived from: home    Imaging:   XR CHEST PORTABLE   Final Result   No acute cardiopulmonary abnormality.         Electronically signed by Lucas Lechuga        Abnormal labs:   Abnormal Labs Reviewed   CBC - Abnormal; Notable for the following components:       Result Value    WBC 11.0 (*)     MCHC 31.3 (*)     MPV 11.4 (*)     All other components within normal limits   COMPREHENSIVE METABOLIC PANEL - Abnormal; Notable for the following components:    BUN 25 (*)     Creatinine 1.2 (*)     Est, Glom Filt Rate 53 (*)     Calcium 11.5 (*)     All other components within normal limits   TROPONIN - Abnormal; Notable for the following components:    Troponin, High Sensitivity 17 (*)     All other components within normal limits   TROPONIN - Abnormal; Notable for the following components:    Troponin, High Sensitivity 15 (*)     All other components within normal limits        Background  History:   Past Medical History:   Diagnosis Date    Acquired hypothyroidism 2018    Chronic back pain 2018    Chronic obstructive pulmonary disease (HCC) 2018    COPD (chronic obstructive pulmonary disease) (HCC) 10/2013    COVID-19 2021    Essential hypertension 10/09/2018    Fibromyalgia 2018    Former tobacco use 2018    GERD (gastroesophageal reflux disease)     H/O 24 hour EKG monitoring 14-14

## 2024-07-01 NOTE — PROGRESS NOTES
4 Eyes Skin Assessment     NAME:  Coty Stearns  YOB: 1970  MEDICAL RECORD NUMBER:  [id number]    The patient is being assessed for  Admission    I agree that at least one RN has performed a thorough Head to Toe Skin Assessment on the patient. ALL assessment sites listed below have been assessed.      Areas assessed by both nurses:    Head, Face, Ears, Shoulders, Back, Chest, Arms, Elbows, Hands, Sacrum. Buttock, Coccyx, Ischium, and Legs. Feet and Heels        Does the Patient have a Wound? No noted wound(s)       Tra Prevention initiated by RN: Yes  Wound Care Orders initiated by RN: No    Pressure Injury (Stage 3,4, Unstageable, DTI, NWPT, and Complex wounds) if present, place Wound referral order by RN under : No    New Ostomies, if present place, Ostomy referral order under : No     Nurse 1 eSignature: Electronically signed by Joseline Dudley RN on 7/1/24 at 3:54 PM EDT    **SHARE this note so that the co-signing nurse can place an eSignature**    Nurse 2 eSignature: Electronically signed by Pepito Martinez RN on 7/1/24 at 4:04 PM EDT

## 2024-07-01 NOTE — ED NOTES
12 lead EKG as interpreted by me reveals normal sinus rhythm. Axis is normal. There are no ischemic ST elevations or other suspicious ST changes;  QRS interval is narrow, QT interval is not prolonged. Final interpretation: Normal sinus rhythm.       Kvng Pierre MD  07/01/24 9778

## 2024-07-02 ENCOUNTER — APPOINTMENT (OUTPATIENT)
Dept: NON INVASIVE DIAGNOSTICS | Age: 54
DRG: 322 | End: 2024-07-02
Payer: COMMERCIAL

## 2024-07-02 ENCOUNTER — APPOINTMENT (OUTPATIENT)
Dept: NUCLEAR MEDICINE | Age: 54
DRG: 322 | End: 2024-07-02
Payer: COMMERCIAL

## 2024-07-02 PROBLEM — I21.02 ST ELEVATION MYOCARDIAL INFARCTION INVOLVING LEFT ANTERIOR DESCENDING (LAD) CORONARY ARTERY (HCC): Status: ACTIVE | Noted: 2024-07-02

## 2024-07-02 LAB
ACTIVATED CLOTTING TIME, LOW RANGE: >400 SEC
AMPHETAMINES: NEGATIVE
ANION GAP SERPL CALCULATED.3IONS-SCNC: 10 MMOL/L (ref 7–16)
BARBITURATE SCREEN URINE: NEGATIVE
BASOPHILS ABSOLUTE: 0.1 K/CU MM
BASOPHILS RELATIVE PERCENT: 0.8 % (ref 0–1)
BENZODIAZEPINE SCREEN, URINE: NEGATIVE
BUN SERPL-MCNC: 25 MG/DL (ref 6–23)
CALCIUM IONIZED: ABNORMAL MMOL/L (ref 1.12–1.32)
CALCIUM SERPL-MCNC: 11.5 MG/DL (ref 8.3–10.6)
CANNABINOID SCREEN URINE: NEGATIVE
CHLORIDE BLD-SCNC: 104 MMOL/L (ref 99–110)
CO2: 27 MMOL/L (ref 21–32)
COCAINE METABOLITE: NEGATIVE
CREAT SERPL-MCNC: 1 MG/DL (ref 0.6–1.1)
DIFFERENTIAL TYPE: ABNORMAL
ECHO BSA: 2 M2
EKG ATRIAL RATE: 89 BPM
EKG DIAGNOSIS: NORMAL
EKG P AXIS: 43 DEGREES
EKG P-R INTERVAL: 176 MS
EKG Q-T INTERVAL: 376 MS
EKG QRS DURATION: 92 MS
EKG QTC CALCULATION (BAZETT): 457 MS
EKG R AXIS: 6 DEGREES
EKG T AXIS: 29 DEGREES
EKG VENTRICULAR RATE: 89 BPM
EOSINOPHILS ABSOLUTE: 0.4 K/CU MM
EOSINOPHILS RELATIVE PERCENT: 4.9 % (ref 0–3)
ESTIMATED AVERAGE GLUCOSE: 108 MG/DL
FENTANYL URINE: NEGATIVE
GFR, ESTIMATED: 66 ML/MIN/1.73M2
GLUCOSE BLD-MCNC: 117 MG/DL (ref 70–99)
GLUCOSE BLD-MCNC: 83 MG/DL (ref 70–99)
GLUCOSE BLD-MCNC: 93 MG/DL (ref 70–99)
GLUCOSE BLD-MCNC: 93 MG/DL (ref 70–99)
GLUCOSE SERPL-MCNC: 108 MG/DL (ref 70–99)
HBA1C MFR BLD: 5.4 % (ref 4.2–6.3)
HCT VFR BLD CALC: 42.1 % (ref 37–47)
HEMOGLOBIN: 13.3 GM/DL (ref 12.5–16)
IMMATURE NEUTROPHIL %: 0.2 % (ref 0–0.43)
LYMPHOCYTES ABSOLUTE: 3.8 K/CU MM
LYMPHOCYTES RELATIVE PERCENT: 44 % (ref 24–44)
MCH RBC QN AUTO: 29.6 PG (ref 27–31)
MCHC RBC AUTO-ENTMCNC: 31.6 % (ref 32–36)
MCV RBC AUTO: 93.6 FL (ref 78–100)
MONOCYTES ABSOLUTE: 1.1 K/CU MM
MONOCYTES RELATIVE PERCENT: 13.1 % (ref 0–4)
NEUTROPHILS ABSOLUTE: 3.2 K/CU MM
NEUTROPHILS RELATIVE PERCENT: 37 % (ref 36–66)
NUCLEATED RBC %: 0 %
OPIATES, URINE: ABNORMAL
OXYCODONE: NEGATIVE
PDW BLD-RTO: 14.4 % (ref 11.7–14.9)
PLATELET # BLD: 209 K/CU MM (ref 140–440)
PMV BLD AUTO: 11.5 FL (ref 7.5–11.1)
POTASSIUM SERPL-SCNC: 4.9 MMOL/L (ref 3.5–5.1)
RBC # BLD: 4.5 M/CU MM (ref 4.2–5.4)
SODIUM BLD-SCNC: 141 MMOL/L (ref 135–145)
STRESS BASELINE DIAS BP: 80 MMHG
STRESS BASELINE DIAS BP: 80 MMHG
STRESS BASELINE HR: 64 BPM
STRESS BASELINE HR: 64 BPM
STRESS BASELINE SYS BP: 120 MMHG
STRESS BASELINE SYS BP: 120 MMHG
STRESS ESTIMATED WORKLOAD: 1 METS
STRESS ESTIMATED WORKLOAD: 1 METS
STRESS PEAK DIAS BP: 80 MMHG
STRESS PEAK DIAS BP: 80 MMHG
STRESS PEAK SYS BP: 140 MMHG
STRESS PEAK SYS BP: 140 MMHG
STRESS PERCENT HR ACHIEVED: 79 %
STRESS PERCENT HR ACHIEVED: 79 %
STRESS POST PEAK HR: 130 BPM
STRESS POST PEAK HR: 130 BPM
STRESS RATE PRESSURE PRODUCT: NORMAL BPM*MMHG
STRESS RATE PRESSURE PRODUCT: NORMAL BPM*MMHG
STRESS TARGET HR: 164 BPM
STRESS TARGET HR: 164 BPM
TOTAL IMMATURE NEUTOROPHIL: 0.02 K/CU MM
TOTAL NUCLEATED RBC: 0 K/CU MM
WBC # BLD: 8.6 K/CU MM (ref 4–10.5)

## 2024-07-02 PROCEDURE — 93458 L HRT ARTERY/VENTRICLE ANGIO: CPT | Performed by: INTERNAL MEDICINE

## 2024-07-02 PROCEDURE — 93017 CV STRESS TEST TRACING ONLY: CPT

## 2024-07-02 PROCEDURE — 93016 CV STRESS TEST SUPVJ ONLY: CPT | Performed by: INTERNAL MEDICINE

## 2024-07-02 PROCEDURE — 02703DZ DILATION OF CORONARY ARTERY, ONE ARTERY WITH INTRALUMINAL DEVICE, PERCUTANEOUS APPROACH: ICD-10-PCS | Performed by: INTERNAL MEDICINE

## 2024-07-02 PROCEDURE — 6370000000 HC RX 637 (ALT 250 FOR IP): Performed by: INTERNAL MEDICINE

## 2024-07-02 PROCEDURE — 82330 ASSAY OF CALCIUM: CPT

## 2024-07-02 PROCEDURE — 3430000000 HC RX DIAGNOSTIC RADIOPHARMACEUTICAL: Performed by: INTERNAL MEDICINE

## 2024-07-02 PROCEDURE — 78452 HT MUSCLE IMAGE SPECT MULT: CPT | Performed by: INTERNAL MEDICINE

## 2024-07-02 PROCEDURE — 6360000002 HC RX W HCPCS: Performed by: INTERNAL MEDICINE

## 2024-07-02 PROCEDURE — 78451 HT MUSCLE IMAGE SPECT SING: CPT

## 2024-07-02 PROCEDURE — 2580000003 HC RX 258: Performed by: INTERNAL MEDICINE

## 2024-07-02 PROCEDURE — 94640 AIRWAY INHALATION TREATMENT: CPT

## 2024-07-02 PROCEDURE — 94761 N-INVAS EAR/PLS OXIMETRY MLT: CPT

## 2024-07-02 PROCEDURE — 6370000000 HC RX 637 (ALT 250 FOR IP): Performed by: STUDENT IN AN ORGANIZED HEALTH CARE EDUCATION/TRAINING PROGRAM

## 2024-07-02 PROCEDURE — 92929 HC PRQ CARD STENT W/ANGIO ADDL: CPT | Performed by: INTERNAL MEDICINE

## 2024-07-02 PROCEDURE — 1200000000 HC SEMI PRIVATE

## 2024-07-02 PROCEDURE — 92941 PRQ TRLML REVSC TOT OCCL AMI: CPT | Performed by: INTERNAL MEDICINE

## 2024-07-02 PROCEDURE — 99291 CRITICAL CARE FIRST HOUR: CPT | Performed by: INTERNAL MEDICINE

## 2024-07-02 PROCEDURE — 93005 ELECTROCARDIOGRAM TRACING: CPT | Performed by: INTERNAL MEDICINE

## 2024-07-02 PROCEDURE — 2500000003 HC RX 250 WO HCPCS: Performed by: INTERNAL MEDICINE

## 2024-07-02 PROCEDURE — C1887 CATHETER, GUIDING: HCPCS | Performed by: INTERNAL MEDICINE

## 2024-07-02 PROCEDURE — 85025 COMPLETE CBC W/AUTO DIFF WBC: CPT

## 2024-07-02 PROCEDURE — 93018 CV STRESS TEST I&R ONLY: CPT | Performed by: INTERNAL MEDICINE

## 2024-07-02 PROCEDURE — 93454 CORONARY ARTERY ANGIO S&I: CPT | Performed by: INTERNAL MEDICINE

## 2024-07-02 PROCEDURE — 94010 BREATHING CAPACITY TEST: CPT

## 2024-07-02 PROCEDURE — 2709999900 HC NON-CHARGEABLE SUPPLY: Performed by: INTERNAL MEDICINE

## 2024-07-02 PROCEDURE — 96374 THER/PROPH/DIAG INJ IV PUSH: CPT

## 2024-07-02 PROCEDURE — 6360000004 HC RX CONTRAST MEDICATION: Performed by: INTERNAL MEDICINE

## 2024-07-02 PROCEDURE — 80048 BASIC METABOLIC PNL TOTAL CA: CPT

## 2024-07-02 PROCEDURE — C1769 GUIDE WIRE: HCPCS | Performed by: INTERNAL MEDICINE

## 2024-07-02 PROCEDURE — 2580000003 HC RX 258: Performed by: STUDENT IN AN ORGANIZED HEALTH CARE EDUCATION/TRAINING PROGRAM

## 2024-07-02 PROCEDURE — C1894 INTRO/SHEATH, NON-LASER: HCPCS | Performed by: INTERNAL MEDICINE

## 2024-07-02 PROCEDURE — B2111ZZ FLUOROSCOPY OF MULTIPLE CORONARY ARTERIES USING LOW OSMOLAR CONTRAST: ICD-10-PCS | Performed by: INTERNAL MEDICINE

## 2024-07-02 PROCEDURE — 4A023N7 MEASUREMENT OF CARDIAC SAMPLING AND PRESSURE, LEFT HEART, PERCUTANEOUS APPROACH: ICD-10-PCS | Performed by: INTERNAL MEDICINE

## 2024-07-02 PROCEDURE — 93010 ELECTROCARDIOGRAM REPORT: CPT | Performed by: INTERNAL MEDICINE

## 2024-07-02 PROCEDURE — 36415 COLL VENOUS BLD VENIPUNCTURE: CPT

## 2024-07-02 PROCEDURE — 85347 COAGULATION TIME ACTIVATED: CPT

## 2024-07-02 PROCEDURE — 80307 DRUG TEST PRSMV CHEM ANLYZR: CPT

## 2024-07-02 PROCEDURE — 82962 GLUCOSE BLOOD TEST: CPT

## 2024-07-02 PROCEDURE — C1874 STENT, COATED/COV W/DEL SYS: HCPCS | Performed by: INTERNAL MEDICINE

## 2024-07-02 PROCEDURE — 83970 ASSAY OF PARATHORMONE: CPT

## 2024-07-02 PROCEDURE — A9500 TC99M SESTAMIBI: HCPCS | Performed by: INTERNAL MEDICINE

## 2024-07-02 DEVICE — STENT CORONARY ONYX FRONTIER RX 3X22 MM ZOTAROLIMUS ELUT: Type: IMPLANTABLE DEVICE | Status: FUNCTIONAL

## 2024-07-02 RX ORDER — ASPIRIN 81 MG/1
81 TABLET, CHEWABLE ORAL DAILY
Status: DISCONTINUED | OUTPATIENT
Start: 2024-07-03 | End: 2024-07-03 | Stop reason: HOSPADM

## 2024-07-02 RX ORDER — SODIUM CHLORIDE 9 MG/ML
INJECTION, SOLUTION INTRAVENOUS PRN
Status: DISCONTINUED | OUTPATIENT
Start: 2024-07-02 | End: 2024-07-03 | Stop reason: HOSPADM

## 2024-07-02 RX ORDER — SODIUM CHLORIDE 0.9 % (FLUSH) 0.9 %
5-40 SYRINGE (ML) INJECTION EVERY 12 HOURS SCHEDULED
Status: DISCONTINUED | OUTPATIENT
Start: 2024-07-02 | End: 2024-07-03 | Stop reason: HOSPADM

## 2024-07-02 RX ORDER — NITROGLYCERIN 0.4 MG/1
0.4 TABLET SUBLINGUAL EVERY 5 MIN PRN
Status: DISCONTINUED | OUTPATIENT
Start: 2024-07-02 | End: 2024-07-03 | Stop reason: HOSPADM

## 2024-07-02 RX ORDER — ROSUVASTATIN CALCIUM 20 MG/1
40 TABLET, COATED ORAL NIGHTLY
Status: DISCONTINUED | OUTPATIENT
Start: 2024-07-02 | End: 2024-07-03 | Stop reason: HOSPADM

## 2024-07-02 RX ORDER — ACETAMINOPHEN 325 MG/1
650 TABLET ORAL EVERY 4 HOURS PRN
Status: DISCONTINUED | OUTPATIENT
Start: 2024-07-02 | End: 2024-07-03 | Stop reason: HOSPADM

## 2024-07-02 RX ORDER — MORPHINE SULFATE 2 MG/ML
2 INJECTION, SOLUTION INTRAMUSCULAR; INTRAVENOUS
Status: COMPLETED | OUTPATIENT
Start: 2024-07-02 | End: 2024-07-02

## 2024-07-02 RX ORDER — TETRAKIS(2-METHOXYISOBUTYLISOCYANIDE)COPPER(I) TETRAFLUOROBORATE 1 MG/ML
10.2 INJECTION, POWDER, LYOPHILIZED, FOR SOLUTION INTRAVENOUS
Status: COMPLETED | OUTPATIENT
Start: 2024-07-02 | End: 2024-07-02

## 2024-07-02 RX ORDER — PRASUGREL 10 MG/1
10 TABLET, FILM COATED ORAL DAILY
Status: DISCONTINUED | OUTPATIENT
Start: 2024-07-03 | End: 2024-07-03 | Stop reason: HOSPADM

## 2024-07-02 RX ORDER — SODIUM CHLORIDE 0.9 % (FLUSH) 0.9 %
5-40 SYRINGE (ML) INJECTION PRN
Status: DISCONTINUED | OUTPATIENT
Start: 2024-07-02 | End: 2024-07-03 | Stop reason: HOSPADM

## 2024-07-02 RX ORDER — NITROGLYCERIN 20 MG/100ML
INJECTION INTRAVENOUS PRN
Status: DISCONTINUED | OUTPATIENT
Start: 2024-07-02 | End: 2024-07-02 | Stop reason: HOSPADM

## 2024-07-02 RX ORDER — ASPIRIN 325 MG
TABLET, DELAYED RELEASE (ENTERIC COATED) ORAL PRN
Status: DISCONTINUED | OUTPATIENT
Start: 2024-07-02 | End: 2024-07-02 | Stop reason: HOSPADM

## 2024-07-02 RX ORDER — NITROGLYCERIN 0.4 MG/1
0.4 TABLET SUBLINGUAL ONCE
Status: COMPLETED | OUTPATIENT
Start: 2024-07-02 | End: 2024-07-02

## 2024-07-02 RX ORDER — HEPARIN SODIUM 10000 [USP'U]/ML
INJECTION, SOLUTION INTRAVENOUS; SUBCUTANEOUS PRN
Status: DISCONTINUED | OUTPATIENT
Start: 2024-07-02 | End: 2024-07-02 | Stop reason: HOSPADM

## 2024-07-02 RX ORDER — ROSUVASTATIN CALCIUM 20 MG/1
20 TABLET, COATED ORAL NIGHTLY
Status: DISCONTINUED | OUTPATIENT
Start: 2024-07-02 | End: 2024-07-02 | Stop reason: ALTCHOICE

## 2024-07-02 RX ORDER — PRASUGREL 10 MG/1
TABLET, FILM COATED ORAL PRN
Status: DISCONTINUED | OUTPATIENT
Start: 2024-07-02 | End: 2024-07-02 | Stop reason: HOSPADM

## 2024-07-02 RX ADMIN — KIT FOR THE PREPARATION OF TECHNETIUM TC99M SESTAMIBI 10.2 MILLICURIE: 1 INJECTION, POWDER, LYOPHILIZED, FOR SOLUTION PARENTERAL at 09:30

## 2024-07-02 RX ADMIN — LEVOTHYROXINE SODIUM 50 MCG: 0.05 TABLET ORAL at 05:26

## 2024-07-02 RX ADMIN — ROSUVASTATIN CALCIUM 40 MG: 20 TABLET, COATED ORAL at 20:17

## 2024-07-02 RX ADMIN — METOPROLOL TARTRATE 25 MG: 25 TABLET, FILM COATED ORAL at 13:23

## 2024-07-02 RX ADMIN — SODIUM CHLORIDE, PRESERVATIVE FREE 10 ML: 5 INJECTION INTRAVENOUS at 13:23

## 2024-07-02 RX ADMIN — NITROGLYCERIN 0.4 MG: 0.4 TABLET, ORALLY DISINTEGRATING SUBLINGUAL at 11:40

## 2024-07-02 RX ADMIN — METOPROLOL TARTRATE 25 MG: 25 TABLET, FILM COATED ORAL at 20:17

## 2024-07-02 RX ADMIN — MONTELUKAST 10 MG: 10 TABLET, FILM COATED ORAL at 20:17

## 2024-07-02 RX ADMIN — NITROGLYCERIN 0.4 MG: 0.4 TABLET, ORALLY DISINTEGRATING SUBLINGUAL at 13:48

## 2024-07-02 RX ADMIN — BUDESONIDE AND FORMOTEROL FUMARATE DIHYDRATE 2 PUFF: 160; 4.5 AEROSOL RESPIRATORY (INHALATION) at 22:28

## 2024-07-02 RX ADMIN — BUDESONIDE AND FORMOTEROL FUMARATE DIHYDRATE 2 PUFF: 160; 4.5 AEROSOL RESPIRATORY (INHALATION) at 08:13

## 2024-07-02 RX ADMIN — MORPHINE SULFATE 2 MG: 2 INJECTION, SOLUTION INTRAMUSCULAR; INTRAVENOUS at 11:40

## 2024-07-02 RX ADMIN — SODIUM CHLORIDE, PRESERVATIVE FREE 10 ML: 5 INJECTION INTRAVENOUS at 20:17

## 2024-07-02 RX ADMIN — LISINOPRIL AND HYDROCHLOROTHIAZIDE 1 TABLET: 12.5; 1 TABLET ORAL at 13:22

## 2024-07-02 RX ADMIN — KIT FOR THE PREPARATION OF TECHNETIUM TC99M SESTAMIBI 11 MILLICURIE: 1 INJECTION, POWDER, LYOPHILIZED, FOR SOLUTION PARENTERAL at 08:30

## 2024-07-02 RX ADMIN — ACETAMINOPHEN 650 MG: 325 TABLET ORAL at 13:22

## 2024-07-02 RX ADMIN — PANTOPRAZOLE SODIUM 40 MG: 40 TABLET, DELAYED RELEASE ORAL at 13:22

## 2024-07-02 ASSESSMENT — PAIN DESCRIPTION - LOCATION
LOCATION: CHEST
LOCATION: CHEST
LOCATION: CHEST;JAW
LOCATION: HEAD
LOCATION: CHEST

## 2024-07-02 ASSESSMENT — COPD QUESTIONNAIRES
TOTAL_EXACERBATIONS_PASTYEAR: 0
QUESTION5_HOMEACTIVITIES: 5
QUESTION7_SLEEPQUALITY: 3
QUESTION6_LEAVINGHOUSE: 0
QUESTION1_COUGHFREQUENCY: 2
QUESTION8_ENERGYLEVEL: 4
QUESTION4_WALKINCLINE: 5
CAT_TOTALSCORE: 24
GOLD_GROUP: GROUP B
QUESTION3_CHESTTIGHTNESS: 3
QUESTION2_CHESTPHLEGM: 2

## 2024-07-02 ASSESSMENT — PAIN DESCRIPTION - ORIENTATION
ORIENTATION: LEFT
ORIENTATION: INNER
ORIENTATION: ANTERIOR
ORIENTATION: MID

## 2024-07-02 ASSESSMENT — PAIN DESCRIPTION - ONSET
ONSET: ON-GOING
ONSET: ON-GOING

## 2024-07-02 ASSESSMENT — PULMONARY FUNCTION TESTS
POST BRONCHODILATOR FEV1/FVC: 92
FEV1 (%PREDICTED): 99
PIF_VALUE: 120

## 2024-07-02 ASSESSMENT — PAIN SCALES - GENERAL
PAINLEVEL_OUTOF10: 7
PAINLEVEL_OUTOF10: 5
PAINLEVEL_OUTOF10: 6
PAINLEVEL_OUTOF10: 5
PAINLEVEL_OUTOF10: 6

## 2024-07-02 ASSESSMENT — PAIN DESCRIPTION - PAIN TYPE
TYPE: ACUTE PAIN
TYPE: ACUTE PAIN

## 2024-07-02 ASSESSMENT — PAIN DESCRIPTION - DESCRIPTORS
DESCRIPTORS: ACHING
DESCRIPTORS: CRUSHING
DESCRIPTORS: CRUSHING
DESCRIPTORS: ACHING;BURNING

## 2024-07-02 ASSESSMENT — PAIN DESCRIPTION - FREQUENCY: FREQUENCY: CONTINUOUS

## 2024-07-02 ASSESSMENT — PAIN - FUNCTIONAL ASSESSMENT: PAIN_FUNCTIONAL_ASSESSMENT: ACTIVITIES ARE NOT PREVENTED

## 2024-07-02 NOTE — PROGRESS NOTES
Pt's R radial insertion site soft, ecchymotic with mild numbness around the site. Howard CALIX notified.

## 2024-07-02 NOTE — PROGRESS NOTES
1135 pt on treadmill for stress test,complained of jaw pain. ST elevation noted. Treadmill stopped. Rapid response called, then stopped because Dr España and Dr Mullins in room with the patient. B/P 154/117 O 2 at 4 liters placed, nitro sublingual given, and morphine 2 mg given. Defibrillator applied. Patient sent to cath lab with dr Coon assistance.  1151 Nikita-RN notified of patients condition and whereabouts.

## 2024-07-02 NOTE — PLAN OF CARE
Problem: Discharge Planning  Goal: Discharge to home or other facility with appropriate resources  7/2/2024 1955 by Maksim Carter, RN  Outcome: Progressing  7/2/2024 1024 by Nikita Peoples RN  Outcome: Progressing     Problem: Pain  Goal: Verbalizes/displays adequate comfort level or baseline comfort level  7/2/2024 1955 by Maksim Carter, RN  Outcome: Progressing  7/2/2024 1024 by Nikita Peoples, RN  Outcome: Progressing

## 2024-07-02 NOTE — PROGRESS NOTES
4 Eyes Skin Assessment     NAME:  Coty Stearns  YOB: 1970  MEDICAL RECORD NUMBER:  [id number]    The patient is being assessed for  Admission    I agree that at least one RN has performed a thorough Head to Toe Skin Assessment on the patient. ALL assessment sites listed below have been assessed.      Areas assessed by both nurses:    Head, Face, Ears, Shoulders, Back, Chest, Arms, Elbows, Hands, Sacrum. Buttock, Coccyx, Ischium, Legs. Feet and Heels, and Under Medical Devices         Does the Patient have a Wound? No noted wound(s)       Tra Prevention initiated by RN: No  Wound Care Orders initiated by RN: No    Pressure Injury (Stage 3,4, Unstageable, DTI, NWPT, and Complex wounds) if present, place Wound referral order by RN under : No    New Ostomies, if present place, Ostomy referral order under : No     Nurse 1 eSignature: Electronically signed by Aaron Llanos RN on 7/2/24 at 1:41 AM EDT    **SHARE this note so that the co-signing nurse can place an eSignature**    Nurse 2 eSignature: Electronically signed by Kierra Bermeo RN on 7/2/24 at 2:51 AM EDT

## 2024-07-02 NOTE — PROGRESS NOTES
Cardiology Progress Note     Admit Date:  7/1/2024    Consult reason/ Seen today for :       Subjective and  Overnight Events :  STEMi  during stress test she was put on a treadmill and walked 4 minutes and 2 seconds and developed ST elevation in the anterior leads she was emergently taken to the Cath Lab by myself      Chief complain on admission : 54 y.o.year old who is admitted for  Chief Complaint   Patient presents with    Jaw Pain     Pt reports \"off and on for a few days\" pain radiates to both arms    Arm Pain     bilateral    Nausea      Assessment / Plan:  ASCVD: LAD intervention due to FARHAD II flow in the postprocedure although ST's had improved after aspirin we were concerned about irregular area in the mid LAD segment of about 70 to 80% images reviewed with interventional cardiology  Echo shows preserved EF with no wall motion abnormality  Diabetes as per primary team  DVT Prophylaxis if no contraindication  Discussed with primary team, hospitalist service, bedside nursing staff and family  Critical care time coordinating care evaluating patient 44-minute  Past medical history:    has a past medical history of Acquired hypothyroidism, Chronic back pain, Chronic obstructive pulmonary disease ([facility]), COPD (chronic obstructive pulmonary disease) ([facility]), COVID-19, Essential hypertension, Fibromyalgia, Former tobacco use, GERD (gastroesophageal reflux disease), H/O 24 hour EKG monitoring, H/O fibromyalgia, History of 24 hour EKG monitoring, History of echocardiogram, History of hysterectomy, Hx of cardiovascular stress test, Hyperlipidemia, Hypertension, Schizoaffective disorder ([facility]), Simple chronic bronchitis ([facility]), and Spastic paraparesis.  Past surgical history:   has a past surgical history that includes Hysterectomy; back surgery; Inner ear surgery (Left); Tubal ligation; Inguinal hernia repair (Right); Cardiac catheterization;

## 2024-07-02 NOTE — PROGRESS NOTES
Current GOLD classification       GOLD Stage:    Group:    Recorded domestic exacerbations past 12 months:    Current recorded COPD Assessment Tool (CAT) score of    Current eosinophil count: 0.4     Inhaler Device   Acceptable for Use   Respimat  Not Breath Actuated Yes   MDI  Not Breath Actuated Yes           DPI  Observed PIF   using  In-Check Meter   Optimal PIF   Acceptable for Use   HANDIHALER 120 >30 Y   Pressair 120 >45 Y   NEOHALER 120 >50 Y   Diskus 120 >60 Y   ELLIPTA 120 >60 Y     Records show this patient was  using ALBUTEROL, SYMBICORT maintenance therapy prior to admission.          LONG-ACTING (LABA)   Arformoterol (Brovana) NEBULIZER   Indacaterol (Arcapta) NEOHALER   Olodaterol (Striverdi) Respimat   Salmeterol (Serevent) MDI, DISKUS   LONG-ACTING (LAMA)   Aclidinium bromide (Tudorza) PRESSAIR   Glycopyrronium bromide (Seebri) NEOHALER   Tiotropium (Spiriva) Respimat, HANDIHALER   Umeclidinium (Incruse) ELLIPTA   (LABA/LAMA)   Formoterol/glycopyrronium (Bevespi) MDI   Indacaterol/glycopyrronium (Utibron) NEOHALER   Vilanterol/umeclidinium (Anoro) ELLIPTA   Olodaterol/tiotropium (Stiolto) Respimat   (LABA/ICS)   Formoterol/budesomide (Symbicort) MDI   Formoterol/mometasone (Dulera) MDI   Salmeterol/fluticasone (Advair) MDI, DISKUS   Vilanterol/fluticasone (Breo) ELLIPTA   (LABA/LAMA/ICS)   Fluticasone/umeclidinium/vilanterol (Trelegy) ELLIPTA   Budesonide/glycopyrrolate/formoterol fumarate (Beztri) aerosphere      07/02/24 1335   Spirometry Assessment   FEV1 (%PRED) 99   Post Bronchodilator FEV/FVC 92   COPD Exacerbations in last year 0    L/min   COPD Assessment (CAT Score)   Cough Assessment 2   Phlegm Assessment 2   Chest tightness 3   Walking on an incline 5   Home Activities 5   Confident Leaving The Home 0   Sleeping Soundly 3   Have Energy 4   Assessment Score 24   $RT COPD Assessment Yes   GOLD Staging   Group Group B

## 2024-07-02 NOTE — PROGRESS NOTES
1345: Pt complaining new sudden onset of chest pain with nausea and \"feeling weird\". FIFI [place] notified, EKG and nitro 1x dose given per order.     1400: pt stated that she was feeling better.

## 2024-07-02 NOTE — PROGRESS NOTES
4 Eyes Skin Assessment     NAME:  Coty Stearns  YOB: 1970  MEDICAL RECORD NUMBER:  [id number]    The patient is being assessed for  {Reason for Assessment:98864}    I agree that at least one RN has performed a thorough Head to Toe Skin Assessment on the patient. ALL assessment sites listed below have been assessed.      Areas assessed by both nurses:    Head, Face, Ears, Shoulders, Back, Chest, Arms, Elbows, Hands, Sacrum. Buttock, Coccyx, Ischium, Legs. Feet and Heels, and Under Medical Devices         Does the Patient have a Wound? No noted wound(s)       Tra Prevention initiated by RN: No  Wound Care Orders initiated by RN: No    Pressure Injury (Stage 3,4, Unstageable, DTI, NWPT, and Complex wounds) if present, place Wound referral order by RN under : No    New Ostomies, if present place, Ostomy referral order under : No     Nurse 1 eSignature: Electronically signed by Alma Rosa Vaca RN on 7/2/24 at 4:09 PM EDT    **SHARE this note so that the co-signing nurse can place an eSignature**    Nurse 2 eSignature: {Esignature:[id number]}

## 2024-07-02 NOTE — PLAN OF CARE
Problem: Discharge Planning  Goal: Discharge to home or other facility with appropriate resources  7/2/2024 1024 by Nikita Peoples RN  Outcome: Progressing  7/1/2024 2258 by Soha Llanos RN  Outcome: Progressing     Problem: Pain  Goal: Verbalizes/displays adequate comfort level or baseline comfort level  7/2/2024 1024 by Nikita Peoples RN  Outcome: Progressing  7/1/2024 2258 by Soha Llanos RN  Outcome: Progressing

## 2024-07-03 VITALS
TEMPERATURE: 98 F | HEIGHT: 66 IN | RESPIRATION RATE: 17 BRPM | DIASTOLIC BLOOD PRESSURE: 65 MMHG | BODY MASS INDEX: 29.51 KG/M2 | OXYGEN SATURATION: 97 % | HEART RATE: 77 BPM | WEIGHT: 183.64 LBS | SYSTOLIC BLOOD PRESSURE: 105 MMHG

## 2024-07-03 LAB
ANION GAP SERPL CALCULATED.3IONS-SCNC: 12 MMOL/L (ref 7–16)
BUN SERPL-MCNC: 23 MG/DL (ref 6–23)
CALCIUM SERPL-MCNC: 11 MG/DL (ref 8.3–10.6)
CHLORIDE BLD-SCNC: 105 MMOL/L (ref 99–110)
CO2: 24 MMOL/L (ref 21–32)
CREAT SERPL-MCNC: 0.8 MG/DL (ref 0.6–1.1)
EKG ATRIAL RATE: 76 BPM
EKG DIAGNOSIS: NORMAL
EKG P AXIS: 57 DEGREES
EKG P-R INTERVAL: 194 MS
EKG Q-T INTERVAL: 388 MS
EKG QRS DURATION: 86 MS
EKG QTC CALCULATION (BAZETT): 436 MS
EKG R AXIS: 40 DEGREES
EKG T AXIS: 70 DEGREES
EKG VENTRICULAR RATE: 76 BPM
GFR, ESTIMATED: 86 ML/MIN/1.73M2
GLUCOSE BLD-MCNC: 109 MG/DL (ref 70–99)
GLUCOSE BLD-MCNC: 131 MG/DL (ref 70–99)
GLUCOSE SERPL-MCNC: 100 MG/DL (ref 70–99)
HCT VFR BLD CALC: 42.6 % (ref 37–47)
HEMOGLOBIN: 13.6 GM/DL (ref 12.5–16)
MAGNESIUM: 2.2 MG/DL (ref 1.8–2.4)
MCH RBC QN AUTO: 29.6 PG (ref 27–31)
MCHC RBC AUTO-ENTMCNC: 31.9 % (ref 32–36)
MCV RBC AUTO: 92.6 FL (ref 78–100)
PDW BLD-RTO: 14.1 % (ref 11.7–14.9)
PLATELET # BLD: 205 K/CU MM (ref 140–440)
PMV BLD AUTO: 11.7 FL (ref 7.5–11.1)
POTASSIUM SERPL-SCNC: 4.3 MMOL/L (ref 3.5–5.1)
RBC # BLD: 4.6 M/CU MM (ref 4.2–5.4)
SODIUM BLD-SCNC: 141 MMOL/L (ref 135–145)
TROPONIN, HIGH SENSITIVITY: 20 NG/L (ref 0–14)
WBC # BLD: 8.5 K/CU MM (ref 4–10.5)

## 2024-07-03 PROCEDURE — 6370000000 HC RX 637 (ALT 250 FOR IP): Performed by: INTERNAL MEDICINE

## 2024-07-03 PROCEDURE — 83735 ASSAY OF MAGNESIUM: CPT

## 2024-07-03 PROCEDURE — 84484 ASSAY OF TROPONIN QUANT: CPT

## 2024-07-03 PROCEDURE — 6370000000 HC RX 637 (ALT 250 FOR IP): Performed by: STUDENT IN AN ORGANIZED HEALTH CARE EDUCATION/TRAINING PROGRAM

## 2024-07-03 PROCEDURE — 2580000003 HC RX 258: Performed by: STUDENT IN AN ORGANIZED HEALTH CARE EDUCATION/TRAINING PROGRAM

## 2024-07-03 PROCEDURE — 93010 ELECTROCARDIOGRAM REPORT: CPT | Performed by: INTERNAL MEDICINE

## 2024-07-03 PROCEDURE — 82962 GLUCOSE BLOOD TEST: CPT

## 2024-07-03 PROCEDURE — 2580000003 HC RX 258: Performed by: INTERNAL MEDICINE

## 2024-07-03 PROCEDURE — 36415 COLL VENOUS BLD VENIPUNCTURE: CPT

## 2024-07-03 PROCEDURE — 85027 COMPLETE CBC AUTOMATED: CPT

## 2024-07-03 PROCEDURE — 94761 N-INVAS EAR/PLS OXIMETRY MLT: CPT

## 2024-07-03 PROCEDURE — 93005 ELECTROCARDIOGRAM TRACING: CPT

## 2024-07-03 PROCEDURE — 80048 BASIC METABOLIC PNL TOTAL CA: CPT

## 2024-07-03 PROCEDURE — 94640 AIRWAY INHALATION TREATMENT: CPT

## 2024-07-03 PROCEDURE — APPNB15 APP NON BILLABLE TIME 0-15 MINS

## 2024-07-03 PROCEDURE — 6370000000 HC RX 637 (ALT 250 FOR IP)

## 2024-07-03 RX ORDER — METOPROLOL TARTRATE 50 MG/1
50 TABLET, FILM COATED ORAL 2 TIMES DAILY
Status: DISCONTINUED | OUTPATIENT
Start: 2024-07-03 | End: 2024-07-03 | Stop reason: HOSPADM

## 2024-07-03 RX ORDER — ISOSORBIDE MONONITRATE 30 MG/1
30 TABLET, EXTENDED RELEASE ORAL DAILY
Qty: 30 TABLET | Refills: 5 | Status: SHIPPED | OUTPATIENT
Start: 2024-07-04 | End: 2024-07-11

## 2024-07-03 RX ORDER — NITROGLYCERIN 0.4 MG/1
0.4 TABLET SUBLINGUAL EVERY 5 MIN PRN
Qty: 25 TABLET | Refills: 3 | Status: SHIPPED | OUTPATIENT
Start: 2024-07-03

## 2024-07-03 RX ORDER — LANOLIN ALCOHOL/MO/W.PET/CERES
400 CREAM (GRAM) TOPICAL DAILY
Qty: 30 TABLET | Refills: 5 | Status: SHIPPED | OUTPATIENT
Start: 2024-07-03

## 2024-07-03 RX ORDER — PRASUGREL 10 MG/1
10 TABLET, FILM COATED ORAL DAILY
Qty: 30 TABLET | Refills: 5 | Status: SHIPPED | OUTPATIENT
Start: 2024-07-04

## 2024-07-03 RX ORDER — METOPROLOL TARTRATE 50 MG/1
50 TABLET, FILM COATED ORAL 2 TIMES DAILY
Qty: 60 TABLET | Refills: 5 | Status: SHIPPED | OUTPATIENT
Start: 2024-07-03

## 2024-07-03 RX ORDER — LANOLIN ALCOHOL/MO/W.PET/CERES
400 CREAM (GRAM) TOPICAL DAILY
Status: DISCONTINUED | OUTPATIENT
Start: 2024-07-03 | End: 2024-07-03 | Stop reason: HOSPADM

## 2024-07-03 RX ORDER — ISOSORBIDE MONONITRATE 30 MG/1
30 TABLET, EXTENDED RELEASE ORAL DAILY
Status: DISCONTINUED | OUTPATIENT
Start: 2024-07-03 | End: 2024-07-03 | Stop reason: HOSPADM

## 2024-07-03 RX ORDER — TETRAKIS(2-METHOXYISOBUTYLISOCYANIDE)COPPER(I) TETRAFLUOROBORATE 1 MG/ML
11 INJECTION, POWDER, LYOPHILIZED, FOR SOLUTION INTRAVENOUS
Status: COMPLETED | OUTPATIENT
Start: 2024-07-02 | End: 2024-07-02

## 2024-07-03 RX ORDER — ROSUVASTATIN CALCIUM 40 MG/1
40 TABLET, COATED ORAL NIGHTLY
Qty: 30 TABLET | Refills: 5 | Status: SHIPPED | OUTPATIENT
Start: 2024-07-03

## 2024-07-03 RX ORDER — ASPIRIN 81 MG/1
81 TABLET, CHEWABLE ORAL DAILY
Qty: 30 TABLET | Refills: 5 | Status: SHIPPED | OUTPATIENT
Start: 2024-07-04

## 2024-07-03 RX ADMIN — SODIUM CHLORIDE, PRESERVATIVE FREE 10 ML: 5 INJECTION INTRAVENOUS at 09:02

## 2024-07-03 RX ADMIN — ISOSORBIDE MONONITRATE 30 MG: 30 TABLET, EXTENDED RELEASE ORAL at 10:49

## 2024-07-03 RX ADMIN — METOPROLOL TARTRATE 25 MG: 25 TABLET, FILM COATED ORAL at 09:02

## 2024-07-03 RX ADMIN — PRASUGREL 10 MG: 10 TABLET, FILM COATED ORAL at 09:01

## 2024-07-03 RX ADMIN — SODIUM CHLORIDE, PRESERVATIVE FREE 10 ML: 5 INJECTION INTRAVENOUS at 09:04

## 2024-07-03 RX ADMIN — ACETAMINOPHEN 650 MG: 325 TABLET ORAL at 04:30

## 2024-07-03 RX ADMIN — LEVOTHYROXINE SODIUM 50 MCG: 0.05 TABLET ORAL at 06:13

## 2024-07-03 RX ADMIN — Medication 400 MG: at 11:57

## 2024-07-03 RX ADMIN — PANTOPRAZOLE SODIUM 40 MG: 40 TABLET, DELAYED RELEASE ORAL at 09:02

## 2024-07-03 RX ADMIN — ASPIRIN 81 MG: 81 TABLET, CHEWABLE ORAL at 09:01

## 2024-07-03 RX ADMIN — BUDESONIDE AND FORMOTEROL FUMARATE DIHYDRATE 2 PUFF: 160; 4.5 AEROSOL RESPIRATORY (INHALATION) at 08:54

## 2024-07-03 RX ADMIN — METOPROLOL TARTRATE 25 MG: 25 TABLET, FILM COATED ORAL at 10:49

## 2024-07-03 RX ADMIN — LISINOPRIL AND HYDROCHLOROTHIAZIDE 1 TABLET: 12.5; 1 TABLET ORAL at 09:02

## 2024-07-03 ASSESSMENT — PAIN DESCRIPTION - DESCRIPTORS: DESCRIPTORS: ACHING;SORE

## 2024-07-03 ASSESSMENT — PAIN SCALES - GENERAL
PAINLEVEL_OUTOF10: 0
PAINLEVEL_OUTOF10: 6
PAINLEVEL_OUTOF10: 3
PAINLEVEL_OUTOF10: 0
PAINLEVEL_OUTOF10: 0

## 2024-07-03 ASSESSMENT — PAIN DESCRIPTION - ORIENTATION: ORIENTATION: RIGHT

## 2024-07-03 ASSESSMENT — PAIN SCALES - WONG BAKER
WONGBAKER_NUMERICALRESPONSE: NO HURT

## 2024-07-03 ASSESSMENT — PAIN DESCRIPTION - LOCATION: LOCATION: NECK

## 2024-07-03 NOTE — PLAN OF CARE
Problem: Discharge Planning  Goal: Discharge to home or other facility with appropriate resources  7/3/2024 0907 by Farhad Aguilar, RN  Outcome: Progressing  7/2/2024 1955 by Maksim Carter, RN  Outcome: Progressing     Problem: Pain  Goal: Verbalizes/displays adequate comfort level or baseline comfort level  7/3/2024 0907 by Farhad Aguilar, RN  Outcome: Progressing  7/2/2024 1955 by Maksim Carter, RN  Outcome: Progressing

## 2024-07-03 NOTE — CARE COORDINATION
.CM has reviewed pt's chart for needs. CM screening shows that pt has PCP, insurance and is independent PTA. If any d/c needs arise please contact AGUILA. AIRAM     07/03/24 0843   Service Assessment   Patient Orientation Alert and Oriented   Cognition Alert   History Provided By Medical Record   Primary Caregiver Self   Prior Functional Level Independent in ADLs/IADLs   Current Functional Level Independent in ADLs/IADLs   Can patient return to prior living arrangement Yes   Ability to make needs known: Good   Financial Resources Medicaid   Social/Functional History   ADL Assistance Independent   Ambulation Assistance Independent   Transfer Assistance Independent   Discharge Planning   Current Services Prior To Admission None   Potential Assistance Needed N/A   DME Ordered? No   Type of Home Care Services None   Patient expects to be discharged to: House   Services At/After Discharge   Transition of Care Consult (CM Consult) N/A   Services At/After Discharge None

## 2024-07-03 NOTE — PROGRESS NOTES
Cardiology Progress Note    Subjective/Overnight Events:  Nursing staff approached me this morning because patient began experiencing chest pain that also radiated into her jaws.  During time of chest pain telemetry was reviewed which was showing bigeminy PVCs.  Patient has been experiencing frequent PVCs throughout the night.    Upon my interview with patient her chest pain and jaw pain had resolved but she is still concerned about the pain.  EKG was repeated which did reveal some PVCs however no obvious ST/T wave changes.    Offered nitroglycerin but since pain resolved, I initiated her on Imdur 30 mg daily.  Also starting patient on magnesium tablets.    Informed her that we will repeat her troponin levels and if those are stable no further cardiac intervention would be required.    Informed patient of her limitations following her heart cath.  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  Informed her that she will need to avoid heavy lifting until seen in the office    Right radial artery site does show some bruising.  Dressing is clean and dry.  No hematoma, bleeding or erythema    Assessment/Plan:  Chest pain  STEMI  Bigeminy PVCs  -Patient developed chest pain this morning.  EKG stable.  Repeat troponins.  -Echo showing preserved EF.  -During treadmill patient developed STEMI.  Emergently taken to the Cath Lab in received intervention of LAD.  -DAPT with aspirin and Effient for at least 1 year.  Continue Crestor 40 mg nightly.  -Increase Lopressor to 50 mg twice daily.  -Start Imdur 30 mg daily  -Start magnesium 400 mg daily  Hypertension  -Blood pressure stable at this time.  Type 2 diabetes mellitus: Well-controlled hemoglobin A1c 5.4%  Hypothyroidism  COPD  GERD  -Per primary care            Angelo Reaves PA-C 07/03/24 10:54 AM

## 2024-07-03 NOTE — DISCHARGE SUMMARY
V2.0  Discharge Summary    Name:  Coty Stearns /Age/Sex: 1970 (54 y.o. female)   Admit Date: 2024  Discharge Date: 7/3/24    MRN & CSN:  [id number] & [id number] Encounter Date and Time 7/3/24 7:56 AM EDT    Attending:  Kvng Mobley MD Discharging Provider: MONTSE RUCKER [facility] Course:     Brief HPI: Coty Stearns is a 54 y.o. female who presented with Jaw Pain and Chest Discomfort.     Brief Problem Based Course:     Chest pain: STEMI   -Patient presents with jaw pain radiating into her arms and chest, nonexertional episode lasting for 5 minutes associated with nausea and shortness of breath since Friday 2-3 times a day, EKG with normal sinus rhythm without any ST-T wave changes, Initial troponin 17 down trended to 15, continue to trend troponin, consult cardiology, check echocardiogram  -ECHO: EF 55-60%, sclerotic AV, neg bubble study   -NM Stress: abnormal, STEMI during treadmill test after 4 min and 2 seconds developed ST elevation in anterior lreads, taken emergently to Cath Lab by Dr. España   -Left Heart Cath: successful stenting of LAD 75% blockage to 0%   -Fasting Lipids: , HDL 56, , Trig 177  -cont ASA 81 mg QD, Effient 10 mg QD, Indur 30 mg QD, Nitro prn, increasing metoprolol to 50 mg BID, Crestor 40 mg QHS  -Cardiology rechecking Troponin x 2 and magnesium this am, if normal can DC home     Hypercalcemia  -Ca++ 11.5  -adding Ionized Calcium and PTH      T2DM  -Start medium dose sliding scale with hypoglycemia protocol on metformin at home  -A1C 5.4% today      HTN  -Continue home meds     HLD  -Not on statin, check lipid profile     GERD  -Continue PPI     COPD  -Continue home inhalers      Hypothyroidism   -Continue Synthroid    This patient was seen and examined and/or discussed in conjunction with Dr. Kvng Mobley. He/She was agreeable with the patient's plan at discharge as dictated above.     The patient expressed appropriate understanding  right wrist bruising, dressing C/D/I of radial artery   Skin: warm, dry  Neuro: Alert and oriented x 3   Psych: Mood appropriate.         Labs and Imaging   Nuclear stress test with myocardial perfusion    Result Date: 7/2/2024    Abnormal stress test   Pt developed ST elvation after walking for 4.2 Mins she was rushed to cath lab ,   Normal rest imaging , Post stress images were not taken ekg changes, patient placed on cart, O2 applied via nasal cannula Dr. España present 2mg morphine given 0.4mg nitro given sublinqual patient transferred to cath lab 4     Cardiac procedure    Result Date: 7/2/2024  The left main is patent. The left anterior descending artery has hazy lesion in mid LAD with 60-75% stenosis, appears ulcerated plaque, FARHAD flow 3 before and after stenting. The left circumflex is patent. The right coronary artery is patent, dominant vessel. Successful stenting of the LAD, with reduction of stenosis from 75% to 0%. RECOMMENDATIONS:  LOAD effient and aspirin Post-procedure care will focus on prevention of any ischemic events, arrhythmic and congestive complications.  Aggressive risk factor modification will be carried out.     Echo (TTE) complete (PRN contrast/bubble/strain/3D)    Result Date: 7/1/2024    Left Ventricle: Normal left ventricular systolic function with a visually estimated EF of 55 - 60%.   Aortic Valve: Sclerotic but non stenotic aortic valve.   Interatrial Septum: Color doppler suggests possible ASD or PFO; Bubble study was negative for right to left unable to rule out left to right shunt.   Pericardium: No pericardial effusion.     XR CHEST PORTABLE    Result Date: 7/1/2024  EXAMINATION: XR CHEST PORTABLE, 7/1/2024 12:59 PM HISTORY: Chest Pain COMPARISON:  No comparisons available.  Technique: Single view. Findings: The lungs are clear, no effusion. No pneumothorax. Heart is normal size. Mediastinal and hilar contours are within normal limits. Bony thorax no acute abnormality.     No

## 2024-07-05 LAB — PTH-INTACT SERPL-MCNC: 61 PG/ML (ref 15–65)

## 2024-07-08 NOTE — PROGRESS NOTES
Cardiac Rehab will follow up with patient and provide further education.  Phase 1 and 2 CRR was ordered.  Phase 2 order, as well as the patient's history, tests and medications were electronically sent to the OP CR program.

## 2024-07-11 ENCOUNTER — OFFICE VISIT (OUTPATIENT)
Dept: CARDIOLOGY CLINIC | Age: 54
End: 2024-07-11
Payer: COMMERCIAL

## 2024-07-11 VITALS
HEART RATE: 57 BPM | WEIGHT: 185 LBS | DIASTOLIC BLOOD PRESSURE: 64 MMHG | OXYGEN SATURATION: 97 % | BODY MASS INDEX: 29.73 KG/M2 | HEIGHT: 66 IN | SYSTOLIC BLOOD PRESSURE: 90 MMHG

## 2024-07-11 DIAGNOSIS — I25.10 CORONARY ARTERY DISEASE INVOLVING NATIVE CORONARY ARTERY OF NATIVE HEART WITHOUT ANGINA PECTORIS: Primary | ICD-10-CM

## 2024-07-11 DIAGNOSIS — E78.5 DYSLIPIDEMIA: ICD-10-CM

## 2024-07-11 DIAGNOSIS — Z51.89 ENCOUNTER FOR CARDIAC REHABILITATION: ICD-10-CM

## 2024-07-11 DIAGNOSIS — Z98.890 S/P LEFT HEART CATHETERIZATION BY PERCUTANEOUS APPROACH: ICD-10-CM

## 2024-07-11 DIAGNOSIS — Z09 HOSPITAL DISCHARGE FOLLOW-UP: ICD-10-CM

## 2024-07-11 DIAGNOSIS — Z92.89 HISTORY OF RECENT HOSPITALIZATION: ICD-10-CM

## 2024-07-11 DIAGNOSIS — I25.2 HISTORY OF ST ELEVATION MYOCARDIAL INFARCTION (STEMI): ICD-10-CM

## 2024-07-11 DIAGNOSIS — I10 ESSENTIAL HYPERTENSION: ICD-10-CM

## 2024-07-11 DIAGNOSIS — Z82.49 FAMILY HISTORY OF EARLY CAD: ICD-10-CM

## 2024-07-11 PROBLEM — R07.9 CHEST PAIN, RULE OUT ACUTE MYOCARDIAL INFARCTION: Status: RESOLVED | Noted: 2024-07-01 | Resolved: 2024-07-11

## 2024-07-11 PROCEDURE — 93000 ELECTROCARDIOGRAM COMPLETE: CPT | Performed by: NURSE PRACTITIONER

## 2024-07-11 PROCEDURE — 1111F DSCHRG MED/CURRENT MED MERGE: CPT | Performed by: NURSE PRACTITIONER

## 2024-07-11 PROCEDURE — 3078F DIAST BP <80 MM HG: CPT | Performed by: NURSE PRACTITIONER

## 2024-07-11 PROCEDURE — 3074F SYST BP LT 130 MM HG: CPT | Performed by: NURSE PRACTITIONER

## 2024-07-11 PROCEDURE — 99214 OFFICE O/P EST MOD 30 MIN: CPT | Performed by: NURSE PRACTITIONER

## 2024-07-11 PROCEDURE — 3017F COLORECTAL CA SCREEN DOC REV: CPT | Performed by: NURSE PRACTITIONER

## 2024-07-11 PROCEDURE — G8427 DOCREV CUR MEDS BY ELIG CLIN: HCPCS | Performed by: NURSE PRACTITIONER

## 2024-07-11 PROCEDURE — G8417 CALC BMI ABV UP PARAM F/U: HCPCS | Performed by: NURSE PRACTITIONER

## 2024-07-11 PROCEDURE — 1036F TOBACCO NON-USER: CPT | Performed by: NURSE PRACTITIONER

## 2024-07-11 RX ORDER — MIDODRINE HYDROCHLORIDE 5 MG/1
5 TABLET ORAL 3 TIMES DAILY PRN
Qty: 90 TABLET | Refills: 3 | Status: SHIPPED | OUTPATIENT
Start: 2024-07-11

## 2024-07-11 RX ORDER — ISOSORBIDE MONONITRATE 30 MG/1
30 TABLET, EXTENDED RELEASE ORAL 2 TIMES DAILY
Qty: 30 TABLET | Refills: 5
Start: 2024-07-11

## 2024-07-11 ASSESSMENT — ENCOUNTER SYMPTOMS
COUGH: 0
SHORTNESS OF BREATH: 0

## 2024-07-11 NOTE — PROGRESS NOTES
CARDIOLOGY  NOTE    2024    Coty Stearns (:  1970) is a 54 y.o. female,an established patient with Dr. España, here for evaluation of the following chief complaint(s):  Follow-Up from Hospital (Pt states she is still tired./Pt denied any other cardiac sx )      SUBJECTIVE/OBJECTIVE:    MADDY Ansari is here to follow up on her cardiovascular health.     She states that she has been doing fairly well. She states that she is exhausted. She was riding her stationary bike 2-3 days ago and started having jaw and arm pain simillar to prior to Hospitalization. She has not restarted riding her bike, pain has not returned.     She has a history of COPD, CAD, Dyslipidemia, HTN, former tobacco use, fibromyalgia, schizoaffective disorder    she is a former smoker. she denies any issues with obtaining taking or side effects from medications.       Review of Systems   Constitutional:  Negative for fatigue and fever.   Respiratory:  Negative for cough and shortness of breath.    Cardiovascular:  Positive for chest pain. Negative for palpitations and leg swelling.   Musculoskeletal:  Negative for arthralgias and gait problem.   Neurological:  Negative for dizziness, syncope, weakness, light-headedness and headaches.       Vitals:    24 1258   BP: 90/64   Site: Left Upper Arm   Position: Sitting   Cuff Size: Medium Adult   Pulse: 57   SpO2: 97%   Weight: 83.9 kg (185 lb)   Height: 1.676 m (5' 6\")       Wt Readings from Last 3 Encounters:   24 83.9 kg (185 lb)   24 83.3 kg (183 lb 10.3 oz)   24 85.7 kg (189 lb)       BP Readings from Last 3 Encounters:   24 90/64   24 105/65   24 (!) 146/111       Prior to Admission medications    Medication Sig Start Date End Date Taking? Authorizing Provider   aspirin 81 MG chewable tablet Take 1 tablet by mouth daily 24  Yes Oksana Barlow, APRN - ELLIOTT   isosorbide mononitrate (IMDUR) 30 MG extended release tablet Take 1 tablet by

## 2024-07-15 ENCOUNTER — HOSPITAL ENCOUNTER (EMERGENCY)
Age: 54
Discharge: HOME | End: 2024-07-15
Payer: COMMERCIAL

## 2024-07-15 ENCOUNTER — APPOINTMENT (OUTPATIENT)
Dept: GENERAL RADIOLOGY | Age: 54
End: 2024-07-15
Payer: COMMERCIAL

## 2024-07-15 VITALS
TEMPERATURE: 98 F | HEIGHT: 66 IN | DIASTOLIC BLOOD PRESSURE: 62 MMHG | HEART RATE: 93 BPM | BODY MASS INDEX: 29.73 KG/M2 | SYSTOLIC BLOOD PRESSURE: 94 MMHG | RESPIRATION RATE: 18 BRPM | OXYGEN SATURATION: 98 % | WEIGHT: 185 LBS

## 2024-07-15 DIAGNOSIS — T73.3XXA FATIGUE DUE TO EXCESSIVE EXERTION, INITIAL ENCOUNTER: ICD-10-CM

## 2024-07-15 DIAGNOSIS — T14.8XXA BRUISING: Primary | ICD-10-CM

## 2024-07-15 LAB
ALBUMIN SERPL-MCNC: 4.3 GM/DL (ref 3.4–5)
ALP BLD-CCNC: 62 IU/L (ref 40–128)
ALT SERPL-CCNC: 18 U/L (ref 10–40)
ANION GAP SERPL CALCULATED.3IONS-SCNC: 12 MMOL/L (ref 7–16)
APTT: 27.7 SECONDS (ref 25.1–37.1)
AST SERPL-CCNC: 23 IU/L (ref 15–37)
BILIRUB SERPL-MCNC: 0.3 MG/DL (ref 0–1)
BUN SERPL-MCNC: 20 MG/DL (ref 6–23)
CALCIUM SERPL-MCNC: 10.9 MG/DL (ref 8.3–10.6)
CHLORIDE BLD-SCNC: 103 MMOL/L (ref 99–110)
CO2: 21 MMOL/L (ref 21–32)
CREAT SERPL-MCNC: 1 MG/DL (ref 0.6–1.1)
GFR, ESTIMATED: 66 ML/MIN/1.73M2
GLUCOSE SERPL-MCNC: 99 MG/DL (ref 70–99)
HCT VFR BLD CALC: 43.3 % (ref 37–47)
HEMOGLOBIN: 13.9 GM/DL (ref 12.5–16)
INR BLD: 0.9 INDEX
MAGNESIUM: 2.2 MG/DL (ref 1.8–2.4)
MCH RBC QN AUTO: 29.6 PG (ref 27–31)
MCHC RBC AUTO-ENTMCNC: 32.1 % (ref 32–36)
MCV RBC AUTO: 92.1 FL (ref 78–100)
PDW BLD-RTO: 13 % (ref 11.7–14.9)
PLATELET # BLD: 234 K/CU MM (ref 140–440)
PMV BLD AUTO: 11 FL (ref 7.5–11.1)
POTASSIUM SERPL-SCNC: 4.2 MMOL/L (ref 3.5–5.1)
PROTHROMBIN TIME: 12.3 SECONDS (ref 11.7–14.5)
RBC # BLD: 4.7 M/CU MM (ref 4.2–5.4)
SODIUM BLD-SCNC: 136 MMOL/L (ref 135–145)
TOTAL PROTEIN: 7.7 GM/DL (ref 6.4–8.2)
TROPONIN, HIGH SENSITIVITY: 8 NG/L (ref 0–14)
TROPONIN, HIGH SENSITIVITY: 8 NG/L (ref 0–14)
WBC # BLD: 9.8 K/CU MM (ref 4–10.5)

## 2024-07-15 PROCEDURE — 84484 ASSAY OF TROPONIN QUANT: CPT

## 2024-07-15 PROCEDURE — 71045 X-RAY EXAM CHEST 1 VIEW: CPT

## 2024-07-15 PROCEDURE — 83735 ASSAY OF MAGNESIUM: CPT

## 2024-07-15 PROCEDURE — 80053 COMPREHEN METABOLIC PANEL: CPT

## 2024-07-15 PROCEDURE — 85027 COMPLETE CBC AUTOMATED: CPT

## 2024-07-15 PROCEDURE — 2580000003 HC RX 258

## 2024-07-15 PROCEDURE — 93005 ELECTROCARDIOGRAM TRACING: CPT

## 2024-07-15 PROCEDURE — 99285 EMERGENCY DEPT VISIT HI MDM: CPT

## 2024-07-15 PROCEDURE — 96361 HYDRATE IV INFUSION ADD-ON: CPT

## 2024-07-15 PROCEDURE — 6370000000 HC RX 637 (ALT 250 FOR IP)

## 2024-07-15 PROCEDURE — 85610 PROTHROMBIN TIME: CPT

## 2024-07-15 PROCEDURE — 85730 THROMBOPLASTIN TIME PARTIAL: CPT

## 2024-07-15 PROCEDURE — 96360 HYDRATION IV INFUSION INIT: CPT

## 2024-07-15 RX ORDER — ONDANSETRON 4 MG/1
4 TABLET, ORALLY DISINTEGRATING ORAL ONCE
Status: COMPLETED | OUTPATIENT
Start: 2024-07-15 | End: 2024-07-15

## 2024-07-15 RX ORDER — 0.9 % SODIUM CHLORIDE 0.9 %
1000 INTRAVENOUS SOLUTION INTRAVENOUS ONCE
Status: COMPLETED | OUTPATIENT
Start: 2024-07-15 | End: 2024-07-15

## 2024-07-15 RX ADMIN — ONDANSETRON 4 MG: 4 TABLET, ORALLY DISINTEGRATING ORAL at 13:23

## 2024-07-15 RX ADMIN — SODIUM CHLORIDE 1000 ML: 9 INJECTION, SOLUTION INTRAVENOUS at 13:22

## 2024-07-15 ASSESSMENT — LIFESTYLE VARIABLES
HOW MANY STANDARD DRINKS CONTAINING ALCOHOL DO YOU HAVE ON A TYPICAL DAY: PATIENT DOES NOT DRINK
HOW OFTEN DO YOU HAVE A DRINK CONTAINING ALCOHOL: NEVER

## 2024-07-15 ASSESSMENT — PAIN SCALES - GENERAL: PAINLEVEL_OUTOF10: 0

## 2024-07-15 ASSESSMENT — HEART SCORE: ECG: NORMAL

## 2024-07-15 NOTE — ED NOTES
12 lead EKG as interpreted by me reveals normal sinus rhythm. Axis is normal. There are no ischemic ST elevations or other suspicious ST changes;  QRS interval is narrow, QT interval is not prolonged. Final interpretation: Normal sinus rhythm. PVCs     Kvng Pierre MD  07/15/24 [id number]

## 2024-07-15 NOTE — ED PROVIDER NOTES
CATHETERIZATION      CARDIAC PROCEDURE N/A 7/2/2024    Left heart cath / coronary angiography performed by Campos Mullins MD at [facility] CARDIAC CATH LAB    HYSTERECTOMY (CERVIX STATUS UNKNOWN)      INGUINAL HERNIA REPAIR Right     INNER EAR SURGERY Left     TUBAL LIGATION         CURRENTMEDICATIONS       Previous Medications    ALBUTEROL SULFATE HFA (PROVENTIL HFA) 108 (90 BASE) MCG/ACT INHALER    Inhale 2 puffs into the lungs every 6 hours as needed for Wheezing or Shortness of Breath    ASPIRIN 81 MG CHEWABLE TABLET    Take 1 tablet by mouth daily    BACLOFEN (LIORESAL) 10 MG TABLET    Take 2 tablets by mouth every 8 hours as needed    BUDESONIDE-FORMOTEROL (SYMBICORT) 160-4.5 MCG/ACT AERO    Inhale 2 puffs into the lungs in the morning and at bedtime    DICYCLOMINE (BENTYL) 20 MG TABLET    Take 1 tablet by mouth 3 times daily    ISOSORBIDE MONONITRATE (IMDUR) 30 MG EXTENDED RELEASE TABLET    Take 1 tablet by mouth in the morning and at bedtime    LEVOTHYROXINE (SYNTHROID) 50 MCG TABLET    Take 1 tablet by mouth Daily    LISINOPRIL-HYDROCHLOROTHIAZIDE (PRINZIDE;ZESTORETIC) 10-12.5 MG PER TABLET        MAGNESIUM OXIDE (MAG-OX) 400 (240 MG) MG TABLET    Take 1 tablet by mouth daily    METFORMIN (GLUCOPHAGE) 500 MG TABLET    Take 0.5 tablets by mouth 2 times daily (with meals)    METOPROLOL TARTRATE (LOPRESSOR) 50 MG TABLET    Take 1 tablet by mouth 2 times daily Hold if SBP < 95 or if HR < 55    MIDODRINE (PROAMATINE) 5 MG TABLET    Take 1 tablet by mouth 3 times daily as needed (SBP < 100)    MONTELUKAST (SINGULAIR) 10 MG TABLET    Take 1 tablet by mouth nightly    NITROGLYCERIN (NITROSTAT) 0.4 MG SL TABLET    Place 1 tablet under the tongue every 5 minutes as needed for Chest pain up to max of 3 total doses. If no relief after 1 dose, call 911.    PRASUGREL (EFFIENT) 10 MG TABS    Take 1 tablet by mouth daily    PROMETHAZINE (PHENERGAN) 12.5 MG TABLET        ROSUVASTATIN (CRESTOR) 40 MG TABLET    Take 1 tablet

## 2024-07-17 LAB
EKG ATRIAL RATE: 77 BPM
EKG DIAGNOSIS: NORMAL
EKG P AXIS: 81 DEGREES
EKG P-R INTERVAL: 176 MS
EKG Q-T INTERVAL: 380 MS
EKG QRS DURATION: 88 MS
EKG QTC CALCULATION (BAZETT): 430 MS
EKG R AXIS: 6 DEGREES
EKG T AXIS: 34 DEGREES
EKG VENTRICULAR RATE: 77 BPM

## 2024-07-17 PROCEDURE — 93010 ELECTROCARDIOGRAM REPORT: CPT | Performed by: INTERNAL MEDICINE

## 2024-07-22 RX ORDER — ISOSORBIDE MONONITRATE 30 MG/1
30 TABLET, EXTENDED RELEASE ORAL 2 TIMES DAILY
Qty: 30 TABLET | Refills: 5 | Status: SHIPPED | OUTPATIENT
Start: 2024-07-22 | End: 2024-07-23

## 2024-07-23 ENCOUNTER — OFFICE VISIT (OUTPATIENT)
Dept: CARDIOLOGY CLINIC | Age: 54
End: 2024-07-23
Payer: COMMERCIAL

## 2024-07-23 VITALS
HEART RATE: 76 BPM | WEIGHT: 180.8 LBS | HEIGHT: 66 IN | SYSTOLIC BLOOD PRESSURE: 89 MMHG | OXYGEN SATURATION: 97 % | DIASTOLIC BLOOD PRESSURE: 66 MMHG | BODY MASS INDEX: 29.06 KG/M2

## 2024-07-23 DIAGNOSIS — G47.33 OBSTRUCTIVE SLEEP APNEA SYNDROME: ICD-10-CM

## 2024-07-23 DIAGNOSIS — M79.7 FIBROMYALGIA: ICD-10-CM

## 2024-07-23 DIAGNOSIS — I95.0 IDIOPATHIC HYPOTENSION: ICD-10-CM

## 2024-07-23 DIAGNOSIS — J44.9 CHRONIC OBSTRUCTIVE PULMONARY DISEASE, UNSPECIFIED COPD TYPE (HCC): ICD-10-CM

## 2024-07-23 DIAGNOSIS — I25.10 CORONARY ARTERY DISEASE INVOLVING NATIVE CORONARY ARTERY OF NATIVE HEART WITHOUT ANGINA PECTORIS: ICD-10-CM

## 2024-07-23 DIAGNOSIS — I10 ESSENTIAL HYPERTENSION: ICD-10-CM

## 2024-07-23 DIAGNOSIS — Z82.49 FAMILY HISTORY OF EARLY CAD: ICD-10-CM

## 2024-07-23 DIAGNOSIS — M54.2 NECK PAIN: Primary | ICD-10-CM

## 2024-07-23 DIAGNOSIS — I25.2 HISTORY OF ST ELEVATION MYOCARDIAL INFARCTION (STEMI): ICD-10-CM

## 2024-07-23 DIAGNOSIS — E78.5 DYSLIPIDEMIA: ICD-10-CM

## 2024-07-23 PROCEDURE — 99214 OFFICE O/P EST MOD 30 MIN: CPT | Performed by: INTERNAL MEDICINE

## 2024-07-23 PROCEDURE — 3078F DIAST BP <80 MM HG: CPT | Performed by: INTERNAL MEDICINE

## 2024-07-23 PROCEDURE — 3074F SYST BP LT 130 MM HG: CPT | Performed by: INTERNAL MEDICINE

## 2024-07-23 PROCEDURE — 3017F COLORECTAL CA SCREEN DOC REV: CPT | Performed by: INTERNAL MEDICINE

## 2024-07-23 PROCEDURE — G8417 CALC BMI ABV UP PARAM F/U: HCPCS | Performed by: INTERNAL MEDICINE

## 2024-07-23 PROCEDURE — G8427 DOCREV CUR MEDS BY ELIG CLIN: HCPCS | Performed by: INTERNAL MEDICINE

## 2024-07-23 PROCEDURE — 3023F SPIROM DOC REV: CPT | Performed by: INTERNAL MEDICINE

## 2024-07-23 PROCEDURE — 1036F TOBACCO NON-USER: CPT | Performed by: INTERNAL MEDICINE

## 2024-07-23 PROCEDURE — 1111F DSCHRG MED/CURRENT MED MERGE: CPT | Performed by: INTERNAL MEDICINE

## 2024-07-23 RX ORDER — METOPROLOL TARTRATE 75 MG/1
75 TABLET, FILM COATED ORAL 2 TIMES DAILY
Qty: 90 TABLET | Refills: 3 | Status: SHIPPED | OUTPATIENT
Start: 2024-07-23 | End: 2024-07-26

## 2024-07-23 RX ORDER — MIDODRINE HYDROCHLORIDE 5 MG/1
5 TABLET ORAL 3 TIMES DAILY PRN
Qty: 90 TABLET | Refills: 3 | Status: SHIPPED | OUTPATIENT
Start: 2024-07-23

## 2024-07-23 RX ORDER — ISOSORBIDE MONONITRATE 30 MG/1
30 TABLET, EXTENDED RELEASE ORAL DAILY
Qty: 30 TABLET | Refills: 5 | Status: SHIPPED | OUTPATIENT
Start: 2024-07-23

## 2024-07-23 NOTE — ASSESSMENT & PLAN NOTE
S/p PCI due to ST elevation in July 2024 continue aspirin and Effient till July 2025.  Will get treadmill refer to cardiac rehab  Reduce Imdur 30 mg daily am concerned that she may be having spasms?  Causing chest pain angina

## 2024-07-23 NOTE — PROGRESS NOTES
Component Value Date    PROBNP <36 04/09/2024     PT/INR:  No results found for: \"PTINR\"  Lab Results   Component Value Date    LABA1C 5.4 07/02/2024    LABA1C 5.5 12/20/2021     Lab Results   Component Value Date    CHOL 244 (H) 07/01/2024    TRIG 177 (H) 07/01/2024    HDL 56 07/01/2024    LDLDIRECT 155 (H) 05/23/2019     Lab Results   Component Value Date    ALT 18 07/15/2024    AST 23 07/15/2024     No results for input(s): \"WBC\", \"HGB\", \"HCT\", \"MCV\", \"PLT\" in the last 72 hours.  TSH:   Lab Results   Component Value Date    TSH 3.060 06/30/2015     Lab Results   Component Value Date    AST 23 07/15/2024    ALT 18 07/15/2024    BILITOT 0.3 07/15/2024    ALKPHOS 62 07/15/2024     Lab Results   Component Value Date    PROBNP <36 04/09/2024    PROBNP 60.12 09/23/2021    PROBNP 24.10 09/18/2021     Lab Results   Component Value Date    LABA1C 5.4 07/02/2024    LABA1C 5.5 12/20/2021     Lab Results   Component Value Date    WBC 9.8 07/15/2024    HGB 13.9 07/15/2024    HCT 43.3 07/15/2024     07/15/2024     Cath 7/2024  The left main is patent.  The left anterior descending artery has hazy lesion in mid LAD with 60-75% stenosis, appears ulcerated plaque, FARHAD flow 3 before and after stenting.  The left circumflex is patent.  The right coronary artery is patent, dominant vessel.  Successful stenting of the LAD, with reduction of stenosis from 75% to 0%.    Echo  Left Ventricle: Normal left ventricular systolic function with a visually estimated EF of 55 - 60%.    Aortic Valve: Sclerotic but non stenotic aortic valve.    Interatrial Septum: Color doppler suggests possible ASD or PFO; Bubble study was negative for right to left unable to rule out left to right shunt.    Pericardium: No pericardial effusion.      All labs, medications and tests reviewed by myself including data and history from outside source , patient and available family .  Assessment & Plan:      1. Neck pain    2. Chronic obstructive pulmonary

## 2024-07-23 NOTE — PATIENT INSTRUCTIONS
**It is YOUR responsibilty to bring medication bottles and/or updated medication list to EACH APPOINTMENT. This will allow us to better serve you and all your healthcare needs**  Thank you for allowing us to care for you today!   We want to ensure we can follow your treatment plan and we strive to give you the best outcomes and experience possible.   If you ever have a life threatening emergency and call 911 - for an ambulance (EMS)   Our providers can only care for you at:   [facility] or [facility].   Even if you have someone take you or you drive yourself we can only care for you in a [facility]. Our providers are not setup at the other healthcare locations!   Please be informed that if you contact our office outside of normal business hours the physician on call cannot help with any scheduling or rescheduling issues, procedure instruction questions or any type of medication issue.    We advise you for any urgent/emergency that you go to the nearest emergency room!    PLEASE CALL OUR OFFICE DURING NORMAL BUSINESS HOURS    Monday - Friday   8 am to 5 pm    [place]: [phone]    [place]: [phone]    [place]:  [phone]  We are committed to providing you the best care possible.    If you receive a survey after visiting one of our offices, please take time to share your experience concerning your physician office visit.  These surveys are confidential and no health information about you is shared.    We are eager to improve for you and we are counting on your feedback to help make that happen.

## 2024-07-23 NOTE — ASSESSMENT & PLAN NOTE
She is having low blood pressure cut down on Imdur 60 to 30 mg daily, add midodrine 5 mg twice daily, due to anginal-like symptoms will increase metoprolol to 75 twice daily

## 2024-07-23 NOTE — ASSESSMENT & PLAN NOTE
Get x-ray cervical spine she had cervical stenosis or arthritis will also refer to Dr. Javier from orthospine

## 2024-07-25 ENCOUNTER — TELEPHONE (OUTPATIENT)
Dept: CARDIOLOGY CLINIC | Age: 54
End: 2024-07-25

## 2024-07-26 RX ORDER — METOPROLOL TARTRATE 50 MG/1
75 TABLET, FILM COATED ORAL 2 TIMES DAILY
COMMUNITY

## 2024-08-13 NOTE — PROGRESS NOTES
Reviewed Stress with Dr. España stress test with patient complaints of jaw pain, nausea, and radiation into her arms.   Stop imdur  Start verapamil 120 mg Daily.     Plan [facility] end of the month to rule out endothelial dysfunction/ spasm / stenosis

## 2024-08-14 DIAGNOSIS — Z01.810 PRE-OPERATIVE CARDIOVASCULAR EXAMINATION: ICD-10-CM

## 2024-08-14 DIAGNOSIS — R07.9 CHEST PAIN, UNSPECIFIED TYPE: Primary | ICD-10-CM

## 2024-08-15 ENCOUNTER — TELEPHONE (OUTPATIENT)
Dept: CARDIOLOGY CLINIC | Age: 54
End: 2024-08-15

## 2024-08-15 NOTE — TELEPHONE ENCOUNTER
Attempted to notify pt of procedure date and time. No answer. Unable to leave VM d/t VM being full.

## 2024-08-16 ENCOUNTER — TELEPHONE (OUTPATIENT)
Dept: CARDIOLOGY CLINIC | Age: 54
End: 2024-08-16

## 2024-08-16 NOTE — TELEPHONE ENCOUNTER
Attempted to notify pt of procedure date/time. No answer. Unable to leave VM d/t mailbox being full.

## 2024-08-16 NOTE — TELEPHONE ENCOUNTER
[facility]     Dr. jO España     LEFT HEART CATHETERIZATION WITH POSSIBLE PERCUTANEOUS CORONARY INTERVENTION    Patient Name: Coty Stearns   : 1970  MRN# [id number]    Date of Procedure: 2024 Time: 7 Am  Arrival Time: 5:45 Am     The catheterization and angiogram are usually outpatient procedures, however if stenting is needed you may need to stay overnight. You will need to arrive at the hospital two hours before the procedure.  You will go to registration in the main lobby.  You will need to arrange for someone to drive you home.      HOSPITAL:  [facility] ([facility]      X   If you have received orders for blood work and or a chest x-ray, please have         them done on assigned date at [facility],           [facility], or [facility].     X Please do not have anything by mouth after midnight prior to or 8 hours before   the procedure.    X You may take your medications with a sip of water in the morning of your               procedure or take them with you to the hospital          X If you are taking Metformin (Glucophage) or any medication containing Metformin (Glucophage) you must hold this medication the day before the procedure 2024 , the day of your procedure and two days after your procedure.  You may restart Metformin two days after your procedure on 2024.

## 2024-08-16 NOTE — TELEPHONE ENCOUNTER
Pt notified of procedure date and time. Procedure scheduled for 8/26/2024 @ 7 Am, arrival @ 5:45. Telephone visit for procedure instructions scheduled on 8/23/2024 after 8:30 Am. Pt voiced understanding.

## 2024-08-20 ENCOUNTER — TELEPHONE (OUTPATIENT)
Dept: CARDIOLOGY CLINIC | Age: 54
End: 2024-08-20

## 2024-08-23 ENCOUNTER — HOSPITAL ENCOUNTER (OUTPATIENT)
Age: 54
Discharge: HOME | End: 2024-08-23
Payer: COMMERCIAL

## 2024-08-23 ENCOUNTER — TELEPHONE (OUTPATIENT)
Dept: CARDIOLOGY CLINIC | Age: 54
End: 2024-08-23

## 2024-08-23 ENCOUNTER — HOSPITAL ENCOUNTER (OUTPATIENT)
Dept: GENERAL RADIOLOGY | Age: 54
Discharge: HOME | End: 2024-08-23
Payer: COMMERCIAL

## 2024-08-23 DIAGNOSIS — M54.2 NECK PAIN: ICD-10-CM

## 2024-08-23 DIAGNOSIS — Z01.810 PRE-OPERATIVE CARDIOVASCULAR EXAMINATION: ICD-10-CM

## 2024-08-23 LAB
ABO/RH: NORMAL
ANION GAP SERPL CALCULATED.3IONS-SCNC: 12 MMOL/L (ref 7–16)
ANTIBODY SCREEN: NEGATIVE
BUN SERPL-MCNC: 17 MG/DL (ref 6–23)
CALCIUM SERPL-MCNC: 11.1 MG/DL (ref 8.3–10.6)
CHLORIDE BLD-SCNC: 102 MMOL/L (ref 99–110)
CO2: 27 MMOL/L (ref 21–32)
COMMENT: NORMAL
CREAT SERPL-MCNC: 0.8 MG/DL (ref 0.6–1.1)
GFR, ESTIMATED: 86 ML/MIN/1.73M2
GLUCOSE SERPL-MCNC: 107 MG/DL (ref 70–99)
HCT VFR BLD CALC: 43.4 % (ref 37–47)
HEMOGLOBIN: 13.5 GM/DL (ref 12.5–16)
MCH RBC QN AUTO: 30 PG (ref 27–31)
MCHC RBC AUTO-ENTMCNC: 31.1 % (ref 32–36)
MCV RBC AUTO: 96.4 FL (ref 78–100)
PDW BLD-RTO: 14.1 % (ref 11.7–14.9)
PLATELET # BLD: 245 K/CU MM (ref 140–440)
PMV BLD AUTO: 11.6 FL (ref 7.5–11.1)
POTASSIUM SERPL-SCNC: 4.2 MMOL/L (ref 3.5–5.1)
RBC # BLD: 4.5 M/CU MM (ref 4.2–5.4)
SODIUM BLD-SCNC: 141 MMOL/L (ref 135–145)
WBC # BLD: 7.3 K/CU MM (ref 4–10.5)

## 2024-08-23 PROCEDURE — 86901 BLOOD TYPING SEROLOGIC RH(D): CPT

## 2024-08-23 PROCEDURE — 80048 BASIC METABOLIC PNL TOTAL CA: CPT

## 2024-08-23 PROCEDURE — 86850 RBC ANTIBODY SCREEN: CPT

## 2024-08-23 PROCEDURE — 85027 COMPLETE CBC AUTOMATED: CPT

## 2024-08-23 PROCEDURE — 86900 BLOOD TYPING SEROLOGIC ABO: CPT

## 2024-08-23 PROCEDURE — 72050 X-RAY EXAM NECK SPINE 4/5VWS: CPT

## 2024-08-23 PROCEDURE — 36415 COLL VENOUS BLD VENIPUNCTURE: CPT

## 2024-08-23 NOTE — TELEPHONE ENCOUNTER
Pt notified that procedure time has changed to 8 Am with arrival @ 6 Am on 8/26/2024. Pt voiced understanding.

## 2024-08-23 NOTE — TELEPHONE ENCOUNTER
Patient given instructions over telephone on 8/23/2024.  Procedure is scheduled for 8/26/2024 @ 7 Am , w/arrival @ 5:45 Am, @ [facility]. Medication/Education Letter gone over with patient. Procedure and risks were explained to patient. Questions answered, Patient voiced understanding.        Patient was notified that surgery date or time could be changed due to an emergency. Patient voiced understanding.

## 2024-08-26 ENCOUNTER — HOSPITAL ENCOUNTER (OUTPATIENT)
Age: 54
Setting detail: OUTPATIENT SURGERY
Discharge: HOME | End: 2024-08-26
Attending: INTERNAL MEDICINE | Admitting: INTERNAL MEDICINE
Payer: COMMERCIAL

## 2024-08-26 VITALS
SYSTOLIC BLOOD PRESSURE: 130 MMHG | HEIGHT: 65 IN | RESPIRATION RATE: 14 BRPM | HEART RATE: 60 BPM | OXYGEN SATURATION: 99 % | DIASTOLIC BLOOD PRESSURE: 62 MMHG | BODY MASS INDEX: 29.16 KG/M2 | TEMPERATURE: 96.5 F | WEIGHT: 175 LBS

## 2024-08-26 DIAGNOSIS — R07.9 CHEST PAIN: ICD-10-CM

## 2024-08-26 LAB — ECHO BSA: 1.91 M2

## 2024-08-26 PROCEDURE — 6360000002 HC RX W HCPCS: Performed by: INTERNAL MEDICINE

## 2024-08-26 PROCEDURE — 7100000011 HC PHASE II RECOVERY - ADDTL 15 MIN: Performed by: INTERNAL MEDICINE

## 2024-08-26 PROCEDURE — 2500000003 HC RX 250 WO HCPCS: Performed by: INTERNAL MEDICINE

## 2024-08-26 PROCEDURE — C1894 INTRO/SHEATH, NON-LASER: HCPCS | Performed by: INTERNAL MEDICINE

## 2024-08-26 PROCEDURE — C1887 CATHETER, GUIDING: HCPCS | Performed by: INTERNAL MEDICINE

## 2024-08-26 PROCEDURE — 7100000010 HC PHASE II RECOVERY - FIRST 15 MIN: Performed by: INTERNAL MEDICINE

## 2024-08-26 PROCEDURE — C1769 GUIDE WIRE: HCPCS | Performed by: INTERNAL MEDICINE

## 2024-08-26 PROCEDURE — 93452 LEFT HRT CATH W/VENTRCLGRPHY: CPT | Performed by: INTERNAL MEDICINE

## 2024-08-26 PROCEDURE — 2580000003 HC RX 258: Performed by: INTERNAL MEDICINE

## 2024-08-26 PROCEDURE — 6370000000 HC RX 637 (ALT 250 FOR IP): Performed by: INTERNAL MEDICINE

## 2024-08-26 PROCEDURE — 2709999900 HC NON-CHARGEABLE SUPPLY: Performed by: INTERNAL MEDICINE

## 2024-08-26 PROCEDURE — 93458 L HRT ARTERY/VENTRICLE ANGIO: CPT | Performed by: INTERNAL MEDICINE

## 2024-08-26 RX ORDER — DIPHENHYDRAMINE HCL 25 MG
25 TABLET ORAL ONCE
Status: COMPLETED | OUTPATIENT
Start: 2024-08-26 | End: 2024-08-26

## 2024-08-26 RX ORDER — PHENYLEPHRINE HYDROCHLORIDE 10 MG/ML
INJECTION INTRAVENOUS PRN
Status: DISCONTINUED | OUTPATIENT
Start: 2024-08-26 | End: 2024-08-26 | Stop reason: HOSPADM

## 2024-08-26 RX ORDER — ISOSORBIDE MONONITRATE 60 MG/1
60 TABLET, EXTENDED RELEASE ORAL DAILY
Qty: 30 TABLET | Refills: 3 | Status: SHIPPED | OUTPATIENT
Start: 2024-08-26

## 2024-08-26 RX ORDER — SODIUM CHLORIDE 9 MG/ML
INJECTION, SOLUTION INTRAVENOUS CONTINUOUS
Status: DISCONTINUED | OUTPATIENT
Start: 2024-08-26 | End: 2024-08-26 | Stop reason: HOSPADM

## 2024-08-26 RX ORDER — MIDAZOLAM HYDROCHLORIDE 1 MG/ML
INJECTION INTRAMUSCULAR; INTRAVENOUS PRN
Status: DISCONTINUED | OUTPATIENT
Start: 2024-08-26 | End: 2024-08-26 | Stop reason: HOSPADM

## 2024-08-26 RX ORDER — DIAZEPAM 5 MG
5 TABLET ORAL ONCE
Status: COMPLETED | OUTPATIENT
Start: 2024-08-26 | End: 2024-08-26

## 2024-08-26 RX ORDER — AMLODIPINE BESYLATE 2.5 MG/1
2.5 TABLET ORAL DAILY
Qty: 90 TABLET | Refills: 0 | Status: SHIPPED | OUTPATIENT
Start: 2024-08-26

## 2024-08-26 RX ORDER — METOPROLOL TARTRATE 25 MG/1
25 TABLET, FILM COATED ORAL 2 TIMES DAILY
Qty: 90 TABLET | Refills: 2 | Status: SHIPPED | OUTPATIENT
Start: 2024-08-26

## 2024-08-26 RX ORDER — HEPARIN SODIUM 10000 [USP'U]/ML
INJECTION, SOLUTION INTRAVENOUS; SUBCUTANEOUS PRN
Status: DISCONTINUED | OUTPATIENT
Start: 2024-08-26 | End: 2024-08-26 | Stop reason: HOSPADM

## 2024-08-26 RX ORDER — 0.9 % SODIUM CHLORIDE 0.9 %
INTRAVENOUS SOLUTION INTRAVENOUS CONTINUOUS PRN
Status: COMPLETED | OUTPATIENT
Start: 2024-08-26 | End: 2024-08-26

## 2024-08-26 RX ORDER — NITROGLYCERIN 20 MG/100ML
INJECTION INTRAVENOUS PRN
Status: DISCONTINUED | OUTPATIENT
Start: 2024-08-26 | End: 2024-08-26 | Stop reason: HOSPADM

## 2024-08-26 RX ADMIN — DIAZEPAM 5 MG: 5 TABLET ORAL at 06:57

## 2024-08-26 RX ADMIN — DIPHENHYDRAMINE HYDROCHLORIDE 25 MG: 25 TABLET ORAL at 06:57

## 2024-08-26 NOTE — PROGRESS NOTES
Received from cath lab, call light at bedside, bed in lowest position, firmness felt below radial wrist band, pt complaining of pain below radial band. pressure held x 5min by MB, Dr España at bedside and aware of manual pressure being held. Will continue to monitor site. Soft, tender, no swelling, no drainage.  Pt sts [pain improved after manual pressure.

## 2024-08-26 NOTE — PROGRESS NOTES
Re-access right radial site. Site: firmness at bottom of radial band. Pt stated there was pain at site of firmness. No oozing, no drainage. Held manual pressure for 10 minutes. Site: Soft. Pt stated pain improved below the wrist band. No change from previous assessment. Suspect lidocaine bump. Will continue to monitor site.

## 2024-08-26 NOTE — H&P
Coty CANO Stearns, 54 y.o., female    Primary care physician:  Grazyna English MD     Chief Complaint: Chest Pain    History of Present Illness:  Still having chest pain and had abnormal stress test   She was traking verpamil and imdur meds were adjusted but still having it   Has LAD stent     Past medical history:    has a past medical history of Acquired hypothyroidism, Chest pain, rule out acute myocardial infarction, Chronic back pain, Chronic obstructive pulmonary disease (HCC), COPD (chronic obstructive pulmonary disease) ([facility]), Coronary artery disease involving native coronary artery of native heart without angina pectoris, COVID-19, Essential hypertension, Fibromyalgia, Former tobacco use, GERD (gastroesophageal reflux disease), H/O 24 hour EKG monitoring, H/O fibromyalgia, History of 24 hour EKG monitoring, History of echocardiogram, History of hysterectomy, Hx of cardiovascular stress test, Hyperlipidemia, Hypertension, Schizoaffective disorder (HCC), Simple chronic bronchitis ([facility]), and Spastic paraparesis.  Past surgical history:   has a past surgical history that includes Hysterectomy; back surgery; Inner ear surgery (Left); Tubal ligation; Inguinal hernia repair (Right); Cardiac catheterization; Breast biopsy; and Cardiac procedure (N/A, 7/2/2024).  Social History:   reports that she quit smoking about 13 years ago. Her smoking use included cigarettes. She started smoking about 38 years ago. She has a 25 pack-year smoking history. She has never used smokeless tobacco. She reports that she does not drink alcohol and does not use drugs.  Family history:  family history includes Breast Cancer in her maternal grandmother and paternal aunt; Diabetes in her sister; Elevated Lipids in her mother; Heart Disease in her father, mother, and sister; Hypertension in her mother; Kidney Disease in her sister; Ovarian Cancer in her paternal aunt.    Allergies:      Allergies   Allergen Reactions    Latex     Codeine

## 2024-08-26 NOTE — PROGRESS NOTES
2ml air removed from TR Band; will cont to remove 3-5ml air every 10-15 minutes until band removed; as per protocol.

## 2024-08-28 ENCOUNTER — TELEPHONE (OUTPATIENT)
Dept: CARDIOLOGY CLINIC | Age: 54
End: 2024-08-28

## 2024-08-28 NOTE — TELEPHONE ENCOUNTER
LM to check on pt after procedure on 8/28/24, notified pt to return call if having any issues or needs to see provider earlier than scheduled f/u appt.

## 2024-08-29 ENCOUNTER — TELEPHONE (OUTPATIENT)
Dept: CARDIOLOGY CLINIC | Age: 54
End: 2024-08-29

## 2024-09-03 ENCOUNTER — OFFICE VISIT (OUTPATIENT)
Dept: CARDIOLOGY CLINIC | Age: 54
End: 2024-09-03
Payer: COMMERCIAL

## 2024-09-03 VITALS
OXYGEN SATURATION: 98 % | HEIGHT: 66 IN | DIASTOLIC BLOOD PRESSURE: 60 MMHG | WEIGHT: 175 LBS | HEART RATE: 62 BPM | SYSTOLIC BLOOD PRESSURE: 118 MMHG | BODY MASS INDEX: 28.12 KG/M2

## 2024-09-03 DIAGNOSIS — I20.1 CORONARY ARTERY SPASM (HCC): Primary | ICD-10-CM

## 2024-09-03 PROCEDURE — G8417 CALC BMI ABV UP PARAM F/U: HCPCS | Performed by: NURSE PRACTITIONER

## 2024-09-03 PROCEDURE — 3074F SYST BP LT 130 MM HG: CPT | Performed by: NURSE PRACTITIONER

## 2024-09-03 PROCEDURE — 99214 OFFICE O/P EST MOD 30 MIN: CPT | Performed by: NURSE PRACTITIONER

## 2024-09-03 PROCEDURE — 1036F TOBACCO NON-USER: CPT | Performed by: NURSE PRACTITIONER

## 2024-09-03 PROCEDURE — 3017F COLORECTAL CA SCREEN DOC REV: CPT | Performed by: NURSE PRACTITIONER

## 2024-09-03 PROCEDURE — G8427 DOCREV CUR MEDS BY ELIG CLIN: HCPCS | Performed by: NURSE PRACTITIONER

## 2024-09-03 PROCEDURE — 3078F DIAST BP <80 MM HG: CPT | Performed by: NURSE PRACTITIONER

## 2024-09-03 RX ORDER — ISOSORBIDE MONONITRATE 120 MG/1
120 TABLET, EXTENDED RELEASE ORAL DAILY
Qty: 30 TABLET | Refills: 0
Start: 2024-09-03

## 2024-09-03 RX ORDER — AMLODIPINE BESYLATE 5 MG/1
5 TABLET ORAL DAILY
Qty: 30 TABLET | Refills: 0
Start: 2024-09-03

## 2024-09-03 RX ORDER — MIDODRINE HYDROCHLORIDE 10 MG/1
10 TABLET ORAL 3 TIMES DAILY PRN
Qty: 90 TABLET | Refills: 0
Start: 2024-09-03

## 2024-09-09 ASSESSMENT — ENCOUNTER SYMPTOMS
COUGH: 0
SHORTNESS OF BREATH: 1

## 2024-09-16 ENCOUNTER — OFFICE VISIT (OUTPATIENT)
Dept: CARDIOLOGY CLINIC | Age: 54
End: 2024-09-16
Payer: COMMERCIAL

## 2024-09-16 VITALS
HEART RATE: 84 BPM | DIASTOLIC BLOOD PRESSURE: 60 MMHG | OXYGEN SATURATION: 96 % | HEIGHT: 66 IN | BODY MASS INDEX: 28.28 KG/M2 | SYSTOLIC BLOOD PRESSURE: 98 MMHG | WEIGHT: 176 LBS

## 2024-09-16 DIAGNOSIS — I20.1 CORONARY ARTERY SPASM (HCC): Primary | ICD-10-CM

## 2024-09-16 DIAGNOSIS — R07.9 CHEST PAIN, UNSPECIFIED TYPE: ICD-10-CM

## 2024-09-16 PROCEDURE — 3074F SYST BP LT 130 MM HG: CPT | Performed by: NURSE PRACTITIONER

## 2024-09-16 PROCEDURE — 99214 OFFICE O/P EST MOD 30 MIN: CPT | Performed by: NURSE PRACTITIONER

## 2024-09-16 PROCEDURE — 3017F COLORECTAL CA SCREEN DOC REV: CPT | Performed by: NURSE PRACTITIONER

## 2024-09-16 PROCEDURE — 3078F DIAST BP <80 MM HG: CPT | Performed by: NURSE PRACTITIONER

## 2024-09-16 PROCEDURE — G8427 DOCREV CUR MEDS BY ELIG CLIN: HCPCS | Performed by: NURSE PRACTITIONER

## 2024-09-16 PROCEDURE — G8417 CALC BMI ABV UP PARAM F/U: HCPCS | Performed by: NURSE PRACTITIONER

## 2024-09-16 PROCEDURE — 1036F TOBACCO NON-USER: CPT | Performed by: NURSE PRACTITIONER

## 2024-09-16 RX ORDER — VENLAFAXINE 75 MG/1
75 TABLET ORAL DAILY
COMMUNITY

## 2024-09-16 RX ORDER — METOPROLOL TARTRATE 25 MG/1
25 TABLET, FILM COATED ORAL 3 TIMES DAILY
Qty: 90 TABLET | Refills: 2
Start: 2024-09-16

## 2024-09-16 RX ORDER — ISOSORBIDE MONONITRATE 120 MG/1
120 TABLET, EXTENDED RELEASE ORAL DAILY
Qty: 30 TABLET | Refills: 5 | Status: SHIPPED | OUTPATIENT
Start: 2024-09-16

## 2024-09-16 ASSESSMENT — ENCOUNTER SYMPTOMS
COUGH: 0
SHORTNESS OF BREATH: 1

## 2024-10-21 ENCOUNTER — OFFICE VISIT (OUTPATIENT)
Dept: CARDIOLOGY CLINIC | Age: 54
End: 2024-10-21
Payer: COMMERCIAL

## 2024-10-21 VITALS
OXYGEN SATURATION: 97 % | WEIGHT: 171 LBS | HEIGHT: 66 IN | DIASTOLIC BLOOD PRESSURE: 60 MMHG | HEART RATE: 85 BPM | SYSTOLIC BLOOD PRESSURE: 100 MMHG | BODY MASS INDEX: 27.48 KG/M2

## 2024-10-21 DIAGNOSIS — E78.5 DYSLIPIDEMIA: ICD-10-CM

## 2024-10-21 DIAGNOSIS — I25.2 HISTORY OF ST ELEVATION MYOCARDIAL INFARCTION (STEMI): ICD-10-CM

## 2024-10-21 DIAGNOSIS — I20.1 CORONARY ARTERY SPASM (HCC): ICD-10-CM

## 2024-10-21 DIAGNOSIS — I25.10 CORONARY ARTERY DISEASE INVOLVING NATIVE CORONARY ARTERY OF NATIVE HEART WITHOUT ANGINA PECTORIS: ICD-10-CM

## 2024-10-21 DIAGNOSIS — I10 ESSENTIAL HYPERTENSION: ICD-10-CM

## 2024-10-21 DIAGNOSIS — R07.2 PRECORDIAL PAIN: Primary | ICD-10-CM

## 2024-10-21 PROCEDURE — 1036F TOBACCO NON-USER: CPT | Performed by: INTERNAL MEDICINE

## 2024-10-21 PROCEDURE — 99214 OFFICE O/P EST MOD 30 MIN: CPT | Performed by: INTERNAL MEDICINE

## 2024-10-21 PROCEDURE — G8417 CALC BMI ABV UP PARAM F/U: HCPCS | Performed by: INTERNAL MEDICINE

## 2024-10-21 PROCEDURE — G8427 DOCREV CUR MEDS BY ELIG CLIN: HCPCS | Performed by: INTERNAL MEDICINE

## 2024-10-21 PROCEDURE — 3078F DIAST BP <80 MM HG: CPT | Performed by: INTERNAL MEDICINE

## 2024-10-21 PROCEDURE — G8484 FLU IMMUNIZE NO ADMIN: HCPCS | Performed by: INTERNAL MEDICINE

## 2024-10-21 PROCEDURE — 3074F SYST BP LT 130 MM HG: CPT | Performed by: INTERNAL MEDICINE

## 2024-10-21 PROCEDURE — 3017F COLORECTAL CA SCREEN DOC REV: CPT | Performed by: INTERNAL MEDICINE

## 2024-10-21 RX ORDER — MIDODRINE HYDROCHLORIDE 5 MG/1
TABLET ORAL
Qty: 90 TABLET | Refills: 3 | OUTPATIENT
Start: 2024-10-21

## 2024-10-21 RX ORDER — AMLODIPINE BESYLATE 2.5 MG/1
2.5 TABLET ORAL 3 TIMES DAILY
Qty: 90 TABLET | Refills: 2 | Status: SHIPPED | OUTPATIENT
Start: 2024-10-21

## 2024-10-21 RX ORDER — ISOSORBIDE MONONITRATE 30 MG/1
30 TABLET, EXTENDED RELEASE ORAL DAILY
Qty: 30 TABLET | Refills: 3 | Status: SHIPPED | OUTPATIENT
Start: 2024-10-21

## 2024-10-21 RX ORDER — ISOSORBIDE MONONITRATE 120 MG/1
120 TABLET, EXTENDED RELEASE ORAL DAILY
Qty: 30 TABLET | Refills: 5 | Status: SHIPPED | OUTPATIENT
Start: 2024-10-21

## 2024-10-21 NOTE — PROGRESS NOTES
CARDIOLOGY  NOTE    Chief Complaint: chest pain / neck pain     HPI:   Coty is a 54 y.o. year old who has Past medical history as noted below.  Coty comes in for evaluation she is still having intermittent chest pain and jaw pain in spite of taking Imdur and metoprolol and imdur few times a week but her symptoms have improved compared to before   She is also tired and fatigued and feels her blood pressure been running low in the 80s and 90s , Midodrine has helped with bp   She actually presented with jaw pain chest pain with abnormal troponin levels in early July she was undergoing stress test when she developed ST elevation and was emergently taken to Cath Lab she underwent LAD intervention due to unstable looking is a segment in the mid LAD by Dr. Mullins.  She is on aspirin and Effient and reports compliance        Current Outpatient Medications   Medication Sig Dispense Refill    amLODIPine (NORVASC) 2.5 MG tablet Take 1 tablet by mouth in the morning, at noon, and at bedtime 90 tablet 2    isosorbide mononitrate (IMDUR) 120 MG extended release tablet Take 1 tablet by mouth daily 30 tablet 5    isosorbide mononitrate (IMDUR) 30 MG extended release tablet Take 1 tablet by mouth daily Take in the afternoon 30 tablet 3    venlafaxine (EFFEXOR) 75 MG tablet Take 1 tablet by mouth Daily      metoprolol tartrate (LOPRESSOR) 25 MG tablet Take 1 tablet by mouth in the morning, at noon, and at bedtime Hold if SBP < 95 or if HR < 55 90 tablet 2    midodrine (PROAMATINE) 10 MG tablet Take 1 tablet by mouth 3 times daily as needed (SBP < 100) 90 tablet 0    LORazepam (ATIVAN) 0.5 MG tablet 1 tablet at bedtime as needed Orally Once a day for 30 days      SYMBICORT 160-4.5 MCG/ACT AERO INHALE 2 PUFFS INTO THE LUNGS TWICE A DAY 30.6 each 3    albuterol sulfate HFA (PROVENTIL;VENTOLIN;PROAIR) 108 (90 Base) MCG/ACT inhaler INHALE 2 PUFFS INTO THE LUNGS EVERY 6 HOURS AS NEEDED FOR WHEEZING

## 2024-11-12 RX ORDER — AMLODIPINE BESYLATE 2.5 MG/1
2.5 TABLET ORAL 3 TIMES DAILY
Qty: 270 TABLET | Refills: 1 | Status: SHIPPED | OUTPATIENT
Start: 2024-11-12

## 2024-11-25 ENCOUNTER — OFFICE VISIT (OUTPATIENT)
Dept: CARDIOLOGY CLINIC | Age: 54
End: 2024-11-25

## 2024-11-25 VITALS
WEIGHT: 169 LBS | OXYGEN SATURATION: 97 % | DIASTOLIC BLOOD PRESSURE: 82 MMHG | HEART RATE: 61 BPM | SYSTOLIC BLOOD PRESSURE: 124 MMHG | BODY MASS INDEX: 27.16 KG/M2 | HEIGHT: 66 IN

## 2024-11-25 DIAGNOSIS — I20.1 CORONARY ARTERY SPASM (HCC): ICD-10-CM

## 2024-11-25 DIAGNOSIS — I10 ESSENTIAL HYPERTENSION: ICD-10-CM

## 2024-11-25 DIAGNOSIS — I25.10 CORONARY ARTERY DISEASE INVOLVING NATIVE CORONARY ARTERY OF NATIVE HEART WITHOUT ANGINA PECTORIS: ICD-10-CM

## 2024-11-25 DIAGNOSIS — I25.2 HISTORY OF ST ELEVATION MYOCARDIAL INFARCTION (STEMI): ICD-10-CM

## 2024-11-25 DIAGNOSIS — R00.2 PALPITATION: Primary | ICD-10-CM

## 2024-11-25 RX ORDER — AMLODIPINE BESYLATE 5 MG/1
5 TABLET ORAL 2 TIMES DAILY
Qty: 90 TABLET | Refills: 4 | Status: ON HOLD | OUTPATIENT
Start: 2024-11-25

## 2024-11-25 RX ORDER — METOPROLOL TARTRATE 50 MG
50 TABLET ORAL 2 TIMES DAILY
Qty: 90 TABLET | Refills: 3 | Status: ON HOLD | OUTPATIENT
Start: 2024-11-25

## 2024-11-25 RX ORDER — ISOSORBIDE MONONITRATE 60 MG/1
60 TABLET, EXTENDED RELEASE ORAL DAILY
Qty: 30 TABLET | Refills: 3 | Status: ON HOLD | OUTPATIENT
Start: 2024-11-25

## 2024-11-25 RX ORDER — NITROGLYCERIN 0.4 MG/1
0.4 TABLET SUBLINGUAL EVERY 5 MIN PRN
Qty: 25 TABLET | Refills: 3 | Status: ON HOLD | OUTPATIENT
Start: 2024-11-25

## 2024-11-25 RX ORDER — ROSUVASTATIN CALCIUM 40 MG/1
40 TABLET, COATED ORAL NIGHTLY
Qty: 90 TABLET | Refills: 5 | Status: ON HOLD | OUTPATIENT
Start: 2024-11-25

## 2024-11-25 NOTE — PROGRESS NOTES
CARDIOLOGY  NOTE    Chief Complaint: chest pain / neck pain     HPI:   Coty is a 54 y.o. year old who has Past medical history as noted below.  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 Mullins.  She is on aspirin and Effient and reports compliance        Current Outpatient Medications   Medication Sig Dispense Refill    amLODIPine (NORVASC) 5 MG tablet Take 1 tablet by mouth in the morning and 1 tablet in the evening. 90 tablet 4    metoprolol tartrate (LOPRESSOR) 50 MG tablet Take 1 tablet by mouth 2 times daily Hold if SBP < 95 or if HR < 55 90 tablet 3    isosorbide mononitrate (IMDUR) 60 MG extended release tablet Take 1 tablet by mouth daily 30 tablet 3    rosuvastatin (CRESTOR) 40 MG tablet Take 1 tablet by mouth nightly 90 tablet 5    nitroGLYCERIN (NITROSTAT) 0.4 MG SL tablet Place 1 tablet under the tongue every 5 minutes as needed for Chest pain up to max of 3 total doses. If no relief after 1 dose, call 911. 25 tablet 3    isosorbide mononitrate (IMDUR) 120 MG extended release tablet Take 1 tablet by mouth daily 30 tablet 5    venlafaxine (EFFEXOR) 75 MG tablet Take 2

## 2024-12-01 ENCOUNTER — APPOINTMENT (OUTPATIENT)
Dept: GENERAL RADIOLOGY | Age: 54
End: 2024-12-01
Payer: COMMERCIAL

## 2024-12-01 ENCOUNTER — HOSPITAL ENCOUNTER (OUTPATIENT)
Age: 54
Setting detail: OBSERVATION
Discharge: HOME | End: 2024-12-03
Attending: STUDENT IN AN ORGANIZED HEALTH CARE EDUCATION/TRAINING PROGRAM | Admitting: STUDENT IN AN ORGANIZED HEALTH CARE EDUCATION/TRAINING PROGRAM
Payer: COMMERCIAL

## 2024-12-01 DIAGNOSIS — R07.9 CHEST PAIN, UNSPECIFIED TYPE: Primary | ICD-10-CM

## 2024-12-01 DIAGNOSIS — I20.9 ANGINA PECTORIS (HCC): ICD-10-CM

## 2024-12-01 LAB
ANION GAP SERPL CALCULATED.3IONS-SCNC: 15 MMOL/L (ref 9–17)
BACTERIA URNS QL MICRO: ABNORMAL
BASOPHILS # BLD: 0.08 K/UL
BASOPHILS NFR BLD: 1 % (ref 0–1)
BILIRUB UR QL STRIP: ABNORMAL
BUN SERPL-MCNC: 20 MG/DL (ref 7–20)
CALCIUM SERPL-MCNC: 12.2 MG/DL (ref 8.3–10.6)
CASTS #/AREA URNS LPF: ABNORMAL /LPF
CHLORIDE SERPL-SCNC: 97 MMOL/L (ref 99–110)
CLARITY UR: CLEAR
CO2 SERPL-SCNC: 27 MMOL/L (ref 21–32)
COLOR UR: YELLOW
CREAT SERPL-MCNC: 1.4 MG/DL (ref 0.6–1.1)
EOSINOPHIL # BLD: 0.37 K/UL
EOSINOPHILS RELATIVE PERCENT: 3 % (ref 0–3)
EPI CELLS #/AREA URNS HPF: 2 /HPF
ERYTHROCYTE [DISTWIDTH] IN BLOOD BY AUTOMATED COUNT: 12.4 % (ref 11.7–14.9)
GFR, ESTIMATED: 39 ML/MIN/1.73M2
GLUCOSE BLD-MCNC: 92 MG/DL (ref 74–99)
GLUCOSE SERPL-MCNC: 101 MG/DL (ref 74–99)
GLUCOSE UR STRIP-MCNC: NEGATIVE MG/DL
HCT VFR BLD AUTO: 46 % (ref 37–47)
HGB BLD-MCNC: 14.4 G/DL (ref 12.5–16)
HGB UR QL STRIP.AUTO: NEGATIVE
IMM GRANULOCYTES # BLD AUTO: 0.03 K/UL
IMM GRANULOCYTES NFR BLD: 0 %
KETONES UR STRIP-MCNC: ABNORMAL MG/DL
LEUKOCYTE ESTERASE UR QL STRIP: ABNORMAL
LYMPHOCYTES NFR BLD: 4.48 K/UL
LYMPHOCYTES RELATIVE PERCENT: 38 % (ref 24–44)
MCH RBC QN AUTO: 29.9 PG (ref 27–31)
MCHC RBC AUTO-ENTMCNC: 31.3 G/DL (ref 32–36)
MCV RBC AUTO: 95.4 FL (ref 78–100)
MONOCYTES NFR BLD: 1.2 K/UL
MONOCYTES NFR BLD: 10 % (ref 0–4)
MUCOUS THREADS URNS QL MICRO: ABNORMAL
NEUTROPHILS NFR BLD: 48 % (ref 36–66)
NEUTS SEG NFR BLD: 5.61 K/UL
NITRITE UR QL STRIP: NEGATIVE
PH UR STRIP: 6 [PH] (ref 5–8)
PLATELET # BLD AUTO: 254 K/UL (ref 140–440)
PMV BLD AUTO: 11.4 FL (ref 7.5–11.1)
POTASSIUM SERPL-SCNC: 3.7 MMOL/L (ref 3.5–5.1)
PROT UR STRIP-MCNC: 100 MG/DL
RBC # BLD AUTO: 4.82 M/UL (ref 4.2–5.4)
RBC #/AREA URNS HPF: 3 /HPF (ref 0–2)
RENAL EPITHELIAL, UA: <1 /HPF
SODIUM SERPL-SCNC: 139 MMOL/L (ref 136–145)
SP GR UR STRIP: 1.02 (ref 1–1.03)
TRICHOMONAS #/AREA URNS HPF: ABNORMAL /[HPF]
TROPONIN I SERPL HS-MCNC: 13 NG/L (ref 0–14)
TROPONIN I SERPL HS-MCNC: 14 NG/L (ref 0–14)
UROBILINOGEN UR STRIP-ACNC: 1 EU/DL (ref 0–1)
WBC #/AREA URNS HPF: 27 /HPF (ref 0–5)
WBC OTHER # BLD: 11.8 K/UL (ref 4–10.5)

## 2024-12-01 PROCEDURE — 96361 HYDRATE IV INFUSION ADD-ON: CPT

## 2024-12-01 PROCEDURE — 2580000003 HC RX 258: Performed by: STUDENT IN AN ORGANIZED HEALTH CARE EDUCATION/TRAINING PROGRAM

## 2024-12-01 PROCEDURE — 6370000000 HC RX 637 (ALT 250 FOR IP)

## 2024-12-01 PROCEDURE — 71045 X-RAY EXAM CHEST 1 VIEW: CPT

## 2024-12-01 PROCEDURE — 85025 COMPLETE CBC W/AUTO DIFF WBC: CPT

## 2024-12-01 PROCEDURE — 80048 BASIC METABOLIC PNL TOTAL CA: CPT

## 2024-12-01 PROCEDURE — 99285 EMERGENCY DEPT VISIT HI MDM: CPT

## 2024-12-01 PROCEDURE — 96374 THER/PROPH/DIAG INJ IV PUSH: CPT

## 2024-12-01 PROCEDURE — 93005 ELECTROCARDIOGRAM TRACING: CPT | Performed by: STUDENT IN AN ORGANIZED HEALTH CARE EDUCATION/TRAINING PROGRAM

## 2024-12-01 PROCEDURE — 82947 ASSAY GLUCOSE BLOOD QUANT: CPT

## 2024-12-01 PROCEDURE — G0378 HOSPITAL OBSERVATION PER HR: HCPCS

## 2024-12-01 PROCEDURE — 6360000002 HC RX W HCPCS: Performed by: STUDENT IN AN ORGANIZED HEALTH CARE EDUCATION/TRAINING PROGRAM

## 2024-12-01 PROCEDURE — 6370000000 HC RX 637 (ALT 250 FOR IP): Performed by: STUDENT IN AN ORGANIZED HEALTH CARE EDUCATION/TRAINING PROGRAM

## 2024-12-01 PROCEDURE — 84484 ASSAY OF TROPONIN QUANT: CPT

## 2024-12-01 PROCEDURE — 81001 URINALYSIS AUTO W/SCOPE: CPT

## 2024-12-01 RX ORDER — TRAMADOL HYDROCHLORIDE 50 MG/1
50 TABLET ORAL 2 TIMES DAILY PRN
Status: DISCONTINUED | OUTPATIENT
Start: 2024-12-01 | End: 2024-12-03 | Stop reason: HOSPADM

## 2024-12-01 RX ORDER — ENOXAPARIN SODIUM 100 MG/ML
40 INJECTION SUBCUTANEOUS EVERY EVENING
Status: DISCONTINUED | OUTPATIENT
Start: 2024-12-02 | End: 2024-12-03 | Stop reason: HOSPADM

## 2024-12-01 RX ORDER — DICYCLOMINE HCL 20 MG
20 TABLET ORAL
Status: DISCONTINUED | OUTPATIENT
Start: 2024-12-02 | End: 2024-12-03 | Stop reason: HOSPADM

## 2024-12-01 RX ORDER — ALBUTEROL SULFATE 90 UG/1
2 INHALANT RESPIRATORY (INHALATION) EVERY 6 HOURS PRN
Status: DISCONTINUED | OUTPATIENT
Start: 2024-12-01 | End: 2024-12-03 | Stop reason: HOSPADM

## 2024-12-01 RX ORDER — PRASUGREL 10 MG/1
10 TABLET, FILM COATED ORAL DAILY
Status: DISCONTINUED | OUTPATIENT
Start: 2024-12-02 | End: 2024-12-03 | Stop reason: HOSPADM

## 2024-12-01 RX ORDER — ASPIRIN 81 MG/1
243 TABLET, CHEWABLE ORAL ONCE
Status: COMPLETED | OUTPATIENT
Start: 2024-12-01 | End: 2024-12-01

## 2024-12-01 RX ORDER — DEXTROSE MONOHYDRATE 100 MG/ML
INJECTION, SOLUTION INTRAVENOUS CONTINUOUS PRN
Status: DISCONTINUED | OUTPATIENT
Start: 2024-12-01 | End: 2024-12-03 | Stop reason: HOSPADM

## 2024-12-01 RX ORDER — ASPIRIN 81 MG/1
81 TABLET, CHEWABLE ORAL DAILY
Status: DISCONTINUED | OUTPATIENT
Start: 2024-12-02 | End: 2024-12-03 | Stop reason: HOSPADM

## 2024-12-01 RX ORDER — INSULIN LISPRO 100 [IU]/ML
0-4 INJECTION, SOLUTION INTRAVENOUS; SUBCUTANEOUS
Status: DISCONTINUED | OUTPATIENT
Start: 2024-12-01 | End: 2024-12-03 | Stop reason: HOSPADM

## 2024-12-01 RX ORDER — POTASSIUM CHLORIDE 7.45 MG/ML
10 INJECTION INTRAVENOUS PRN
Status: DISCONTINUED | OUTPATIENT
Start: 2024-12-01 | End: 2024-12-03 | Stop reason: HOSPADM

## 2024-12-01 RX ORDER — SODIUM CHLORIDE 9 MG/ML
INJECTION, SOLUTION INTRAVENOUS PRN
Status: DISCONTINUED | OUTPATIENT
Start: 2024-12-01 | End: 2024-12-03 | Stop reason: HOSPADM

## 2024-12-01 RX ORDER — GLUCAGON 1 MG/ML
1 KIT INJECTION PRN
Status: DISCONTINUED | OUTPATIENT
Start: 2024-12-01 | End: 2024-12-03 | Stop reason: HOSPADM

## 2024-12-01 RX ORDER — AMLODIPINE BESYLATE 5 MG/1
5 TABLET ORAL 2 TIMES DAILY
Status: DISCONTINUED | OUTPATIENT
Start: 2024-12-02 | End: 2024-12-02

## 2024-12-01 RX ORDER — BACLOFEN 10 MG/1
20 TABLET ORAL EVERY 8 HOURS PRN
Status: DISCONTINUED | OUTPATIENT
Start: 2024-12-01 | End: 2024-12-03 | Stop reason: HOSPADM

## 2024-12-01 RX ORDER — ISOSORBIDE MONONITRATE 60 MG/1
60 TABLET, EXTENDED RELEASE ORAL NIGHTLY
Status: DISCONTINUED | OUTPATIENT
Start: 2024-12-01 | End: 2024-12-02

## 2024-12-01 RX ORDER — VENLAFAXINE HYDROCHLORIDE 150 MG/1
150 CAPSULE, EXTENDED RELEASE ORAL DAILY
Status: DISCONTINUED | OUTPATIENT
Start: 2024-12-02 | End: 2024-12-03 | Stop reason: HOSPADM

## 2024-12-01 RX ORDER — NITROGLYCERIN 0.4 MG/1
0.4 TABLET SUBLINGUAL EVERY 5 MIN PRN
Status: DISCONTINUED | OUTPATIENT
Start: 2024-12-01 | End: 2024-12-03 | Stop reason: HOSPADM

## 2024-12-01 RX ORDER — SODIUM CHLORIDE, SODIUM LACTATE, POTASSIUM CHLORIDE, CALCIUM CHLORIDE 600; 310; 30; 20 MG/100ML; MG/100ML; MG/100ML; MG/100ML
INJECTION, SOLUTION INTRAVENOUS CONTINUOUS
Status: ACTIVE | OUTPATIENT
Start: 2024-12-01 | End: 2024-12-02

## 2024-12-01 RX ORDER — LEVOTHYROXINE SODIUM 50 UG/1
50 TABLET ORAL DAILY
Status: DISCONTINUED | OUTPATIENT
Start: 2024-12-02 | End: 2024-12-03 | Stop reason: HOSPADM

## 2024-12-01 RX ORDER — BUDESONIDE AND FORMOTEROL FUMARATE DIHYDRATE 160; 4.5 UG/1; UG/1
2 AEROSOL RESPIRATORY (INHALATION) 2 TIMES DAILY
Status: DISCONTINUED | OUTPATIENT
Start: 2024-12-01 | End: 2024-12-03 | Stop reason: HOSPADM

## 2024-12-01 RX ORDER — SODIUM CHLORIDE 0.9 % (FLUSH) 0.9 %
5-40 SYRINGE (ML) INJECTION PRN
Status: DISCONTINUED | OUTPATIENT
Start: 2024-12-01 | End: 2024-12-03 | Stop reason: HOSPADM

## 2024-12-01 RX ORDER — ACETAMINOPHEN 325 MG/1
650 TABLET ORAL EVERY 6 HOURS PRN
Status: DISCONTINUED | OUTPATIENT
Start: 2024-12-01 | End: 2024-12-03 | Stop reason: HOSPADM

## 2024-12-01 RX ORDER — POLYETHYLENE GLYCOL 3350 17 G/17G
17 POWDER, FOR SOLUTION ORAL DAILY PRN
Status: DISCONTINUED | OUTPATIENT
Start: 2024-12-01 | End: 2024-12-03

## 2024-12-01 RX ORDER — ONDANSETRON 2 MG/ML
4 INJECTION INTRAMUSCULAR; INTRAVENOUS ONCE
Status: COMPLETED | OUTPATIENT
Start: 2024-12-01 | End: 2024-12-01

## 2024-12-01 RX ORDER — METOPROLOL TARTRATE 50 MG
50 TABLET ORAL 2 TIMES DAILY
Status: DISCONTINUED | OUTPATIENT
Start: 2024-12-01 | End: 2024-12-03 | Stop reason: HOSPADM

## 2024-12-01 RX ORDER — ASPIRIN 81 MG/1
TABLET, CHEWABLE ORAL
Status: COMPLETED
Start: 2024-12-01 | End: 2024-12-01

## 2024-12-01 RX ORDER — LANOLIN ALCOHOL/MO/W.PET/CERES
400 CREAM (GRAM) TOPICAL DAILY
Status: DISCONTINUED | OUTPATIENT
Start: 2024-12-02 | End: 2024-12-03 | Stop reason: HOSPADM

## 2024-12-01 RX ORDER — ISOSORBIDE MONONITRATE 60 MG/1
120 TABLET, EXTENDED RELEASE ORAL DAILY
Status: DISCONTINUED | OUTPATIENT
Start: 2024-12-02 | End: 2024-12-02

## 2024-12-01 RX ORDER — ONDANSETRON 2 MG/ML
4 INJECTION INTRAMUSCULAR; INTRAVENOUS EVERY 6 HOURS PRN
Status: DISCONTINUED | OUTPATIENT
Start: 2024-12-01 | End: 2024-12-03 | Stop reason: HOSPADM

## 2024-12-01 RX ORDER — LORAZEPAM 0.5 MG/1
0.5 TABLET ORAL NIGHTLY PRN
Status: DISCONTINUED | OUTPATIENT
Start: 2024-12-01 | End: 2024-12-03 | Stop reason: HOSPADM

## 2024-12-01 RX ORDER — ROSUVASTATIN CALCIUM 20 MG/1
40 TABLET, COATED ORAL NIGHTLY
Status: DISCONTINUED | OUTPATIENT
Start: 2024-12-01 | End: 2024-12-03 | Stop reason: HOSPADM

## 2024-12-01 RX ORDER — MONTELUKAST SODIUM 10 MG/1
10 TABLET ORAL NIGHTLY
Status: DISCONTINUED | OUTPATIENT
Start: 2024-12-01 | End: 2024-12-03 | Stop reason: HOSPADM

## 2024-12-01 RX ORDER — ACETAMINOPHEN 650 MG/1
650 SUPPOSITORY RECTAL EVERY 6 HOURS PRN
Status: DISCONTINUED | OUTPATIENT
Start: 2024-12-01 | End: 2024-12-03 | Stop reason: HOSPADM

## 2024-12-01 RX ORDER — MAGNESIUM SULFATE IN WATER 40 MG/ML
2000 INJECTION, SOLUTION INTRAVENOUS PRN
Status: DISCONTINUED | OUTPATIENT
Start: 2024-12-01 | End: 2024-12-03 | Stop reason: HOSPADM

## 2024-12-01 RX ORDER — ONDANSETRON 4 MG/1
4 TABLET, ORALLY DISINTEGRATING ORAL EVERY 8 HOURS PRN
Status: DISCONTINUED | OUTPATIENT
Start: 2024-12-01 | End: 2024-12-03 | Stop reason: HOSPADM

## 2024-12-01 RX ORDER — SODIUM CHLORIDE 0.9 % (FLUSH) 0.9 %
5-40 SYRINGE (ML) INJECTION EVERY 12 HOURS SCHEDULED
Status: DISCONTINUED | OUTPATIENT
Start: 2024-12-01 | End: 2024-12-03 | Stop reason: HOSPADM

## 2024-12-01 RX ADMIN — ASPIRIN 243 MG: 81 TABLET, CHEWABLE ORAL at 18:34

## 2024-12-01 RX ADMIN — ONDANSETRON 4 MG: 2 INJECTION INTRAMUSCULAR; INTRAVENOUS at 18:50

## 2024-12-01 RX ADMIN — SODIUM CHLORIDE, PRESERVATIVE FREE 10 ML: 5 INJECTION INTRAVENOUS at 22:57

## 2024-12-01 RX ADMIN — ROSUVASTATIN CALCIUM 40 MG: 20 TABLET, FILM COATED ORAL at 22:54

## 2024-12-01 RX ADMIN — ISOSORBIDE MONONITRATE 60 MG: 60 TABLET, EXTENDED RELEASE ORAL at 22:54

## 2024-12-01 RX ADMIN — MONTELUKAST 10 MG: 10 TABLET, FILM COATED ORAL at 22:54

## 2024-12-01 RX ADMIN — LORAZEPAM 0.5 MG: 0.5 TABLET ORAL at 22:54

## 2024-12-01 RX ADMIN — SODIUM CHLORIDE, POTASSIUM CHLORIDE, SODIUM LACTATE AND CALCIUM CHLORIDE: 600; 310; 30; 20 INJECTION, SOLUTION INTRAVENOUS at 22:55

## 2024-12-01 ASSESSMENT — PAIN DESCRIPTION - LOCATION: LOCATION: CHEST

## 2024-12-01 ASSESSMENT — PAIN SCALES - GENERAL
PAINLEVEL_OUTOF10: 5
PAINLEVEL_OUTOF10: 0
PAINLEVEL_OUTOF10: 0

## 2024-12-01 ASSESSMENT — PAIN - FUNCTIONAL ASSESSMENT: PAIN_FUNCTIONAL_ASSESSMENT: 0-10

## 2024-12-01 NOTE — ED PROVIDER NOTES
EMERGENCY DEPARTMENT HISTORY AND PHYSICAL EXAM      Patient Name: Coty Stearns  MRN: [id number]  : 1970  Date of Evaluation: 2024  ED Provider:  Jose Mack DO    History of Presenting Illness     Chief Complaint:   Chief Complaint   Patient presents with    Chest Pain     Radiating to jaw and back, took three nitro PTA. Pt sees Dr. España       HPI: Patient is a 54 y.o. female with past medical history of hypertension, hyperlipidemia, coronary artery disease, COPD, and schizoaffective disorder who is presenting to the emergency department with chief complaint of chest pain.  Patient states for the last 2 or 3 days she has been having intermittent episodes of chest pain.  Patient states her symptoms are worsened with activity.  Patient reports central chest pain that radiates to her jaw and extremities with associated nausea.  Patient states this feels similar to previous heart attack.  Patient states she had a heart cath earlier this year in July with a stent placed.  Patient is denying current chest pain.          Past History     Past Medical History:   Past Medical History:   Diagnosis Date    Acquired hypothyroidism 2018    Chest pain, rule out acute myocardial infarction 2024    Chronic back pain 2018    Chronic obstructive pulmonary disease (HCC) 2018    COPD (chronic obstructive pulmonary disease) (HCC) 10/2013    Coronary artery disease involving native coronary artery of native heart without angina pectoris 2024    COVID-19 2021    Essential hypertension 10/09/2018    Fibromyalgia 2018    Former tobacco use 2018    GERD (gastroesophageal reflux disease)     H/O 24 hour EKG monitoring 14-14    30 DAY EVENT MONITOR-Normal sinus rhythm    H/O fibromyalgia     History of 24 hour EKG monitoring 2014    Sinus rhythm. Isolated  premature atrial contract. and premature ventricular contraction. Sinus tachycardia present.

## 2024-12-02 ENCOUNTER — APPOINTMENT (OUTPATIENT)
Dept: NON INVASIVE DIAGNOSTICS | Age: 54
End: 2024-12-02
Attending: INTERNAL MEDICINE
Payer: COMMERCIAL

## 2024-12-02 LAB
ANION GAP SERPL CALCULATED.3IONS-SCNC: 10 MMOL/L (ref 9–17)
BASOPHILS # BLD: 0.08 K/UL
BASOPHILS NFR BLD: 1 % (ref 0–1)
BNP SERPL-MCNC: 82 PG/ML (ref 0–125)
BUN SERPL-MCNC: 23 MG/DL (ref 7–20)
CALCIUM SERPL-MCNC: 10.5 MG/DL (ref 8.3–10.6)
CHLORIDE SERPL-SCNC: 101 MMOL/L (ref 99–110)
CHOLEST SERPL-MCNC: 135 MG/DL (ref 125–199)
CO2 SERPL-SCNC: 28 MMOL/L (ref 21–32)
CREAT SERPL-MCNC: 1.4 MG/DL (ref 0.6–1.1)
ECHO BSA: 1.9 M2
EOSINOPHIL # BLD: 0.42 K/UL
EOSINOPHILS RELATIVE PERCENT: 5 % (ref 0–3)
ERYTHROCYTE [DISTWIDTH] IN BLOOD BY AUTOMATED COUNT: 12.5 % (ref 11.7–14.9)
EST. AVERAGE GLUCOSE BLD GHB EST-MCNC: 117 MG/DL
GFR, ESTIMATED: 41 ML/MIN/1.73M2
GLUCOSE BLD-MCNC: 110 MG/DL (ref 74–99)
GLUCOSE BLD-MCNC: 79 MG/DL (ref 74–99)
GLUCOSE BLD-MCNC: 87 MG/DL (ref 74–99)
GLUCOSE SERPL-MCNC: 94 MG/DL (ref 74–99)
HBA1C MFR BLD: 5.7 % (ref 4.2–6.3)
HCT VFR BLD AUTO: 38.4 % (ref 37–47)
HDLC SERPL-MCNC: 50 MG/DL
HGB BLD-MCNC: 12 G/DL (ref 12.5–16)
IMM GRANULOCYTES # BLD AUTO: 0.03 K/UL
IMM GRANULOCYTES NFR BLD: 0 %
LDLC SERPL CALC-MCNC: 58 MG/DL
LYMPHOCYTES NFR BLD: 3.34 K/UL
LYMPHOCYTES RELATIVE PERCENT: 40 % (ref 24–44)
MCH RBC QN AUTO: 29.6 PG (ref 27–31)
MCHC RBC AUTO-ENTMCNC: 31.3 G/DL (ref 32–36)
MCV RBC AUTO: 94.8 FL (ref 78–100)
MONOCYTES NFR BLD: 0.97 K/UL
MONOCYTES NFR BLD: 12 % (ref 0–4)
NEUTROPHILS NFR BLD: 42 % (ref 36–66)
NEUTS SEG NFR BLD: 3.57 K/UL
PLATELET # BLD AUTO: 169 K/UL (ref 140–440)
PMV BLD AUTO: 11 FL (ref 7.5–11.1)
POTASSIUM SERPL-SCNC: 4.2 MMOL/L (ref 3.5–5.1)
RBC # BLD AUTO: 4.05 M/UL (ref 4.2–5.4)
SODIUM SERPL-SCNC: 138 MMOL/L (ref 136–145)
STRESS BASELINE DIAS BP: 71 MMHG
STRESS BASELINE HR: 94 BPM
STRESS BASELINE SYS BP: 106 MMHG
STRESS ESTIMATED WORKLOAD: 4.6 METS
STRESS PEAK DIAS BP: 76 MMHG
STRESS PEAK SYS BP: 142 MMHG
STRESS PERCENT HR ACHIEVED: 89 %
STRESS POST PEAK HR: 146 BPM
STRESS RATE PRESSURE PRODUCT: NORMAL BPM*MMHG
STRESS TARGET HR: 164 BPM
TRIGL SERPL-MCNC: 134 MG/DL
TROPONIN I SERPL HS-MCNC: 10 NG/L (ref 0–14)
TSH SERPL DL<=0.05 MIU/L-ACNC: 1.14 UIU/ML (ref 0.27–4.2)
WBC OTHER # BLD: 8.4 K/UL (ref 4–10.5)

## 2024-12-02 PROCEDURE — 84443 ASSAY THYROID STIM HORMONE: CPT

## 2024-12-02 PROCEDURE — 94761 N-INVAS EAR/PLS OXIMETRY MLT: CPT

## 2024-12-02 PROCEDURE — 80061 LIPID PANEL: CPT

## 2024-12-02 PROCEDURE — 6370000000 HC RX 637 (ALT 250 FOR IP): Performed by: INTERNAL MEDICINE

## 2024-12-02 PROCEDURE — 85025 COMPLETE CBC W/AUTO DIFF WBC: CPT

## 2024-12-02 PROCEDURE — 82947 ASSAY GLUCOSE BLOOD QUANT: CPT

## 2024-12-02 PROCEDURE — 93016 CV STRESS TEST SUPVJ ONLY: CPT | Performed by: INTERNAL MEDICINE

## 2024-12-02 PROCEDURE — 94640 AIRWAY INHALATION TREATMENT: CPT

## 2024-12-02 PROCEDURE — 83880 ASSAY OF NATRIURETIC PEPTIDE: CPT

## 2024-12-02 PROCEDURE — 96372 THER/PROPH/DIAG INJ SC/IM: CPT

## 2024-12-02 PROCEDURE — 36415 COLL VENOUS BLD VENIPUNCTURE: CPT

## 2024-12-02 PROCEDURE — 96361 HYDRATE IV INFUSION ADD-ON: CPT

## 2024-12-02 PROCEDURE — 6370000000 HC RX 637 (ALT 250 FOR IP): Performed by: NURSE PRACTITIONER

## 2024-12-02 PROCEDURE — 83036 HEMOGLOBIN GLYCOSYLATED A1C: CPT

## 2024-12-02 PROCEDURE — 93017 CV STRESS TEST TRACING ONLY: CPT

## 2024-12-02 PROCEDURE — 2580000003 HC RX 258: Performed by: STUDENT IN AN ORGANIZED HEALTH CARE EDUCATION/TRAINING PROGRAM

## 2024-12-02 PROCEDURE — G0378 HOSPITAL OBSERVATION PER HR: HCPCS

## 2024-12-02 PROCEDURE — 6370000000 HC RX 637 (ALT 250 FOR IP): Performed by: STUDENT IN AN ORGANIZED HEALTH CARE EDUCATION/TRAINING PROGRAM

## 2024-12-02 PROCEDURE — 6360000002 HC RX W HCPCS: Performed by: STUDENT IN AN ORGANIZED HEALTH CARE EDUCATION/TRAINING PROGRAM

## 2024-12-02 PROCEDURE — 80048 BASIC METABOLIC PNL TOTAL CA: CPT

## 2024-12-02 PROCEDURE — 93018 CV STRESS TEST I&R ONLY: CPT | Performed by: INTERNAL MEDICINE

## 2024-12-02 PROCEDURE — 84484 ASSAY OF TROPONIN QUANT: CPT

## 2024-12-02 RX ORDER — MIDODRINE HYDROCHLORIDE 5 MG/1
10 TABLET ORAL 3 TIMES DAILY PRN
Status: DISCONTINUED | OUTPATIENT
Start: 2024-12-02 | End: 2024-12-03 | Stop reason: HOSPADM

## 2024-12-02 RX ORDER — SODIUM CHLORIDE 1 G/1
1 TABLET ORAL
Status: DISCONTINUED | OUTPATIENT
Start: 2024-12-02 | End: 2024-12-03 | Stop reason: HOSPADM

## 2024-12-02 RX ORDER — SUMATRIPTAN 6 MG/.5ML
6 INJECTION, SOLUTION SUBCUTANEOUS ONCE
Status: COMPLETED | OUTPATIENT
Start: 2024-12-02 | End: 2024-12-02

## 2024-12-02 RX ORDER — ISOSORBIDE MONONITRATE 60 MG/1
60 TABLET, EXTENDED RELEASE ORAL EVERY 12 HOURS
Status: DISCONTINUED | OUTPATIENT
Start: 2024-12-02 | End: 2024-12-03 | Stop reason: HOSPADM

## 2024-12-02 RX ORDER — ACETAMINOPHEN 500 MG
1000 TABLET ORAL ONCE
Status: COMPLETED | OUTPATIENT
Start: 2024-12-02 | End: 2024-12-02

## 2024-12-02 RX ADMIN — VENLAFAXINE HYDROCHLORIDE 150 MG: 150 CAPSULE, EXTENDED RELEASE ORAL at 05:24

## 2024-12-02 RX ADMIN — DICYCLOMINE HYDROCHLORIDE 20 MG: 20 TABLET ORAL at 13:21

## 2024-12-02 RX ADMIN — SODIUM CHLORIDE, PRESERVATIVE FREE 10 ML: 5 INJECTION INTRAVENOUS at 21:24

## 2024-12-02 RX ADMIN — LEVOTHYROXINE SODIUM 50 MCG: 0.05 TABLET ORAL at 05:24

## 2024-12-02 RX ADMIN — Medication 400 MG: at 08:41

## 2024-12-02 RX ADMIN — ISOSORBIDE MONONITRATE 60 MG: 60 TABLET, EXTENDED RELEASE ORAL at 21:22

## 2024-12-02 RX ADMIN — ROSUVASTATIN CALCIUM 40 MG: 20 TABLET, FILM COATED ORAL at 21:22

## 2024-12-02 RX ADMIN — ACETAMINOPHEN 1000 MG: 500 TABLET ORAL at 14:37

## 2024-12-02 RX ADMIN — DICYCLOMINE HYDROCHLORIDE 20 MG: 20 TABLET ORAL at 05:24

## 2024-12-02 RX ADMIN — ENOXAPARIN SODIUM 40 MG: 100 INJECTION SUBCUTANEOUS at 21:23

## 2024-12-02 RX ADMIN — MONTELUKAST 10 MG: 10 TABLET, FILM COATED ORAL at 21:23

## 2024-12-02 RX ADMIN — TRAMADOL HYDROCHLORIDE 50 MG: 50 TABLET ORAL at 05:24

## 2024-12-02 RX ADMIN — SODIUM CHLORIDE 1 G: 1 TABLET ORAL at 16:01

## 2024-12-02 RX ADMIN — ASPIRIN 81 MG CHEWABLE TABLET 81 MG: 81 TABLET CHEWABLE at 08:41

## 2024-12-02 RX ADMIN — PRASUGREL 10 MG: 10 TABLET, FILM COATED ORAL at 08:41

## 2024-12-02 RX ADMIN — LORAZEPAM 0.5 MG: 0.5 TABLET ORAL at 21:23

## 2024-12-02 RX ADMIN — ONDANSETRON 4 MG: 4 TABLET, ORALLY DISINTEGRATING ORAL at 16:02

## 2024-12-02 RX ADMIN — SUMATRIPTAN SUCCINATE 6 MG: 6 INJECTION, SOLUTION SUBCUTANEOUS at 16:02

## 2024-12-02 RX ADMIN — BUDESONIDE AND FORMOTEROL FUMARATE DIHYDRATE 2 PUFF: 160; 4.5 AEROSOL RESPIRATORY (INHALATION) at 22:04

## 2024-12-02 ASSESSMENT — PAIN DESCRIPTION - DESCRIPTORS
DESCRIPTORS: ACHING

## 2024-12-02 ASSESSMENT — PAIN DESCRIPTION - ORIENTATION
ORIENTATION: ANTERIOR;POSTERIOR
ORIENTATION: ANTERIOR;POSTERIOR
ORIENTATION: INNER

## 2024-12-02 ASSESSMENT — PAIN DESCRIPTION - LOCATION
LOCATION: HEAD

## 2024-12-02 ASSESSMENT — PAIN SCALES - GENERAL
PAINLEVEL_OUTOF10: 7
PAINLEVEL_OUTOF10: 7
PAINLEVEL_OUTOF10: 4
PAINLEVEL_OUTOF10: 7
PAINLEVEL_OUTOF10: 8

## 2024-12-02 ASSESSMENT — PAIN - FUNCTIONAL ASSESSMENT: PAIN_FUNCTIONAL_ASSESSMENT: ACTIVITIES ARE NOT PREVENTED

## 2024-12-02 NOTE — PROGRESS NOTES
V2.0  [facility] [place] Progress Note      Name:  Coty Stearns /Age/Sex: 1970  (54 y.o. female)   MRN & CSN:  [id number] & [id number] Encounter Date/Time: 2024 7:35 AM EST    Location:  [facility] PCP: Grazyna English MD       Hospital Day: 2    Assessment and Plan:   Coty Stearns is a 54 y.o. female with pmh of Anxiety Depression, CAD with stent, COPD, hypertension, hypothyroidism, history of tobaccoism, GERD, hyperlipidemia, who presents with Chest pain      Plan:    Chest pain due to CAD  CAD s/p PCI to LAD in 2024  Sinus bradycardia  - Endorsed intermittent chest and jaw pain with radiation to left arm over past week, symptoms similar to previous MI, and improved with NTG. No presyncope, syncope, orthopnea or LE edema. Had repeat LHC in 2024 noted to have significant spasms of LAD post stenting associated with jaw pain. Recently seen by cardiology outpatient.  - Tn negative x2. ECG without acute ischemic changes.   - Continue ASA, Effient, Crestor and Imdur, hold Lopressor for SB. Cardiology consulted, appreciate assistance. NTG prn  -Cardiology consulted: Dr. Shetty  -NM Stress: pending      LISANDRO, mild  - Clinically hypovolemic. Cr 1.4, baseline ~ 0.6. UA ordered.  - Will challenge with IVF. Strict I/O's. Bladder scan if decreased voiding.     Essential hypertension  -HOLD amlodipine, Imdur and metoprolol tartrate due to HYPOTENSION, BP 85/59    Orthostatic Hypotension  -cont midodrine 10 mg TID      Mixed hyperlipidemia  -Fasting Lipids: , HDL 50, LDL 58, Trig 134  - Continue Crestor.     T2DM   - Last A1c 5.4% in 2024, now 5.7%  - POCT Glucose Qac&hs, LCSI, Hypoglycemic Protocols      Acquired hypothyroidism  - Continue Synthroid.  - Follow-up repeat TSH.     Depression / anxiety  - Continue Effexor and prn Ativan.       Diet Diet NPO Exceptions are: Ice Chips, Sips of Water with Meds   DVT Prophylaxis [x] Lovenox, []  Heparin, [] SCDs, [] Ambulation,  [] Eliquis, [] Xarelto

## 2024-12-02 NOTE — CARE COORDINATION
Chart reviewed and patient discussed in IDR. From home, independent prior to admission. PCP and insurance. Stress test scheduled for today. No DC needs id'd at this time. CM following for any new needs that may arise.

## 2024-12-02 NOTE — H&P
History and Physical      Name:  Coty Stearns /Age/Sex: 1970  (54 y.o. female)   MRN & CSN:  [id number] & [id number] Encounter Date/Time: 2024 9:23 PM   Location:  ED14/ED-14 PCP: Grazyna English MD       Hospital Day: 1    Assessment and Plan:     Patient is a 54-year-old female who presented with chest pain.     # Chest pain due to CAD  # CAD s/p PCI to LAD in 2024  # Sinus bradycardia  - Endorsed intermittent chest and jaw pain with radiation to left arm over past week, symptoms similar to previous MI, and improved with NTG. No presyncope, syncope, orthopnea or LE edema. Had repeat LHC in 2024 noted to have significant spasms of LAD post stenting associated with jaw pain. Recently seen by cardiology outpatient.  - Tn negative x2. ECG without acute ischemic changes.   - Continue ASA, Effient, Crestor and Imdur, hold Lopressor for SB. Cardiology consulted, appreciate assistance. NTG prn.     # LISANDRO, mild  - Clinically hypovolemic. Cr 1.4, baseline ~ 0.6. UA ordered.  - Will challenge with IVF. Strict I/O's. Bladder scan if decreased voiding.    # Essential hypertension  - Continue Norvasc.    # Mixed hyperlipidemia  - Continue Crestor.    # T2DM   - Last A1c 5.4% in 2024.   - LCSI.    # Acquired hypothyroidism  - Continue Synthroid.  - Follow-up repeat TSH.     # Depression / anxiety  - Continue Effexor and prn Ativan.      Checklist:  Advanced care planning: full  Diet: NPO past midnight pending cardiology evaluation  DVT ppx: Lovenox  Sugar: BG goal of 140-180 while inpatient    Disposition: place in observation.  Estimated discharge: 1-2 day(s).  Current living situation: home.  Expected disposition: home.    Spoke with ED provider who recommended admission for the patient and I agree with that plan.  Personally reviewed lab studies and imaging.  EKG interpreted personally and results as stated above.  Imaging that was interpreted personally and results as stated above.    History of

## 2024-12-02 NOTE — CONSULTS
CARDIOLOGY CONSULT NOTE         Reason for consultation: Chest pain      Primary care physician: Grazyna English MD      Chief Complaints :  Chief Complaint   Patient presents with    Chest Pain     Radiating to jaw and back, took three nitro PTA. Pt sees Dr. España        History of present illness:Coty is a 54 y.o.year old female who has a history of coronary disease status post PCI in July 2024.  She is here now with exertional chest discomfort.  Patient states that she feels pain in her jaw and it radiates to both arms.  She came in August with similar complaints and the repeat angiography was performed which showed severe coronary artery spasm.  At present she is chest pain-free.  Her troponin is normal.    Review of Systems:     All systems negative except as marked.        Physical Examination:    Vitals:    12/02/24 0832   BP: (!) 88/61   Pulse: 56   Resp: 14   Temp: 97.9 °F (36.6 °C)   SpO2: 95%        General Appearance:  No distress, conversant    Constitutional:  No acute distress, non-toxic appearance.    HENT:  Normocephalic, Atraumatic,   Eyes:  PERRL, EOMI, Conjunctiva normal, No discharge.   Respiratory:  No respiratory distress, No wheezing  Cardiovascular: S1, S2, no murmurs, gallops. JVD wnl  Abdomen /GI:   Soft, No tenderness   Genitourinary: No costovertebral angle tenderness   Musculoskeletal:  No edema, no tenderness, no deformities.   Integument:  Well hydrated, no rash   Neurologic:  Alert & oriented x 3, no focal deficits noted       Medical decision making and Data review:    Lab Review   Recent Labs     12/02/24  0516   WBC 8.4   HGB 12.0*   HCT 38.4         Recent Labs     12/02/24  0516      K 4.2      CO2 28   BUN 23*   CREATININE 1.4*     No results for input(s): \"AST\", \"ALT\", \"BILIDIR\", \"BILITOT\", \"ALKPHOS\" in the last 72 hours.    Invalid input(s): \"ALB\"  No results for input(s): \"TROPONINT\" in the last 72 hours.    Recent Labs     12/02/24  0516   PROBNP 82

## 2024-12-02 NOTE — PROGRESS NOTES
12/02/24 1443   Encounter Summary   Encounter Overview/Reason Spiritual/Emotional Needs   Encounter Code  Assessment by  services   Service Provided For Patient   Referral/Consult From [facility]   Support System Family members   Last Encounter  12/02/24  (Pt going for a test during time of visit, she wants a follow-up visit tomorrow, continue care, provide grief support for this Pt.)   Complexity of Encounter Low   Begin Time 1440   End Time  1444   Total Time Calculated 4 min   Spiritual/Emotional needs   Type Spiritual Support   Assessment/Intervention/Outcome   Assessment Calm   Intervention Sustaining Presence/Ministry of presence   Outcome Coping   Plan and Referrals   Plan/Referrals Continue Support (comment)

## 2024-12-02 NOTE — PLAN OF CARE
Problem: Discharge Planning  Goal: Discharge to home or other facility with appropriate resources  Outcome: Progressing     Problem: Pain  Goal: Verbalizes/displays adequate comfort level or baseline comfort level  Outcome: Progressing      leg swelling/diabetes

## 2024-12-02 NOTE — PROGRESS NOTES
I did NOT see the patient. The patient was discussed with the AISSATOU. I was available for questions and consultation as needed.       Going for stress test.

## 2024-12-02 NOTE — PROGRESS NOTES
4 Eyes Skin Assessment     NAME:  Coty Stearns  YOB: 1970  MEDICAL RECORD NUMBER:  [id number]    The patient is being assess for  Admission    I agree that 2 RN's have performed a thorough Head to Toe Skin Assessment on the patient. ALL assessment sites listed below have been assessed.      Areas assessed by both nurses:    Head, Face, Ears, Shoulders, Back, Chest, Arms, Elbows, Hands, Sacrum. Buttock, Coccyx, Ischium, Legs. Feet and Heels, and Under Medical Devices   - bruising on left foot (dorsal)    - bruising on BLE  - bruising on right lower back      Does the Patient have a Wound? No noted wound(s)       Tra Prevention initiated:  NA   Wound Care Orders initiated:  NA    Pressure Injury (Stage 3,4, Unstageable, DTI, NWPT, and Complex wounds) if present place consult order under :: NA    New and Established Ostomies if present place consult order under : NA      Nurse 1 eSignature: Electronically signed by Shlomo Schmitt RN on 12/1/24 at 10:32 PM EST    **SHARE this note so that the co-signing nurse is able to place an eSignature**    Nurse 2 eSignature: Electronically signed by Elmira Reaves LPN on 12/1/24 at 11:21 PM EST

## 2024-12-02 NOTE — ED TRIAGE NOTES
Pt arrives with complaint of chest pain radiating to her jaw and both arms. Pt states she took 3 nitro PTA. Pt states she has had near constant nausea since Thursday. Pt states exertion makes the symptoms worse, nothing makes the pain better.   
10/09/2018    Fibromyalgia 06/25/2018    Former tobacco use 09/26/2018    GERD (gastroesophageal reflux disease)     H/O 24 hour EKG monitoring 08/05/14-09/03/14    30 DAY EVENT MONITOR-Normal sinus rhythm    H/O fibromyalgia     History of 24 hour EKG monitoring 06/30/2014    Sinus rhythm. Isolated  premature atrial contract. and premature ventricular contraction. Sinus tachycardia present.     History of echocardiogram 07/01/2014    EF 55% Mild left ventricular hypertrophy noted. Mild mitral and TR noted. The patient is bradycardic during the study.    History of hysterectomy 06/25/2018    Hx of cardiovascular stress test 06/30/2014    Lexiscan: EF 73% Negative electrocardiogram suggestive for ischemia.    Hyperlipidemia 06/25/2018    Hypertension     Schizoaffective disorder (HCC) 06/25/2018    Simple chronic bronchitis (HCC) 09/26/2018    Spastic paraparesis        Assessment    Vitals:    Level of Consciousness: Alert (0)   Vitals:    12/01/24 2002 12/01/24 2004 12/01/24 2032 12/01/24 2102   BP: 105/71 109/70 105/67 106/67   Pulse: 62 57 58 65   Resp: 14 12 14 12   Temp: 98.2 °F (36.8 °C)      TempSrc: Oral      SpO2: 95% 96% 97% 95%   Weight:       Height:           PO Status: Regular    O2 Flow Rate: O2 Device: None (Room air)      Cardiac Rhythm: sr    Last documented pain medication administered: none    NIH Score: NIH       Active LDA's:   Peripheral IV 12/01/24 Right Antecubital (Active)       Pertinent or High Risk Medications/Drips: no   If Yes, please provide details:       Blood Product Administration: no  If Yes, please provide details:     Recommendation    Incomplete orders admit orders   Additional Comments:    If any further questions, please call Sending RN at [phone]    Electronically signed by: Electronically signed by Cat Marx RN on 12/1/2024 at 9:17 PM

## 2024-12-03 VITALS
RESPIRATION RATE: 14 BRPM | HEIGHT: 66 IN | HEART RATE: 104 BPM | TEMPERATURE: 98.4 F | DIASTOLIC BLOOD PRESSURE: 91 MMHG | OXYGEN SATURATION: 90 % | BODY MASS INDEX: 27.48 KG/M2 | SYSTOLIC BLOOD PRESSURE: 108 MMHG | WEIGHT: 171 LBS

## 2024-12-03 LAB
ANION GAP SERPL CALCULATED.3IONS-SCNC: 11 MMOL/L (ref 9–17)
BASOPHILS # BLD: 0.06 K/UL
BASOPHILS NFR BLD: 1 % (ref 0–1)
BUN SERPL-MCNC: 17 MG/DL (ref 7–20)
CALCIUM SERPL-MCNC: 10.5 MG/DL (ref 8.3–10.6)
CHLORIDE SERPL-SCNC: 102 MMOL/L (ref 99–110)
CO2 SERPL-SCNC: 27 MMOL/L (ref 21–32)
CREAT SERPL-MCNC: 0.7 MG/DL (ref 0.6–1.1)
EOSINOPHIL # BLD: 0.29 K/UL
EOSINOPHILS RELATIVE PERCENT: 4 % (ref 0–3)
ERYTHROCYTE [DISTWIDTH] IN BLOOD BY AUTOMATED COUNT: 12.2 % (ref 11.7–14.9)
GFR, ESTIMATED: 85 ML/MIN/1.73M2
GLUCOSE BLD-MCNC: 87 MG/DL (ref 74–99)
GLUCOSE SERPL-MCNC: 93 MG/DL (ref 74–99)
HCT VFR BLD AUTO: 39.4 % (ref 37–47)
HGB BLD-MCNC: 12.6 G/DL (ref 12.5–16)
IMM GRANULOCYTES # BLD AUTO: 0.02 K/UL
IMM GRANULOCYTES NFR BLD: 0 %
LYMPHOCYTES NFR BLD: 2.74 K/UL
LYMPHOCYTES RELATIVE PERCENT: 39 % (ref 24–44)
MAGNESIUM SERPL-MCNC: 1.8 MG/DL (ref 1.8–2.4)
MCH RBC QN AUTO: 30.1 PG (ref 27–31)
MCHC RBC AUTO-ENTMCNC: 32 G/DL (ref 32–36)
MCV RBC AUTO: 94 FL (ref 78–100)
MONOCYTES NFR BLD: 0.74 K/UL
MONOCYTES NFR BLD: 10 % (ref 0–4)
NEUTROPHILS NFR BLD: 46 % (ref 36–66)
NEUTS SEG NFR BLD: 3.24 K/UL
PLATELET # BLD AUTO: 171 K/UL (ref 140–440)
PMV BLD AUTO: 11 FL (ref 7.5–11.1)
POTASSIUM SERPL-SCNC: 4.7 MMOL/L (ref 3.5–5.1)
RBC # BLD AUTO: 4.19 M/UL (ref 4.2–5.4)
SODIUM SERPL-SCNC: 140 MMOL/L (ref 136–145)
WBC OTHER # BLD: 7.1 K/UL (ref 4–10.5)

## 2024-12-03 PROCEDURE — 80048 BASIC METABOLIC PNL TOTAL CA: CPT

## 2024-12-03 PROCEDURE — 82962 GLUCOSE BLOOD TEST: CPT

## 2024-12-03 PROCEDURE — 83735 ASSAY OF MAGNESIUM: CPT

## 2024-12-03 PROCEDURE — 6370000000 HC RX 637 (ALT 250 FOR IP): Performed by: NURSE PRACTITIONER

## 2024-12-03 PROCEDURE — 2580000003 HC RX 258: Performed by: STUDENT IN AN ORGANIZED HEALTH CARE EDUCATION/TRAINING PROGRAM

## 2024-12-03 PROCEDURE — 85025 COMPLETE CBC W/AUTO DIFF WBC: CPT

## 2024-12-03 PROCEDURE — 94761 N-INVAS EAR/PLS OXIMETRY MLT: CPT

## 2024-12-03 PROCEDURE — 6370000000 HC RX 637 (ALT 250 FOR IP): Performed by: INTERNAL MEDICINE

## 2024-12-03 PROCEDURE — 6370000000 HC RX 637 (ALT 250 FOR IP): Performed by: STUDENT IN AN ORGANIZED HEALTH CARE EDUCATION/TRAINING PROGRAM

## 2024-12-03 PROCEDURE — G0378 HOSPITAL OBSERVATION PER HR: HCPCS

## 2024-12-03 PROCEDURE — 36415 COLL VENOUS BLD VENIPUNCTURE: CPT

## 2024-12-03 RX ORDER — SODIUM CHLORIDE 1 G/1
1 TABLET ORAL
Qty: 90 TABLET | Refills: 3 | Status: SHIPPED | OUTPATIENT
Start: 2024-12-03

## 2024-12-03 RX ORDER — ISOSORBIDE MONONITRATE 60 MG/1
60 TABLET, EXTENDED RELEASE ORAL EVERY 12 HOURS
Qty: 60 TABLET | Refills: 3 | Status: SHIPPED | OUTPATIENT
Start: 2024-12-03

## 2024-12-03 RX ORDER — POLYETHYLENE GLYCOL 3350 17 G/17G
17 POWDER, FOR SOLUTION ORAL 2 TIMES DAILY
Qty: 527 G | Refills: 5 | Status: SHIPPED | OUTPATIENT
Start: 2024-12-03

## 2024-12-03 RX ORDER — POLYETHYLENE GLYCOL 3350 17 G/17G
17 POWDER, FOR SOLUTION ORAL 2 TIMES DAILY
Status: DISCONTINUED | OUTPATIENT
Start: 2024-12-03 | End: 2024-12-03 | Stop reason: HOSPADM

## 2024-12-03 RX ADMIN — SODIUM CHLORIDE 1 G: 1 TABLET ORAL at 12:32

## 2024-12-03 RX ADMIN — SODIUM CHLORIDE 1 G: 1 TABLET ORAL at 10:04

## 2024-12-03 RX ADMIN — METOPROLOL TARTRATE 50 MG: 50 TABLET, FILM COATED ORAL at 09:56

## 2024-12-03 RX ADMIN — ACETAMINOPHEN 650 MG: 325 TABLET ORAL at 09:47

## 2024-12-03 RX ADMIN — ASPIRIN 81 MG CHEWABLE TABLET 81 MG: 81 TABLET CHEWABLE at 09:47

## 2024-12-03 RX ADMIN — ISOSORBIDE MONONITRATE 60 MG: 60 TABLET, EXTENDED RELEASE ORAL at 09:56

## 2024-12-03 RX ADMIN — POLYETHYLENE GLYCOL (3350) 17 G: 17 POWDER, FOR SOLUTION ORAL at 12:31

## 2024-12-03 RX ADMIN — PRASUGREL 10 MG: 10 TABLET, FILM COATED ORAL at 10:04

## 2024-12-03 RX ADMIN — SODIUM CHLORIDE, PRESERVATIVE FREE 10 ML: 5 INJECTION INTRAVENOUS at 09:56

## 2024-12-03 RX ADMIN — DICYCLOMINE HYDROCHLORIDE 20 MG: 20 TABLET ORAL at 06:04

## 2024-12-03 RX ADMIN — BACLOFEN 20 MG: 10 TABLET ORAL at 06:04

## 2024-12-03 RX ADMIN — SODIUM CHLORIDE, PRESERVATIVE FREE 10 ML: 5 INJECTION INTRAVENOUS at 09:51

## 2024-12-03 RX ADMIN — DICYCLOMINE HYDROCHLORIDE 20 MG: 20 TABLET ORAL at 10:04

## 2024-12-03 RX ADMIN — VENLAFAXINE HYDROCHLORIDE 150 MG: 150 CAPSULE, EXTENDED RELEASE ORAL at 06:06

## 2024-12-03 RX ADMIN — Medication 400 MG: at 09:47

## 2024-12-03 RX ADMIN — TRAMADOL HYDROCHLORIDE 50 MG: 50 TABLET ORAL at 03:39

## 2024-12-03 RX ADMIN — LEVOTHYROXINE SODIUM 50 MCG: 0.05 TABLET ORAL at 06:04

## 2024-12-03 ASSESSMENT — PAIN DESCRIPTION - DESCRIPTORS
DESCRIPTORS: ACHING

## 2024-12-03 ASSESSMENT — PAIN DESCRIPTION - ORIENTATION
ORIENTATION: MID
ORIENTATION: POSTERIOR;LOWER
ORIENTATION: MID

## 2024-12-03 ASSESSMENT — PAIN SCALES - GENERAL
PAINLEVEL_OUTOF10: 8
PAINLEVEL_OUTOF10: 7
PAINLEVEL_OUTOF10: 8
PAINLEVEL_OUTOF10: 0
PAINLEVEL_OUTOF10: 8
PAINLEVEL_OUTOF10: 0

## 2024-12-03 ASSESSMENT — PAIN DESCRIPTION - PAIN TYPE
TYPE: ACUTE PAIN
TYPE: ACUTE PAIN

## 2024-12-03 ASSESSMENT — PAIN DESCRIPTION - LOCATION
LOCATION: HEAD;NECK
LOCATION: NECK;HEAD
LOCATION: NECK
LOCATION: BACK;NECK

## 2024-12-03 ASSESSMENT — PAIN - FUNCTIONAL ASSESSMENT
PAIN_FUNCTIONAL_ASSESSMENT: PREVENTS OR INTERFERES SOME ACTIVE ACTIVITIES AND ADLS
PAIN_FUNCTIONAL_ASSESSMENT: ACTIVITIES ARE NOT PREVENTED
PAIN_FUNCTIONAL_ASSESSMENT: ACTIVITIES ARE NOT PREVENTED

## 2024-12-03 NOTE — DISCHARGE SUMMARY
chest submitted for review. FINDINGS: The lungs are adequately expanded without evidence of acute infiltrate or effusion.  The cardiac silhouette measures within normal.  Pulmonary vascularity is unremarkable.  Osseous structures do not demonstrate any acute abnormality.     No plain film evidence for acute cardiopulmonary disease. Electronically signed by Michelle Martines      CBC:   Recent Labs     12/01/24  1825 12/02/24  0516 12/03/24  0607   WBC 11.8* 8.4 7.1   HGB 14.4 12.0* 12.6    169 171     BMP:    Recent Labs     12/01/24  1825 12/02/24  0516 12/03/24  0607    138 140   K 3.7 4.2 4.7   CL 97* 101 102   CO2 27 28 27   BUN 20 23* 17   CREATININE 1.4* 1.4* 0.7   GLUCOSE 101* 94 93     Hepatic: No results for input(s): \"AST\", \"ALT\", \"BILITOT\", \"ALKPHOS\" in the last 72 hours.    Invalid input(s): \"ALB\"  Lipids:   Lab Results   Component Value Date/Time    CHOL 135 12/02/2024 05:16 AM    HDL 50 12/02/2024 05:16 AM    TRIG 134 12/02/2024 05:16 AM     Hemoglobin A1C:   Lab Results   Component Value Date/Time    LABA1C 5.7 12/02/2024 05:16 AM     TSH:   Lab Results   Component Value Date/Time    TSH 1.14 12/02/2024 05:16 AM     Troponin:   Lab Results   Component Value Date/Time    TROPONINT <0.010 09/18/2021 04:49 PM    TROPONINT <0.010 11/23/2019 11:17 PM    TROPONINT <0.010 11/23/2019 08:55 PM     Lactic Acid: No results for input(s): \"LACTA\" in the last 72 hours.  BNP:   Recent Labs     12/02/24  0516   PROBNP 82     UA:  Lab Results   Component Value Date/Time    NITRU NEGATIVE 12/01/2024 10:45 PM    COLORU Yellow 12/01/2024 10:45 PM    PHUR 6.0 12/01/2024 10:45 PM    PHUR 7.0 06/07/2018 11:23 AM    WBCUA 27 12/01/2024 10:45 PM    RBCUA 3 12/01/2024 10:45 PM    RBCUA NONE SEEN 11/23/2019 11:39 PM    MUCUS FEW 12/01/2024 10:45 PM    TRICHOMONAS None seen 12/01/2024 10:45 PM    BACTERIA RARE 12/01/2024 10:45 PM    CLARITYU CLEAR 11/23/2019 11:39 PM    SPECGRAV 1.015 06/07/2018 11:23 AM

## 2024-12-03 NOTE — PROGRESS NOTES
Cardiology Progress Note     Admit Date:  12/1/2024      Subjective and  Overnight Events : Clinically stable.  She denies any chest pain.        Physical Exam:  Vitals:    12/03/24 0954   BP: (!) 108/91   Pulse: (!) 104   Resp: 14   Temp:    SpO2:         General Appearance:  No distress, conversant  Respiratory:  Normal breath sounds, No respiratory distress, No wheezing  Cardiovascular: S1, S2, no murmurs, gallops. JVD wnl  Abdomen /GI:  Bowel sounds normal, Soft, No tenderness   Integument:  Well hydrated, no rash   Neurologic:  Alert & oriented x 3, no focal deficits noted       Lab Data:  CBC:   Recent Labs     12/01/24  1825 12/02/24  0516 12/03/24  0607   WBC 11.8* 8.4 7.1   HGB 14.4 12.0* 12.6   HCT 46.0 38.4 39.4   MCV 95.4 94.8 94.0    169 171     BMP:   Recent Labs     12/01/24  1825 12/02/24  0516 12/03/24  0607    138 140   K 3.7 4.2 4.7   CL 97* 101 102   CO2 27 28 27   BUN 20 23* 17   CREATININE 1.4* 1.4* 0.7     PT/INR: No results for input(s): \"PROTIME\", \"INR\" in the last 72 hours.  BNP:    Recent Labs     12/02/24  0516   PROBNP 82     TROPONIN: No results for input(s): \"TROPONINT\" in the last 72 hours.       Assessment and Recommendations:      Coronary vasospasm with vasospastic angina  Hypotension  Anxiety  LISANDRO        As above she is clinically stable.  Her troponins are normal.  Her exercise stress test did not show any acute ischemia.  At present I would recommend to continue with Imdur 60 mg twice a day, metoprolol 50 mg twice a day, aspirin and prasugrel and rosuvastatin.  Amlodipine is stopped due to hypotension.  We will sign off.  Please call with questions.    All labs, medications and tests reviewed by myself , continue all other medications of all above medical condition listed as is except for changes mentioned above.  Thank you very much for consult , please call with questions.    Sherri

## 2024-12-04 LAB
EKG ATRIAL RATE: 53 BPM
EKG DIAGNOSIS: NORMAL
EKG P AXIS: 45 DEGREES
EKG P-R INTERVAL: 200 MS
EKG Q-T INTERVAL: 420 MS
EKG QRS DURATION: 92 MS
EKG QTC CALCULATION (BAZETT): 394 MS
EKG R AXIS: 19 DEGREES
EKG T AXIS: 31 DEGREES
EKG VENTRICULAR RATE: 53 BPM

## 2024-12-04 PROCEDURE — 93010 ELECTROCARDIOGRAM REPORT: CPT | Performed by: INTERNAL MEDICINE

## 2024-12-09 ENCOUNTER — TELEPHONE (OUTPATIENT)
Dept: CARDIOLOGY CLINIC | Age: 54
End: 2024-12-09

## 2024-12-09 NOTE — TELEPHONE ENCOUNTER
DOS 9/16/24; 9/3/24; 7/11/24- [employer] denial-  I emailed to billing with message>  Provider Svitlana Stevens- [employer] has denied these dos for No Auth.  There are notes in the Tx Inquiry that it's been reviewed, but I think the issue is that this needs to go to Credentialing.  Somehow, [employer] has that Svitlana Stevens isn't contacted.

## 2024-12-18 ENCOUNTER — TELEPHONE (OUTPATIENT)
Dept: CARDIOLOGY CLINIC | Age: 54
End: 2024-12-18

## 2024-12-18 NOTE — TELEPHONE ENCOUNTER
Pt called in stating that the Isosorbide mononitrate 60mg has been causing her headaches and neck pain. She tried taking 30mg of this medication and still has the headache and neck pain. She does not want to take this medication. Call pt to see what else she may take.

## 2024-12-19 ENCOUNTER — HOSPITAL ENCOUNTER (OUTPATIENT)
Dept: GENERAL RADIOLOGY | Age: 54
Discharge: HOME | End: 2024-12-19
Payer: COMMERCIAL

## 2024-12-19 ENCOUNTER — HOSPITAL ENCOUNTER (OUTPATIENT)
Age: 54
Discharge: HOME | End: 2024-12-19
Payer: COMMERCIAL

## 2024-12-19 DIAGNOSIS — G89.29 CHRONIC KNEE PAIN, UNSPECIFIED LATERALITY: ICD-10-CM

## 2024-12-19 DIAGNOSIS — M25.569 CHRONIC KNEE PAIN, UNSPECIFIED LATERALITY: ICD-10-CM

## 2024-12-19 PROCEDURE — 73564 X-RAY EXAM KNEE 4 OR MORE: CPT

## 2025-01-21 ENCOUNTER — OFFICE VISIT (OUTPATIENT)
Dept: CARDIOLOGY CLINIC | Age: 55
End: 2025-01-21
Payer: COMMERCIAL

## 2025-01-21 VITALS — SYSTOLIC BLOOD PRESSURE: 88 MMHG | HEART RATE: 68 BPM | DIASTOLIC BLOOD PRESSURE: 58 MMHG

## 2025-01-21 DIAGNOSIS — I20.1 CORONARY ARTERY SPASM (HCC): Primary | ICD-10-CM

## 2025-01-21 DIAGNOSIS — Z09 HOSPITAL DISCHARGE FOLLOW-UP: ICD-10-CM

## 2025-01-21 DIAGNOSIS — G47.33 OBSTRUCTIVE SLEEP APNEA SYNDROME: ICD-10-CM

## 2025-01-21 DIAGNOSIS — E78.5 DYSLIPIDEMIA: ICD-10-CM

## 2025-01-21 DIAGNOSIS — I95.1 ORTHOSTATIC HYPOTENSION: ICD-10-CM

## 2025-01-21 DIAGNOSIS — I25.10 CORONARY ARTERY DISEASE INVOLVING NATIVE CORONARY ARTERY OF NATIVE HEART WITHOUT ANGINA PECTORIS: ICD-10-CM

## 2025-01-21 DIAGNOSIS — Z98.890 S/P LEFT HEART CATHETERIZATION BY PERCUTANEOUS APPROACH: ICD-10-CM

## 2025-01-21 PROCEDURE — G8417 CALC BMI ABV UP PARAM F/U: HCPCS | Performed by: NURSE PRACTITIONER

## 2025-01-21 PROCEDURE — G8427 DOCREV CUR MEDS BY ELIG CLIN: HCPCS | Performed by: NURSE PRACTITIONER

## 2025-01-21 PROCEDURE — 3074F SYST BP LT 130 MM HG: CPT | Performed by: NURSE PRACTITIONER

## 2025-01-21 PROCEDURE — 3017F COLORECTAL CA SCREEN DOC REV: CPT | Performed by: NURSE PRACTITIONER

## 2025-01-21 PROCEDURE — 3078F DIAST BP <80 MM HG: CPT | Performed by: NURSE PRACTITIONER

## 2025-01-21 PROCEDURE — 99214 OFFICE O/P EST MOD 30 MIN: CPT | Performed by: NURSE PRACTITIONER

## 2025-01-21 PROCEDURE — 1036F TOBACCO NON-USER: CPT | Performed by: NURSE PRACTITIONER

## 2025-01-21 RX ORDER — NITROGLYCERIN 0.4 MG/1
0.4 TABLET SUBLINGUAL EVERY 5 MIN PRN
Qty: 25 TABLET | Refills: 3 | Status: SHIPPED | OUTPATIENT
Start: 2025-01-21

## 2025-01-21 RX ORDER — QUETIAPINE FUMARATE 100 MG/1
100 TABLET, FILM COATED ORAL DAILY
COMMUNITY
Start: 2024-12-31

## 2025-01-21 RX ORDER — PRASUGREL 10 MG/1
10 TABLET, FILM COATED ORAL DAILY
Qty: 90 TABLET | Refills: 4 | Status: SHIPPED | OUTPATIENT
Start: 2025-01-21

## 2025-01-21 RX ORDER — ROSUVASTATIN CALCIUM 40 MG/1
40 TABLET, COATED ORAL NIGHTLY
Qty: 90 TABLET | Refills: 3 | Status: SHIPPED | OUTPATIENT
Start: 2025-01-21

## 2025-01-21 RX ORDER — LISINOPRIL AND HYDROCHLOROTHIAZIDE 10; 12.5 MG/1; MG/1
TABLET ORAL
COMMUNITY
Start: 2024-12-08 | End: 2025-01-21 | Stop reason: DRUGHIGH

## 2025-01-21 RX ORDER — RANOLAZINE 500 MG/1
500 TABLET, EXTENDED RELEASE ORAL 2 TIMES DAILY
Qty: 60 TABLET | Refills: 3 | Status: SHIPPED | OUTPATIENT
Start: 2025-01-21

## 2025-01-21 RX ORDER — LISINOPRIL 2.5 MG/1
2.5 TABLET ORAL DAILY
Qty: 90 TABLET | Refills: 3 | Status: SHIPPED | OUTPATIENT
Start: 2025-01-21

## 2025-01-21 NOTE — PROGRESS NOTES
When did it begin? Since July 2024  How long does it last? Last about 10 minutes   How severe 1-10? Pain is a 10.   Radiation? Jaw, arms and in the middle of chest   Aggravating Factors? Exertion at first now anything can trigger them   Relieving Factors? Nitro and resting   Associated Features? Nausea and sweaty   Tenderness to palp? No     Palpitations:  When did they begin? Since 2021 but worse after Heart attack in 2024  How often do they occur? Couple times a week   How long do they last? Few minutes   Any aggravating features? No triggers   Any relieving features? no  Any Associated features? Dizziness sometimes   Any Thyroid issues? Yes   How much caffeine ? Coffee 2 a week, Soda 2 glasses a week   
tablet Take 1 tablet by mouth daily 7/3/24  Yes Oksana Barlow APRN - CNP   prasugrel (EFFIENT) 10 MG TABS Take 1 tablet by mouth daily 7/4/24  Yes Oksana Barlow APRN - CNP   dicyclomine (BENTYL) 20 MG tablet Take 1 tablet by mouth 3 times daily 6/7/24  Yes Milla Low MD   metFORMIN (GLUCOPHAGE) 500 MG tablet Take 1 tablet by mouth 2 times daily (with meals)   Yes ProviderMilla MD   montelukast (SINGULAIR) 10 MG tablet Take 1 tablet by mouth nightly 5/29/24  Yes Felix Dean MD   traMADol (ULTRAM) 50 MG tablet Take 1 tablet by mouth 2 times daily as needed. 2/11/24  Yes ProviderMilla MD   baclofen (LIORESAL) 10 MG tablet Take 2 tablets by mouth every 8 hours as needed   Yes ProviderMilla MD   levothyroxine (SYNTHROID) 50 MCG tablet Take 1 tablet by mouth Daily 9/26/18  Yes Heidi Berkowitz MD       Physical Exam    Results        ASSESSMENT/PLAN:  Assessment & Plan  1. Chest pain/ coronary spasms  Has known CAD, post PCI 7/2024   The use of Imdur has been discontinued due to its ineffectiveness in alleviating her chest pain and the severe side effects it induces.   Continue effient until 7/2025  She has been previously on amlodipine, which was discontinued due to low blood pressure.   A prescription for ranolazine will be provided, along with refills, to evaluate its effectiveness in managing her chest pain. Refills for prasugrel, Crestor, and nitroglycerin will also be provided.     2. Orthostatic hypotension   Her blood pressure remains slightly low, with a significant drop of approximately 20 points when transitioning from a supine to sitting position. This could be attributed to her current medications, including Seroquel, Ativan, tramadol, and Effexor, which may also be contributing to her dizziness.   Reduce lisinopril to 2.5 mg only. Do not add diuretic.   May need to return to using midodrine due to hypotention. Her coronary spasms are severe and cause

## 2025-01-21 NOTE — PATIENT INSTRUCTIONS
**It is YOUR responsibilty to bring medication bottles and/or updated medication list to EACH APPOINTMENT. This will allow us to better serve you and all your healthcare needs**  Thank you for allowing us to care for you today!   We want to ensure we can follow your treatment plan and we strive to give you the best outcomes and experience possible.   If you ever have a life threatening emergency and call 911 - for an ambulance (EMS)   Our providers can only care for you at:   [facility] or [facility].   Even if you have someone take you or you drive yourself we can only care for you in a [facility]. Our providers are not setup at the other healthcare locations!   Please be informed that if you contact our office outside of normal business hours the physician on call cannot help with any scheduling or rescheduling issues, procedure instruction questions or any type of medication issue.    We advise you for any urgent/emergency that you go to the nearest emergency room!    PLEASE CALL OUR OFFICE DURING NORMAL BUSINESS HOURS    Monday - Friday   8 am to 5 pm    [place]: [phone]    [place]: [phone]    [place]:  [phone]  We are committed to providing you the best care possible.    If you receive a survey after visiting one of our offices, please take time to share your experience concerning your physician office visit.  These surveys are confidential and no health information about you is shared.    We are eager to improve for you and we are counting on your feedback to help make that happen.

## 2025-01-27 ENCOUNTER — TELEPHONE (OUTPATIENT)
Dept: CARDIOLOGY CLINIC | Age: 55
End: 2025-01-27

## 2025-01-27 NOTE — TELEPHONE ENCOUNTER
We rec'vd a letter from [employer], stating that the appeal for Svitlana Stevens is contracted with [employer] has been dismissed.   I emailed this letter to who does Credential at [email or web address].

## 2025-02-04 ENCOUNTER — OFFICE VISIT (OUTPATIENT)
Dept: CARDIOLOGY CLINIC | Age: 55
End: 2025-02-04
Payer: COMMERCIAL

## 2025-02-04 VITALS
BODY MASS INDEX: 28.77 KG/M2 | SYSTOLIC BLOOD PRESSURE: 96 MMHG | WEIGHT: 179 LBS | DIASTOLIC BLOOD PRESSURE: 56 MMHG | HEIGHT: 66 IN | HEART RATE: 64 BPM

## 2025-02-04 DIAGNOSIS — I95.1 ORTHOSTATIC HYPOTENSION: ICD-10-CM

## 2025-02-04 DIAGNOSIS — I25.111 CORONARY ARTERY DISEASE INVOLVING NATIVE CORONARY ARTERY OF NATIVE HEART WITH ANGINA PECTORIS WITH DOCUMENTED SPASM (HCC): Primary | ICD-10-CM

## 2025-02-04 DIAGNOSIS — I20.1 CORONARY ARTERY SPASM (HCC): ICD-10-CM

## 2025-02-04 PROCEDURE — 3078F DIAST BP <80 MM HG: CPT | Performed by: NURSE PRACTITIONER

## 2025-02-04 PROCEDURE — 1036F TOBACCO NON-USER: CPT | Performed by: NURSE PRACTITIONER

## 2025-02-04 PROCEDURE — 99214 OFFICE O/P EST MOD 30 MIN: CPT | Performed by: NURSE PRACTITIONER

## 2025-02-04 PROCEDURE — G8427 DOCREV CUR MEDS BY ELIG CLIN: HCPCS | Performed by: NURSE PRACTITIONER

## 2025-02-04 PROCEDURE — 3017F COLORECTAL CA SCREEN DOC REV: CPT | Performed by: NURSE PRACTITIONER

## 2025-02-04 PROCEDURE — 3074F SYST BP LT 130 MM HG: CPT | Performed by: NURSE PRACTITIONER

## 2025-02-04 PROCEDURE — G8417 CALC BMI ABV UP PARAM F/U: HCPCS | Performed by: NURSE PRACTITIONER

## 2025-02-04 RX ORDER — MIDODRINE HYDROCHLORIDE 5 MG/1
5 TABLET ORAL 3 TIMES DAILY
Qty: 90 TABLET | Refills: 3 | Status: SHIPPED | OUTPATIENT
Start: 2025-02-04

## 2025-02-04 RX ORDER — RANOLAZINE 1000 MG/1
1000 TABLET, EXTENDED RELEASE ORAL 2 TIMES DAILY
Qty: 180 TABLET | Refills: 4 | Status: SHIPPED | OUTPATIENT
Start: 2025-02-04

## 2025-02-04 RX ORDER — AMLODIPINE BESYLATE 2.5 MG/1
2.5 TABLET ORAL NIGHTLY
Qty: 90 TABLET | Refills: 3 | Status: SHIPPED | OUTPATIENT
Start: 2025-02-04

## 2025-02-04 RX ORDER — ASPIRIN 81 MG/1
81 TABLET, CHEWABLE ORAL DAILY
Qty: 90 TABLET | Refills: 3 | Status: SHIPPED | OUTPATIENT
Start: 2025-02-04

## 2025-02-04 NOTE — PATIENT INSTRUCTIONS
Thank you for allowing us to care for you today!   We want to ensure we can follow your treatment plan and we strive to give you the best outcomes and experience possible.   If you ever have a life threatening emergency and call 911 - for an ambulance (EMS)  REMEMBER  Our providers can only care for you at:   [facility] or [facility]   Even if you have someone take you or you drive yourself we can only care for you in a [facility] facility. Our providers are not setup at the other healthcare locations!    PLEASE CALL OUR OFFICE DURING NORMAL BUSINESS HOURS  Monday through Friday 8 am to 5 pm  AFTER HOURS the physician on-call cannot help with scheduling, rescheduling, procedure instruction questions or any type of medication need or issue.  [facility] P:[phone] - [facility] P:[phone] - [facility] P:[phone]      If you receive a survey:  We would appreciate you taking the time to share your experience concerning your provider visit in the office.    These surveys are confidential!  We are eager to improve and are counting on you to share your feedback so we can ensure you get the best care possible.

## 2025-02-04 NOTE — PROGRESS NOTES
CARDIOLOGY  NOTE    2025    Coty Stearns (:  1970) is a 54 y.o. female,an established patient with Dr. España, here for evaluation of the following chief complaint(s):  Follow-up (PT states she thinks that's the CP is less frequent that before she states sometimes she will have hand edema )        SUBJECTIVE/OBJECTIVE:    History of Present Illness  Coty presents for evaluation of chest pain and dizziness    She reports that she is feeling better. She has had 6 episodes of chest pain in the last 2 weeks. She has not had issues with HA or dizziness or hypotension with change in medications. She is currently on prasugrel and Crestor. She also has nitroglycerin available as needed.    She has not taken baclofen in a while and takes it only if needed. She takes Seroquel at nighttime for sleep and anxiety. She is diabetic and takes metformin twice a day.    She is trying to get her physical therapy set up first. Nothing showed on the x-rays, so now she has to do physical therapy. Her doctor thinks she has a torn meniscus in her knee.    MEDICATIONS  Current: Seroquel, Ativan, tramadol, Effexor, metformin, lisinopril hydrochlorothiazide, prasugrel, Crestor, nitroglycerin  Discontinued: Imdur, amlodipine    Coty has a history of Palpitation, Fibromyalgia, Chronic obstructive pulmonary disease (HCC), Schizoaffective disorder (HCC), History of hysterectomy, Dyslipidemia, Fatty liver, Chronic back pain, Chronic low back pain, Acquired hypothyroidism, Former tobacco use, Simple chronic bronchitis ([facility]), Essential hypertension, Perforated left tympanic membrane on examination, COPD exacerbation ([facility]), COVID-19, History of ST elevation myocardial infarction (STEMI), Family history of early CAD, Coronary artery disease involving native coronary artery of native heart without angina pectoris, Neck pain, Obstructive sleep apnea syndrome, Idiopathic hypotension, Coronary artery spasm (HCC)     She  is a former

## 2025-02-06 ENCOUNTER — OFFICE VISIT (OUTPATIENT)
Dept: ORTHOPEDIC SURGERY | Age: 55
End: 2025-02-06
Payer: COMMERCIAL

## 2025-02-06 VITALS — WEIGHT: 180 LBS | BODY MASS INDEX: 28.93 KG/M2 | HEIGHT: 66 IN | HEART RATE: 63 BPM | OXYGEN SATURATION: 98 %

## 2025-02-06 DIAGNOSIS — M25.561 RIGHT KNEE PAIN, UNSPECIFIED CHRONICITY: Primary | ICD-10-CM

## 2025-02-06 PROCEDURE — 3017F COLORECTAL CA SCREEN DOC REV: CPT

## 2025-02-06 PROCEDURE — 99203 OFFICE O/P NEW LOW 30 MIN: CPT

## 2025-02-06 PROCEDURE — G8417 CALC BMI ABV UP PARAM F/U: HCPCS

## 2025-02-06 PROCEDURE — 1036F TOBACCO NON-USER: CPT

## 2025-02-06 PROCEDURE — G8427 DOCREV CUR MEDS BY ELIG CLIN: HCPCS

## 2025-02-06 ASSESSMENT — ENCOUNTER SYMPTOMS
BACK PAIN: 0
FACIAL SWELLING: 0
NAUSEA: 0
RHINORRHEA: 0
COUGH: 0
SHORTNESS OF BREATH: 0

## 2025-02-06 NOTE — PATIENT INSTRUCTIONS
Continue weight-bearing as tolerated.  Continue range of motion exercises as instructed.  Ice and elevate as needed.  Tylenol or Motrin for pain.  Please wear the brace given to you today.   Central scheduling [phone] will be calling you to schedule your MRI.  If you do not hear from them with in a week give them a call.   Follow up in 3 weeks to discuss the results of the MRI ordered.

## 2025-02-06 NOTE — PROGRESS NOTES
2/6/2025   Chief Complaint   Patient presents with    Knee Pain     Right        History of Present Illness:                             Coty Stearns is a 54 y.o. female presenting to the office today as a new patient with right knee pain.  Patient states she has injured this leg twice.  Her first injury was roughly 2 years ago and a couple of months ago she reinjured this area.  She states the pain is throughout the front and back of the knee.  She has been taking tramadol and states the pain today is 8 out of 10.    Patient presents with right knee pain. Patient states about two years ago she fell on right leg. Patient states it hurt off and on until about two months ago that it is constant. Describes pain in the back of knee. Takes Tramadol and rates pain today a 8/10.       Medical History  Patient's medications, allergies, past medical, surgical, social and family histories were reviewed and updated as appropriate.    Past Medical History:   Diagnosis Date    Acquired hypothyroidism 09/26/2018    Chest pain, rule out acute myocardial infarction 07/01/2024    Chronic back pain 07/16/2018    Chronic obstructive pulmonary disease ([facility]) 06/25/2018    COPD (chronic obstructive pulmonary disease) ([facility]) 10/2013    Coronary artery disease involving native coronary artery of native heart without angina pectoris 07/11/2024    COVID-19 09/18/2021    Essential hypertension 10/09/2018    Fibromyalgia 06/25/2018    Former tobacco use 09/26/2018    GERD (gastroesophageal reflux disease)     H/O 24 hour EKG monitoring 08/05/14-09/03/14    30 DAY EVENT MONITOR-Normal sinus rhythm    H/O fibromyalgia     History of 24 hour EKG monitoring 06/30/2014    Sinus rhythm. Isolated  premature atrial contract. and premature ventricular contraction. Sinus tachycardia present.     History of echocardiogram 07/01/2014    EF 55% Mild left ventricular hypertrophy noted. Mild mitral and TR noted. The patient is bradycardic during the

## 2025-02-06 NOTE — PROGRESS NOTES
Patient presents with right knee pain. Patient states about two years ago she fell on right leg. Patient states it hurt off and on until about two months ago that it is constant. Describes pain in the back of knee. Takes Tramadol and rates pain today a 8/10.

## 2025-02-18 ENCOUNTER — APPOINTMENT (OUTPATIENT)
Dept: CT IMAGING | Age: 55
End: 2025-02-18
Payer: COMMERCIAL

## 2025-02-18 ENCOUNTER — HOSPITAL ENCOUNTER (OUTPATIENT)
Age: 55
Setting detail: OBSERVATION
Discharge: NURSING, REHAB OR LONG-TERM CARE | End: 2025-02-21
Attending: EMERGENCY MEDICINE | Admitting: INTERNAL MEDICINE
Payer: COMMERCIAL

## 2025-02-18 DIAGNOSIS — F43.22 ADJUSTMENT DISORDER WITH ANXIOUS MOOD: ICD-10-CM

## 2025-02-18 DIAGNOSIS — R47.1 DYSARTHRIA: Primary | ICD-10-CM

## 2025-02-18 DIAGNOSIS — R11.2 NAUSEA AND VOMITING, UNSPECIFIED VOMITING TYPE: ICD-10-CM

## 2025-02-18 DIAGNOSIS — R10.10 PAIN OF UPPER ABDOMEN: ICD-10-CM

## 2025-02-18 DIAGNOSIS — R41.0 CONFUSION: ICD-10-CM

## 2025-02-18 PROBLEM — G40.89 OTHER SEIZURES (HCC): Status: ACTIVE | Noted: 2025-02-18

## 2025-02-18 LAB
ALBUMIN SERPL-MCNC: 3.9 G/DL (ref 3.4–5)
ALBUMIN/GLOB SERPL: 1.7 {RATIO} (ref 1.1–2.2)
ALP SERPL-CCNC: 70 U/L (ref 40–129)
ALT SERPL-CCNC: 21 U/L (ref 10–40)
AMMONIA PLAS-SCNC: 34 UMOL/L (ref 11–51)
AMPHET UR QL SCN: NEGATIVE
ANION GAP SERPL CALCULATED.3IONS-SCNC: 11 MMOL/L (ref 9–17)
APAP SERPL-MCNC: <5 UG/ML (ref 10–30)
AST SERPL-CCNC: 24 U/L (ref 15–37)
B PARAP IS1001 DNA NPH QL NAA+NON-PROBE: NOT DETECTED
B PERT DNA SPEC QL NAA+PROBE: NOT DETECTED
BARBITURATES UR QL SCN: NEGATIVE
BASOPHILS # BLD: 0.07 K/UL
BASOPHILS NFR BLD: 1 % (ref 0–1)
BENZODIAZ UR QL: NEGATIVE
BILIRUB DIRECT SERPL-MCNC: <0.2 MG/DL (ref 0–0.3)
BILIRUB INDIRECT SERPL-MCNC: ABNORMAL MG/DL (ref 0–0.7)
BILIRUB SERPL-MCNC: <0.2 MG/DL (ref 0–1)
BILIRUB UR QL STRIP: NEGATIVE
BUN SERPL-MCNC: 17 MG/DL (ref 7–20)
C PNEUM DNA NPH QL NAA+NON-PROBE: NOT DETECTED
CA-I BLD-SCNC: 1.3 MMOL/L (ref 1.15–1.33)
CALCIUM SERPL-MCNC: 10.8 MG/DL (ref 8.3–10.6)
CANNABINOIDS UR QL SCN: NEGATIVE
CHLORIDE SERPL-SCNC: 108 MMOL/L (ref 99–110)
CK SERPL-CCNC: 69 U/L (ref 26–192)
CLARITY UR: CLEAR
CO2 SERPL-SCNC: 23 MMOL/L (ref 21–32)
COCAINE UR QL SCN: NEGATIVE
COLOR UR: YELLOW
COMMENT: NORMAL
CREAT SERPL-MCNC: 0.7 MG/DL (ref 0.6–1.1)
EOSINOPHIL # BLD: 0.66 K/UL
EOSINOPHILS RELATIVE PERCENT: 8 % (ref 0–3)
ERYTHROCYTE [DISTWIDTH] IN BLOOD BY AUTOMATED COUNT: 14.5 % (ref 11.7–14.9)
ETHANOLAMINE SERPL-MCNC: <10 MG/DL (ref 0–0.08)
FENTANYL UR QL: NEGATIVE
FLUAV RNA NPH QL NAA+NON-PROBE: NOT DETECTED
FLUBV RNA NPH QL NAA+NON-PROBE: NOT DETECTED
GFR, ESTIMATED: >90 ML/MIN/1.73M2
GLUCOSE SERPL-MCNC: 117 MG/DL (ref 74–99)
GLUCOSE UR STRIP-MCNC: NEGATIVE MG/DL
HADV DNA NPH QL NAA+NON-PROBE: NOT DETECTED
HCOV 229E RNA NPH QL NAA+NON-PROBE: NOT DETECTED
HCOV HKU1 RNA NPH QL NAA+NON-PROBE: NOT DETECTED
HCOV NL63 RNA NPH QL NAA+NON-PROBE: NOT DETECTED
HCOV OC43 RNA NPH QL NAA+NON-PROBE: NOT DETECTED
HCT VFR BLD AUTO: 41.8 % (ref 37–47)
HGB BLD-MCNC: 12.8 G/DL (ref 12.5–16)
HGB UR QL STRIP.AUTO: NEGATIVE
HMPV RNA NPH QL NAA+NON-PROBE: NOT DETECTED
HPIV1 RNA NPH QL NAA+NON-PROBE: NOT DETECTED
HPIV2 RNA NPH QL NAA+NON-PROBE: NOT DETECTED
HPIV3 RNA NPH QL NAA+NON-PROBE: NOT DETECTED
HPIV4 RNA NPH QL NAA+NON-PROBE: NOT DETECTED
IMM GRANULOCYTES # BLD AUTO: 0.04 K/UL
IMM GRANULOCYTES NFR BLD: 1 %
INFLUENZA A BY PCR: NOT DETECTED
INFLUENZA B BY PCR: NOT DETECTED
KETONES UR STRIP-MCNC: NEGATIVE MG/DL
LACTATE BLDV-SCNC: 1.9 MMOL/L (ref 0.4–2)
LACTATE BLDV-SCNC: 2.1 MMOL/L (ref 0.4–2)
LEUKOCYTE ESTERASE UR QL STRIP: NEGATIVE
LYMPHOCYTES NFR BLD: 2.17 K/UL
LYMPHOCYTES RELATIVE PERCENT: 27 % (ref 24–44)
M PNEUMO DNA NPH QL NAA+NON-PROBE: NOT DETECTED
MCH RBC QN AUTO: 29.4 PG (ref 27–31)
MCHC RBC AUTO-ENTMCNC: 30.6 G/DL (ref 32–36)
MCV RBC AUTO: 96.1 FL (ref 78–100)
MONOCYTES NFR BLD: 0.9 K/UL
MONOCYTES NFR BLD: 11 % (ref 0–4)
NEUTROPHILS NFR BLD: 52 % (ref 36–66)
NEUTS SEG NFR BLD: 4.13 K/UL
NITRITE UR QL STRIP: NEGATIVE
OPIATES UR QL SCN: NEGATIVE
OXYCODONE UR QL SCN: NEGATIVE
PH UR STRIP: 6 [PH] (ref 5–8)
PLATELET # BLD AUTO: 193 K/UL (ref 140–440)
PMV BLD AUTO: 11.1 FL (ref 7.5–11.1)
POTASSIUM SERPL-SCNC: 4.2 MMOL/L (ref 3.5–5.1)
PROT SERPL-MCNC: 6.2 G/DL (ref 6.4–8.2)
PROT UR STRIP-MCNC: NEGATIVE MG/DL
RBC # BLD AUTO: 4.35 M/UL (ref 4.2–5.4)
RSV RNA NPH QL NAA+NON-PROBE: NOT DETECTED
RV+EV RNA NPH QL NAA+NON-PROBE: NOT DETECTED
S PYO AG THROAT QL: NEGATIVE
SALICYLATES SERPL-MCNC: <0.5 MG/DL (ref 15–30)
SARS-COV-2 RDRP RESP QL NAA+PROBE: NOT DETECTED
SARS-COV-2 RNA NPH QL NAA+NON-PROBE: NOT DETECTED
SODIUM SERPL-SCNC: 142 MMOL/L (ref 136–145)
SP GR UR STRIP: 1.02 (ref 1–1.03)
SPECIMEN DESCRIPTION: NORMAL
SPECIMEN DESCRIPTION: NORMAL
SPECIMEN SOURCE: NORMAL
T4 FREE SERPL-MCNC: 0.8 NG/DL (ref 0.9–1.8)
TEST INFORMATION: NORMAL
TROPONIN I SERPL HS-MCNC: 9 NG/L (ref 0–14)
TSH SERPL DL<=0.05 MIU/L-ACNC: 1.51 UIU/ML (ref 0.27–4.2)
UROBILINOGEN UR STRIP-ACNC: 0.2 EU/DL (ref 0–1)
WBC OTHER # BLD: 8 K/UL (ref 4–10.5)

## 2025-02-18 PROCEDURE — 82550 ASSAY OF CK (CPK): CPT

## 2025-02-18 PROCEDURE — 95816 EEG AWAKE AND DROWSY: CPT | Performed by: STUDENT IN AN ORGANIZED HEALTH CARE EDUCATION/TRAINING PROGRAM

## 2025-02-18 PROCEDURE — 96372 THER/PROPH/DIAG INJ SC/IM: CPT

## 2025-02-18 PROCEDURE — 96374 THER/PROPH/DIAG INJ IV PUSH: CPT

## 2025-02-18 PROCEDURE — G0378 HOSPITAL OBSERVATION PER HR: HCPCS

## 2025-02-18 PROCEDURE — 80053 COMPREHEN METABOLIC PANEL: CPT

## 2025-02-18 PROCEDURE — 83605 ASSAY OF LACTIC ACID: CPT

## 2025-02-18 PROCEDURE — 99223 1ST HOSP IP/OBS HIGH 75: CPT | Performed by: CLINICAL NURSE SPECIALIST

## 2025-02-18 PROCEDURE — 97530 THERAPEUTIC ACTIVITIES: CPT

## 2025-02-18 PROCEDURE — 97166 OT EVAL MOD COMPLEX 45 MIN: CPT

## 2025-02-18 PROCEDURE — 82248 BILIRUBIN DIRECT: CPT

## 2025-02-18 PROCEDURE — G0480 DRUG TEST DEF 1-7 CLASSES: HCPCS

## 2025-02-18 PROCEDURE — 82330 ASSAY OF CALCIUM: CPT

## 2025-02-18 PROCEDURE — 96361 HYDRATE IV INFUSION ADD-ON: CPT

## 2025-02-18 PROCEDURE — 6360000002 HC RX W HCPCS: Performed by: EMERGENCY MEDICINE

## 2025-02-18 PROCEDURE — 99285 EMERGENCY DEPT VISIT HI MDM: CPT

## 2025-02-18 PROCEDURE — 80307 DRUG TEST PRSMV CHEM ANLYZR: CPT

## 2025-02-18 PROCEDURE — 87880 STREP A ASSAY W/OPTIC: CPT

## 2025-02-18 PROCEDURE — 6370000000 HC RX 637 (ALT 250 FOR IP): Performed by: PHYSICIAN ASSISTANT

## 2025-02-18 PROCEDURE — 95819 EEG AWAKE AND ASLEEP: CPT

## 2025-02-18 PROCEDURE — 2580000003 HC RX 258: Performed by: EMERGENCY MEDICINE

## 2025-02-18 PROCEDURE — 97535 SELF CARE MNGMENT TRAINING: CPT

## 2025-02-18 PROCEDURE — 87081 CULTURE SCREEN ONLY: CPT

## 2025-02-18 PROCEDURE — 81003 URINALYSIS AUTO W/O SCOPE: CPT

## 2025-02-18 PROCEDURE — 6360000004 HC RX CONTRAST MEDICATION: Performed by: EMERGENCY MEDICINE

## 2025-02-18 PROCEDURE — 84443 ASSAY THYROID STIM HORMONE: CPT

## 2025-02-18 PROCEDURE — 2500000003 HC RX 250 WO HCPCS: Performed by: PHYSICIAN ASSISTANT

## 2025-02-18 PROCEDURE — 92610 EVALUATE SWALLOWING FUNCTION: CPT

## 2025-02-18 PROCEDURE — 0202U NFCT DS 22 TRGT SARS-COV-2: CPT

## 2025-02-18 PROCEDURE — 96376 TX/PRO/DX INJ SAME DRUG ADON: CPT

## 2025-02-18 PROCEDURE — 80179 DRUG ASSAY SALICYLATE: CPT

## 2025-02-18 PROCEDURE — 87502 INFLUENZA DNA AMP PROBE: CPT

## 2025-02-18 PROCEDURE — 94761 N-INVAS EAR/PLS OXIMETRY MLT: CPT

## 2025-02-18 PROCEDURE — 70450 CT HEAD/BRAIN W/O DYE: CPT

## 2025-02-18 PROCEDURE — 99221 1ST HOSP IP/OBS SF/LOW 40: CPT | Performed by: NURSE PRACTITIONER

## 2025-02-18 PROCEDURE — 70496 CT ANGIOGRAPHY HEAD: CPT

## 2025-02-18 PROCEDURE — 97162 PT EVAL MOD COMPLEX 30 MIN: CPT

## 2025-02-18 PROCEDURE — 80143 DRUG ASSAY ACETAMINOPHEN: CPT

## 2025-02-18 PROCEDURE — 85025 COMPLETE CBC W/AUTO DIFF WBC: CPT

## 2025-02-18 PROCEDURE — 84484 ASSAY OF TROPONIN QUANT: CPT

## 2025-02-18 PROCEDURE — 6360000002 HC RX W HCPCS: Performed by: PHYSICIAN ASSISTANT

## 2025-02-18 PROCEDURE — 82140 ASSAY OF AMMONIA: CPT

## 2025-02-18 PROCEDURE — 84439 ASSAY OF FREE THYROXINE: CPT

## 2025-02-18 PROCEDURE — 74177 CT ABD & PELVIS W/CONTRAST: CPT

## 2025-02-18 PROCEDURE — 87635 SARS-COV-2 COVID-19 AMP PRB: CPT

## 2025-02-18 RX ORDER — POLYETHYLENE GLYCOL 3350 17 G/17G
17 POWDER, FOR SOLUTION ORAL 2 TIMES DAILY
Status: DISCONTINUED | OUTPATIENT
Start: 2025-02-18 | End: 2025-02-21 | Stop reason: HOSPADM

## 2025-02-18 RX ORDER — ASPIRIN 300 MG/1
300 SUPPOSITORY RECTAL DAILY
Status: DISCONTINUED | OUTPATIENT
Start: 2025-02-19 | End: 2025-02-21

## 2025-02-18 RX ORDER — ENOXAPARIN SODIUM 100 MG/ML
40 INJECTION SUBCUTANEOUS DAILY
Status: DISCONTINUED | OUTPATIENT
Start: 2025-02-18 | End: 2025-02-21 | Stop reason: HOSPADM

## 2025-02-18 RX ORDER — QUETIAPINE FUMARATE 100 MG/1
100 TABLET, FILM COATED ORAL NIGHTLY
Status: DISCONTINUED | OUTPATIENT
Start: 2025-02-18 | End: 2025-02-21 | Stop reason: HOSPADM

## 2025-02-18 RX ORDER — ASPIRIN 81 MG/1
81 TABLET, CHEWABLE ORAL DAILY
Status: DISCONTINUED | OUTPATIENT
Start: 2025-02-18 | End: 2025-02-18 | Stop reason: SDUPTHER

## 2025-02-18 RX ORDER — LANOLIN ALCOHOL/MO/W.PET/CERES
400 CREAM (GRAM) TOPICAL DAILY
Status: DISCONTINUED | OUTPATIENT
Start: 2025-02-18 | End: 2025-02-21 | Stop reason: HOSPADM

## 2025-02-18 RX ORDER — ONDANSETRON 2 MG/ML
4 INJECTION INTRAMUSCULAR; INTRAVENOUS EVERY 6 HOURS PRN
Status: DISCONTINUED | OUTPATIENT
Start: 2025-02-18 | End: 2025-02-21 | Stop reason: HOSPADM

## 2025-02-18 RX ORDER — LEVOTHYROXINE SODIUM 50 UG/1
50 TABLET ORAL DAILY
Status: DISCONTINUED | OUTPATIENT
Start: 2025-02-18 | End: 2025-02-21 | Stop reason: HOSPADM

## 2025-02-18 RX ORDER — TRAMADOL HYDROCHLORIDE 50 MG/1
50 TABLET ORAL 2 TIMES DAILY PRN
Status: DISCONTINUED | OUTPATIENT
Start: 2025-02-18 | End: 2025-02-21 | Stop reason: HOSPADM

## 2025-02-18 RX ORDER — MIDODRINE HYDROCHLORIDE 5 MG/1
5 TABLET ORAL 3 TIMES DAILY
Status: DISCONTINUED | OUTPATIENT
Start: 2025-02-18 | End: 2025-02-19

## 2025-02-18 RX ORDER — ALBUTEROL SULFATE 90 UG/1
2 INHALANT RESPIRATORY (INHALATION) EVERY 6 HOURS PRN
Status: DISCONTINUED | OUTPATIENT
Start: 2025-02-18 | End: 2025-02-21 | Stop reason: HOSPADM

## 2025-02-18 RX ORDER — 0.9 % SODIUM CHLORIDE 0.9 %
1000 INTRAVENOUS SOLUTION INTRAVENOUS ONCE
Status: COMPLETED | OUTPATIENT
Start: 2025-02-18 | End: 2025-02-18

## 2025-02-18 RX ORDER — ROSUVASTATIN CALCIUM 20 MG/1
40 TABLET, COATED ORAL NIGHTLY
Status: DISCONTINUED | OUTPATIENT
Start: 2025-02-18 | End: 2025-02-21 | Stop reason: HOSPADM

## 2025-02-18 RX ORDER — RANOLAZINE 500 MG/1
1000 TABLET, EXTENDED RELEASE ORAL 2 TIMES DAILY
Status: DISCONTINUED | OUTPATIENT
Start: 2025-02-18 | End: 2025-02-21 | Stop reason: HOSPADM

## 2025-02-18 RX ORDER — VENLAFAXINE 37.5 MG/1
150 TABLET ORAL DAILY
Status: DISCONTINUED | OUTPATIENT
Start: 2025-02-18 | End: 2025-02-21 | Stop reason: HOSPADM

## 2025-02-18 RX ORDER — SODIUM CHLORIDE 9 MG/ML
INJECTION, SOLUTION INTRAVENOUS PRN
Status: DISCONTINUED | OUTPATIENT
Start: 2025-02-18 | End: 2025-02-21 | Stop reason: HOSPADM

## 2025-02-18 RX ORDER — IOPAMIDOL 755 MG/ML
75 INJECTION, SOLUTION INTRAVASCULAR
Status: COMPLETED | OUTPATIENT
Start: 2025-02-18 | End: 2025-02-18

## 2025-02-18 RX ORDER — ONDANSETRON 2 MG/ML
4 INJECTION INTRAMUSCULAR; INTRAVENOUS EVERY 30 MIN PRN
Status: DISCONTINUED | OUTPATIENT
Start: 2025-02-18 | End: 2025-02-21 | Stop reason: HOSPADM

## 2025-02-18 RX ORDER — ASPIRIN 81 MG/1
81 TABLET, CHEWABLE ORAL DAILY
Status: DISCONTINUED | OUTPATIENT
Start: 2025-02-19 | End: 2025-02-21

## 2025-02-18 RX ORDER — METOPROLOL TARTRATE 50 MG
50 TABLET ORAL 2 TIMES DAILY
Status: DISCONTINUED | OUTPATIENT
Start: 2025-02-18 | End: 2025-02-21 | Stop reason: HOSPADM

## 2025-02-18 RX ORDER — SODIUM CHLORIDE 0.9 % (FLUSH) 0.9 %
5-40 SYRINGE (ML) INJECTION PRN
Status: DISCONTINUED | OUTPATIENT
Start: 2025-02-18 | End: 2025-02-21 | Stop reason: HOSPADM

## 2025-02-18 RX ORDER — ONDANSETRON 4 MG/1
4 TABLET, ORALLY DISINTEGRATING ORAL EVERY 8 HOURS PRN
Status: DISCONTINUED | OUTPATIENT
Start: 2025-02-18 | End: 2025-02-21 | Stop reason: HOSPADM

## 2025-02-18 RX ORDER — AMLODIPINE BESYLATE 5 MG/1
2.5 TABLET ORAL NIGHTLY
Status: DISCONTINUED | OUTPATIENT
Start: 2025-02-18 | End: 2025-02-21 | Stop reason: HOSPADM

## 2025-02-18 RX ORDER — BUDESONIDE AND FORMOTEROL FUMARATE DIHYDRATE 160; 4.5 UG/1; UG/1
2 AEROSOL RESPIRATORY (INHALATION) 2 TIMES DAILY
Status: DISCONTINUED | OUTPATIENT
Start: 2025-02-18 | End: 2025-02-21 | Stop reason: HOSPADM

## 2025-02-18 RX ORDER — POLYETHYLENE GLYCOL 3350 17 G/17G
17 POWDER, FOR SOLUTION ORAL DAILY PRN
Status: DISCONTINUED | OUTPATIENT
Start: 2025-02-18 | End: 2025-02-21 | Stop reason: HOSPADM

## 2025-02-18 RX ORDER — PRASUGREL 10 MG/1
10 TABLET, FILM COATED ORAL DAILY
Status: DISCONTINUED | OUTPATIENT
Start: 2025-02-18 | End: 2025-02-21 | Stop reason: HOSPADM

## 2025-02-18 RX ORDER — SODIUM CHLORIDE 0.9 % (FLUSH) 0.9 %
5-40 SYRINGE (ML) INJECTION EVERY 12 HOURS SCHEDULED
Status: DISCONTINUED | OUTPATIENT
Start: 2025-02-18 | End: 2025-02-21 | Stop reason: HOSPADM

## 2025-02-18 RX ORDER — MONTELUKAST SODIUM 10 MG/1
10 TABLET ORAL NIGHTLY
Status: DISCONTINUED | OUTPATIENT
Start: 2025-02-18 | End: 2025-02-21 | Stop reason: HOSPADM

## 2025-02-18 RX ORDER — LORAZEPAM 0.5 MG/1
0.5 TABLET ORAL 2 TIMES DAILY PRN
Status: DISCONTINUED | OUTPATIENT
Start: 2025-02-18 | End: 2025-02-21 | Stop reason: HOSPADM

## 2025-02-18 RX ORDER — SENNA AND DOCUSATE SODIUM 50; 8.6 MG/1; MG/1
2 TABLET, FILM COATED ORAL DAILY
Status: DISCONTINUED | OUTPATIENT
Start: 2025-02-18 | End: 2025-02-21 | Stop reason: HOSPADM

## 2025-02-18 RX ADMIN — METOPROLOL TARTRATE 50 MG: 50 TABLET, FILM COATED ORAL at 20:48

## 2025-02-18 RX ADMIN — IOPAMIDOL 75 ML: 755 INJECTION, SOLUTION INTRAVENOUS at 06:47

## 2025-02-18 RX ADMIN — SODIUM CHLORIDE, PRESERVATIVE FREE 10 ML: 5 INJECTION INTRAVENOUS at 20:51

## 2025-02-18 RX ADMIN — ONDANSETRON 4 MG: 2 INJECTION INTRAMUSCULAR; INTRAVENOUS at 17:32

## 2025-02-18 RX ADMIN — AMLODIPINE BESYLATE 2.5 MG: 5 TABLET ORAL at 20:49

## 2025-02-18 RX ADMIN — Medication 400 MG: at 11:59

## 2025-02-18 RX ADMIN — RANOLAZINE 1000 MG: 500 TABLET, EXTENDED RELEASE ORAL at 20:48

## 2025-02-18 RX ADMIN — LEVOTHYROXINE SODIUM 50 MCG: 0.05 TABLET ORAL at 11:49

## 2025-02-18 RX ADMIN — SENNOSIDES AND DOCUSATE SODIUM 2 TABLET: 50; 8.6 TABLET ORAL at 11:51

## 2025-02-18 RX ADMIN — MIDODRINE HYDROCHLORIDE 5 MG: 5 TABLET ORAL at 20:49

## 2025-02-18 RX ADMIN — SODIUM CHLORIDE 1000 ML: 9 INJECTION, SOLUTION INTRAVENOUS at 07:13

## 2025-02-18 RX ADMIN — PRASUGREL 10 MG: 10 TABLET, FILM COATED ORAL at 11:52

## 2025-02-18 RX ADMIN — ENOXAPARIN SODIUM 40 MG: 100 INJECTION SUBCUTANEOUS at 17:32

## 2025-02-18 RX ADMIN — ONDANSETRON 4 MG: 2 INJECTION INTRAMUSCULAR; INTRAVENOUS at 07:34

## 2025-02-18 RX ADMIN — ROSUVASTATIN CALCIUM 40 MG: 20 TABLET, FILM COATED ORAL at 20:48

## 2025-02-18 RX ADMIN — RANOLAZINE 1000 MG: 500 TABLET, EXTENDED RELEASE ORAL at 11:49

## 2025-02-18 RX ADMIN — QUETIAPINE FUMARATE 100 MG: 100 TABLET ORAL at 20:48

## 2025-02-18 RX ADMIN — POLYETHYLENE GLYCOL (3350) 17 G: 17 POWDER, FOR SOLUTION ORAL at 11:51

## 2025-02-18 RX ADMIN — MONTELUKAST 10 MG: 10 TABLET, FILM COATED ORAL at 20:49

## 2025-02-18 RX ADMIN — METOPROLOL TARTRATE 50 MG: 50 TABLET, FILM COATED ORAL at 11:51

## 2025-02-18 RX ADMIN — ASPIRIN 81 MG: 81 TABLET, CHEWABLE ORAL at 11:51

## 2025-02-18 RX ADMIN — POLYETHYLENE GLYCOL (3350) 17 G: 17 POWDER, FOR SOLUTION ORAL at 20:49

## 2025-02-18 RX ADMIN — VENLAFAXINE 150 MG: 37.5 TABLET ORAL at 11:52

## 2025-02-18 RX ADMIN — TRAMADOL HYDROCHLORIDE 50 MG: 50 TABLET, COATED ORAL at 20:58

## 2025-02-18 ASSESSMENT — PAIN DESCRIPTION - DESCRIPTORS: DESCRIPTORS: SQUEEZING

## 2025-02-18 ASSESSMENT — PAIN DESCRIPTION - ORIENTATION: ORIENTATION: RIGHT;MID;UPPER

## 2025-02-18 ASSESSMENT — PAIN SCALES - GENERAL
PAINLEVEL_OUTOF10: 8
PAINLEVEL_OUTOF10: 8
PAINLEVEL_OUTOF10: 4

## 2025-02-18 ASSESSMENT — PAIN DESCRIPTION - LOCATION: LOCATION: NECK

## 2025-02-18 ASSESSMENT — PAIN DESCRIPTION - PAIN TYPE: TYPE: ACUTE PAIN

## 2025-02-18 NOTE — PROGRESS NOTES
Facility/Department: [address]   CLINICAL BEDSIDE SWALLOW EVALUATION    NAME: Coty Stearns  : 1970  MRN: [id number]    ADMISSION DATE: 2025  ADMITTING DIAGNOSIS: has Hx of cardiovascular stress test; Palpitation; H/O 24 hour EKG monitoring; Fibromyalgia; Chronic obstructive pulmonary disease (HCC); Schizoaffective disorder (HCC); History of hysterectomy; Dyslipidemia; Fatty liver; Chronic back pain; Chronic low back pain; Acquired hypothyroidism; Former tobacco use; Simple chronic bronchitis (HCC); Essential hypertension; Perforated left tympanic membrane on examination; COPD exacerbation (HCC); COVID-19; History of ST elevation myocardial infarction (STEMI); Family history of early CAD; Coronary artery disease involving native coronary artery of native heart without angina pectoris; Neck pain; Obstructive sleep apnea syndrome; Idiopathic hypotension; Chest pain; Coronary artery spasm (HCC); and Dysarthria on their problem list.    IMPRESSIONS: Coty Stearns was seen for a bedside swallow evaluation following admission to [facility] for speech difficulties and tremors. Pt denies hx of dysphagia and reports baseline diet of regular/thin liquids. Pt noted to have intermittent halting, effortful speech with single occurrence of a stutter noted, otherwise speech is clear. Pertinent medical history includes, fibromyalgia, hypertension, schizoaffective disorder, seizures.    Pt was positioned upright in bed for evalation with clear vocal quality and strong volitional cough. Oral mechanism exam revealed adequate ROM/strength, however, pt is noted to have slowed lingual movements. Head/hand tremors throughout evaluation impacting patient's ability to self-feed. PO trials of thin via cup and straw, puree, and regular solids were given. Grossly functional oropharyngeal swallow noted with adequate mastication, transit, and clearance. No overt s/s of aspiration with any trials given.      Recommend upgrade to  regular/thin liquids - straws ok. Medications as tolerated. Strict aspiration precautions. Pt may need assistance with tray setup d/t tremors. RN notified. Pt educated on recommendations. No further skilled ST warranted. SLP team to sign off.       Date of Eval: 2/18/2025  Evaluating Therapist: NICO Kirkpatrick    Current Diet level:  Current Diet : NPO  Current Liquid Diet : NPO    Pain:  Pain Assessment  Pain Assessment: 0-10  Pain Level: 4  Patient's Stated Pain Goal: 0 - No pain    Reason for Referral  Coty Stearns was referred for a bedside swallow evaluation to assess the efficiency of her swallow function, identify signs and symptoms of aspiration and make recommendations regarding safe dietary consistencies, effective compensatory strategies, and safe eating environment.    Impression  Dysphagia Diagnosis: Swallow function appears WFL;No clinical indicators of dysphagia  Dysphagia Outcome Severity Scale: Level 6: Within functional limits/Modified independence     Treatment Plan  Requires SLP Intervention: No     D/C Recommendations: No follow up therapy recommended post discharge       Recommended Diet and Intervention  Diet Solids Recommendation: Regular  Liquid Consistency Recommendation: Thin  Recommended Form of Meds: PO     Compensatory Swallowing Strategies  Compensatory Swallowing Strategies : Upright as possible for all oral intake;Set up assist      General  Chart Reviewed: Yes  Subjective  Subjective: Pt alert and agreeable to evaluation - consistent tremors noted  Behavior/Cognition: Alert;Cooperative;Pleasant mood  Respiratory Status: Room air  O2 Device: None (Room air)  Communication Observation: Functional (intermittent delayed response, single occurance of stutter)  Follows Directions: Complex  Dentition: Dentures top (lower bridge)  Patient Positioning: Upright in bed  Baseline Vocal Quality: Normal  Prior Dysphagia History: None  Consistencies Administered: Regular;Pureed;Thin -

## 2025-02-18 NOTE — ED TRIAGE NOTES
Pt presents to ED via EMS from home after falling and being to weak to get herself off the floor. Pt states she has been feeling increasingly weak. Arrives alert and oriented. VSS on triage.

## 2025-02-18 NOTE — CONSULTS
Neurology Service Consult Note  [facility]   Patient Name: Coty Stearns  : 1970        Subjective:   Reason for consult: AMS with tremors  54 y.o. - female with history of schizoaffective disorder, HTN, HTN, and HLD presenting to [facility] for AMS. She states she remembers getting out of the bed as she was going to vomit. She states she could not make it to the bathroom and got to her knees and started vomiting. She states she vomited several times. She was unable to get up. Reports indicate she was tremulous and confused. She reported to have had paresthesia to right side and developed stuttering of speech.     Patient seen and examined. She states her tremors are better but continues to have them intermittently. She states if she clenches her fists the tremors will stop. At times she will have stuttering of speech. She reports this is new for her. She also goes on to state she was diagnosed with hereditary spastic hemiparesis at [facility] in . She reports no family history of this and no ongoing issues. Reviewed records at that time and she was admitted to the hospital for paraplegia after lumbar surgery. According to notes hereditary spastic hemiparesis was in the differential however felt there was a strong functional component to her exam. There has never been any follow up since.        Past Medical History:   Diagnosis Date    Acquired hypothyroidism 2018    Chest pain, rule out acute myocardial infarction 2024    Chronic back pain 2018    Chronic obstructive pulmonary disease (HCC) 2018    COPD (chronic obstructive pulmonary disease) (HCC) 10/2013    Coronary artery disease involving native coronary artery of native heart without angina pectoris 2024    COVID-19 2021    Essential hypertension 10/09/2018    Fibromyalgia 2018    Former tobacco use 2018    GERD (gastroesophageal reflux disease)     H/O 24 hour EKG  Mother     Hypertension Mother     Heart Disease Father     Diabetes Sister     Heart Disease Sister     Kidney Disease Sister     Breast Cancer Maternal Grandmother     Breast Cancer Paternal Aunt         2 aunts    Ovarian Cancer Paternal Aunt          in her 30's         ROS (10 systems)  In addition to that documented in the HPI above, the additional ROS was obtained:  Constitutional: Denies fevers or chills  Eyes: Denies vision changes  ENMT: Denies sore throat  CV: Denies chest pain  Resp: Denies SOB  GI: Denies vomiting or diarrhea  : Denies painful urination  MSK: Denies recent trauma  Skin: Denies new rashes  Neuro: Denies new numbness or tingling or weakness  Endocrine: Denies unexpected weight loss  Heme: Denies bleeding disorders    Physical Exam:       [unfilled]   Wt Readings from Last 3 Encounters:   25 81.6 kg (180 lb)   25 81.6 kg (180 lb)   25 81.2 kg (179 lb)     Temp Readings from Last 3 Encounters:   25 97.8 °F (36.6 °C)   25 96.9 °F (36.1 °C)   24 98.4 °F (36.9 °C) (Oral)     BP Readings from Last 3 Encounters:   25 128/85   25 (!) 96/56   25 (!) 88/58     Pulse Readings from Last 3 Encounters:   25 69   25 63   25 68        Gen: A&O x 4, NAD, cooperative  HEENT: NC/AT, EOMI, PERRL, mmm, no carotid bruits, neck supple, no meningeal signs;   Heart: SR on monitor  Lungs:Respirations unlabored  Ext: no edema, no calf tenderness b/l  Psych: normal mood and affect  Skin: no rashes or lesions    NEUROLOGIC EXAM:    Mental Status: A&O to self, location, month and year, NAD, speech clear, language fluent, stuttering speech at times, repetition and naming intact, follows commands appropriately    Cranial Nerve Exam:   CN II-XII:  PERRL, VFF, no nystagmus, no gaze paresis, sensation V1-V3 intact b/l, muscles of facial expression symmetric; hearing intact to conversational tone, palate elevates symmetrically, shoulder elevation

## 2025-02-18 NOTE — ED PROVIDER NOTES
Patient signed out to me by Dr. Hatfield,    54yof with complaint of being found on floor, yelling, vomiting.     Had some dysarthria but no other focal findings on exam, no reliable last known well. She claims dizziness for weeks. Then stated was having abdominal pain. Did not know when the vomiting started.     Pending scans and labs.     Son now at bedside, did not know who was in the house with her/called 911.      Abigail Anders MD  02/18/25 0640          Patient has no leukocytosis, mild hypertension but otherwise vitals are normal, she is not in distress, no fevers. Lactic acid 2.1- unclear etiology. Her chemistry is unremarkable, Cr normal. She does not currently have other symptoms to suggest acute bacterial infection. Pending imaging, urine, I have added flu/covid and fluids     Abigail Anders MD  02/18/25 0644       Abigail Anders MD  02/18/25 0644        CT head negative, CT abdomen/pelvis with cysts on liver, spleen and kidneys, no acute abnormalities noted, no infection noted.      Abigail Anders MD  02/18/25 0708        EKG interpreted by me  Sinus rhythm first-degree AV block, rate of 74 bpm.  Prolonged QT interval, otherwise normal intervals.  No previous to compare         Abigail Anders MD  02/18/25 0717       Abigail Anders MD  02/18/25 0717          Called by radiology- CT angio head was unremarkable. Does not appear that they did the neck part.   Currently she is asymptomatic, doing better, plan for admission.      Abigail Anders MD  02/18/25 0936        Radiology called back- images of the neck did come through and are negative.      Abigail Anders MD  02/18/25 0944

## 2025-02-18 NOTE — ED PROVIDER NOTES
CHIEF COMPLAINT    Chief Complaint   Patient presents with    Extremity Weakness     HPI  Coty Stearns is a 54 y.o. female with history of schizoaffective disorder, fibromyalgia, coronary artery disease who presents to the ED via EMS with concerns for being found on the ground and having weakness.  Medics state they received a call from other people inside the home that the patient was found down on the ground in a bedroom near a bed and piece of furniture yelling for help with vomiting.  Her last known normal is uncertain.  Patient arrives here and complains that she does have nausea and dizziness.  When asked how long she has been feeling unwell she states for several weeks.  She mentions that she feels dizzy with medications she takes for her blood pressure and when asked about the nausea and vomiting she is uncertain exactly when this started but remembers having vomiting tonight.  She cannot tell me why she was on the floor.  In speaking with her she is noted to have some dysarthria/stuttering and tells me this is not normal for her.  She is unable to currently rate her symptoms or any alleviating or aggravating factors.      REVIEW OF SYSTEMS  Constitutional: No fever, chills or recent illness.   Eye: No visual changes  HENT: No earache or sore throat.  Resp: No SOB or productive cough.  Cardio: No chest pain or palpitations.  GI: Complains of abdominal pain and nausea with vomiting  : No dysuria, urgency or frequency.  Endocrine: No heat intolerance, no cold intolerance, no polydipsia   Lymphatics: No adenopathy  Musculoskeletal: No new muscle aches or joint pain.  Neuro: No headaches. Complains of dizziness.  Psych: No homicidal or suicidal thoughts  Skin: No rash, No itching.  ?  ?  PAST MEDICAL HISTORY  Past Medical History:   Diagnosis Date    Acquired hypothyroidism 09/26/2018    Chest pain, rule out acute myocardial infarction 07/01/2024    Chronic back pain 07/16/2018    Chronic obstructive

## 2025-02-18 NOTE — CONSULTS
NEGATIVE    Opiates, Urine NEGATIVE NEGATIVE    Cannabinoid Scrn, Ur NEGATIVE NEGATIVE    Oxycodone Screen, Ur NEGATIVE NEGATIVE    Fentanyl, Ur NEGATIVE NEGATIVE    Test Information       This method is a screening test to detect only these drug classes as part of a medical workup. Confirmatory testing by another method should be ordered if clinically indicated.   Urinalysis    Collection Time: 02/18/25  7:46 AM   Result Value Ref Range    Color, UA Yellow Yellow    Turbidity UA Clear Clear    Glucose, Ur NEGATIVE NEGATIVE mg/dL    Bilirubin, Urine NEGATIVE NEGATIVE    Ketones, Urine NEGATIVE NEGATIVE mg/dL    Specific Gravity, UA 1.025 1.005 - 1.030    Urine Hgb NEGATIVE NEGATIVE    pH, Urine 6.0 5.0 - 8.0    Protein, UA NEGATIVE NEGATIVE mg/dL    Urobilinogen, Urine 0.2 0.0 - 1.0 EU/dL    Nitrite, Urine NEGATIVE NEGATIVE    Leukocyte Esterase, Urine NEGATIVE NEGATIVE    Comment       Microscopic exam not performed based on chemical results unless requested in original order.   COVID-19, Rapid    Collection Time: 02/18/25  8:16 AM    Specimen: Nasopharyngeal Swab   Result Value Ref Range    Specimen Description .NASOPHARYNGEAL SWAB     SARS-CoV-2, Rapid Not Detected Not Detected       Review of Systems:  Reports of no current cardiovascular, respiratory, gastrointestinal, genitourinary, integumentary, neurological, muscuoskeletal, or immunological symptoms today.   PSYCHIATRIC: See HPI above.    PSYCHIATRIC EXAMINATION / MENTAL STATUS EXAM    CONSTITUTIONAL:    Vitals:   Vitals:    02/18/25 0802   BP: 135/84   Pulse:    Resp:    Temp:    SpO2:       General appearance: [x] appears age, []  appears older than stated age,               [x]  adequately dressed and groomed, [] disheveled,               [x]  in no acute distress, [] appears mildly distressed, [] other           MUSCULOSKELETAL:   Gait:   [] normal, [] antalgic, [] unsteady, [x] gait not evaluated   Station:             [] erect, [x] sitting, []  recumbent, [] other        Strength/tone:  [x] muscle strength and tone appear consistent with age and                                        condition     [] atrophy      [] abnormal movements     Vitals: Blood pressure 135/84, pulse 79, temperature 97.8 °F (36.6 °C), resp. rate 16, SpO2 97%, not currently breastfeeding.  CONSTITUTIONAL:    Appearance: appears stated age. alert and oriented to person, place, time & situation. no acute distress. Adequate grooming and hygeine. Good eye contact. No prominent physical abnormalities.  Attitude: Manner is cooperative and pleasant  Motor: No psychomotor agitation, retardation or abnormal movements noted  Speech: Clearly articulated; normal rate, volume, tone & amount.  Language: intact understanding and production  Mood:\"depressed at times, but I cycle. I am good now\"  Affect: euthymic, full range, non-labile, congruent with mood and content of speech  Thought Production: Spontaneous.  Thought Form: Coherent, linear, logical & goal-directed. No tangentiality or circumstantiality. No flight of ideas or loosening of associations.  Thought Content/Perceptions: No DEYA, no AVH, no delusion  Insight:adequate  Judgment adequate  Memory: Immediate, recent, and remote appear intact, though not formally tested.  Attention: maintained throughout interview  Fund of knowledge: Average  Gait/Balance: HIREN  Overall level of suicide risk: denies      2/18/2025     5:52 AM   C-SSRS Suicide Screening   1) Within the past month, have you wished you were dead or wished you could go to sleep and not wake up?  No   2) Have you actually had any thoughts of killing yourself?  No   6) Have you ever done anything, started to do anything, or prepared to do anything to end your life? No      Impression:   Dysarthria  Schizoaffective disorder,bipolar type  Adjustment disorder  R/O functional neurological disorder    Plan:  Patient is willing to go to counseling which is the treatment for FND. Referral

## 2025-02-18 NOTE — CONSULTS
[facility] ACUTE CARE OCCUPATIONAL THERAPY EVALUATION  Coty Stearns, 1970, [id number], 2/18/2025    Discharge Recommendation: Encourage facility for post-acute rehabilitation, anticipate 1-2 hours per day and 5 days per week.    History  [place]:  The primary encounter diagnosis was Dysarthria. Diagnoses of Confusion, Pain of upper abdomen, and Nausea and vomiting, unspecified vomiting type were also pertinent to this visit.  Patient  has a past medical history of Acquired hypothyroidism, Chest pain, rule out acute myocardial infarction, Chronic back pain, Chronic obstructive pulmonary disease (HCC), COPD (chronic obstructive pulmonary disease) ([facility]), Coronary artery disease involving native coronary artery of native heart without angina pectoris, COVID-19, Essential hypertension, Fibromyalgia, Former tobacco use, GERD (gastroesophageal reflux disease), H/O 24 hour EKG monitoring, H/O fibromyalgia, History of 24 hour EKG monitoring, History of echocardiogram, History of hysterectomy, Hx of cardiovascular stress test, Hyperlipidemia, Hypertension, Schizoaffective disorder ([facility]), Seizures ([facility]), Simple chronic bronchitis ([facility]), and Spastic paraparesis.  Patient  has a past surgical history that includes Hysterectomy; back surgery; Inner ear surgery (Left); Tubal ligation; Inguinal hernia repair (Right); Cardiac catheterization; Breast biopsy; Cardiac procedure (N/A, 7/2/2024); and Cardiac procedure (N/A, 8/26/2024).    Subjective:  Patient states:  \"I think I might have to vomit; pass me that bucket... I don't like the word vomit, I don't know why I keep saying it\"   Pain:  reports headache, does not provide numerical rating.    Communication: RN, CM, co-eval with PT [place] for safety  Restrictions: general precautions, fall risk    Home Setup/Prior level of function  Social/Functional History  Lives With: Other (Comment) (Roommate and 2 kids)  Home Layout: Two level, Laundry in basement (rail

## 2025-02-18 NOTE — CONSULTS
[facility] ACUTE CARE PHYSICAL THERAPY EVALUATION  Coty Stearns, 1970, 1129/1129-A, 2/18/2025    History  [place]:  The primary encounter diagnosis was Dysarthria. Diagnoses of Confusion, Pain of upper abdomen, and Nausea and vomiting, unspecified vomiting type were also pertinent to this visit.  Patient  has a past medical history of Acquired hypothyroidism, Chest pain, rule out acute myocardial infarction, Chronic back pain, Chronic obstructive pulmonary disease (HCC), COPD (chronic obstructive pulmonary disease) (HCC), Coronary artery disease involving native coronary artery of native heart without angina pectoris, COVID-19, Essential hypertension, Fibromyalgia, Former tobacco use, GERD (gastroesophageal reflux disease), H/O 24 hour EKG monitoring, H/O fibromyalgia, History of 24 hour EKG monitoring, History of echocardiogram, History of hysterectomy, Hx of cardiovascular stress test, Hyperlipidemia, Hypertension, Schizoaffective disorder (HCC), Seizures (HCC), Simple chronic bronchitis ([facility]), and Spastic paraparesis.  Patient  has a past surgical history that includes Hysterectomy; back surgery; Inner ear surgery (Left); Tubal ligation; Inguinal hernia repair (Right); Cardiac catheterization; Breast biopsy; Cardiac procedure (N/A, 7/2/2024); and Cardiac procedure (N/A, 8/26/2024).    Recommendation: Facility for moderate post-acute rehabilitation, anticipate 1-2 hours per day and 5 days per week.    Subjective:  Patient states: \"Your voice sound weird, like I have water stuck in this ear\".    Pain:  reports headache, does not provide numerical rating.    Communication with other providers:  RN approval for therapy session, Handoff to RN, co-eval with OBED Dempsey  Restrictions: general precautions, falls    Home Setup/Prior level of function  Social/Functional History  Lives With: Other (Comment) (Roommate and 2 kids)  Home Layout: Two level, Laundry in basement (rail upstairs broken,  bedroom/bathroom upstairs)  Home Access: Stairs to enter without rails  Entrance Stairs - Number of Steps: 2-3  Bathroom Shower/Tub: Tub/Shower unit  Bathroom Equipment: Shower chair  Home Equipment: None  Has the patient had two or more falls in the past year or any fall with injury in the past year?: No  Prior Level of Assist for ADLs: Independent  Prior Level of Assist for Homemaking: Independent  Prior Level of Assist for Ambulation:  (no DME)  Prior Level of Assist for Transfers: Independent  Active : Yes  Occupation: Retired    Examination of body systems (includes body structures/functions, activity/participation limitations):  Observation:  Supine in bed upon arrival, agreeable to therapy  Vision:  glasses  Hearing:  WFL, reports R ear feeling 'like water is stuck in it'  Cardiopulmonary:  on room air  Cognition: AxO x4, hx schizoaffective disorder, see OT/SLP note for further evaluation.    Musculoskeletal  ROM R/L:  WFL BLEs.    Strength R/L:  grossly 4-/5 BLEs, R knee extension limited due to pain, moderate impairment in function and endurance.    Neuro:  inconsistent body tremors observed throughout session, ranging in intensity      Mobility:  Rolling L/R: completed x1 to L with HOB flat and use of bedrail, SBA  Supine to sit:  Sofi for trunk uprighting and stabilization, HOB slightly elevated and use of bedrail  Sit to supine: modA for BLE advancement back onto bed. Verbal cues for sequencing  Transfers:   Sit to stand: from EOB to standing at RW, Sofi for lift assist and bilateral feet blocking.  Stand to sit: from standing at RW to seated EOB, CGA for safety  Sitting balance:  Fair static and light dynamic seated EOB without UE support, mostly CGA-Sofi due to retropulsion. While donning socks, pt requiring up to modA to maintain balance due to increased postural instability and jerking movements.   Standing balance:  Fair static with BUE support on RW, initially modA due to retropulsion. Pt

## 2025-02-18 NOTE — PROGRESS NOTES
4 Eyes Skin Assessment     NAME:  Coty Stearns  YOB: 1970  MEDICAL RECORD NUMBER:  [id number]    The patient is being assessed for  Admission    I agree that at least one RN has performed a thorough Head to Toe Skin Assessment on the patient. ALL assessment sites listed below have been assessed.      Areas assessed by both nurses:    Head, Face, Ears, Shoulders, Back, Chest, Arms, Elbows, Hands, Sacrum. Buttock, Coccyx, Ischium, Legs. Feet and Heels, and Under Medical Devices         Does the Patient have a Wound? No noted wound(s)       Tra Prevention initiated by RN: Yes  Wound Care Orders initiated by RN: No    Pressure Injury (Stage 3,4, Unstageable, DTI, NWPT, and Complex wounds) if present, place Wound referral order by RN under : No    New Ostomies, if present place, Ostomy referral order under : No     Nurse 1 eSignature: Electronically signed by JADNY BRANCH RN on 2/18/25 at 5:43 PM EST    **SHARE this note so that the co-signing nurse can place an eSignature**    Nurse 2 eSignature: Electronically signed by Montserrat Low RN on 2/18/25 at 5:46 PM EST

## 2025-02-18 NOTE — ED NOTES
every 5 minutes as needed for Chest pain up to max of 3 total doses. If no relief after 1 dose, call 911. 1/21/25   Svitlana Stevens APRN - CNP   prasugrel (EFFIENT) 10 MG TABS Take 1 tablet by mouth daily 1/21/25   Svitlana Stevens APRN - CNP   polyethylene glycol (GLYCOLAX) 17 g packet Take 1 packet by mouth 2 times daily 12/3/24   Oksana Barlow APRN - CNP   sodium chloride 1 g tablet Take 1 tablet by mouth 3 times daily (with meals)  Patient taking differently: Take 1 tablet by mouth as needed 12/3/24   Oksana Barlow APRN - CNP   metoprolol tartrate (LOPRESSOR) 50 MG tablet Take 1 tablet by mouth 2 times daily Hold if SBP < 95 or if HR < 55 11/25/24   Oj Cuevas MD   venlafaxine (EFFEXOR) 75 MG tablet Take 2 tablets by mouth Daily    ProviderMilla MD   LORazepam (ATIVAN) 0.5 MG tablet 1 tablet at bedtime as needed Orally Once a day for 30 days 8/27/24   Milla Low MD   magnesium oxide (MAG-OX) 400 (240 Mg) MG tablet Take 1 tablet by mouth daily 7/3/24   Oksana Barlow APRN - CNP   dicyclomine (BENTYL) 20 MG tablet Take 1 tablet by mouth 3 times daily 6/7/24   Milla Low MD   metFORMIN (GLUCOPHAGE) 500 MG tablet Take 1 tablet by mouth 2 times daily (with meals)    Milla Low MD   traMADol (ULTRAM) 50 MG tablet Take 1 tablet by mouth 2 times daily as needed. 2/11/24   Milla Low MD   baclofen (LIORESAL) 10 MG tablet Take 2 tablets by mouth every 8 hours as needed    Milla Low MD   levothyroxine (SYNTHROID) 50 MCG tablet Take 1 tablet by mouth Daily 9/26/18   Heidi Berkowitz MD     Comments:  Medication list reviewed with patient and insurance claims verified.  Patient reports she is not always compliant with taking medications, but reports has been better the past couple of days.  Per cardiology note 12/18/2024 Isosorbide MN discontinued.  Per cardiology note 02/04/25 lisinopril discontinued with amlodipine and midodrine

## 2025-02-18 NOTE — H&P
V2.0  History and Physical      Name:  Coty Stearns /Age/Sex: 1970  (54 y.o. female)   MRN & CSN:  [id number] & [id number] Encounter Date/Time: 2025 9:31 AM EST   Location:  ED19/ED-19 PCP: Grazyna English MD       Hospital Day: 1    Assessment and Plan:   Coty Stearns is a 54 y.o. female with a past medical history of hypothyroidism, fibromyalgia, hypertension, hyperlipidemia, schizoaffective disorder, seizures who presents with dysarthria, tremors.    Strokelike symptoms  Tremors  -Presented with episodes of confusion, dysarthria, tremors, dizziness  -CT head no acute process  -CTA head and neck with no LVO  -Unclear last known well - stroke alert not called in ED  -NIH 4 (dysarthria, mild R-sided sensation loss, ataxia)  - continue neuro checks  - NPO until bedside swallow eval   - PT/OT/SLP   - monitor on telemetry   - start ASA, continue statin   - permissive hypertension <220/120  - MRI brain,  FLP, HgbA1c, ammonia, TSH ordered   - neurology consulted, appreciate recs  - ?functional neurologic symptoms, patient admits to increased stress at home. Psych consulted, appreciate recs    Nausea/vomiting  -CT A/P with hepatic, splenic and left renal cysts, moderate fecal stasis, otherwise nonacute exam  -Symptoms resolved upon my evaluation  -Continue antiemetics  -start bowel regimen    Fall  Generalized weakness  - PT/OT    Lactic acidosis  -Lactic acid 2.1  -May be secondary to dehydration given vomiting  -Received IV fluid bolus  -Recheck lactic acid pending    Hypercalcemia  -Calcium 10.8  -Likely related to dehydration  -Received IV fluid bolus  -iCal pending    Hypothyroidism  -Continue Synthroid  -Check TSH    Hypertension  -Continue amlodipine, Lopressor  -Takes midodrine as needed for low BP    Schizoaffective disorder  -Continue home Seroquel, Effexor, as needed Ativan    CAD  -Continue home Effient, statin    Chronic pain with opioid dependence  - continue home tramadol as needed  PRN  traMADol, 50 mg, BID PRN        Labs    CBC:   Recent Labs     02/18/25  0540   WBC 8.0   HGB 12.8        BMP:    Recent Labs     02/18/25  0540      K 4.2      CO2 23   BUN 17   CREATININE 0.7   GLUCOSE 117*     Hepatic:   Recent Labs     02/18/25  0540   AST 24   ALT 21   BILITOT <0.2   ALKPHOS 70     Lipids:   Lab Results   Component Value Date/Time    CHOL 135 12/02/2024 05:16 AM    HDL 50 12/02/2024 05:16 AM    TRIG 134 12/02/2024 05:16 AM     Hemoglobin A1C:   Lab Results   Component Value Date/Time    LABA1C 5.7 12/02/2024 05:16 AM     TSH:   Lab Results   Component Value Date/Time    TSH 1.14 12/02/2024 05:16 AM     Troponin:   Lab Results   Component Value Date/Time    TROPONINT <0.010 09/18/2021 04:49 PM    TROPONINT <0.010 11/23/2019 11:17 PM    TROPONINT <0.010 11/23/2019 08:55 PM     Lactic Acid:   Recent Labs     02/18/25  0540   LACTA 2.1*     BNP: No results for input(s): \"PROBNP\" in the last 72 hours.  UA:  Lab Results   Component Value Date/Time    NITRU NEGATIVE 02/18/2025 07:46 AM    COLORU Yellow 02/18/2025 07:46 AM    PHUR 6.0 02/18/2025 07:46 AM    PHUR 7.0 06/07/2018 11:23 AM    WBCUA 27 12/01/2024 10:45 PM    RBCUA 3 12/01/2024 10:45 PM    RBCUA NONE SEEN 11/23/2019 11:39 PM    MUCUS FEW 12/01/2024 10:45 PM    TRICHOMONAS None seen 12/01/2024 10:45 PM    BACTERIA RARE 12/01/2024 10:45 PM    CLARITYU CLEAR 11/23/2019 11:39 PM    SPECGRAV 1.015 06/07/2018 11:23 AM    LEUKOCYTESUR NEGATIVE 02/18/2025 07:46 AM    UROBILINOGEN 0.2 02/18/2025 07:46 AM    BILIRUBINUR NEGATIVE 02/18/2025 07:46 AM    BLOODU NEGATIVE 11/23/2019 11:39 PM    GLUCOSEU NEGATIVE 02/18/2025 07:46 AM    KETUA NEGATIVE 02/18/2025 07:46 AM     Urine Cultures:   Lab Results   Component Value Date/Time    LABURIN No growth at 18-36 hours 06/07/2018 11:25 AM     Blood Cultures: No results found for: \"BC\"  No results found for: \"BLOODCULT2\"  Organism: No results found for: \"ORG\"    Imaging/Diagnostics Last

## 2025-02-18 NOTE — ED NOTES
ED TO INPATIENT SBAR HANDOFF    Patient Name: Coty Stearns   :  1970  54 y.o.   Preferred Name  Coty Stearns  Family/Caregiver Present no   Restraints no   C-SSRS: Risk of Suicide: No Risk  Sitter no   Sepsis Risk Score        Situation  Chief Complaint   Patient presents with    Extremity Weakness     Brief Description of Patient's Condition: Coty Stearns is a 54 y.o. female with history of schizoaffective disorder, fibromyalgia, coronary artery disease who presents to the ED via EMS with concerns for being found on the ground and having weakness.  Medics state they received a call from other people inside the home that the patient was found down on the ground in a bedroom near a bed and piece of furniture yelling for help with vomiting.  Her last known normal is uncertain.  Patient arrives here and complains that she does have nausea and dizziness.  When asked how long she has been feeling unwell she states for several weeks.  She mentions that she feels dizzy with medications she takes for her blood pressure and when asked about the nausea and vomiting she is uncertain exactly when this started but remembers having vomiting tonight.  She cannot tell me why she was on the floor.  In speaking with her she is noted to have some dysarthria/stuttering and tells me this is not normal for her.  She is unable to currently rate her symptoms or any alleviating or aggravating factors.     Mental Status: oriented, alert, and thought processes intact  Arrived from: home    Imaging:   CTA HEAD NECK W CONTRAST   Final Result      CT HEAD WO CONTRAST   Final Result      CT ABDOMEN PELVIS W IV CONTRAST Additional Contrast? None   Final Result      MRI brain without contrast    (Results Pending)     Abnormal labs:   Abnormal Labs Reviewed   CBC WITH AUTO DIFFERENTIAL - Abnormal; Notable for the following components:       Result Value    MCHC 30.6 (*)     Monocytes % 11 (*)     Eosinophils % 8 (*)     Immature  electrocardiogram suggestive for ischemia.    Hyperlipidemia 06/25/2018    Hypertension     Schizoaffective disorder (HCC) 06/25/2018    Seizures (HCC)     Simple chronic bronchitis (HCC) 09/26/2018    Spastic paraparesis        Assessment    Vitals:    Level of Consciousness: Alert (0)   Vitals:    02/18/25 1032 02/18/25 1151 02/18/25 1203 02/18/25 1206   BP: (!) 147/87 (!) 139/92 (!) 162/97    Pulse:  81 73    Resp:   12    Temp:       SpO2: 97%  95%    Weight:    81.6 kg (180 lb)   Height:    1.676 m (5' 6\")       PO Status: Regular    O2 Flow Rate:        Cardiac Rhythm:     Last documented pain medication administered:     NIH Score: NIH       Active LDA's:   Peripheral IV 02/18/25 Left Forearm (Active)   Site Assessment Clean, dry & intact 02/18/25 0624       Pertinent or High Risk Medications/Drips: no   If Yes, please provide details:       Blood Product Administration: no  If Yes, please provide details:    Recommendation    Incomplete orders   Additional Comments:    If any further questions, please call Sending RN at [phone]    Electronically signed by: Electronically signed by Racquel Brooks RN on 2/18/2025 at 12:32 PM

## 2025-02-18 NOTE — DISCHARGE INSTRUCTIONS
Adult Mental Health Outpatient Resources    -[facility]    [address], [place]    [phone]    Walk-in-hours M-F 8-10 am  - [facility]  [phone]  --[facility]               [address], [place] [phone]              [address] [place], [place] [phone]              [address], [place]        [phone]   -[facility]               [address], [place]    [phone]  - [facility]                [address], [place]    [phone]  -[facility]          [address], [place] [phone]  -[facility]   [address] [facility] [place], [place] [phone]   [address] [place], [place] [phone]  -[facility]   [address] [place], [place], [address] [phone]  - [facility]   [address], [phone]  _____________________________________________________________________________  HOUSING ISSUES SUPPORT  [facility](Fair Housing, Landlord/Tenant Mediation)-[phone]  [facility] [place]-[phone]  [facility] [phone]  [employer] (homebuyer education, down payment, home repair [phone].

## 2025-02-18 NOTE — PROGRESS NOTES
Routine inpatient EEG completed.  Epileptologist, Dr. Lisa, notified via [employer].       Electronically signed by Prachi Haynes on 2/18/2025 at 1:07 PM

## 2025-02-19 PROBLEM — F25.0 SCHIZOAFFECTIVE DISORDER, BIPOLAR TYPE (HCC): Status: ACTIVE | Noted: 2018-06-25

## 2025-02-19 PROBLEM — F43.22 ADJUSTMENT DISORDER WITH ANXIOUS MOOD: Status: ACTIVE | Noted: 2025-02-19

## 2025-02-19 LAB
25(OH)D3 SERPL-MCNC: 20.8 NG/ML (ref 30–150)
ANION GAP SERPL CALCULATED.3IONS-SCNC: 8 MMOL/L (ref 9–17)
BUN SERPL-MCNC: 15 MG/DL (ref 7–20)
CALCIUM SERPL-MCNC: 10.6 MG/DL (ref 8.3–10.6)
CHLORIDE SERPL-SCNC: 107 MMOL/L (ref 99–110)
CHOLEST SERPL-MCNC: 146 MG/DL (ref 125–199)
CO2 SERPL-SCNC: 26 MMOL/L (ref 21–32)
CREAT SERPL-MCNC: 0.8 MG/DL (ref 0.6–1.1)
EST. AVERAGE GLUCOSE BLD GHB EST-MCNC: 123 MG/DL
FERRITIN SERPL-MCNC: 49 NG/ML (ref 15–150)
FOLATE SERPL-MCNC: 14.4 NG/ML (ref 4.8–24.2)
GFR, ESTIMATED: 73 ML/MIN/1.73M2
GLUCOSE BLD-MCNC: 101 MG/DL (ref 74–99)
GLUCOSE BLD-MCNC: 107 MG/DL (ref 74–99)
GLUCOSE BLD-MCNC: 129 MG/DL (ref 74–99)
GLUCOSE BLD-MCNC: 84 MG/DL (ref 74–99)
GLUCOSE SERPL-MCNC: 97 MG/DL (ref 74–99)
HBA1C MFR BLD: 5.9 % (ref 4.2–6.3)
HDLC SERPL-MCNC: 59 MG/DL
LDLC SERPL CALC-MCNC: 70 MG/DL
POTASSIUM SERPL-SCNC: 4.5 MMOL/L (ref 3.5–5.1)
SODIUM SERPL-SCNC: 141 MMOL/L (ref 136–145)
TRIGL SERPL-MCNC: 84 MG/DL
VIT B12 SERPL-MCNC: 334 PG/ML (ref 211–911)

## 2025-02-19 PROCEDURE — 82962 GLUCOSE BLOOD TEST: CPT

## 2025-02-19 PROCEDURE — 2500000003 HC RX 250 WO HCPCS: Performed by: PHYSICIAN ASSISTANT

## 2025-02-19 PROCEDURE — 82728 ASSAY OF FERRITIN: CPT

## 2025-02-19 PROCEDURE — 82746 ASSAY OF FOLIC ACID SERUM: CPT

## 2025-02-19 PROCEDURE — 80048 BASIC METABOLIC PNL TOTAL CA: CPT

## 2025-02-19 PROCEDURE — 6370000000 HC RX 637 (ALT 250 FOR IP): Performed by: PHYSICIAN ASSISTANT

## 2025-02-19 PROCEDURE — 6370000000 HC RX 637 (ALT 250 FOR IP)

## 2025-02-19 PROCEDURE — 83036 HEMOGLOBIN GLYCOSYLATED A1C: CPT

## 2025-02-19 PROCEDURE — 94761 N-INVAS EAR/PLS OXIMETRY MLT: CPT

## 2025-02-19 PROCEDURE — 82607 VITAMIN B-12: CPT

## 2025-02-19 PROCEDURE — 6360000002 HC RX W HCPCS: Performed by: PHYSICIAN ASSISTANT

## 2025-02-19 PROCEDURE — 94640 AIRWAY INHALATION TREATMENT: CPT

## 2025-02-19 PROCEDURE — G0378 HOSPITAL OBSERVATION PER HR: HCPCS

## 2025-02-19 PROCEDURE — 96372 THER/PROPH/DIAG INJ SC/IM: CPT

## 2025-02-19 PROCEDURE — 82306 VITAMIN D 25 HYDROXY: CPT

## 2025-02-19 PROCEDURE — 6370000000 HC RX 637 (ALT 250 FOR IP): Performed by: NURSE PRACTITIONER

## 2025-02-19 PROCEDURE — 36415 COLL VENOUS BLD VENIPUNCTURE: CPT

## 2025-02-19 PROCEDURE — 80061 LIPID PANEL: CPT

## 2025-02-19 PROCEDURE — 6370000000 HC RX 637 (ALT 250 FOR IP): Performed by: FAMILY MEDICINE

## 2025-02-19 RX ORDER — BUTALBITAL, ACETAMINOPHEN AND CAFFEINE 50; 325; 40 MG/1; MG/1; MG/1
2 TABLET ORAL EVERY 8 HOURS
Status: DISCONTINUED | OUTPATIENT
Start: 2025-02-19 | End: 2025-02-21

## 2025-02-19 RX ORDER — DEXTROSE MONOHYDRATE 100 MG/ML
INJECTION, SOLUTION INTRAVENOUS CONTINUOUS PRN
Status: DISCONTINUED | OUTPATIENT
Start: 2025-02-19 | End: 2025-02-21 | Stop reason: HOSPADM

## 2025-02-19 RX ORDER — GLUCAGON 1 MG/ML
1 KIT INJECTION PRN
Status: DISCONTINUED | OUTPATIENT
Start: 2025-02-19 | End: 2025-02-21 | Stop reason: HOSPADM

## 2025-02-19 RX ORDER — MIDODRINE HYDROCHLORIDE 5 MG/1
5 TABLET ORAL 3 TIMES DAILY
Status: DISCONTINUED | OUTPATIENT
Start: 2025-02-19 | End: 2025-02-21 | Stop reason: HOSPADM

## 2025-02-19 RX ADMIN — MONTELUKAST 10 MG: 10 TABLET, FILM COATED ORAL at 21:13

## 2025-02-19 RX ADMIN — BUTALBITAL, ACETAMINOPHEN, AND CAFFEINE 2 TABLET: 325; 50; 40 TABLET ORAL at 18:24

## 2025-02-19 RX ADMIN — SODIUM CHLORIDE, PRESERVATIVE FREE 10 ML: 5 INJECTION INTRAVENOUS at 21:13

## 2025-02-19 RX ADMIN — POLYETHYLENE GLYCOL (3350) 17 G: 17 POWDER, FOR SOLUTION ORAL at 08:27

## 2025-02-19 RX ADMIN — Medication 1 LOZENGE: at 21:50

## 2025-02-19 RX ADMIN — QUETIAPINE FUMARATE 100 MG: 100 TABLET ORAL at 21:13

## 2025-02-19 RX ADMIN — ENOXAPARIN SODIUM 40 MG: 100 INJECTION SUBCUTANEOUS at 08:27

## 2025-02-19 RX ADMIN — PRASUGREL 10 MG: 10 TABLET, FILM COATED ORAL at 08:26

## 2025-02-19 RX ADMIN — BUDESONIDE AND FORMOTEROL FUMARATE DIHYDRATE 2 PUFF: 160; 4.5 AEROSOL RESPIRATORY (INHALATION) at 09:12

## 2025-02-19 RX ADMIN — MIDODRINE HYDROCHLORIDE 5 MG: 5 TABLET ORAL at 08:28

## 2025-02-19 RX ADMIN — ONDANSETRON 4 MG: 4 TABLET, ORALLY DISINTEGRATING ORAL at 21:12

## 2025-02-19 RX ADMIN — METOPROLOL TARTRATE 50 MG: 50 TABLET, FILM COATED ORAL at 21:13

## 2025-02-19 RX ADMIN — BUDESONIDE AND FORMOTEROL FUMARATE DIHYDRATE 2 PUFF: 160; 4.5 AEROSOL RESPIRATORY (INHALATION) at 22:55

## 2025-02-19 RX ADMIN — SODIUM CHLORIDE, PRESERVATIVE FREE 10 ML: 5 INJECTION INTRAVENOUS at 08:27

## 2025-02-19 RX ADMIN — ASPIRIN 81 MG CHEWABLE TABLET 81 MG: 81 TABLET CHEWABLE at 08:26

## 2025-02-19 RX ADMIN — MIDODRINE HYDROCHLORIDE 5 MG: 5 TABLET ORAL at 21:13

## 2025-02-19 RX ADMIN — ROSUVASTATIN CALCIUM 40 MG: 20 TABLET, FILM COATED ORAL at 21:12

## 2025-02-19 RX ADMIN — VENLAFAXINE 150 MG: 37.5 TABLET ORAL at 06:00

## 2025-02-19 RX ADMIN — SENNOSIDES AND DOCUSATE SODIUM 2 TABLET: 50; 8.6 TABLET ORAL at 08:26

## 2025-02-19 RX ADMIN — Medication 400 MG: at 08:27

## 2025-02-19 RX ADMIN — POLYETHYLENE GLYCOL (3350) 17 G: 17 POWDER, FOR SOLUTION ORAL at 21:12

## 2025-02-19 RX ADMIN — LEVOTHYROXINE SODIUM 50 MCG: 0.05 TABLET ORAL at 06:00

## 2025-02-19 RX ADMIN — MIDODRINE HYDROCHLORIDE 5 MG: 5 TABLET ORAL at 14:00

## 2025-02-19 ASSESSMENT — PAIN DESCRIPTION - ORIENTATION
ORIENTATION: OTHER (COMMENT)
ORIENTATION: MID
ORIENTATION: MID

## 2025-02-19 ASSESSMENT — PAIN DESCRIPTION - DESCRIPTORS
DESCRIPTORS: ACHING

## 2025-02-19 ASSESSMENT — PAIN DESCRIPTION - ONSET: ONSET: ON-GOING

## 2025-02-19 ASSESSMENT — PAIN DESCRIPTION - LOCATION
LOCATION: HEAD;NECK
LOCATION: THROAT
LOCATION: ABDOMEN

## 2025-02-19 ASSESSMENT — PAIN DESCRIPTION - FREQUENCY: FREQUENCY: INTERMITTENT

## 2025-02-19 ASSESSMENT — PAIN - FUNCTIONAL ASSESSMENT
PAIN_FUNCTIONAL_ASSESSMENT: ACTIVITIES ARE NOT PREVENTED

## 2025-02-19 ASSESSMENT — PAIN SCALES - GENERAL
PAINLEVEL_OUTOF10: 2

## 2025-02-19 ASSESSMENT — PAIN DESCRIPTION - PAIN TYPE: TYPE: ACUTE PAIN

## 2025-02-19 NOTE — PROGRESS NOTES
V2.0  [facility] Hospitalist Progress Note      Name:  Coty Stearns /Age/Sex: 1970  (54 y.o. female)   MRN & CSN:  [id number] & [id number] Encounter Date/Time: 2025 7:37 AM EST    Location:  [address] [place] [address]-A PCP: Grazyna English MD       Hospital Day: 2    Assessment and Plan:   Coty Stearns is a 54 y.o. female with pmh of hypothyroidism, fibromyalgia, hypertension, hyperlipidemia, schizoaffective disorder, seizures,  who presents with Dysarthria      Plan:    Strokelike symptoms  Tremors  -Presented with episodes of confusion, dysarthria, tremors, dizziness with headache   -CT head no acute process  -CTA head and neck with no LVO  -Unclear last known well - stroke alert not called in ED  -NIH 4 (dysarthria, mild R-sided sensation loss, ataxia)  - continue neuro checks  - PT/OT/SLP   - monitor on telemetry   - start ASA, continue statin, Fioricet prn for HA   - permissive hypertension <220/120  - MRI brain: pending   -FLP: , HDL 59, LDL 70, Trig 84, HgbA1c 5.9%, ammonia 34  - Neurology consulted: will follow MRI and EEG, if normal will sign off, doesn't seem typical of seizure or CVA  - ?functional neurologic symptoms, patient admits to increased stress at home  - Psych consulted: per Kimberley WILLARD, recommends outpatient counseling for stress     Nausea/vomiting  -CT A/P with hepatic, splenic and left renal cysts, moderate fecal stasis, otherwise nonacute exam  -Symptoms resolved upon my evaluation  -Continue antiemetics  -start bowel regimen     Fall  Generalized weakness and fatigue   - PT/OT- recs for SNF   -add B12, Folate, Vit and Ferritin  -TSH 1.51, Free T4 0.8 (will need PCP to follow this up, may need to start thyroid replacement)      Lactic acidosis - resolved   -Lactic acid 2.1>1.9  -May be secondary to dehydration given vomiting  -Received IV fluid bolus  -Recheck lactic acid pending    Hypertension, with Hypotension  -Continue Lopressor  -Takes midodrine as needed for low

## 2025-02-19 NOTE — PROCEDURES
ROUTINE ELECTROENCEPHALOGRAM    Identifying Information:  Name: Coty Stearns  MRN: [id number]  : 1970  Interpreting Physician: Lexy Reid DO  Referring Provider: MONTSE Perez  Date of EE25  Procedure Location: Inpatient [facility]     Clinical History:  Coty Stearns is a 54 y.o. female with concern for seizures     Current Medications:    amLODIPine  2.5 mg Oral Nightly    budesonide-formoterol  2 puff Inhalation BID    levothyroxine  50 mcg Oral Daily    magnesium oxide  400 mg Oral Daily    metoprolol tartrate  50 mg Oral BID    midodrine  5 mg Oral TID    montelukast  10 mg Oral Nightly    polyethylene glycol  17 g Oral BID    prasugrel  10 mg Oral Daily    QUEtiapine  100 mg Oral Nightly    ranolazine  1,000 mg Oral BID    rosuvastatin  40 mg Oral Nightly    venlafaxine  150 mg Oral Daily    sodium chloride flush  5-40 mL IntraVENous 2 times per day    enoxaparin  40 mg SubCUTAneous Daily    [START ON 2025] aspirin  81 mg Oral Daily    Or    [START ON 2025] aspirin  300 mg Rectal Daily    sennosides-docusate sodium  2 tablet Oral Daily        Indication:  Rule out seizure/seizure disorder     Technical Summary:  28 channels of EEG were recorded in a digital format on a patient who was reported to be awake and drowsy during the recording. The patient not sleep deprived prior to the EEG.     The PDR consisted of well-developed, well-regulated 8-9 Hz alpha activity, maximal over the posterior head regions and reactive to eye opening and closure.     Photic stimulation performed and did not produce any abnormalities. During the recording stage II sleep was not seen.     The EKG lead revealed no rhythm abnormalities.       EEG Interpretation:   This EEG was within normal limits for a patient of this age in the awake and drowsy state. No focal, lateralizing, or epileptiform features were seen during the recording.       Clinical correlation is recommended.    Lexy Reid

## 2025-02-19 NOTE — PLAN OF CARE
Problem: Discharge Planning  Goal: Discharge to home or other facility with appropriate resources  2/18/2025 2332 by Tamiko Cardoza, RN  Flowsheets (Taken 2/18/2025 2332)  Discharge to home or other facility with appropriate resources:   Identify barriers to discharge with patient and caregiver   Arrange for needed discharge resources and transportation as appropriate   Identify discharge learning needs (meds, wound care, etc)     Problem: Pain  Goal: Verbalizes/displays adequate comfort level or baseline comfort level  2/18/2025 2332 by Tamiko Cardoza, RN  Flowsheets (Taken 2/18/2025 2332)  Verbalizes/displays adequate comfort level or baseline comfort level:   Encourage patient to monitor pain and request assistance   Assess pain using appropriate pain scale   Administer analgesics based on type and severity of pain and evaluate response   Consider cultural and social influences on pain and pain management   Implement non-pharmacological measures as appropriate and evaluate response

## 2025-02-20 ENCOUNTER — APPOINTMENT (OUTPATIENT)
Dept: MRI IMAGING | Age: 55
End: 2025-02-20
Payer: COMMERCIAL

## 2025-02-20 LAB
GLUCOSE BLD-MCNC: 135 MG/DL (ref 74–99)
GLUCOSE BLD-MCNC: 216 MG/DL (ref 74–99)
GLUCOSE BLD-MCNC: 88 MG/DL (ref 74–99)
GLUCOSE BLD-MCNC: 92 MG/DL (ref 74–99)
MICROORGANISM SPEC CULT: NORMAL
SPECIMEN DESCRIPTION: NORMAL

## 2025-02-20 PROCEDURE — 94640 AIRWAY INHALATION TREATMENT: CPT

## 2025-02-20 PROCEDURE — 96372 THER/PROPH/DIAG INJ SC/IM: CPT

## 2025-02-20 PROCEDURE — 6370000000 HC RX 637 (ALT 250 FOR IP): Performed by: NURSE PRACTITIONER

## 2025-02-20 PROCEDURE — 70551 MRI BRAIN STEM W/O DYE: CPT

## 2025-02-20 PROCEDURE — 2500000003 HC RX 250 WO HCPCS: Performed by: PHYSICIAN ASSISTANT

## 2025-02-20 PROCEDURE — 82962 GLUCOSE BLOOD TEST: CPT

## 2025-02-20 PROCEDURE — 6360000002 HC RX W HCPCS: Performed by: PHYSICIAN ASSISTANT

## 2025-02-20 PROCEDURE — 6370000000 HC RX 637 (ALT 250 FOR IP): Performed by: PHYSICIAN ASSISTANT

## 2025-02-20 PROCEDURE — G0378 HOSPITAL OBSERVATION PER HR: HCPCS

## 2025-02-20 PROCEDURE — 94761 N-INVAS EAR/PLS OXIMETRY MLT: CPT

## 2025-02-20 PROCEDURE — 6370000000 HC RX 637 (ALT 250 FOR IP): Performed by: FAMILY MEDICINE

## 2025-02-20 RX ORDER — VITAMIN B COMPLEX
2000 TABLET ORAL DAILY
Status: DISCONTINUED | OUTPATIENT
Start: 2025-02-20 | End: 2025-02-21 | Stop reason: HOSPADM

## 2025-02-20 RX ORDER — LANOLIN ALCOHOL/MO/W.PET/CERES
1000 CREAM (GRAM) TOPICAL DAILY
Status: DISCONTINUED | OUTPATIENT
Start: 2025-02-20 | End: 2025-02-21 | Stop reason: HOSPADM

## 2025-02-20 RX ADMIN — BUDESONIDE AND FORMOTEROL FUMARATE DIHYDRATE 2 PUFF: 160; 4.5 AEROSOL RESPIRATORY (INHALATION) at 09:17

## 2025-02-20 RX ADMIN — ASPIRIN 81 MG CHEWABLE TABLET 81 MG: 81 TABLET CHEWABLE at 10:40

## 2025-02-20 RX ADMIN — METOPROLOL TARTRATE 50 MG: 50 TABLET, FILM COATED ORAL at 10:40

## 2025-02-20 RX ADMIN — Medication 1000 MCG: at 10:40

## 2025-02-20 RX ADMIN — BUTALBITAL, ACETAMINOPHEN, AND CAFFEINE 2 TABLET: 325; 50; 40 TABLET ORAL at 23:36

## 2025-02-20 RX ADMIN — BUTALBITAL, ACETAMINOPHEN, AND CAFFEINE 2 TABLET: 325; 50; 40 TABLET ORAL at 15:33

## 2025-02-20 RX ADMIN — SODIUM CHLORIDE, PRESERVATIVE FREE 10 ML: 5 INJECTION INTRAVENOUS at 23:24

## 2025-02-20 RX ADMIN — BUTALBITAL, ACETAMINOPHEN, AND CAFFEINE 2 TABLET: 325; 50; 40 TABLET ORAL at 06:24

## 2025-02-20 RX ADMIN — PRASUGREL 10 MG: 10 TABLET, FILM COATED ORAL at 10:40

## 2025-02-20 RX ADMIN — METOPROLOL TARTRATE 50 MG: 50 TABLET, FILM COATED ORAL at 23:23

## 2025-02-20 RX ADMIN — POLYETHYLENE GLYCOL (3350) 17 G: 17 POWDER, FOR SOLUTION ORAL at 10:40

## 2025-02-20 RX ADMIN — MONTELUKAST 10 MG: 10 TABLET, FILM COATED ORAL at 23:23

## 2025-02-20 RX ADMIN — Medication 2000 UNITS: at 10:40

## 2025-02-20 RX ADMIN — ROSUVASTATIN CALCIUM 40 MG: 20 TABLET, FILM COATED ORAL at 23:22

## 2025-02-20 RX ADMIN — ENOXAPARIN SODIUM 40 MG: 100 INJECTION SUBCUTANEOUS at 10:40

## 2025-02-20 RX ADMIN — LEVOTHYROXINE SODIUM 50 MCG: 0.05 TABLET ORAL at 06:24

## 2025-02-20 RX ADMIN — SODIUM CHLORIDE, PRESERVATIVE FREE 10 ML: 5 INJECTION INTRAVENOUS at 10:43

## 2025-02-20 RX ADMIN — BUDESONIDE AND FORMOTEROL FUMARATE DIHYDRATE 2 PUFF: 160; 4.5 AEROSOL RESPIRATORY (INHALATION) at 19:32

## 2025-02-20 RX ADMIN — MIDODRINE HYDROCHLORIDE 5 MG: 5 TABLET ORAL at 23:23

## 2025-02-20 RX ADMIN — MIDODRINE HYDROCHLORIDE 5 MG: 5 TABLET ORAL at 10:40

## 2025-02-20 RX ADMIN — Medication 400 MG: at 10:40

## 2025-02-20 RX ADMIN — SENNOSIDES AND DOCUSATE SODIUM 2 TABLET: 50; 8.6 TABLET ORAL at 10:40

## 2025-02-20 RX ADMIN — POLYETHYLENE GLYCOL (3350) 17 G: 17 POWDER, FOR SOLUTION ORAL at 23:23

## 2025-02-20 RX ADMIN — QUETIAPINE FUMARATE 100 MG: 100 TABLET ORAL at 23:23

## 2025-02-20 RX ADMIN — VENLAFAXINE 150 MG: 37.5 TABLET ORAL at 06:23

## 2025-02-20 RX ADMIN — MIDODRINE HYDROCHLORIDE 5 MG: 5 TABLET ORAL at 13:34

## 2025-02-20 ASSESSMENT — PAIN SCALES - GENERAL
PAINLEVEL_OUTOF10: 0
PAINLEVEL_OUTOF10: 5
PAINLEVEL_OUTOF10: 0
PAINLEVEL_OUTOF10: 0

## 2025-02-20 ASSESSMENT — PAIN DESCRIPTION - DESCRIPTORS: DESCRIPTORS: TIGHTNESS;ACHING

## 2025-02-20 ASSESSMENT — PAIN DESCRIPTION - LOCATION: LOCATION: HEAD;NECK

## 2025-02-20 ASSESSMENT — PAIN DESCRIPTION - ORIENTATION: ORIENTATION: MID

## 2025-02-20 NOTE — PROGRESS NOTES
V2.0  [facility] [place] Hospitalist Progress Note      Name:  Coty Stearns /Age/Sex: 1970  (54 y.o. female)   MRN & CSN:  [id number] & [id number] Encounter Date/Time: 2025 7:37 AM EST    Location:  [address] [place] [address]-A PCP: Grazyna English MD       Hospital Day: 3    Assessment and Plan:   Coty Stearns is a 54 y.o. female with pmh of hypothyroidism, fibromyalgia, hypertension, hyperlipidemia, schizoaffective disorder, seizures,  who presents with Dysarthria      Plan:    Strokelike symptoms  Tremors  -Presented with episodes of confusion, dysarthria, tremors, dizziness with headache   -CT head no acute process  -CTA head and neck with no LVO  -Unclear last known well - stroke alert not called in ED  -NIH 4 (dysarthria, mild R-sided sensation loss, ataxia)  - continue neuro checks  - PT/OT/SLP   - monitor on telemetry   - start ASA, continue statin, Fioricet prn for HA   - permissive hypertension <220/120  - MRI brain: pending   -FLP: , HDL 59, LDL 70, Trig 84, HgbA1c 5.9%, ammonia 34  - Neurology consulted: will follow MRI and EEG, if normal will sign off, doesn't seem typical of seizure or CVA  - ?functional neurologic symptoms, patient admits to increased stress at home  - Psych consulted: per Kimberley WILLARD, recommends outpatient counseling for stress      Nausea/vomiting  -CT A/P with hepatic, splenic and left renal cysts, moderate fecal stasis, otherwise nonacute exam  -Symptoms resolved upon my evaluation  -Continue antiemetics  -start bowel regimen     Fall  Generalized weakness and fatigue   - PT/OT- recs for SNF   -B12 334 , Folate 14.4, Vit D 20.8 and Ferritin 49  -TSH 1.51, Free T4 0.8 (will need PCP to follow this up, may need to start thyroid replacement)     Low B12 and Vit D Deficiency   -begin B12 1000 mcg PO QD  -begin Vit D 2000 u PO QD      Lactic acidosis - resolved   -Lactic acid 2.1>1.9  -May be secondary to dehydration given vomiting  -Received IV fluid bolus  -Recheck lactic  wall. Biliary tree: There is no evidence for intra-or extrahepatic biliary ductal dilatation. No CT evidence for choledocholithiasis. Pancreas: Normal morphology without pseudocyst, ductal dilatation, or inflammatory changes. Spleen: Peripherally calcified 3.3 cm splenic cyst. Noncalcified smaller splenic  cysts are seen. Adrenals: Normal size without masses. Kidneys: Normal size and morphology. No nephrolithiasis or hydronephrosis. Small  left renal cyst. Vasculature: No evidence of aneurysm or other significant vascular pathology. No  evidence of dissection. Lymphatic system: No pathologically enlarged retroperitoneal, retrocrural, or mesenteric lymphadenopathy seen. Bowel: The stomach is normally distended with no focal wall abnormality. The duodenum is normal in caliber along its course. No focal abnormality is seen. The small bowel is normal caliber. There is no focal stricture, significant fold  thickening, or dilatation. The appendix appears normal. The colon is normal in caliber. No wall thickening or obstruction seen. Moderate fecal debris scattered  throughout the colon and rectum. No diverticular disease. No rectal wall thickening noted. Peritoneal structures: No evidence of free air or free fluid. No masses. The omentum and small bowel mesentery are normal. Abdominal wall:  The visualized portions of the abdominal wall are within normal  limits. No evidence for ventral hernia. Bones: No lytic or sclerotic osseous lesions are seen. CT PELVIS: Genital: Uterus is not seen. Ovaries are not visualized. Vaginal cuff appears normal. Urinary bladder: The urinary bladder is distended with a smooth thin wall. No focal abnormalities are seen. No intraluminal filling defects are seen on delayed phase imaging. Soft tissues: No other significant abnormalities in the pelvic structures. No free fluid or lymphadenopathy. The ischiorectal fossa and inguinal regions are normal. Bones: No acute or pathologic pelvic osseous

## 2025-02-21 VITALS
OXYGEN SATURATION: 96 % | SYSTOLIC BLOOD PRESSURE: 137 MMHG | RESPIRATION RATE: 17 BRPM | BODY MASS INDEX: 28.93 KG/M2 | HEART RATE: 59 BPM | HEIGHT: 66 IN | TEMPERATURE: 97.3 F | DIASTOLIC BLOOD PRESSURE: 84 MMHG | WEIGHT: 180 LBS

## 2025-02-21 LAB
GLUCOSE BLD-MCNC: 101 MG/DL (ref 74–99)
GLUCOSE BLD-MCNC: 104 MG/DL (ref 74–99)
GLUCOSE BLD-MCNC: 130 MG/DL (ref 74–99)

## 2025-02-21 PROCEDURE — 2580000003 HC RX 258

## 2025-02-21 PROCEDURE — 96361 HYDRATE IV INFUSION ADD-ON: CPT

## 2025-02-21 PROCEDURE — 6370000000 HC RX 637 (ALT 250 FOR IP): Performed by: PHYSICIAN ASSISTANT

## 2025-02-21 PROCEDURE — 97110 THERAPEUTIC EXERCISES: CPT

## 2025-02-21 PROCEDURE — 82962 GLUCOSE BLOOD TEST: CPT

## 2025-02-21 PROCEDURE — G0378 HOSPITAL OBSERVATION PER HR: HCPCS

## 2025-02-21 PROCEDURE — 97530 THERAPEUTIC ACTIVITIES: CPT

## 2025-02-21 PROCEDURE — 6360000002 HC RX W HCPCS: Performed by: PHYSICIAN ASSISTANT

## 2025-02-21 PROCEDURE — 94761 N-INVAS EAR/PLS OXIMETRY MLT: CPT

## 2025-02-21 PROCEDURE — 2500000003 HC RX 250 WO HCPCS: Performed by: PHYSICIAN ASSISTANT

## 2025-02-21 PROCEDURE — 97535 SELF CARE MNGMENT TRAINING: CPT

## 2025-02-21 PROCEDURE — 6370000000 HC RX 637 (ALT 250 FOR IP): Performed by: NURSE PRACTITIONER

## 2025-02-21 PROCEDURE — 96372 THER/PROPH/DIAG INJ SC/IM: CPT

## 2025-02-21 PROCEDURE — 94640 AIRWAY INHALATION TREATMENT: CPT

## 2025-02-21 PROCEDURE — 6370000000 HC RX 637 (ALT 250 FOR IP): Performed by: FAMILY MEDICINE

## 2025-02-21 RX ORDER — ONDANSETRON 4 MG/1
4 TABLET, ORALLY DISINTEGRATING ORAL EVERY 8 HOURS PRN
Qty: 30 TABLET | Refills: 0
Start: 2025-02-21

## 2025-02-21 RX ORDER — LORAZEPAM 0.5 MG/1
0.5 TABLET ORAL 2 TIMES DAILY PRN
Qty: 30 TABLET | Refills: 0
Start: 2025-02-21 | End: 2025-03-23

## 2025-02-21 RX ORDER — ASPIRIN 81 MG/1
81 TABLET, CHEWABLE ORAL DAILY
Status: DISCONTINUED | OUTPATIENT
Start: 2025-02-21 | End: 2025-02-21 | Stop reason: HOSPADM

## 2025-02-21 RX ORDER — ACETAMINOPHEN 500 MG
1000 TABLET ORAL EVERY 6 HOURS PRN
COMMUNITY
Start: 2025-02-21

## 2025-02-21 RX ORDER — CHOLECALCIFEROL (VITAMIN D3) 50 MCG
2000 TABLET ORAL DAILY
COMMUNITY
Start: 2025-02-22

## 2025-02-21 RX ORDER — DICYCLOMINE HCL 20 MG
20 TABLET ORAL 3 TIMES DAILY PRN
Qty: 120 TABLET | Refills: 3 | Status: SHIPPED | OUTPATIENT
Start: 2025-02-21

## 2025-02-21 RX ORDER — IBUPROFEN 600 MG/1
600 TABLET, FILM COATED ORAL EVERY 8 HOURS PRN
COMMUNITY
Start: 2025-02-21

## 2025-02-21 RX ORDER — SENNA AND DOCUSATE SODIUM 50; 8.6 MG/1; MG/1
2 TABLET, FILM COATED ORAL DAILY PRN
COMMUNITY
Start: 2025-02-21

## 2025-02-21 RX ORDER — 0.9 % SODIUM CHLORIDE 0.9 %
500 INTRAVENOUS SOLUTION INTRAVENOUS ONCE
Status: COMPLETED | OUTPATIENT
Start: 2025-02-21 | End: 2025-02-21

## 2025-02-21 RX ADMIN — SODIUM CHLORIDE, PRESERVATIVE FREE 10 ML: 5 INJECTION INTRAVENOUS at 09:41

## 2025-02-21 RX ADMIN — Medication 400 MG: at 08:47

## 2025-02-21 RX ADMIN — PRASUGREL 10 MG: 10 TABLET, FILM COATED ORAL at 09:40

## 2025-02-21 RX ADMIN — LEVOTHYROXINE SODIUM 50 MCG: 0.05 TABLET ORAL at 06:14

## 2025-02-21 RX ADMIN — ASPIRIN 81 MG CHEWABLE TABLET 81 MG: 81 TABLET CHEWABLE at 10:46

## 2025-02-21 RX ADMIN — SENNOSIDES AND DOCUSATE SODIUM 2 TABLET: 50; 8.6 TABLET ORAL at 08:46

## 2025-02-21 RX ADMIN — Medication 1000 MCG: at 08:47

## 2025-02-21 RX ADMIN — SODIUM CHLORIDE 500 ML: 9 INJECTION, SOLUTION INTRAVENOUS at 04:50

## 2025-02-21 RX ADMIN — Medication 2000 UNITS: at 08:46

## 2025-02-21 RX ADMIN — BUDESONIDE AND FORMOTEROL FUMARATE DIHYDRATE 2 PUFF: 160; 4.5 AEROSOL RESPIRATORY (INHALATION) at 07:58

## 2025-02-21 RX ADMIN — BUTALBITAL, ACETAMINOPHEN, AND CAFFEINE 2 TABLET: 325; 50; 40 TABLET ORAL at 08:47

## 2025-02-21 RX ADMIN — MIDODRINE HYDROCHLORIDE 5 MG: 5 TABLET ORAL at 08:46

## 2025-02-21 RX ADMIN — POLYETHYLENE GLYCOL (3350) 17 G: 17 POWDER, FOR SOLUTION ORAL at 08:46

## 2025-02-21 RX ADMIN — VENLAFAXINE 150 MG: 37.5 TABLET ORAL at 06:14

## 2025-02-21 RX ADMIN — ENOXAPARIN SODIUM 40 MG: 100 INJECTION SUBCUTANEOUS at 08:49

## 2025-02-21 NOTE — DISCHARGE SUMMARY
V2.0  Discharge Summary    Name:  Coty Stearns /Age/Sex: 1970 (54 y.o. female)   Admit Date: 2025  Discharge Date: 25    MRN & CSN:  [id number] & [id number] Encounter Date and Time 25 3:59 PM EST    Attending:  Fly Peter MD Discharging Provider: MONTSE RUCKER - [facility] Course:     Brief HPI: Coty Stearns is a 54 y.o. female who presented with stroke like symptoms of confusion, headache, dysarthria, tremors, fatigue and extremity weakness. Her Neurology workup was negative for acute stroke and her speech and tremors have improved.  PT and OT recommended SNF placement for rehab and she has been accepted today to [facility].     Brief Problem Based Course:     Strokelike symptoms - NO CVA  Tremors- improved   -Presented with episodes of confusion, dysarthria, tremors, dizziness with headache   -CT head no acute process  -CTA head and neck with no LVO  -Unclear last known well - stroke alert not called in ED  -NIH 4 (dysarthria, mild R-sided sensation loss, ataxia)  - continue neuro checks  - PT/OT/SLP consults   - monitored on telemetry   - cont ASA and statin, Fioricet prn for HA   - MRI brain: NEG for acute stroke, chronic microvascular ischemic changes   -FLP: , HDL 59, LDL 70, Trig 84, HgbA1c 5.9%, ammonia 34  - Neurology consulted: will follow MRI and EEG, if normal will sign off, doesn't seem typical of seizure or CVA  - ?functional neurologic symptoms, patient admits to increased stress at home  - Psych consulted: per Kimberley WILLARD, recommends outpatient counseling for stress      Headache with nausea/vomiting- resolved   -CT A/P with hepatic, splenic and left renal cysts, moderate fecal stasis, otherwise nonacute exam  -Symptoms resolved upon my evaluation  -Continue antiemetics  -start bowel regimen     Fall  Generalized weakness and fatigue   - PT/OT- recs for SNF   -B12 334 , Folate 14.4, Vit D 20.8 and Ferritin 49  -TSH 1.51, Free T4 0.8  to chronic microvascular ischemic changes. This dictation was created with voice recognition software.  While attempts have  been made to review the dictation as it is transcribed, on occasion the spoken word can be misinterpreted by the technology leading to omissions or inappropriate words, phrases or sentences. .  Dictated and Electronically Signed By: Suhas Pittman MD 2/20/2025 18:08        CTA HEAD NECK W CONTRAST    Result Date: 2/18/2025  CTA HEAD NECK W CONTRAST Reason for exam: New onset dysarthria History: IOPAMIDOL 76 % IV SOLN Comparison: Noncontrast CT head of the same date Procedure and Materials: Helical images were obtained in the axial plane during the rapid administration of intravenous contrast. The exam was timed to best evaluate and opacify the arterial structures. The examination is reviewed at  soft tissue, and lung windows. 2D and 3D reconstructions are performed of the target arterial structures using an independent workstation and the institution's Exo software.CT radiation dose optimization techniques (automatic exposure control, or use of iterative reconstruction techniques, or adjustment of the mA and or kV  according to the patient's size) were used to limited patient radiation dose FINDINGS: Brain: No significant abnormalities in the arterial phase. Soft Tissues: No significant soft tissue findings in the neck or upper chest. Upper Lungs:  No nodules or other significant lung pathology. Aortic Arch: Classic origin of the brachiocephalic vessels. No significant abnormalities in the common carotid or subclavian arteries. Right Cervical Carotid: Calcification at Carotid bifurcation. No evidence of significant stenosis or dissection. Left Cervical Carotid: Normal carotid bifurcation. No evidence of significant stenosis or dissection. Vertebrals: Patent vessels with no evidence of significant stenosis. Intracranial Vasculature:  No evidence of aneurysm, vascular malformation, or major

## 2025-02-21 NOTE — PROGRESS NOTES
Physical Therapy Treatment Note  Name: Coty Stearns MRN: [id number] :   1970   Date:  2025   Admission Date: 2025 Room:  [address] [place] [address]   Restrictions/Precautions:  general precautions, falls   Communication with other providers:  Pt okay to see for therapy per RN, coordination with nursing for transfer.   Subjective:  Patient states:  Agreeable to session, highly motivated.   Pain:   Location, Type, Intensity (0/10 to 10/10):  Endorses pn at R knee and size difference noted between R and L LE.   Objective:    Observation:  Pt sitting up in recliner upon PTA arrival.   Objective Measures:  Tele, stable  Treatment, including education/measures:    Therapeutic Activity Training:   Therapeutic activity training was instructed today.  Cues were given for safety, sequence, UE/LE placement, awareness, and balance.    Activities performed today included bed mobility training, sup-sit, sit-stand, SPT.    Mobility:  STS: From recliner with use of bed rail for force production 1 x 6 ea. Min-Mod A progressing to CGA for last two reps.   SPT: Squat-pivot from recliner > EOB with Max A x 2.   Sit > Sup: Min A at LEs.  Boost to HOB: Mod A at LEs to stabilize.     Exercises:  Pt performed EOB APs, marching, LAQ L LE only dt R knee pain, hip add with pillow, glut sets 1 x 10 ea. With increased time and effort.     Safety:   Pt returned safely to bed with bed alarm activated, call light in reach, all needs met.   Assessment / Impression:    Pt tolerated OOB Activities well this date but with impaired overall strength and endurance.   Patient's tolerance of treatment:  Good   Adverse Reaction: none  Significant change in status and impact:  none  Barriers to improvement:  none  Plan for Next Session:    Plan to continue OOB Activities.   Time in:  1347  Time out:  1420  Timed treatment minutes:  33  Total treatment time:  33    Previously filed items:  Social/Functional History  Lives With: Friend(s)  Home Layout:

## 2025-02-21 NOTE — PROGRESS NOTES
V2.0  [facility] [place]      Name:  Coty Stearns /Age/Sex: 1970  (54 y.o. female)   MRN & CSN:  [id number] & [id number] Encounter Date/Time: 2025 7:37 AM EST    Location:  [address] [place] [address]-A PCP: Grazyna English MD       Hospital Day: 4    Assessment and Plan:   Coty Stearns is a 54 y.o. female with pmh of hypothyroidism, fibromyalgia, hypertension, hyperlipidemia, schizoaffective disorder, seizures,  who presents with Dysarthria      Plan:    Strokelike symptoms - NO CVA  Tremors  -Presented with episodes of confusion, dysarthria, tremors, dizziness with headache   -CT head no acute process  -CTA head and neck with no LVO  -Unclear last known well - stroke alert not called in ED  -NIH 4 (dysarthria, mild R-sided sensation loss, ataxia)  - continue neuro checks  - PT/OT/SLP   - monitor on telemetry   - start ASA, continue statin, Fioricet prn for HA   - MRI brain: NEG for acute stroke, chronic microvascular ischemic changes   -FLP: , HDL 59, LDL 70, Trig 84, HgbA1c 5.9%, ammonia 34  - Neurology consulted: will follow MRI and EEG, if normal will sign off, doesn't seem typical of seizure or CVA  - ?functional neurologic symptoms, patient admits to increased stress at home  - Psych consulted: per Kimberley WILLARD, recommends outpatient counseling for stress      Nausea/vomiting  -CT A/P with hepatic, splenic and left renal cysts, moderate fecal stasis, otherwise nonacute exam  -Symptoms resolved upon my evaluation  -Continue antiemetics  -start bowel regimen     Fall  Generalized weakness and fatigue   - PT/OT- recs for SNF   -B12 334 , Folate 14.4, Vit D 20.8 and Ferritin 49  -TSH 1.51, Free T4 0.8 (will need PCP to follow this up, may need to start thyroid replacement)     Low B12 and Vit D Deficiency   -begin B12 1000 mcg PO QD  -begin Vit D 2000 u PO QD      Lactic acidosis - resolved   -Lactic acid 2.1>1.9  -May be secondary to dehydration given vomiting  -Received IV fluid  use of iterative reconstruction techniques, or adjustment of the mA and/or kV according  to patient size) were used to limit patient radiation dose. Findings: There is no evidence for intracranial hemorrhage, subfalcine herniation, or focal mass effect. No hydrocephalus seen. The ventricles and sulcal pattern appear normal for age. Gray-white matter differentiation is preserved.  No abnormal white matter changes seen.  The visualized sinuses and mastoid air cells are clear. No calvarial fracture seen. Soft tissues are unremarkable. IMPRESSION: 1.  Nonacute CT head without contrast 2.  There is no evidence for acute hemorrhage, infarct, or mass lesion.  Dictated and Electronically Signed By: Jose Juan Evans DO 2/18/2025 7:03        CT ABDOMEN PELVIS W IV CONTRAST Additional Contrast? None    Result Date: 2/18/2025  ORDER DATE: 2/18/2025 7:46 INDICATION: Abdominal pain, nausea DEMOGRAPHICS: 54 years old Female COMPARISON: None TECHNIQUE: Spiral axial routine CT images of the abdomen and pelvis were submitted after the IV administration of contrast. No oral contrast was utilized. Additional coronal reformatted images were submitted.  CT radiation dose optimization techniques (automated exposure control, use of iterative reconstruction techniques, or adjustment of the mA and/or kV according to patient size) were used to limit patient radiation dose. CONTRAST:  IOPAMIDOL 76 % IV SOLN FINDINGS: CT ABDOMEN: Lung bases: Visualized lung bases are clear without pleural effusion. Liver: Normal hepatic size. 3.7 cm posterior right hepatic cyst is seen. Gallbladder:  The gallbladder is distended with a smooth thin wall. Biliary tree: There is no evidence for intra-or extrahepatic biliary ductal dilatation. No CT evidence for choledocholithiasis. Pancreas: Normal morphology without pseudocyst, ductal dilatation, or inflammatory changes. Spleen: Peripherally calcified 3.3 cm splenic cyst. Noncalcified smaller splenic  cysts are

## 2025-02-21 NOTE — DISCHARGE INSTR - COC
Continuity of Care Form    Patient Name: Coty Stearns   :  1970  MRN:  [id number]    Admit date:  2025  Discharge date:  2025    Code Status Order: Full Code   Advance Directives:   Advance Care Flowsheet Documentation             Admitting Physician:  Kvng Mobley MD  PCP: Grazyna English MD    Discharging Nurse: HEIDY Munoz  Discharging Hospital Unit/Room#: 1129/1129-A  Discharging Unit Phone Number: [phone]    Emergency Contact:   Extended Emergency Contact Information  Primary Emergency Contact: Erika Flores           [facility]  Home Phone: [phone]  Mobile Phone: [phone]  Relation: Child  Secondary Emergency Contact: aMjo Martinez  Home Phone: [phone]  Relation: Brother/Sister   needed? No    Past Surgical History:  Past Surgical History:   Procedure Laterality Date    BACK SURGERY      BREAST BIOPSY      benign    CARDIAC CATHETERIZATION      CARDIAC PROCEDURE N/A 2024    Left heart cath / coronary angiography performed by Campos Mullins MD at [facility] CARDIAC CATH LAB    CARDIAC PROCEDURE N/A 2024    Left heart cath / coronary angiography performed by Oj Cuevas MD at [facility] CARDIAC CATH LAB    HYSTERECTOMY (CERVIX STATUS UNKNOWN)      INGUINAL HERNIA REPAIR Right     INNER EAR SURGERY Left     TUBAL LIGATION         Immunization History:   Immunization History   Administered Date(s) Administered    Influenza, AFLURIA (age 3 y+), FLUZONE, (age 6 mo+), Quadv MDV, 0.5mL 2020    Influenza, FLUARIX, FLULAVAL, FLUZONE (age 6 mo+) and AFLURIA, (age 3 y+), Quadv PF, 0.5mL 2018    Influenza, FLUCELVAX, (age 6 mo+), MDCK, Quadv PF, 0.5mL 2021       Active Problems:  Patient Active Problem List   Diagnosis Code    Hx of cardiovascular stress test Z92.89    Palpitation R00.2    H/O 24 hour EKG monitoring Z92.89    Fibromyalgia M79.7    Chronic obstructive pulmonary disease (HCC) J44.9

## 2025-02-21 NOTE — PROGRESS NOTES
Brief Neurology Note:  Chart reviewed. Patient not seen. MRI of brain completed and personally reviewed. No acute abnormality. Nothing further from our standpoint. We will sign off.     Yumiko Madrigal AG-CNS  Neurology

## 2025-02-21 NOTE — CARE COORDINATION
CM received update from [place], that patient has been approved by insurance. Perfect serve sent to Oksana NP with update.    3:51 PM Transport arranged with [facility] for 6:30pm. CM updated primary nurse Marco at [facility].   
Call to [facility], to check status of referral. Voicemail left with request for return call w/ update.    10:26 AM received update from [facility] Hernandez, that patient has been clinically accepted. Perfect serve sent to Oksana MANN to check if able to start precert with pt insurance.     11:45 AM Received update from Oksana MANN with okay to start precert today. AGUILA updated Carlene DEL TORO with information.  
Faxed updated PT/OT and clinical to [facility] [phone] with [facility] per request for precert.   
Pennie has been APPROVED for [facility], sent auth letter via secure fax to Mary Sesay at [facility]  Reference Number  [id number]  Approved 2/21 through 2/28   Next review date 2/27  
Precert submitted for [facility] via [employer] portal and is pending at this time.  Electronic HENS completed online  Certificate Information  Reference Number  [id number]  Status  PENDED    Review Reason 1  Requires Medical Review      [facility] status of precert number:  [phone]    
  Condition of Participation: Discharge Planning   The Patient and/or Patient Representative was provided with a Choice of Provider? Patient   The Patient and/Or Patient Representative agree with the Discharge Plan? Yes   Freedom of Choice list was provided with basic dialogue that supports the patient's individualized plan of care/goals, treatment preferences, and shares the quality data associated with the providers?  Yes

## 2025-02-21 NOTE — PROGRESS NOTES
Occupational Therapy Treatment Note    Name: Coty Stearns MRN: [id number] :   1970   Date:  2025   Admission Date: 2025 Room:  [address] [place] [address]     Primary Problem:  Dysarthria.     Restrictions/Precautions: general precautions, fall risk     Communication with other providers: Per chart review and Nurse patient is appropriate for therapeutic intervention.     Subjective:  Patient states:  Agreeable to OT  Pain:   deny    Objective:    Observation: In semi mistry position   Objective Measures:  Alert and oriented    Treatment, including education:  Therapeutic Activity Training:   Therapeutic activity training was instructed today.  Cues were given for safety, sequence, UE/LE placement, awareness, and balance.    Bed mobility:SBA with bed features  Sitting balance:F+ on EOB   Sit/stand transfers:Max A with F upright posture utlizing RW x3 reps for ~15 secs for static standing training  Functional Mobility: SPT with Max A with 2 steps with therapist    Activities performed today included bed mobility training, transfers, functional mobility  to increase strength, activity tolerance to facilitate IND c ADL tasks, func transfers / mobility with G safety awareness carryover    Self Care Training:   Cues were given for safety, sequence, UE/LE placement, visual cues, and balance.  Activities performed today included dressing, toileting, personal hygiene, and grooming task.Completed 90% in seated position with SBA for bathing, personal hygiene and grooming task. Demo figure 4 method for LB bathing and dressing. Completed posterior/anterior perineal area while standing utilizing RW with Max A to maintain upright standing balance and hiking undergarments over hips.     Assessment / Impression:    Patient's tolerance of treatment: Well  Adverse Reaction: None  Significant change in status and impact: Improved from initial evaluation  Barriers to improvement: strength and activity tolerance    Safety  Patient  educated on role of OT , benefits of OT and rationale for therapeutic intervention. Patient safely in chair + alarm set at end of session, with call light/phone in reach, and nursing aware. Gait belt was used for func transfers / mobility.    Plan for Next Session:    Continue OT POC    Time in:  910  Time out:  938  Timed treatment minutes:  28  Total treatment time:  28      Electronically signed by:      THEODORA Bolton

## 2025-02-21 NOTE — PLAN OF CARE
Problem: Pain  Goal: Verbalizes/displays adequate comfort level or baseline comfort level  2/21/2025 1059 by Tammy Laurent LPN  Outcome: Progressing  2/21/2025 0148 by Rosina Deluca RN  Outcome: Progressing     Problem: Safety - Adult  Goal: Free from fall injury  2/21/2025 1059 by Tammy Laurent LPN  Outcome: Progressing  2/21/2025 0148 by Rosina Deluca, RN  Outcome: Progressing

## 2025-02-22 NOTE — PROGRESS NOTES
Pt discharged via [facility] to [facility] for SNF at 1845. IV removed, attempted to call [facility] to give report x 2. Transferred to multiple different halls both times. None of them being the gleason she was on. Never got to a voicemail to leave a call back number.

## 2025-02-25 ENCOUNTER — HOSPITAL ENCOUNTER (OUTPATIENT)
Age: 55
Setting detail: SPECIMEN
Discharge: HOME | End: 2025-02-25
Payer: COMMERCIAL

## 2025-02-25 LAB
ALBUMIN SERPL-MCNC: 3.8 G/DL (ref 3.4–5)
ALBUMIN/GLOB SERPL: 2.2 {RATIO} (ref 1.1–2.2)
ALP SERPL-CCNC: 55 U/L (ref 40–129)
ALT SERPL-CCNC: 24 U/L (ref 10–40)
ANION GAP SERPL CALCULATED.3IONS-SCNC: 8 MMOL/L (ref 9–17)
AST SERPL-CCNC: 19 U/L (ref 15–37)
BASOPHILS # BLD: 0.05 K/UL
BASOPHILS NFR BLD: 1 % (ref 0–1)
BILIRUB SERPL-MCNC: <0.2 MG/DL (ref 0–1)
BUN SERPL-MCNC: 15 MG/DL (ref 7–20)
CALCIUM SERPL-MCNC: 10.1 MG/DL (ref 8.3–10.6)
CHLORIDE SERPL-SCNC: 109 MMOL/L (ref 99–110)
CO2 SERPL-SCNC: 27 MMOL/L (ref 21–32)
CREAT SERPL-MCNC: 0.8 MG/DL (ref 0.6–1.1)
EOSINOPHIL # BLD: 0.38 K/UL
EOSINOPHILS RELATIVE PERCENT: 4 % (ref 0–3)
ERYTHROCYTE [DISTWIDTH] IN BLOOD BY AUTOMATED COUNT: 15 % (ref 11.7–14.9)
GFR, ESTIMATED: 69 ML/MIN/1.73M2
GLUCOSE SERPL-MCNC: 77 MG/DL (ref 74–99)
HCT VFR BLD AUTO: 37.7 % (ref 37–47)
HGB BLD-MCNC: 11.2 G/DL (ref 12.5–16)
IMM GRANULOCYTES # BLD AUTO: 0.04 K/UL
IMM GRANULOCYTES NFR BLD: 1 %
LYMPHOCYTES NFR BLD: 2.24 K/UL
LYMPHOCYTES RELATIVE PERCENT: 25 % (ref 24–44)
MCH RBC QN AUTO: 29.2 PG (ref 27–31)
MCHC RBC AUTO-ENTMCNC: 29.7 G/DL (ref 32–36)
MCV RBC AUTO: 98.2 FL (ref 78–100)
MONOCYTES NFR BLD: 1.03 K/UL
MONOCYTES NFR BLD: 12 % (ref 0–4)
NEUTROPHILS NFR BLD: 58 % (ref 36–66)
NEUTS SEG NFR BLD: 5.13 K/UL
PLATELET # BLD AUTO: 200 K/UL (ref 140–440)
PMV BLD AUTO: 11.2 FL (ref 7.5–11.1)
POTASSIUM SERPL-SCNC: 4.4 MMOL/L (ref 3.5–5.1)
PROT SERPL-MCNC: 5.4 G/DL (ref 6.4–8.2)
RBC # BLD AUTO: 3.84 M/UL (ref 4.2–5.4)
SODIUM SERPL-SCNC: 144 MMOL/L (ref 136–145)
WBC OTHER # BLD: 8.9 K/UL (ref 4–10.5)

## 2025-02-25 PROCEDURE — 85025 COMPLETE CBC W/AUTO DIFF WBC: CPT

## 2025-02-25 PROCEDURE — 80053 COMPREHEN METABOLIC PANEL: CPT

## 2025-02-26 ENCOUNTER — HOSPITAL ENCOUNTER (OUTPATIENT)
Age: 55
Setting detail: SPECIMEN
Discharge: HOME | End: 2025-02-26
Payer: COMMERCIAL

## 2025-02-26 PROCEDURE — 87086 URINE CULTURE/COLONY COUNT: CPT

## 2025-02-26 PROCEDURE — 81001 URINALYSIS AUTO W/SCOPE: CPT

## 2025-02-26 PROCEDURE — 36415 COLL VENOUS BLD VENIPUNCTURE: CPT

## 2025-02-26 PROCEDURE — 87186 SC STD MICRODIL/AGAR DIL: CPT

## 2025-02-26 PROCEDURE — 87088 URINE BACTERIA CULTURE: CPT

## 2025-02-27 LAB
BILIRUB UR QL STRIP: NEGATIVE
CLARITY UR: CLEAR
COLOR UR: YELLOW
CRYSTALS URNS MICRO: ABNORMAL /HPF
GLUCOSE UR STRIP-MCNC: NEGATIVE MG/DL
HGB UR QL STRIP.AUTO: ABNORMAL
KETONES UR STRIP-MCNC: NEGATIVE MG/DL
LEUKOCYTE ESTERASE UR QL STRIP: ABNORMAL
NITRITE UR QL STRIP: POSITIVE
PH UR STRIP: 6 [PH] (ref 5–8)
PROT UR STRIP-MCNC: NEGATIVE MG/DL
RBC #/AREA URNS HPF: 4 /HPF (ref 0–2)
SP GR UR STRIP: >1.03 (ref 1–1.03)
UROBILINOGEN UR STRIP-ACNC: 0.2 EU/DL (ref 0–1)
WBC #/AREA URNS HPF: 345 /HPF (ref 0–5)

## 2025-03-01 LAB
MICROORGANISM SPEC CULT: ABNORMAL
SPECIMEN DESCRIPTION: ABNORMAL

## 2025-03-14 ENCOUNTER — HOSPITAL ENCOUNTER (OUTPATIENT)
Dept: MRI IMAGING | Age: 55
Discharge: HOME | End: 2025-03-14
Payer: COMMERCIAL

## 2025-03-14 DIAGNOSIS — M25.561 RIGHT KNEE PAIN, UNSPECIFIED CHRONICITY: ICD-10-CM

## 2025-03-14 PROCEDURE — 73721 MRI JNT OF LWR EXTRE W/O DYE: CPT

## 2025-04-07 SDOH — HEALTH STABILITY: PHYSICAL HEALTH: ON AVERAGE, HOW MANY MINUTES DO YOU ENGAGE IN EXERCISE AT THIS LEVEL?: 30 MIN

## 2025-04-07 SDOH — HEALTH STABILITY: PHYSICAL HEALTH: ON AVERAGE, HOW MANY DAYS PER WEEK DO YOU ENGAGE IN MODERATE TO STRENUOUS EXERCISE (LIKE A BRISK WALK)?: 0 DAYS

## 2025-04-08 ENCOUNTER — OFFICE VISIT (OUTPATIENT)
Dept: ORTHOPEDIC SURGERY | Age: 55
End: 2025-04-08
Payer: COMMERCIAL

## 2025-04-08 VITALS — OXYGEN SATURATION: 97 % | HEIGHT: 66 IN | HEART RATE: 77 BPM | BODY MASS INDEX: 29.05 KG/M2

## 2025-04-08 DIAGNOSIS — S83.271A COMPLEX TEAR OF LATERAL MENISCUS OF RIGHT KNEE AS CURRENT INJURY, INITIAL ENCOUNTER: ICD-10-CM

## 2025-04-08 DIAGNOSIS — M17.11 PRIMARY OSTEOARTHRITIS OF RIGHT KNEE: Primary | ICD-10-CM

## 2025-04-08 PROCEDURE — 1036F TOBACCO NON-USER: CPT | Performed by: ORTHOPAEDIC SURGERY

## 2025-04-08 PROCEDURE — G8417 CALC BMI ABV UP PARAM F/U: HCPCS | Performed by: ORTHOPAEDIC SURGERY

## 2025-04-08 PROCEDURE — G8427 DOCREV CUR MEDS BY ELIG CLIN: HCPCS | Performed by: ORTHOPAEDIC SURGERY

## 2025-04-08 PROCEDURE — 3017F COLORECTAL CA SCREEN DOC REV: CPT | Performed by: ORTHOPAEDIC SURGERY

## 2025-04-08 PROCEDURE — 99214 OFFICE O/P EST MOD 30 MIN: CPT | Performed by: ORTHOPAEDIC SURGERY

## 2025-04-08 NOTE — PROGRESS NOTES
IMPRESSION:   1.  A horizontal tear of the lateral meniscus body. Suspected posterior root   tear lateral meniscus  2.  Inner free edge fraying medial meniscus  3.  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  Early osteoarthritis medial compartment  5.  At least low-grade chondromalacia lateral compartment  6.  Moderate thinning articular cartilage posterior lateral tibial plateau  7.  Joint effusion    Pt states she has been in physical therapy for over 2 months and does not feel that it has been beneficial. Pt states she has not been walking that much due to the swelling and pain in her right knee and that has helped. Pt states her swelling has decreased since she has not been weight barring as much. Pt states she is also wearing a brace on her right knee. Pt rates her pain 5/10  
results from 12/19/2024:  FINDINGS:  No acute fracture or dislocation is present. Joint spacing is relatively  preserved. Visualized soft tissues are unremarkable. No sizable joint effusion  is present.     IMPRESSION:  No acute osseous abnormality.    MRI images of the right knee were reviewed by myself and discussed with the patient today:  FINDINGS:     LIGAMENTS AND TENDONS: The ACL and PCL are normal. The MCL is mildly thickened.   No tear. LCL complex shows normal fibular collateral ligament, biceps femoris   muscle and tendon, as well as the iliotibial band. Popliteus muscle and tendon   are normal. The distal petrous tendons are normal. Quadriceps tendon and   patellar tendon are normal     MENISCI: Medial meniscus shows normal morphology. No evidence for a tear, but   there is some inner free edge fraying suggested. Root attachment sites are   normal.     Lateral meniscus demonstrates a horizontal tear of the body that is demonstrated   on coronal image 15. Abundant abnormal signal is present at the posterior root   suggesting tear. Perhaps slight lateral meniscus extrusion     ARTICULAR CARTILAGE: Patellar articular cartilage is normal. Trochlear notch   articular cartilage is normal. Medial compartment shows no significant defect in   the articular cartilage, tiny marginal osteophytes. Lateral compartment shows   signal changes from at least low-grade chondromalacia. Severe thinning articular   cartilage lateral tibial plateau.     There is small or moderate joint effusion.     BONES: Infarcts in the distal femur and proximal tibia. No fracture or other   marrow replacement     OTHER:      Patellofemoral joint shows mild lateral patellar tilt without subluxation.   Patellofemoral retinacula are intact. Signal in Hoffa's fat pad is normal     .              IMPRESSION:   1.  A horizontal tear of the lateral meniscus body. Suspected posterior root   tear lateral meniscus  2.  Inner free edge fraying medial

## 2025-04-11 ENCOUNTER — HOSPITAL ENCOUNTER (OUTPATIENT)
Age: 55
Setting detail: SPECIMEN
Discharge: HOME | End: 2025-04-11

## 2025-04-11 PROBLEM — S83.271A COMPLEX TEAR OF LATERAL MENISCUS OF RIGHT KNEE AS CURRENT INJURY: Status: ACTIVE | Noted: 2025-04-11

## 2025-04-11 PROBLEM — M17.11 PRIMARY OSTEOARTHRITIS OF RIGHT KNEE: Status: ACTIVE | Noted: 2025-04-11

## 2025-04-11 LAB
B PARAP IS1001 DNA NPH QL NAA+NON-PROBE: NOT DETECTED
B PERT DNA SPEC QL NAA+PROBE: NOT DETECTED
C PNEUM DNA NPH QL NAA+NON-PROBE: NOT DETECTED
FLUAV RNA NPH QL NAA+NON-PROBE: NOT DETECTED
FLUBV RNA NPH QL NAA+NON-PROBE: NOT DETECTED
HADV DNA NPH QL NAA+NON-PROBE: NOT DETECTED
HCOV 229E RNA NPH QL NAA+NON-PROBE: NOT DETECTED
HCOV HKU1 RNA NPH QL NAA+NON-PROBE: NOT DETECTED
HCOV NL63 RNA NPH QL NAA+NON-PROBE: NOT DETECTED
HCOV OC43 RNA NPH QL NAA+NON-PROBE: NOT DETECTED
HMPV RNA NPH QL NAA+NON-PROBE: NOT DETECTED
HPIV1 RNA NPH QL NAA+NON-PROBE: NOT DETECTED
HPIV2 RNA NPH QL NAA+NON-PROBE: NOT DETECTED
HPIV3 RNA NPH QL NAA+NON-PROBE: NOT DETECTED
HPIV4 RNA NPH QL NAA+NON-PROBE: NOT DETECTED
M PNEUMO DNA NPH QL NAA+NON-PROBE: NOT DETECTED
RSV RNA NPH QL NAA+NON-PROBE: NOT DETECTED
RV+EV RNA NPH QL NAA+NON-PROBE: NOT DETECTED
SARS-COV-2 RNA NPH QL NAA+NON-PROBE: NOT DETECTED
SPECIMEN DESCRIPTION: NORMAL

## 2025-04-11 PROCEDURE — 87653 STREP B DNA AMP PROBE: CPT

## 2025-04-11 ASSESSMENT — ENCOUNTER SYMPTOMS
VOMITING: 0
WHEEZING: 0
CHEST TIGHTNESS: 0
COLOR CHANGE: 0
EYE PAIN: 0
SHORTNESS OF BREATH: 0
EYE REDNESS: 0

## 2025-04-12 LAB
GROUP B STREP SCREEN PCR: NOT DETECTED
SOURCE, 60200063: NORMAL

## 2025-04-14 RX ORDER — MIDODRINE HYDROCHLORIDE 5 MG/1
5 TABLET ORAL 3 TIMES DAILY
Qty: 126 TABLET | Refills: 2 | OUTPATIENT
Start: 2025-04-14

## 2025-04-17 ENCOUNTER — HOSPITAL ENCOUNTER (EMERGENCY)
Age: 55
Discharge: HOME | End: 2025-04-17
Attending: STUDENT IN AN ORGANIZED HEALTH CARE EDUCATION/TRAINING PROGRAM
Payer: COMMERCIAL

## 2025-04-17 ENCOUNTER — APPOINTMENT (OUTPATIENT)
Dept: CT IMAGING | Age: 55
End: 2025-04-17
Payer: COMMERCIAL

## 2025-04-17 VITALS
DIASTOLIC BLOOD PRESSURE: 99 MMHG | HEIGHT: 66 IN | HEART RATE: 72 BPM | WEIGHT: 183 LBS | OXYGEN SATURATION: 95 % | RESPIRATION RATE: 16 BRPM | SYSTOLIC BLOOD PRESSURE: 137 MMHG | TEMPERATURE: 98.7 F | BODY MASS INDEX: 29.41 KG/M2

## 2025-04-17 DIAGNOSIS — K76.89 HEPATIC CYST: ICD-10-CM

## 2025-04-17 DIAGNOSIS — R10.11 RIGHT UPPER QUADRANT ABDOMINAL PAIN: Primary | ICD-10-CM

## 2025-04-17 LAB
ALBUMIN SERPL-MCNC: 4.1 G/DL (ref 3.4–5)
ALBUMIN/GLOB SERPL: 1.8 {RATIO} (ref 1.1–2.2)
ALP SERPL-CCNC: 68 U/L (ref 40–129)
ALT SERPL-CCNC: 23 U/L (ref 10–40)
ANION GAP SERPL CALCULATED.3IONS-SCNC: 12 MMOL/L (ref 9–17)
AST SERPL-CCNC: 28 U/L (ref 15–37)
BASOPHILS # BLD: 0.04 K/UL
BASOPHILS NFR BLD: 0 % (ref 0–1)
BILIRUB DIRECT SERPL-MCNC: <0.2 MG/DL (ref 0–0.3)
BILIRUB INDIRECT SERPL-MCNC: ABNORMAL MG/DL (ref 0–0.7)
BILIRUB SERPL-MCNC: 0.3 MG/DL (ref 0–1)
BUN SERPL-MCNC: 11 MG/DL (ref 7–20)
CALCIUM SERPL-MCNC: 10.5 MG/DL (ref 8.3–10.6)
CHLORIDE SERPL-SCNC: 107 MMOL/L (ref 99–110)
CO2 SERPL-SCNC: 20 MMOL/L (ref 21–32)
CREAT SERPL-MCNC: 0.8 MG/DL (ref 0.6–1.1)
EOSINOPHIL # BLD: 0.31 K/UL
EOSINOPHILS RELATIVE PERCENT: 3 % (ref 0–3)
ERYTHROCYTE [DISTWIDTH] IN BLOOD BY AUTOMATED COUNT: 14 % (ref 11.7–14.9)
GFR, ESTIMATED: 77 ML/MIN/1.73M2
GLUCOSE SERPL-MCNC: 98 MG/DL (ref 74–99)
HCT VFR BLD AUTO: 38.9 % (ref 37–47)
HGB BLD-MCNC: 12.1 G/DL (ref 12.5–16)
IMM GRANULOCYTES # BLD AUTO: 0.04 K/UL
IMM GRANULOCYTES NFR BLD: 0 %
LIPASE SERPL-CCNC: 43 U/L (ref 13–60)
LYMPHOCYTES NFR BLD: 2.04 K/UL
LYMPHOCYTES RELATIVE PERCENT: 19 % (ref 24–44)
MCH RBC QN AUTO: 29.9 PG (ref 27–31)
MCHC RBC AUTO-ENTMCNC: 31.1 G/DL (ref 32–36)
MCV RBC AUTO: 96 FL (ref 78–100)
MONOCYTES NFR BLD: 1.14 K/UL
MONOCYTES NFR BLD: 10 % (ref 0–5)
NEUTROPHILS NFR BLD: 67 % (ref 36–66)
NEUTS SEG NFR BLD: 7.36 K/UL
PLATELET # BLD AUTO: 194 K/UL (ref 140–440)
PMV BLD AUTO: 10.9 FL (ref 7.5–11.1)
POTASSIUM SERPL-SCNC: 4.7 MMOL/L (ref 3.5–5.1)
PROT SERPL-MCNC: 6.3 G/DL (ref 6.4–8.2)
RBC # BLD AUTO: 4.05 M/UL (ref 4.2–5.4)
SODIUM SERPL-SCNC: 139 MMOL/L (ref 136–145)
WBC OTHER # BLD: 10.9 K/UL (ref 4–10.5)

## 2025-04-17 PROCEDURE — 74176 CT ABD & PELVIS W/O CONTRAST: CPT

## 2025-04-17 PROCEDURE — 6360000002 HC RX W HCPCS: Performed by: STUDENT IN AN ORGANIZED HEALTH CARE EDUCATION/TRAINING PROGRAM

## 2025-04-17 PROCEDURE — 6370000000 HC RX 637 (ALT 250 FOR IP): Performed by: STUDENT IN AN ORGANIZED HEALTH CARE EDUCATION/TRAINING PROGRAM

## 2025-04-17 PROCEDURE — 85025 COMPLETE CBC W/AUTO DIFF WBC: CPT

## 2025-04-17 PROCEDURE — 99284 EMERGENCY DEPT VISIT MOD MDM: CPT

## 2025-04-17 PROCEDURE — 96374 THER/PROPH/DIAG INJ IV PUSH: CPT

## 2025-04-17 PROCEDURE — 80053 COMPREHEN METABOLIC PANEL: CPT

## 2025-04-17 PROCEDURE — 82248 BILIRUBIN DIRECT: CPT

## 2025-04-17 PROCEDURE — 83690 ASSAY OF LIPASE: CPT

## 2025-04-17 RX ORDER — FAMOTIDINE 20 MG/1
20 TABLET, FILM COATED ORAL 2 TIMES DAILY
Qty: 28 TABLET | Refills: 0 | Status: SHIPPED | OUTPATIENT
Start: 2025-04-17 | End: 2025-05-01

## 2025-04-17 RX ORDER — FAMOTIDINE 20 MG/1
20 TABLET, FILM COATED ORAL ONCE
Status: COMPLETED | OUTPATIENT
Start: 2025-04-17 | End: 2025-04-17

## 2025-04-17 RX ORDER — MORPHINE SULFATE 4 MG/ML
4 INJECTION, SOLUTION INTRAMUSCULAR; INTRAVENOUS
Refills: 0 | Status: COMPLETED | OUTPATIENT
Start: 2025-04-17 | End: 2025-04-17

## 2025-04-17 RX ADMIN — LIDOCAINE HYDROCHLORIDE: 20 SOLUTION ORAL at 12:58

## 2025-04-17 RX ADMIN — FAMOTIDINE 20 MG: 20 TABLET, FILM COATED ORAL at 12:58

## 2025-04-17 RX ADMIN — MORPHINE SULFATE 4 MG: 4 INJECTION, SOLUTION INTRAMUSCULAR; INTRAVENOUS at 11:30

## 2025-04-17 ASSESSMENT — PAIN SCALES - GENERAL
PAINLEVEL_OUTOF10: 10

## 2025-04-17 ASSESSMENT — PAIN DESCRIPTION - LOCATION
LOCATION: ABDOMEN

## 2025-04-17 ASSESSMENT — PAIN DESCRIPTION - ORIENTATION: ORIENTATION: RIGHT;UPPER

## 2025-04-17 ASSESSMENT — PAIN - FUNCTIONAL ASSESSMENT
PAIN_FUNCTIONAL_ASSESSMENT: 0-10
PAIN_FUNCTIONAL_ASSESSMENT: 0-10

## 2025-04-17 ASSESSMENT — LIFESTYLE VARIABLES
HOW OFTEN DO YOU HAVE A DRINK CONTAINING ALCOHOL: NEVER
HOW MANY STANDARD DRINKS CONTAINING ALCOHOL DO YOU HAVE ON A TYPICAL DAY: PATIENT DOES NOT DRINK

## 2025-04-17 NOTE — ED NOTES
1305 called [facility] for BLS unit to transport returning patient back to [facility]. Spoke with Tianna at dispatch. ETA for  scheduled for 1400.

## 2025-04-17 NOTE — ED NOTES
Pt arrives via EMS w/ c/o RUQ pain that radiates to R shoulder. Woke pt up at 4AM d/t the pain. Pt states they are unable to take a deep breathe d/t pain, stating nothing makes it better or worse. Nausea but no emesis, and pt stated they took AM meds / water but that did not increase/decrease pain, pt had APAP this morning w/o relief

## 2025-04-17 NOTE — ED PROVIDER NOTES
Emergency Department Encounter    Patient: Coty Stearns  MRN: [id number]  : 1970  Date of Evaluation: 2025  ED Provider:  Blanco Steven DO    Triage Chief Complaint:   Abdominal Pain (RUQ, radiates to R shoulder, started at 4AM)    [place]:  Coty Stearns is a 54 y.o. female that presents with right upper quadrant abdominal pain that wraps to her right flank.  Began this morning at 4 AM.  It woke her out of sleep.  She reports she has been nauseous but no vomiting but does report she has some degree of chronic nausea.  No diarrhea.  Denies any urinary changes such as dysuria or hematuria.  No fevers.    MDM:    History from : Patient    On evaluation patient appears comfortable with watching TV, is mildly hypertensive otherwise stable vital signs.  Does have right upper quadrant tenderness and some mild CVA tenderness but overall nonsurgical examination.  No concerning metabolic or hematologic abnormalities on workup.  CT scan was obtained shows no acute abnormalities either, does have a nonspecific hepatic cyst but do not think this is causing her symptoms and I have lower suspicion for hepatic abscess.  She does report that she has previously dealt with gastritis related issues but does not take any sort of antiacid regimen at this time.  Will give her Maalox and Pepcid here and have her follow-up outpatient with her PCP and possibly consider referral for GI with PCP if symptoms not improved.  At this time based on her workup I think she can further be managed as an outpatient           Patient was given the following medications:  Medications   morphine sulfate (PF) injection 4 mg (4 mg IntraVENous Given 25 1130)   aluminum & magnesium hydroxide-simethicone (MAALOX PLUS) 30 mL, lidocaine viscous hcl (XYLOCAINE) 5 mL (GI COCKTAIL) ( Oral Given 25 1258)   famotidine (PEPCID) tablet 20 mg (20 mg Oral Given 25 1258)       Discharge condition: stable    I am the Primary Clinician of

## 2025-04-17 NOTE — ED NOTES
Called lab and spoke w/ Tom to inform of incoming redraws for pt. Purple top and green top sent. Understanding voiced

## 2025-04-17 NOTE — ED NOTES
During pt's d/c instructions pt stated they've never had this pain before and it has not been alleviated w/ medication indicating pain 10/10 still. Pt stated they were not comfortable to leave. Informed Dr. Steven of pt's concerns, provider at bedside att.

## 2025-05-27 NOTE — PROGRESS NOTES
MRN: [id number]  Name: Coty Stearns  : 1970    Insurance: Payor: [employer] [place] /  /  /      Phone #: [phone]  Provider: MONTSE Starks CNP     Date of Visit: 2025    Reason for visit:  Bp has been high, SOB Recent Hospitalization Date: 25    Reason for Hospitalization: Right upper quadrant abdominal pain.    Last EK25  Type of Device:       Vitals BP HR O2% WT HT ORTHO BP LYING ORTHO BP SITTING ORTHO BP SITTING   Today's Findings           Patients work up- Check List     Testing Last Date Completed Date Expected  ([facility]) Additional Notes    MA to document For provider to complete Either MA or Provider    Carotid Duplex  STAT 1 WK 6 MTH       THIS WK 2 WK 1 YEAR     Cardiac CTA  STAT 1 WK 6 MTH       THIS WK 2 WK 1 YEAR     Cardiac CT Calcium scoring  STAT 1 WK 6 MTH       THIS WK 2 WK 1 YEAR     CTA Chest, Abdomen & Pelvis  STAT 1 WK 6 MTH       THIS WK 2 WK 1 YEAR     CT Chest IV w/ Contrast  STAT 1 WK 6 MTH       THIS WK 2 WK 1 YEAR     CT Chest w/o Contrast  STAT 1 WK 6 MTH       THIS WK 2 WK 1 YEAR     CXR  STAT 1 WK 6 MTH       THIS WK 2 WK 1 YEAR     ECHO  Stress Complete Limited     MRI- Cardiac  STAT 1 WK 6 MTH       THIS WK 2 WK 1 YEAR     MUGA Scan  STAT 1 WK 6 MTH       THIS WK 2 WK 1 YEAR     Nuclear Stress  Lexiscan Cardiolite     PFT  STAT 1 WK 6 MTH       THIS WK 2 WK 1 YEAR     Treadmill Stress Test  STAT 1 WK 6 MTH       THIS WK 2 WK 1 YEAR     Vascular Duplex  Lower: Right Left Bilat       Upper: Right Left Bilat     Other Test Not Listed:    Monitors Last Date Completed Day's Request/Ordered     Holter  Short term 24 hours 48 hours      Long term 3 days 7 days 14 days   Event   (1-30 days)      Procedures Last Date Performed Procedure Details Date Expected   Additional Notes    ASD Closure        Carotid Angio        Cardioversion        Heart Cath  R L R&L      Peripheral Angio  R L      PFO Closure        PTCA/PCI        MICHEAL

## 2025-05-28 ENCOUNTER — TELEPHONE (OUTPATIENT)
Dept: SLEEP CENTER | Age: 55
End: 2025-05-28

## 2025-05-28 NOTE — TELEPHONE ENCOUNTER
significant comorbidities requiring additional attention/ care.      OTHER CRITERIA: (Acuity score must accompany comments in this section)

## 2025-05-29 ENCOUNTER — OFFICE VISIT (OUTPATIENT)
Dept: CARDIOLOGY CLINIC | Age: 55
End: 2025-05-29
Payer: COMMERCIAL

## 2025-05-29 VITALS
HEART RATE: 72 BPM | HEIGHT: 65 IN | WEIGHT: 186.6 LBS | SYSTOLIC BLOOD PRESSURE: 144 MMHG | DIASTOLIC BLOOD PRESSURE: 100 MMHG | BODY MASS INDEX: 31.09 KG/M2

## 2025-05-29 DIAGNOSIS — I10 ESSENTIAL HYPERTENSION: Primary | ICD-10-CM

## 2025-05-29 DIAGNOSIS — R47.89 OTHER SPEECH DISTURBANCE: ICD-10-CM

## 2025-05-29 PROCEDURE — 99214 OFFICE O/P EST MOD 30 MIN: CPT | Performed by: NURSE PRACTITIONER

## 2025-05-29 PROCEDURE — G8417 CALC BMI ABV UP PARAM F/U: HCPCS | Performed by: NURSE PRACTITIONER

## 2025-05-29 PROCEDURE — 3017F COLORECTAL CA SCREEN DOC REV: CPT | Performed by: NURSE PRACTITIONER

## 2025-05-29 PROCEDURE — G8427 DOCREV CUR MEDS BY ELIG CLIN: HCPCS | Performed by: NURSE PRACTITIONER

## 2025-05-29 PROCEDURE — 3080F DIAST BP >= 90 MM HG: CPT | Performed by: NURSE PRACTITIONER

## 2025-05-29 PROCEDURE — 93000 ELECTROCARDIOGRAM COMPLETE: CPT | Performed by: NURSE PRACTITIONER

## 2025-05-29 PROCEDURE — 1036F TOBACCO NON-USER: CPT | Performed by: NURSE PRACTITIONER

## 2025-05-29 PROCEDURE — 3077F SYST BP >= 140 MM HG: CPT | Performed by: NURSE PRACTITIONER

## 2025-05-29 RX ORDER — AMLODIPINE BESYLATE 2.5 MG/1
2.5 TABLET ORAL 2 TIMES DAILY
Qty: 180 TABLET | Refills: 3 | Status: SHIPPED | OUTPATIENT
Start: 2025-05-29

## 2025-05-29 NOTE — PROGRESS NOTES
CLINICAL STAFF DOCUMENTATION         Coty CANO Daryn  1970  [id number]    Have you had any Chest Pain recently? - No        Have you had any Shortness of Breath - Yes  If Yes - When at rest and on exertion  How many flights of stairs can you go up without having SOB? - none.   Are you on Oxygen during the day or at night? - No  How many pillows do you sleep with under your head? - 2 / elevates head of bed     Have you had any dizziness - Yes / per pt has had this for a long time.   When do you feel dizzy? walking / if gets up too fast   Does the room spin? No  How long does it last .  seconds       Have you had any palpitations recently? - No      Do you have any edema - swelling in No      When did you have your last labs drawn 04/17/2025  What doctor ordered shan vasquez , do   Do we have the labs in their chart Yes            Do you have a surgery or procedure scheduled in the near future - No      Caffeine? - No / hasn't for a couple days

## 2025-05-29 NOTE — PROGRESS NOTES
CARDIOLOGY  NOTE    2025    Coty Stearns (:  1970) is a 54 y.o. female,an established patient with Dr. España, here for evaluation of the following chief complaint(s):  Shortness of Breath, Other (High BP and SOB ), and Dizziness        SUBJECTIVE/OBJECTIVE:    History of Present Illness  The patient presents for evaluation of elevated blood pressure and stroke-like symptoms.    She reports experiencing severe headaches, which she attributes to her elevated blood pressure. On one occasion, she experienced chest pain when her blood pressure was extremely high. Her current medication regimen includes Ranexa 1000 mg, Effient, metoprolol 50 mg twice daily, and amlodipine nightly. She also has midodrine available for use as needed. She is requesting a refill on her amlodipine.    In 2025, she experienced an episode of vomiting, incontinence, and generalized shaking, accompanied by severe stuttering. These symptoms were initially suspected to be indicative of a stroke, but no definitive diagnosis was made. She continues to experience difficulty with speech, particularly with stuttering. She has not yet consulted with a neurologist regarding these symptoms. She was subsequently transferred to a nursing facility due to her inability to walk and overall weakness. She was discharged from the nursing facility after completing her physical therapy.    SOCIAL HISTORY  Exercise: Physical therapy    Coty has a history of Palpitation, Fibromyalgia, Chronic obstructive pulmonary disease (HCC), Schizoaffective disorder ([facility]), History of hysterectomy, Dyslipidemia, Fatty liver, Chronic back pain, Chronic low back pain, Acquired hypothyroidism, Former tobacco use, Simple chronic bronchitis ([facility]), Essential hypertension, Perforated left tympanic membrane on examination, COPD exacerbation ([facility]), COVID-19, History of ST elevation myocardial infarction (STEMI), Family history of early CAD, Coronary artery

## 2025-06-05 ENCOUNTER — TELEPHONE (OUTPATIENT)
Age: 55
End: 2025-06-05

## 2025-06-05 RX ORDER — PRASUGREL 10 MG/1
10 TABLET, FILM COATED ORAL DAILY
Qty: 90 TABLET | Refills: 1 | Status: SHIPPED | OUTPATIENT
Start: 2025-06-05

## 2025-06-05 NOTE — PROGRESS NOTES
of Procedure:   Cardiac Clearance  ([facility])   Needs Further Workup Not Cleared Cleared ([facility] below)     Low Risk     Moderate Risk     High Risk   Additional Notes:   Consult/ Referral   CT Surgery Electrophysiology TAVR Coordination (Jeanne Peabody) [facility] Venous Ablation Consult Watchman Consult  ([facility] Below)        Connie España   Additional Notes:   Labs Date Last Completed Date Expected Retrieve from outside source?    BMP 4/17/25      BNP       CBC 2/25/25      INR       Lipid 2/19/25      TSH 2/18/25      A1C 2/19/25      Other:      Additional Notes:   Medication/Samples Type of Medication Additional Notes    Samples of      Paper script      Research Study     Other:   Next Office Visit  ([facility])   1 month 3 months 6 months 9 months 1 year PRN 1 week 2 weeks   Additional Notes:

## 2025-06-10 ENCOUNTER — OFFICE VISIT (OUTPATIENT)
Dept: ORTHOPEDIC SURGERY | Age: 55
End: 2025-06-10

## 2025-06-10 VITALS — OXYGEN SATURATION: 96 % | HEART RATE: 67 BPM | HEIGHT: 65 IN | BODY MASS INDEX: 30.99 KG/M2 | WEIGHT: 186 LBS

## 2025-06-10 DIAGNOSIS — M25.561 RIGHT KNEE PAIN, UNSPECIFIED CHRONICITY: Primary | ICD-10-CM

## 2025-06-10 RX ORDER — TRIAMCINOLONE ACETONIDE 40 MG/ML
40 INJECTION, SUSPENSION INTRA-ARTICULAR; INTRAMUSCULAR ONCE
Status: COMPLETED | OUTPATIENT
Start: 2025-06-10 | End: 2025-06-10

## 2025-06-10 RX ADMIN — TRIAMCINOLONE ACETONIDE 40 MG: 40 INJECTION, SUSPENSION INTRA-ARTICULAR; INTRAMUSCULAR at 13:55

## 2025-06-10 NOTE — PATIENT INSTRUCTIONS
Continue weight-bearing as tolerated.  Continue range of motion exercises as instructed.  Ice and elevate as needed.  Tylenol or Motrin for pain.  Steroid injection given into right knee today  Follow up in 6 weeks    Out patient Physical Therapy has been ordered by your provider. [facility] will call you to set up therapy. If you have not heard from them within 24-48 hours of today's appointment, please reach out to them at [phone].

## 2025-06-12 ENCOUNTER — TRANSCRIBE ORDERS (OUTPATIENT)
Dept: ADMINISTRATIVE | Age: 55
End: 2025-06-12

## 2025-06-12 ENCOUNTER — OFFICE VISIT (OUTPATIENT)
Dept: CARDIOLOGY CLINIC | Age: 55
End: 2025-06-12
Payer: COMMERCIAL

## 2025-06-12 VITALS
BODY MASS INDEX: 32.99 KG/M2 | DIASTOLIC BLOOD PRESSURE: 72 MMHG | HEART RATE: 88 BPM | SYSTOLIC BLOOD PRESSURE: 138 MMHG | WEIGHT: 198 LBS | HEIGHT: 65 IN

## 2025-06-12 DIAGNOSIS — I10 ESSENTIAL HYPERTENSION: ICD-10-CM

## 2025-06-12 DIAGNOSIS — I25.10 CORONARY ARTERY DISEASE INVOLVING NATIVE CORONARY ARTERY OF NATIVE HEART WITHOUT ANGINA PECTORIS: Primary | ICD-10-CM

## 2025-06-12 DIAGNOSIS — Z87.891 FORMER TOBACCO USE: ICD-10-CM

## 2025-06-12 DIAGNOSIS — Z82.49 FAMILY HISTORY OF EARLY CAD: ICD-10-CM

## 2025-06-12 DIAGNOSIS — Z12.31 ENCOUNTER FOR SCREENING MAMMOGRAM FOR MALIGNANT NEOPLASM OF BREAST: Primary | ICD-10-CM

## 2025-06-12 DIAGNOSIS — I20.1 CORONARY ARTERY SPASM: ICD-10-CM

## 2025-06-12 DIAGNOSIS — E78.5 DYSLIPIDEMIA: ICD-10-CM

## 2025-06-12 PROCEDURE — 1036F TOBACCO NON-USER: CPT | Performed by: NURSE PRACTITIONER

## 2025-06-12 PROCEDURE — 3017F COLORECTAL CA SCREEN DOC REV: CPT | Performed by: NURSE PRACTITIONER

## 2025-06-12 PROCEDURE — G8417 CALC BMI ABV UP PARAM F/U: HCPCS | Performed by: NURSE PRACTITIONER

## 2025-06-12 PROCEDURE — 3075F SYST BP GE 130 - 139MM HG: CPT | Performed by: NURSE PRACTITIONER

## 2025-06-12 PROCEDURE — 3078F DIAST BP <80 MM HG: CPT | Performed by: NURSE PRACTITIONER

## 2025-06-12 PROCEDURE — G8427 DOCREV CUR MEDS BY ELIG CLIN: HCPCS | Performed by: NURSE PRACTITIONER

## 2025-06-12 PROCEDURE — 99214 OFFICE O/P EST MOD 30 MIN: CPT | Performed by: NURSE PRACTITIONER

## 2025-06-12 RX ORDER — ATORVASTATIN CALCIUM 80 MG/1
80 TABLET, FILM COATED ORAL NIGHTLY
COMMUNITY
Start: 2025-04-30

## 2025-06-12 RX ORDER — OXYBUTYNIN CHLORIDE 15 MG/1
TABLET, EXTENDED RELEASE ORAL
COMMUNITY
Start: 2025-06-11

## 2025-06-12 RX ORDER — PRASUGREL 10 MG/1
10 TABLET, FILM COATED ORAL DAILY
Qty: 90 TABLET | Refills: 1 | Status: SHIPPED | OUTPATIENT
Start: 2025-06-12

## 2025-06-12 RX ORDER — BUTALBITAL, ACETAMINOPHEN, AND CAFFEINE 50; 300; 40 MG/1; MG/1; MG/1
CAPSULE ORAL
COMMUNITY
Start: 2025-03-03

## 2025-06-12 RX ORDER — LORAZEPAM 0.5 MG/1
TABLET ORAL
COMMUNITY
Start: 2025-04-30

## 2025-06-12 NOTE — PATIENT INSTRUCTIONS
Thank you for allowing us to care for you today!   We want to ensure we can follow your treatment plan and we strive to give you the best outcomes and experience possible.   If you ever have a life threatening emergency and call 911 - for an ambulance (EMS)  REMEMBER  Our providers can only care for you at:   [facility] or [facility]   Even if you have someone take you or you drive yourself we can only care for you in a [facility] facility. Our providers are not setup at the other healthcare locations!    PLEASE CALL OUR OFFICE DURING NORMAL BUSINESS HOURS  Monday through Friday 8 am to 5 pm  AFTER HOURS the physician on-call cannot help with scheduling, rescheduling, procedure instruction questions or any type of medication need or issue.  [facility] P:[phone] - [facility] P:[phone] - [facility] P:[phone]      If you receive a survey:  We would appreciate you taking the time to share your experience concerning your provider visit in the office.    These surveys are confidential!  We are eager to improve and are counting on you to share your feedback so we can ensure you get the best care possible.

## 2025-06-18 ENCOUNTER — OFFICE VISIT (OUTPATIENT)
Dept: ORTHOPEDIC SURGERY | Age: 55
End: 2025-06-18
Payer: COMMERCIAL

## 2025-06-18 VITALS — BODY MASS INDEX: 32.95 KG/M2 | HEART RATE: 87 BPM | RESPIRATION RATE: 17 BRPM | HEIGHT: 65 IN | OXYGEN SATURATION: 97 %

## 2025-06-18 DIAGNOSIS — M25.552 LEFT HIP PAIN: Primary | ICD-10-CM

## 2025-06-18 PROCEDURE — 3017F COLORECTAL CA SCREEN DOC REV: CPT

## 2025-06-18 PROCEDURE — G8427 DOCREV CUR MEDS BY ELIG CLIN: HCPCS

## 2025-06-18 PROCEDURE — G8417 CALC BMI ABV UP PARAM F/U: HCPCS

## 2025-06-18 PROCEDURE — 1036F TOBACCO NON-USER: CPT

## 2025-06-18 PROCEDURE — 20610 DRAIN/INJ JOINT/BURSA W/O US: CPT

## 2025-06-18 PROCEDURE — 99213 OFFICE O/P EST LOW 20 MIN: CPT

## 2025-06-18 RX ORDER — TRIAMCINOLONE ACETONIDE 40 MG/ML
40 INJECTION, SUSPENSION INTRA-ARTICULAR; INTRAMUSCULAR ONCE
Status: COMPLETED | OUTPATIENT
Start: 2025-06-18 | End: 2025-06-18

## 2025-06-18 RX ORDER — METHOCARBAMOL 750 MG/1
750 TABLET, FILM COATED ORAL
COMMUNITY
Start: 2025-06-11

## 2025-06-18 RX ORDER — ONDANSETRON 4 MG/1
TABLET, FILM COATED ORAL
COMMUNITY
Start: 2025-04-07

## 2025-06-18 RX ORDER — FLUTICASONE PROPIONATE AND SALMETEROL 250; 50 UG/1; UG/1
POWDER RESPIRATORY (INHALATION)
COMMUNITY

## 2025-06-18 RX ADMIN — TRIAMCINOLONE ACETONIDE 40 MG: 40 INJECTION, SUSPENSION INTRA-ARTICULAR; INTRAMUSCULAR at 14:39

## 2025-06-18 ASSESSMENT — ENCOUNTER SYMPTOMS
VOMITING: 0
COLOR CHANGE: 0
CHEST TIGHTNESS: 0
EYE PAIN: 0
SHORTNESS OF BREATH: 0
WHEEZING: 0
EYE REDNESS: 0

## 2025-06-18 NOTE — PATIENT INSTRUCTIONS
Continue weight-bearing as tolerated.  Continue range of motion exercises as instructed.  Ice and elevate as needed.  Tylenol or Motrin for pain.  Left hip injection given today.  Follow up in 2 weeks for the right hip injection.

## 2025-06-18 NOTE — PROGRESS NOTES
Patient returns to the office with left hip pain. Pt stated the pain has been on gonig for many years and feels both hips have gotten worse with time. Pt stated she has sharp, aching and stabbing sensations at times on the lateral hip. Pt stated she has not had any treatments but has had some lower back injections in the past. Pt rated her pain today about a 5/10.

## 2025-06-18 NOTE — PROGRESS NOTES
Pt returns to the office with right knee pain. Pt states she was here in April and deferred from getting and injection. Pt states the nursing home facility she was at she was in physical therapy for leg strength and she did not feel like that was beneficial. Pt states her right knee still feels stiff. Pt states her right knee swells the more she is on it. Pt rates her pain 5/10. Pt states she is having difficulty sleeping at night due to the pain in her right knee.  
Homeless in the Last Year: No     Current Outpatient Medications   Medication Sig Dispense Refill    atorvastatin (LIPITOR) 80 MG tablet Take 1 tablet by mouth nightly at bedtime.      FIORICET -40 MG CAPS per capsule Take by mouth      LORazepam (ATIVAN) 0.5 MG tablet Take by mouth.      oxyBUTYnin (DITROPAN XL) 15 MG extended release tablet       prasugrel (EFFIENT) 10 MG TABS Take 1 tablet by mouth daily 90 tablet 1    amLODIPine (NORVASC) 2.5 MG tablet Take 1 tablet by mouth in the morning and at bedtime Hold for SBP < 110 180 tablet 3    albuterol sulfate HFA (PROVENTIL;VENTOLIN;PROAIR) 108 (90 Base) MCG/ACT inhaler Inhale 2 puffs into the lungs every 6 hours as needed for Wheezing or Shortness of Breath 18 each 3    budesonide-formoterol (SYMBICORT) 160-4.5 MCG/ACT AERO Inhale 2 puffs into the lungs in the morning and at bedtime 30.6 each 3    montelukast (SINGULAIR) 10 MG tablet Take 1 tablet by mouth nightly 30 tablet 0    guaiFENesin (MUCINEX) 600 MG extended release tablet Take 1 tablet by mouth 2 times daily 30 tablet 3    famotidine (PEPCID) 20 MG tablet Take 1 tablet by mouth 2 times daily for 14 days 28 tablet 0    vitamin B-12 1000 MCG tablet Take 1 tablet by mouth daily      sennosides-docusate sodium (SENOKOT-S) 8.6-50 MG tablet Take 2 tablets by mouth daily as needed for Constipation      Benzocaine-Menthol (CEPACOL) 6-10 MG LOZG lozenge Take 1 lozenge by mouth every 2 hours as needed for Sore Throat      Vitamin D (CHOLECALCIFEROL) 50 MCG (2000 UT) TABS tablet Take 1 tablet by mouth daily      dicyclomine (BENTYL) 20 MG tablet Take 1 tablet by mouth 3 times daily as needed (stomach cramps) 120 tablet 3    ondansetron (ZOFRAN-ODT) 4 MG disintegrating tablet Take 1 tablet by mouth every 8 hours as needed for Nausea or Vomiting 30 tablet 0    acetaminophen (TYLENOL) 500 MG tablet Take 2 tablets by mouth every 6 hours as needed for Pain or Fever (Headache)      ibuprofen (ADVIL;MOTRIN) 600 MG

## 2025-06-26 ASSESSMENT — ENCOUNTER SYMPTOMS
NAUSEA: 0
COUGH: 0
BACK PAIN: 1
RHINORRHEA: 0
FACIAL SWELLING: 0
SHORTNESS OF BREATH: 0

## 2025-06-26 NOTE — PROGRESS NOTES
6/18/2025   Chief Complaint   Patient presents with    Hip Pain     Left        History of Present Illness:                             Coty Stearns is a 55 y.o. female returning to the office today as an established patient with a new issue of the left and right hip pain.  She states her left hip is worse than the right at this time.  She has also had back pain in the past returning to the office today with a new issue of bilateral hip pain left worse than right.  Patient notes the pain is on the lateral hips and can be stabbing and aching at times.  She denies any acute or traumatic injury though does explain her symptoms.    Patient returns to the office with left hip pain. Pt stated the pain has been on gonig for many years and feels both hips have gotten worse with time. Pt stated she has sharp, aching and stabbing sensations at times on the lateral hip. Pt stated she has not had any treatments but has had some lower back injections in the past. Pt rated her pain today about a 5/10.     Medical History  Patient's medications, allergies, past medical, surgical, social and family histories were reviewed and updated as appropriate.    Review of Systems   Constitutional:  Negative for fever.   HENT:  Negative for facial swelling and rhinorrhea.    Respiratory:  Negative for cough and shortness of breath.    Cardiovascular:  Negative for chest pain.   Gastrointestinal:  Negative for nausea.   Musculoskeletal:  Positive for arthralgias, back pain and gait problem. Negative for joint swelling, myalgias, neck pain and neck stiffness.   Skin:  Negative for pallor and rash.   Neurological:  Negative for facial asymmetry and speech difficulty.   Psychiatric/Behavioral:  Negative for agitation and confusion.                                                Examination:  General Exam:  Vitals: Pulse 87   Resp 17   Ht 1.651 m (5' 5\")   LMP  (LMP Unknown)   SpO2 97%   BMI 32.95 kg/m²    Physical

## 2025-07-01 ENCOUNTER — OFFICE VISIT (OUTPATIENT)
Dept: ORTHOPEDIC SURGERY | Age: 55
End: 2025-07-01

## 2025-07-01 VITALS — HEART RATE: 80 BPM | OXYGEN SATURATION: 97 % | WEIGHT: 198 LBS | HEIGHT: 65 IN | BODY MASS INDEX: 32.99 KG/M2

## 2025-07-01 DIAGNOSIS — M25.551 RIGHT HIP PAIN: Primary | ICD-10-CM

## 2025-07-01 RX ORDER — TRIAMCINOLONE ACETONIDE 40 MG/ML
40 INJECTION, SUSPENSION INTRA-ARTICULAR; INTRAMUSCULAR ONCE
Status: COMPLETED | OUTPATIENT
Start: 2025-07-01 | End: 2025-07-01

## 2025-07-01 RX ADMIN — TRIAMCINOLONE ACETONIDE 40 MG: 40 INJECTION, SUSPENSION INTRA-ARTICULAR; INTRAMUSCULAR at 08:51

## 2025-07-01 NOTE — PATIENT INSTRUCTIONS
Continue weight-bearing as tolerated.  Continue range of motion exercises as instructed.  Ice and elevate as needed.  Tylenol or Motrin for pain.  Steroid injection given today in right hip  Follow up as needed     07-Feb-2020 14:32

## 2025-07-09 ASSESSMENT — ENCOUNTER SYMPTOMS
BACK PAIN: 1
SHORTNESS OF BREATH: 0
COUGH: 0
FACIAL SWELLING: 0
RHINORRHEA: 0
NAUSEA: 0

## 2025-07-09 NOTE — PROGRESS NOTES
7/1/2025   Chief Complaint   Patient presents with    Hip Pain     right        History of Present Illness:             Today's HPI:  Patient is a 55-year-old female returning to the office today for continued management of bilateral hip pain.  Patient received a left hip injection roughly 2 weeks ago.  She is here for a right hip injection.  She denies any new injury to the area.    Previous HPI:                  Coty Stearns is a 55 y.o. female returning to the office today as an established patient with a new issue of the left and right hip pain.  She states her left hip is worse than the right at this time.  She has also had back pain in the past returning to the office today with a new issue of bilateral hip pain left worse than right.  Patient notes the pain is on the lateral hips and can be stabbing and aching at times.  She denies any acute or traumatic injury though does explain her symptoms.    Patient returns to the office with left hip pain. Pt stated the pain has been on gonig for many years and feels both hips have gotten worse with time. Pt stated she has sharp, aching and stabbing sensations at times on the lateral hip. Pt stated she has not had any treatments but has had some lower back injections in the past. Pt rated her pain today about a 5/10.     Medical History  Patient's medications, allergies, past medical, surgical, social and family histories were reviewed and updated as appropriate.    Review of Systems   Constitutional:  Negative for fever.   HENT:  Negative for facial swelling and rhinorrhea.    Respiratory:  Negative for cough and shortness of breath.    Cardiovascular:  Negative for chest pain.   Gastrointestinal:  Negative for nausea.   Musculoskeletal:  Positive for arthralgias, back pain and gait problem. Negative for joint swelling, myalgias, neck pain and neck stiffness.   Skin:  Negative for pallor and rash.   Neurological:  Negative for facial asymmetry and speech

## 2025-08-20 ENCOUNTER — HOSPITAL ENCOUNTER (OUTPATIENT)
Dept: CT IMAGING | Age: 55
Discharge: HOME | End: 2025-08-20
Attending: INTERNAL MEDICINE
Payer: COMMERCIAL

## 2025-08-20 DIAGNOSIS — Z87.891 PERSONAL HISTORY OF TOBACCO USE, PRESENTING HAZARDS TO HEALTH: ICD-10-CM

## 2025-08-20 PROCEDURE — 71271 CT THORAX LUNG CANCER SCR C-: CPT

## 2025-08-22 RX ORDER — METOPROLOL TARTRATE 50 MG
50 TABLET ORAL 2 TIMES DAILY
Qty: 180 TABLET | Refills: 1 | Status: SHIPPED | OUTPATIENT
Start: 2025-08-22

## 2025-08-29 ENCOUNTER — HOSPITAL ENCOUNTER (OUTPATIENT)
Dept: WOMENS IMAGING | Age: 55
Discharge: HOME | End: 2025-08-29
Attending: FAMILY MEDICINE
Payer: COMMERCIAL

## 2025-08-29 VITALS — BODY MASS INDEX: 32.14 KG/M2 | HEIGHT: 66 IN | WEIGHT: 200 LBS

## 2025-08-29 DIAGNOSIS — Z12.31 ENCOUNTER FOR SCREENING MAMMOGRAM FOR MALIGNANT NEOPLASM OF BREAST: ICD-10-CM

## 2025-08-29 PROCEDURE — 77063 BREAST TOMOSYNTHESIS BI: CPT

## (undated) DEVICE — HI-TORQUE BALANCE MIDDLEWEIGHT UNIVERSAL II GUIDE WIRE STRAIGHT TIP PAK  190 CM: Brand: HI-TORQUE BALANCE MIDDLEWEIGHT UNIVERSAL II

## (undated) DEVICE — INTRODUCER SHTH THN WALLED 5 FRX10 CM 22 GA ANGLED RAIN SHTH

## (undated) DEVICE — Device

## (undated) DEVICE — GLIDESHEATH SLENDER ACCESS KIT: Brand: GLIDESHEATH SLENDER

## (undated) DEVICE — RADIFOCUS GLIDEWIRE: Brand: GLIDEWIRE

## (undated) DEVICE — BAND COMPR L24CM REG CLR PLAS HEMSTAT EXT HK AND LOOP RETEN

## (undated) DEVICE — CATHETER GUID EXTRA BACKUP 3.5 0.070IN 6FR 100CM VISTA BRITE TIP

## (undated) DEVICE — RADIFOCUS OPTITORQUE ANGIOGRAPHIC CATHETER: Brand: OPTITORQUE

## (undated) DEVICE — 150CM STANDARD JWIRE

## (undated) DEVICE — CORDIS 4FR JR4 CATHETER

## (undated) DEVICE — 260 CM J TIP WIRE .035

## (undated) DEVICE — HI-TORQUE WHISPER MS GUIDE WIRE .014 J TIP 3.0 CM X 190 CM: Brand: HI-TORQUE WHISPER

## (undated) DEVICE — ANGIOGRAPHY KIT CUST MANIFOLD

## (undated) DEVICE — CORDIS XB3 CATHETER 100CM